# Patient Record
Sex: MALE | Race: WHITE | NOT HISPANIC OR LATINO | Employment: OTHER | URBAN - METROPOLITAN AREA
[De-identification: names, ages, dates, MRNs, and addresses within clinical notes are randomized per-mention and may not be internally consistent; named-entity substitution may affect disease eponyms.]

---

## 2017-01-02 ENCOUNTER — APPOINTMENT (OUTPATIENT)
Dept: CARDIOLOGY | Facility: HOSPITAL | Age: 67
End: 2017-01-02
Attending: INTERNAL MEDICINE

## 2017-01-02 PROCEDURE — 93306 TTE W/DOPPLER COMPLETE: CPT | Performed by: INTERNAL MEDICINE

## 2017-01-02 PROCEDURE — C8929 TTE W OR WO FOL WCON,DOPPLER: HCPCS

## 2017-01-04 ENCOUNTER — APPOINTMENT (OUTPATIENT)
Dept: CARDIOLOGY | Facility: HOSPITAL | Age: 67
End: 2017-01-04

## 2017-01-06 ENCOUNTER — APPOINTMENT (OUTPATIENT)
Dept: CARDIOLOGY | Facility: HOSPITAL | Age: 67
End: 2017-01-06

## 2017-01-09 ENCOUNTER — APPOINTMENT (OUTPATIENT)
Dept: CARDIOLOGY | Facility: HOSPITAL | Age: 67
End: 2017-01-09

## 2017-01-09 RX ORDER — CARVEDILOL 3.12 MG/1
TABLET ORAL
Qty: 60 TABLET | Refills: 0 | OUTPATIENT
Start: 2017-01-09

## 2017-01-09 RX ORDER — CARVEDILOL 6.25 MG/1
12.5 TABLET ORAL 2 TIMES DAILY WITH MEALS
Qty: 60 TABLET | Refills: 6 | Status: CANCELLED
Start: 2017-01-09

## 2017-01-10 ENCOUNTER — OFFICE VISIT (OUTPATIENT)
Dept: CARDIOLOGY | Facility: CLINIC | Age: 67
End: 2017-01-10

## 2017-01-10 VITALS
HEIGHT: 68 IN | SYSTOLIC BLOOD PRESSURE: 138 MMHG | BODY MASS INDEX: 26.98 KG/M2 | DIASTOLIC BLOOD PRESSURE: 78 MMHG | WEIGHT: 178 LBS | HEART RATE: 96 BPM

## 2017-01-10 DIAGNOSIS — I25.5 CARDIOMYOPATHY, ISCHEMIC: Primary | ICD-10-CM

## 2017-01-10 PROCEDURE — 93000 ELECTROCARDIOGRAM COMPLETE: CPT | Performed by: NURSE PRACTITIONER

## 2017-01-10 PROCEDURE — 99214 OFFICE O/P EST MOD 30 MIN: CPT | Performed by: NURSE PRACTITIONER

## 2017-01-10 NOTE — PROGRESS NOTES
Date of Office Visit: 01/10/2017  Encounter Provider: ALEX Soler  Place of Service: [facility] CARDIOLOGY  Patient Name: Mello Arias Jr.  :1950    Chief Complaint   Patient presents with   • Shortness of Breath     Follow-up   :       HPI: Mello Arias Jr. is a 66 y.o. male who has a history of coronary artery disease, myocardial infarction (), and status post angioplasty of the left anterior descending artery, ventricular tachycardia (ICD) and paroxysmal atrial fibrillation.  He also has a history of left ventricular dysfunction.  He is a patient that is followed by Dr. Calvin and I'm seeing him for the first time today.    In 2016, the patient came in for preoperative assessment.  He was seen by ALEX Chinchilla and and reported he had been having midsternal chest pain.  At that time, an echocardiogram and stress tests were ordered.  In 2016, his echo showed his left ventricular cavity severely dilated.  His EF was 19%.  HEENT mildly reduced right ventricular systolic function, moderate MR and mild to moderate TR.  He had a estimated RVSP of greater than 55 mmHg.  His Myoview stress test indicated a large sized infarct located in the anterior wall, inferior wall, septal wall and apex with mild kavita-infarct ischemia.  Both tests were felt to be unchanged from his prior studies in 2014.  At that time, we felt he was high risk to undergo a moderate risk surgery.    About a week after this testing performed, he presented to the hospital with precordial chest pain.  He had elevated proBNP consistent with acute on chronic systolic congestive heart failure.  He was diuresed and discharged 2 days later.  He followed up with her Nikunj few weeks after discharge and was complaining of palpitation and continued orthopnea.  His persistent tightness in the precordium and was easily fatigued.  He went on to have a cardiac  catheterization.  This showed severe two-vessel coronary disease with complete occlusion of the proximal LAD and high-grade stenosis of the distal right coronary artery.  He underwent a CABG ×4 and mitral valve prosthesis with a tissue prosthesis and a tricuspid valve repair.  On November 7, 2016.  He enrolled in cardiac rehabilitation.  He was doing well before Christmas and then on December 30 presented to the hospital with chest tightness, elevated blood pressure as well as shortness of breath.  He was found to be in acute systolic heart failure.  His diuresed with IV diuretics and switched to oral.  An echocardiogram revealed his EF still to be around 20% with stable valve function.    Today, he comes in for follow-up.  He is feeling much improved than when he was in the hospital.  He is found that with his current medications he is able to take breaths easier.  Cardiac rehabilitation was also easier.  He is scheduled to start that again tomorrow.  He denies palpitations or tachycardia.  His edema is improved.  He denies lightheadedness, chest pain, fatigue, orthopnea or PND.  He monitors his blood pressure at home and typically runs 130/60.    Past Medical History   Diagnosis Date   • Allergic rhinitis    • Anemia    • Asthma    • Bone fracture    • CAD (coronary artery disease)    • Cardiomyopathy, ischemic    • Carpal tunnel syndrome    • Fatigue    • GERD (gastroesophageal reflux disease)    • Health care maintenance    • Herniated disc, cervical    • Hx of total hip arthroplasty 3/18/2016   • Hyperglycemia    • Hyperlipidemia    • Hypertension    • Lumbosacral radiculopathy at L4    • Myocardial infarction      OF INFERIOR WALL, GREATER THAN 8 WEEKS   • Myocardial ischemia      POST MYOCARDIAL INFARCTION, POST PTCA WITH STENT PLACEMENT   • Open wound      OF LEFT INDEX FINGER WITHOUT DAMAGE TO NAIL   • Osteoarthritis    • PAF (paroxysmal atrial fibrillation)    • Pneumonia    • Shortness of breath    •  Ventricular tachycardia      POST AICD       Past Surgical History   Procedure Laterality Date   • Appendectomy     • Foot surgery Left    • Implantable cardioverter defibrillator lead replacement/pocket revision     • Coronary angioplasty       WITH STENT PLACEMENT TO THE LAD   • Tonsillectomy     • Cardiac defibrillator placement     • Carpal tunnel release     • Lasik     • Colonoscopy     • Inguinal hernia repair Left    • Total hip arthroplasty Bilateral    • Cardiac catheterization N/A 10/21/2016     Procedure: Coronary angiography;  Surgeon: Naun Calvin MD;  Location: [facility] CATH INVASIVE LOCATION;  Service:    • Cardiac catheterization N/A 10/21/2016     Procedure: Left heart cath;  Surgeon: Naun Calvin MD;  Location: [facility] CATH INVASIVE LOCATION;  Service:    • Cardiac catheterization N/A 10/21/2016     Procedure: Left ventriculography;  Surgeon: Naun Calvin MD;  [facility];  Service:    • Cardiac catheterization N/A 10/21/2016     Procedure: Right heart cath;  Surgeon: Naun Calvin MD;  Location: [facility] CATH INVASIVE LOCATION;  Service:    • Eye surgery     • Hernia repair     • Coronary artery bypass graft with mitral valve repair/replacement N/A 11/7/2016     Procedure: JAYE STERNOTOMY CORONARY ARTERY BYPASS GRAFT TIMES 4 USING LEFT INTERNAL MAMMARY ARTERY AND LEFT GREATER SAPHENOUS VEIN GRAFT PER ENDOSCOPIC VEIN HARVESTING, MITRAL VALVE REPLACEMENT AND TRICUSPID VALVE REPAIR;  Surgeon: Slade Huratdo MD;  Location: [facility] OR;  Service:            Review of Systems   Constitution: Positive for malaise/fatigue. Negative for fever.   HENT: Negative for ear pain, hearing loss, nosebleeds and sore throat.    Eyes: Negative for double vision, pain, vision loss in left eye, vision loss in right eye and visual disturbance.   Cardiovascular: Positive for dyspnea on exertion. Negative for claudication, leg swelling and orthopnea.   Respiratory:  "Positive for shortness of breath and wheezing. Negative for cough and snoring.    Endocrine: Negative for cold intolerance, heat intolerance and polyuria.   Skin: Negative for color change, itching and rash.   Musculoskeletal: Negative for joint pain, joint swelling and muscle cramps.   Gastrointestinal: Negative for abdominal pain, diarrhea, melena, nausea and vomiting.   Genitourinary: Negative for bladder incontinence and hematuria.   Neurological: Negative for excessive daytime sleepiness, dizziness, light-headedness, paresthesias and seizures.   Psychiatric/Behavioral: Negative for depression. The patient is not nervous/anxious.    All other systems reviewed and are negative.    All other systems reviewed and are negative    Allergies   Allergen Reactions   • Shellfish-Derived Products Swelling and Other (See Comments)     THROAT SWELLS       All aspects of family and social history reviewed.          Objective:     Vitals:    01/10/17 0951   BP: 138/78   BP Location: Left arm   Pulse: 96   Weight: 178 lb (80.7 kg)   Height: 68\" (172.7 cm)     Body mass index is 27.06 kg/(m^2).    PHYSICAL EXAM:  Physical Exam   Constitutional: He is oriented to person, place, and time. He appears well-developed and well-nourished.   HENT:   Head: Normocephalic.   Eyes: Pupils are equal, round, and reactive to light.   Neck: Normal range of motion. Carotid bruit is not present. No thyromegaly present.   Cardiovascular: Normal rate, regular rhythm, S1 normal, S2 normal, normal heart sounds and intact distal pulses.  Exam reveals no friction rub.    No murmur heard.  Pulmonary/Chest: Effort normal and breath sounds normal.   Abdominal: Soft. Bowel sounds are normal.   Musculoskeletal: He exhibits no edema.   Neurological: He is alert and oriented to person, place, and time.   Skin: Skin is warm, dry and intact. No erythema.   Psychiatric: He has a normal mood and affect.   Vitals reviewed.        ECG 12 Lead  Date/Time: 1/10/2017 " 1:33 PM  Performed by: AMANDA ENRIQUEZ  Authorized by: AMANDA ENRIQUEZ   Comparison: compared with previous ECG from 1/1/2017  Similar to previous ECG  Rhythm: sinus rhythm  Rate: normal  BPM: 96  T depression: II, III, aVF, V4, V5 and V6  QRS axis: normal  Other findings: LVH  Clinical impression: abnormal ECG                Assessment:       Diagnosis Plan   1. Cardiomyopathy, ischemic  Basic Metabolic Panel        Orders Placed This Encounter   Procedures   • Basic Metabolic Panel     Standing Status:   Future     Standing Expiration Date:   1/10/2018   • ECG 12 Lead     This order was created via procedure documentation       Current Outpatient Prescriptions   Medication Sig Dispense Refill   • acetaminophen (TYLENOL) 325 MG tablet Take 2 tablets by mouth Every 4 (Four) Hours As Needed for mild pain (1-3).  0   • albuterol (PROAIR HFA) 108 (90 BASE) MCG/ACT inhaler Inhale 2 puffs Every 6 (Six) Hours As Needed.     • aspirin 81 MG tablet Take 81 mg by mouth Daily.     • atorvastatin (LIPITOR) 40 MG tablet Take 1 tablet by mouth Every Night. 30 tablet 1   • carvedilol (COREG) 6.25 MG tablet Take 6.25 mg by mouth 2 (Two) Times a Day With Meals.     • furosemide (LASIX) 40 MG tablet Take 1 tablet by mouth Daily. 30 tablet 3   • montelukast (SINGULAIR) 10 MG tablet Take 10 mg by mouth Every Night.     • spironolactone (ALDACTONE) 25 MG tablet Take 1 tablet by mouth Daily. 30 tablet 3   • SYMBICORT 160-4.5 MCG/ACT inhaler Inhale 2 puffs 2 (Two) Times a Day.     • zolpidem (AMBIEN) 5 MG tablet Take 5 mg by mouth At Night As Needed.     • sacubitril-valsartan (ENTRESTO) 24-26 MG tablet Take 1 tablet by mouth 2 (Two) Times a Day. 60 tablet 3     No current facility-administered medications for this visit.             Plan:       #1 ischemic cardiomyopathy-patient with a long standing history of cardiomyopathy.  He is now status post CABG and mitral valve replacement tricuspid valve repair.  His EF still remains low  around 20%.  He has had a few admissions for systolic heart failure and needed diuresis.  Currently he's on guidelines directed medical therapy of carvedilol 6.25 mg twice day, enalapril 2.5 mg daily, spironolactone 25 mg daily and furosemide 40 mg daily.  Due to the patient's recent hospitalizations, in [place] Heart Association class II symptoms, I am going to discontinue his enalapril and began him on Entresto 24/26 mg twice daily.  He will stop his enalapril for 36 hours to allow for washout.  He'll monitor his blood pressures at home and report any side effects.  At this time I will leave his furosemide as it currently is.  If he develops hypotension, we can discuss decreasing his furosemide dosing.  He will have a BMP drawn in one to 2 weeks to follow-up the medication change.      Follow up in office in 3 weeks with Dr. Calvin.    Greater than 25 minute face-to-face time with more than 50% of that time used for education regarding Entresto and heart failure.    As always, it has been a pleasure to participate in this patient's care.      Sincerely,      ALEX Soler    Heart Failure  Assessment  • NYHA class II - There is slight limitation of physical activity. The patient is comfortable at rest, but physical activity results in fatigue, palpitations or shortness of breath.  • Beta blocker prescribed  • Diuretics prescribed  • Angiotensin receptor blocker (ARB) prescribed  • The most recent ejection fraction is 10%  • Left ventricular function is severely reduced by qualitative assessment  • The left ventricle was last assessed on 1/2/2017    Plan  • The patient has received heart failure education on the following topics: dietary sodium restriction, medication instructions, minimizing or avoiding NSAID use, symptom management, physical activity, weight monitoring and minimizing alcohol intake  • The heart failure care plan was discussed with the patient today including: up-titrating HF  medications    Subjective/Objective  • The patient reports dyspnea    • The patient has an ICD implant

## 2017-01-11 ENCOUNTER — APPOINTMENT (OUTPATIENT)
Dept: CARDIOLOGY | Facility: HOSPITAL | Age: 67
End: 2017-01-11

## 2017-01-12 NOTE — TELEPHONE ENCOUNTER
I riverageorge Huitron and asked that they send this refill request to Dr Calvin's office [phone]

## 2017-01-13 ENCOUNTER — APPOINTMENT (OUTPATIENT)
Dept: CARDIOLOGY | Facility: HOSPITAL | Age: 67
End: 2017-01-13

## 2017-01-16 ENCOUNTER — DOCUMENTATION (OUTPATIENT)
Dept: CARDIOLOGY | Facility: CLINIC | Age: 67
End: 2017-01-16

## 2017-01-16 ENCOUNTER — APPOINTMENT (OUTPATIENT)
Dept: CARDIOLOGY | Facility: HOSPITAL | Age: 67
End: 2017-01-16

## 2017-01-16 NOTE — PROGRESS NOTES
1/16/17  Received Prior Authorization from [facility] for pt's Entresto 24/26mg - approved through 12/31/2017 under his Medicare Pard D.  Sent to lit/jaky

## 2017-01-18 ENCOUNTER — APPOINTMENT (OUTPATIENT)
Dept: CARDIOLOGY | Facility: HOSPITAL | Age: 67
End: 2017-01-18

## 2017-01-19 ENCOUNTER — LAB (OUTPATIENT)
Dept: LAB | Facility: HOSPITAL | Age: 67
End: 2017-01-19

## 2017-01-19 ENCOUNTER — TELEPHONE (OUTPATIENT)
Dept: CARDIOLOGY | Facility: CLINIC | Age: 67
End: 2017-01-19

## 2017-01-19 DIAGNOSIS — I25.5 CARDIOMYOPATHY, ISCHEMIC: ICD-10-CM

## 2017-01-19 LAB
ANION GAP SERPL CALCULATED.3IONS-SCNC: 14.4 MMOL/L
BUN BLD-MCNC: 22 MG/DL (ref 8–23)
BUN/CREAT SERPL: 20.4 (ref 7–25)
CALCIUM SPEC-SCNC: 9.3 MG/DL (ref 8.6–10.5)
CHLORIDE SERPL-SCNC: 100 MMOL/L (ref 98–107)
CO2 SERPL-SCNC: 26.6 MMOL/L (ref 22–29)
CREAT BLD-MCNC: 1.08 MG/DL (ref 0.76–1.27)
GFR SERPL CREATININE-BSD FRML MDRD: 68 ML/MIN/1.73
GLUCOSE BLD-MCNC: 157 MG/DL (ref 65–99)
POTASSIUM BLD-SCNC: 4.3 MMOL/L (ref 3.5–5.2)
SODIUM BLD-SCNC: 141 MMOL/L (ref 136–145)

## 2017-01-19 PROCEDURE — 80048 BASIC METABOLIC PNL TOTAL CA: CPT

## 2017-01-19 PROCEDURE — 36415 COLL VENOUS BLD VENIPUNCTURE: CPT

## 2017-01-19 RX ORDER — ATORVASTATIN CALCIUM 40 MG/1
TABLET, FILM COATED ORAL
Qty: 30 TABLET | Refills: 0 | OUTPATIENT
Start: 2017-01-19

## 2017-01-20 ENCOUNTER — APPOINTMENT (OUTPATIENT)
Dept: CARDIOLOGY | Facility: HOSPITAL | Age: 67
End: 2017-01-20

## 2017-01-23 ENCOUNTER — APPOINTMENT (OUTPATIENT)
Dept: CARDIOLOGY | Facility: HOSPITAL | Age: 67
End: 2017-01-23

## 2017-01-25 ENCOUNTER — APPOINTMENT (OUTPATIENT)
Dept: CARDIOLOGY | Facility: HOSPITAL | Age: 67
End: 2017-01-25

## 2017-01-27 ENCOUNTER — TELEPHONE (OUTPATIENT)
Dept: CARDIOLOGY | Facility: HOSPITAL | Age: 67
End: 2017-01-27

## 2017-01-27 ENCOUNTER — APPOINTMENT (OUTPATIENT)
Dept: CARDIOLOGY | Facility: HOSPITAL | Age: 67
End: 2017-01-27

## 2017-01-27 RX ORDER — ATORVASTATIN CALCIUM 40 MG/1
40 TABLET, FILM COATED ORAL NIGHTLY
Qty: 90 TABLET | Refills: 1 | Status: SHIPPED | OUTPATIENT
Start: 2017-01-27 | End: 2017-08-09 | Stop reason: ALTCHOICE

## 2017-01-27 NOTE — TELEPHONE ENCOUNTER
Just FYI, Mr. Arias came to one rehab session, and is not going to return.  He states he is exercising at home.  Thanks,  Claudette

## 2017-01-30 ENCOUNTER — APPOINTMENT (OUTPATIENT)
Dept: CARDIOLOGY | Facility: HOSPITAL | Age: 67
End: 2017-01-30

## 2017-01-31 RX ORDER — FUROSEMIDE 20 MG/1
TABLET ORAL
Qty: 30 TABLET | Refills: 0 | OUTPATIENT
Start: 2017-01-31

## 2017-02-01 ENCOUNTER — APPOINTMENT (OUTPATIENT)
Dept: CARDIOLOGY | Facility: HOSPITAL | Age: 67
End: 2017-02-01

## 2017-02-02 ENCOUNTER — OFFICE VISIT (OUTPATIENT)
Dept: CARDIOLOGY | Facility: CLINIC | Age: 67
End: 2017-02-02

## 2017-02-02 VITALS
BODY MASS INDEX: 27.28 KG/M2 | HEIGHT: 68 IN | HEART RATE: 97 BPM | WEIGHT: 180 LBS | DIASTOLIC BLOOD PRESSURE: 72 MMHG | SYSTOLIC BLOOD PRESSURE: 104 MMHG

## 2017-02-02 DIAGNOSIS — I36.1 NON-RHEUMATIC TRICUSPID VALVE INSUFFICIENCY: ICD-10-CM

## 2017-02-02 DIAGNOSIS — I50.23 ACUTE ON CHRONIC SYSTOLIC CONGESTIVE HEART FAILURE (HCC): ICD-10-CM

## 2017-02-02 DIAGNOSIS — I25.5 CARDIOMYOPATHY, ISCHEMIC: Primary | ICD-10-CM

## 2017-02-02 DIAGNOSIS — I34.0 NON-RHEUMATIC MITRAL REGURGITATION: ICD-10-CM

## 2017-02-02 DIAGNOSIS — I48.0 PAROXYSMAL ATRIAL FIBRILLATION (HCC): ICD-10-CM

## 2017-02-02 DIAGNOSIS — I47.29 IDIOPATHIC VENTRICULAR TACHYCARDIA (HCC): ICD-10-CM

## 2017-02-02 PROCEDURE — 99214 OFFICE O/P EST MOD 30 MIN: CPT | Performed by: INTERNAL MEDICINE

## 2017-02-02 PROCEDURE — 93000 ELECTROCARDIOGRAM COMPLETE: CPT | Performed by: INTERNAL MEDICINE

## 2017-02-02 RX ORDER — CARVEDILOL 3.12 MG/1
3.12 TABLET ORAL 2 TIMES DAILY WITH MEALS
COMMUNITY
End: 2017-03-16 | Stop reason: DRUGHIGH

## 2017-02-02 NOTE — PROGRESS NOTES
Date of Office Visit: 2017  Encounter Provider: Naun Calvin MD  Place of Service: [facility] CARDIOLOGY  Patient Name: Mello Arias Jr.  :1950      Chief Complaint   Patient presents with   • Atrial Fibrillation   • Coronary Artery Disease     History of Present Illness   66-year-old white male with a long-standing history of ischemic heart disease.  In  he had his first angioplasty of the left anterior descending coronary artery.  A cause of ventricular tachycardia as well as decreased left ventricular function a prophylactic ICD was placed.  In  the patient became more symptomatic with shortness of breath and angina pectoris.  He was noted to have an ejection fraction of 19% severe left ventricular dysfunction, reduced right ventricular function, moderate mitral regurgitation and tricuspid regurgitation.  He was also noted to have pulmonary hypertension with a right ventricular systolic pressure of 55.  He underwent a follow-up catheterization which showed severe two-vessel disease with complete occlusion of the left anterior descending and high-grade stenosis in the right coronary artery.  The patient underwent bypass surgery in November as well as a mitral valve replacement with a tissue stasis.  In addition he had tricuspid valve repair.  The patient was rehospitalized with congestive heart failure he eventually was placed on additional medication including Entresto.  He now returns for follow-up.  He states he feels much better than he has an a long time.  He does not complain of any palpitations, peripheral edema or chest pain.  He is short of breath with exertion but this improves on a daily basis.  He continues to exercise regularly. his fatigue issue is also improving.    Past Medical History   Diagnosis Date   • Allergic rhinitis    • Anemia    • Asthma    • Bone fracture    • CAD (coronary artery disease)    • Cardiomyopathy, ischemic     • Carpal tunnel syndrome    • Fatigue    • GERD (gastroesophageal reflux disease)    • Health care maintenance    • Herniated disc, cervical    • Hx of total hip arthroplasty 3/18/2016   • Hyperglycemia    • Hyperlipidemia    • Hypertension    • Lumbosacral radiculopathy at L4    • Myocardial infarction      OF INFERIOR WALL, GREATER THAN 8 WEEKS   • Myocardial ischemia      POST MYOCARDIAL INFARCTION, POST PTCA WITH STENT PLACEMENT   • Open wound      OF LEFT INDEX FINGER WITHOUT DAMAGE TO NAIL   • Osteoarthritis    • PAF (paroxysmal atrial fibrillation)    • Pneumonia    • Shortness of breath    • Ventricular tachycardia      POST AICD         Past Surgical History   Procedure Laterality Date   • Appendectomy     • Foot surgery Left    • Implantable cardioverter defibrillator lead replacement/pocket revision     • Coronary angioplasty       WITH STENT PLACEMENT TO THE LAD   • Tonsillectomy     • Cardiac defibrillator placement     • Carpal tunnel release     • Lasik     • Colonoscopy     • Inguinal hernia repair Left    • Total hip arthroplasty Bilateral    • Cardiac catheterization N/A 10/21/2016     Procedure: Coronary angiography;  Surgeon: Naun Calvin MD;  Location:  [facility] CATH INVASIVE LOCATION;  Service:    • Cardiac catheterization N/A 10/21/2016     Procedure: Left heart cath;  Surgeon: Naun Calvin MD;  Location:  [facility] CATH INVASIVE LOCATION;  Service:    • Cardiac catheterization N/A 10/21/2016     Procedure: Left ventriculography;  Surgeon: Naun Calvin MD;  Location:  [facility] CATH INVASIVE LOCATION;  Service:    • Cardiac catheterization N/A 10/21/2016     Procedure: Right heart cath;  Surgeon: Naun Calvin MD;  [facility];  Service:    • Eye surgery     • Hernia repair     • Coronary artery bypass graft with mitral valve repair/replacement N/A 11/7/2016     Procedure: JAYE STERNOTOMY CORONARY ARTERY BYPASS GRAFT TIMES 4 USING LEFT INTERNAL  MAMMARY ARTERY AND LEFT GREATER SAPHENOUS VEIN GRAFT PER ENDOSCOPIC VEIN HARVESTING, MITRAL VALVE REPLACEMENT AND TRICUSPID VALVE REPAIR;  Surgeon: Slade Hurtado MD;  Location: [facility];  Service:            Current Outpatient Prescriptions:   •  acetaminophen (TYLENOL) 325 MG tablet, Take 2 tablets by mouth Every 4 (Four) Hours As Needed for mild pain (1-3)., Disp: , Rfl: 0  •  albuterol (PROAIR HFA) 108 (90 BASE) MCG/ACT inhaler, Inhale 2 puffs Every 6 (Six) Hours As Needed., Disp: , Rfl:   •  aspirin 81 MG tablet, Take 81 mg by mouth Daily., Disp: , Rfl:   •  atorvastatin (LIPITOR) 40 MG tablet, Take 1 tablet by mouth Every Night., Disp: 90 tablet, Rfl: 1  •  carvedilol (COREG) 3.125 MG tablet, Take 3.125 mg by mouth 2 (Two) Times a Day With Meals., Disp: , Rfl:   •  furosemide (LASIX) 40 MG tablet, Take 1 tablet by mouth Daily., Disp: 30 tablet, Rfl: 3  •  montelukast (SINGULAIR) 10 MG tablet, Take 10 mg by mouth Every Night., Disp: , Rfl:   •  sacubitril-valsartan (ENTRESTO) 24-26 MG tablet, Take 1 tablet by mouth 2 (Two) Times a Day., Disp: 60 tablet, Rfl: 3  •  spironolactone (ALDACTONE) 25 MG tablet, Take 1 tablet by mouth Daily., Disp: 30 tablet, Rfl: 3  •  SYMBICORT 160-4.5 MCG/ACT inhaler, Inhale 2 puffs 2 (Two) Times a Day., Disp: , Rfl:   •  zolpidem (AMBIEN) 5 MG tablet, Take 5 mg by mouth At Night As Needed., Disp: , Rfl:       Social History     Social History   • Marital status: Single     Spouse name: N/A   • Number of children: N/A   • Years of education: N/A     Occupational History   • Not on file.     Social History Main Topics   • Smoking status: Never Smoker   • Smokeless tobacco: Never Used   • Alcohol use 6.0 oz/week     10 Shots of liquor per week      Comment: social drinker   • Drug use: No   • Sexual activity: Defer     Other Topics Concern   • Not on file     Social History Narrative         Review of Systems   Constitution: Positive for weakness.   Cardiovascular: Positive  "for dyspnea on exertion.       Procedures      ECG 12 Lead  Date/Time: 2/2/2017 3:50 PM  Performed by: JANENE ZHAO  Authorized by: JANENE ZHAO   Comparison: compared with previous ECG from 1/10/2017  Similar to previous ECG  Rhythm: sinus rhythm  Ectopy: unifocal PVCs  Rate: normal  Conduction: conduction normal  QRS axis: normal  Comments: Diffuse nonspecific ST-T wave changes               Objective:      Visit Vitals   • /72   • Pulse 97   • Ht 68\" (172.7 cm)   • Wt 180 lb (81.6 kg)   • BMI 27.37 kg/m2           Physical Exam   Constitutional: He is oriented to person, place, and time. He appears well-developed and well-nourished.   HENT:   Head: Normocephalic.   Eyes: Pupils are equal, round, and reactive to light.   Neck: Normal range of motion. No JVD present. Carotid bruit is not present. No thyromegaly present.   Cardiovascular: Normal rate, regular rhythm, S1 normal, S2 normal, normal heart sounds and intact distal pulses.  Exam reveals no S3, no S4 and no friction rub.    No murmur heard.  Pulmonary/Chest: Effort normal and breath sounds normal. He has no rales.   Abdominal: Soft. Bowel sounds are normal.   Musculoskeletal: He exhibits no edema.   Neurological: He is alert and oriented to person, place, and time.   Skin: Skin is warm, dry and intact. No erythema.   Psychiatric: He has a normal mood and affect.   Vitals reviewed.          Assessment:       Diagnosis Plan   1. Cardiomyopathy, ischemic     2. Acute on chronic systolic congestive heart failure     3. Idiopathic ventricular tachycardia     4. Non-rheumatic mitral regurgitation     5. Non-rheumatic tricuspid valve insufficiency     6. Paroxysmal atrial fibrillation       Coronary Artery Disease  Assessment  • The patient has no angina    Plan  • Lifestyle modifications discussed include regular exercise and regular monitoring of cholesterol and blood pressure    Subjective - Objective  • There is a history of past MI  • " There is a history of previous coronary artery bypass graft November 2016  • There has been a previous stent procedure using RODY  • Current antiplatelet therapy includes aspirin 81 mg    Heart Failure  Assessment  • NYHA class II - There is slight limitation of physical activity. The patient is comfortable at rest, but physical activity results in fatigue, palpitations or shortness of breath.  • Left ventricular function is severely reduced by qualitative assessment    Plan  • The heart failure care plan was discussed with the patient today including: continuing the current program    Subjective/Objective    • Physical exam findings negative for rales, peripheral edema, elevated JVP, S3 gallop and S4 gallop.  • The patient has an ICD implant      Ventricular tachycardia: Status post AICD, stable at this time    Valvular heart disease: Mitral regurgitation and tricuspid regurgitation.  Status post mitral valve replacement with tissue prosthesis, tricuspid valve repair.    Pulmonary hypertension    Paroxysmal atrial fibrillation: Resolved     Plan:    The patient will see Nayeli JOHNSON in 6 weeks.  I will see him back in 3 months.

## 2017-02-03 ENCOUNTER — APPOINTMENT (OUTPATIENT)
Dept: CARDIOLOGY | Facility: HOSPITAL | Age: 67
End: 2017-02-03

## 2017-02-06 ENCOUNTER — APPOINTMENT (OUTPATIENT)
Dept: CARDIOLOGY | Facility: HOSPITAL | Age: 67
End: 2017-02-06

## 2017-02-08 ENCOUNTER — APPOINTMENT (OUTPATIENT)
Dept: CARDIOLOGY | Facility: HOSPITAL | Age: 67
End: 2017-02-08

## 2017-02-09 ENCOUNTER — TELEPHONE (OUTPATIENT)
Dept: CARDIOLOGY | Facility: CLINIC | Age: 67
End: 2017-02-09

## 2017-02-09 NOTE — TELEPHONE ENCOUNTER
2/9/17  Pt left sg in Nayeli's - states he is to have dental work next week - dentist asked if he needs antibiotics prior.  His ph [phone]/jaky WHITE pt

## 2017-02-10 ENCOUNTER — APPOINTMENT (OUTPATIENT)
Dept: CARDIOLOGY | Facility: HOSPITAL | Age: 67
End: 2017-02-10

## 2017-02-13 ENCOUNTER — APPOINTMENT (OUTPATIENT)
Dept: CARDIOLOGY | Facility: HOSPITAL | Age: 67
End: 2017-02-13

## 2017-02-15 ENCOUNTER — APPOINTMENT (OUTPATIENT)
Dept: CARDIOLOGY | Facility: HOSPITAL | Age: 67
End: 2017-02-15

## 2017-02-17 ENCOUNTER — APPOINTMENT (OUTPATIENT)
Dept: CARDIOLOGY | Facility: HOSPITAL | Age: 67
End: 2017-02-17

## 2017-02-20 ENCOUNTER — APPOINTMENT (OUTPATIENT)
Dept: CARDIOLOGY | Facility: HOSPITAL | Age: 67
End: 2017-02-20

## 2017-02-22 ENCOUNTER — APPOINTMENT (OUTPATIENT)
Dept: CARDIOLOGY | Facility: HOSPITAL | Age: 67
End: 2017-02-22

## 2017-02-24 ENCOUNTER — APPOINTMENT (OUTPATIENT)
Dept: CARDIOLOGY | Facility: HOSPITAL | Age: 67
End: 2017-02-24

## 2017-02-27 ENCOUNTER — APPOINTMENT (OUTPATIENT)
Dept: CARDIOLOGY | Facility: HOSPITAL | Age: 67
End: 2017-02-27

## 2017-03-01 ENCOUNTER — APPOINTMENT (OUTPATIENT)
Dept: CARDIOLOGY | Facility: HOSPITAL | Age: 67
End: 2017-03-01

## 2017-03-03 ENCOUNTER — APPOINTMENT (OUTPATIENT)
Dept: CARDIOLOGY | Facility: HOSPITAL | Age: 67
End: 2017-03-03

## 2017-03-06 ENCOUNTER — CLINICAL SUPPORT NO REQUIREMENTS (OUTPATIENT)
Dept: CARDIOLOGY | Facility: CLINIC | Age: 67
End: 2017-03-06

## 2017-03-06 ENCOUNTER — APPOINTMENT (OUTPATIENT)
Dept: CARDIOLOGY | Facility: HOSPITAL | Age: 67
End: 2017-03-06

## 2017-03-06 ENCOUNTER — TELEPHONE (OUTPATIENT)
Dept: CARDIOLOGY | Facility: CLINIC | Age: 67
End: 2017-03-06

## 2017-03-06 DIAGNOSIS — I42.9 CARDIOMYOPATHY (HCC): Primary | ICD-10-CM

## 2017-03-06 PROCEDURE — 93295 DEV INTERROG REMOTE 1/2/MLT: CPT | Performed by: INTERNAL MEDICINE

## 2017-03-06 PROCEDURE — 93296 REM INTERROG EVL PM/IDS: CPT | Performed by: INTERNAL MEDICINE

## 2017-03-06 NOTE — TELEPHONE ENCOUNTER
His icd was checked remotely today.  Since last checked in office on 12/1/16 there has been a drop in RV imp by 200 ohms, a drop in RV amplitude in 10mv and a rise in shock imp by nearly 10 ohms.  He has had 11 nst since his device was checked in hospital on 12/31/16, ones with egms are 4-19 beats nst vt.  Per information stored in his device these changes in rv lead had occurred between 12/1 and 12/31 checks.  12/31 tresholds were good based on testing stored in his device.  I spoke to pt and he states he feels great since he was started on Entresto.  He has an apt with KWAKU Armenta in March and with you in May.  We plan to check his icd when he sees you in May

## 2017-03-08 ENCOUNTER — APPOINTMENT (OUTPATIENT)
Dept: CARDIOLOGY | Facility: HOSPITAL | Age: 67
End: 2017-03-08

## 2017-03-10 ENCOUNTER — APPOINTMENT (OUTPATIENT)
Dept: CARDIOLOGY | Facility: HOSPITAL | Age: 67
End: 2017-03-10

## 2017-03-13 ENCOUNTER — APPOINTMENT (OUTPATIENT)
Dept: CARDIOLOGY | Facility: HOSPITAL | Age: 67
End: 2017-03-13

## 2017-03-15 ENCOUNTER — APPOINTMENT (OUTPATIENT)
Dept: CARDIOLOGY | Facility: HOSPITAL | Age: 67
End: 2017-03-15

## 2017-03-16 ENCOUNTER — OFFICE VISIT (OUTPATIENT)
Dept: CARDIOLOGY | Facility: CLINIC | Age: 67
End: 2017-03-16

## 2017-03-16 VITALS
BODY MASS INDEX: 27.28 KG/M2 | SYSTOLIC BLOOD PRESSURE: 116 MMHG | WEIGHT: 180 LBS | HEART RATE: 89 BPM | HEIGHT: 68 IN | DIASTOLIC BLOOD PRESSURE: 82 MMHG

## 2017-03-16 DIAGNOSIS — I25.5 CARDIOMYOPATHY, ISCHEMIC: Primary | ICD-10-CM

## 2017-03-16 DIAGNOSIS — I47.29 IDIOPATHIC VENTRICULAR TACHYCARDIA (HCC): ICD-10-CM

## 2017-03-16 PROCEDURE — 93000 ELECTROCARDIOGRAM COMPLETE: CPT | Performed by: NURSE PRACTITIONER

## 2017-03-16 PROCEDURE — 99213 OFFICE O/P EST LOW 20 MIN: CPT | Performed by: NURSE PRACTITIONER

## 2017-03-16 RX ORDER — CARVEDILOL 6.25 MG/1
6.25 TABLET ORAL 2 TIMES DAILY
Qty: 180 TABLET | Refills: 3 | Status: SHIPPED | OUTPATIENT
Start: 2017-03-16 | End: 2017-08-16 | Stop reason: SDUPTHER

## 2017-03-16 NOTE — PROGRESS NOTES
Date of Office Visit: 2017  Encounter Provider: ALEX Soler  Place of Service: [facility] CARDIOLOGY  Patient Name: Mello Arias Jr.  :1950    Chief Complaint   Patient presents with   • Cardiomyopathy   :     HPI: Mello Arias Jr. is a 66 y.o. male comes in today for follow up. He has a history of coronary artery disease, myocardial infarction (), and status post angioplasty of the left anterior descending artery, ventricular tachycardia (ICD) and paroxysmal atrial fibrillation. He also has a history of left ventricular dysfunction.      In 2016, the patient came in for preoperative assessment. He was seen by ALEX Chinchilla and and reported he had been having midsternal chest pain. At that time, an echocardiogram and stress tests were ordered. In 2016, his echo showed his left ventricular cavity severely dilated. His EF was 19%. HEENT mildly reduced right ventricular systolic function, moderate MR and mild to moderate TR. He had a estimated RVSP of greater than 55 mmHg. His Myoview stress test indicated a large sized infarct located in the anterior wall, inferior wall, septal wall and apex with mild kavita-infarct ischemia. Both tests were felt to be unchanged from his prior studies in 2014. At that time, we felt he was high risk to undergo a moderate risk surgery.    About a week after this testing performed, he presented to the hospital with precordial chest pain. He had elevated proBNP consistent with acute on chronic systolic congestive heart failure. He was diuresed and discharged 2 days later. He followed up with her Licandro few weeks after discharge and was complaining of palpitation and continued orthopnea. His persistent tightness in the precordium and was easily fatigued. He went on to have a cardiac catheterization. This showed severe two-vessel coronary disease with complete occlusion of the proximal LAD  and high-grade stenosis of the distal right coronary artery. He underwent a CABG ×4 and mitral valve prosthesis with a tissue prosthesis and a tricuspid valve repair. On November 7, 2016. He enrolled in cardiac rehabilitation. He was doing well before Christmas and then on December 30 presented to the hospital with chest tightness, elevated blood pressure as well as shortness of breath. He was found to be in acute systolic heart failure. His diuresed with IV diuretics and switched to oral. An echocardiogram revealed his EF still to be around 20% with stable valve function.    He came in for f/u with me on January 10, 2017 has started him on Entresto.    His ICD was interrogated on March 6, 2017.  He had a drop in his RV RV imp by 200 ohms, a drop in RV amplitude in 10mv and a rise in shock imp by nearly 10 ohms. He has had 11 nst since his device was checked in hospital on 12/31/16, ones with egms are 4-19 beats nst vt. Per information stored in his device these changes in rv lead had occurred between 12/1 and 12/31 checks. 12/31 tresholds were good based on testing stored in his device.     Today he comes in for followup and has been feeling well.  He says this is the best he has felt in sometime.  He reports that the last time he came into clinic he still had trouble walking in.  Since then, he has really seen an improvement.  He is walking daily on his treadmill.  He had no problems walking in the clinic today.  His shortness of breath is greatly improved.  He also contributes this a little to having a sleeping pill and having great sleep.  This has really helped his morale and how he has been feeling.  His ICD interrogation reported some nonsustained VT episodes.  He denies ever feeling palpitations or anything that scared him.  He denies edema or lightheadedness.  He denies chest pain, orthopnea or PND.  His Entresto costs him $26 per month.         Past Medical History   Diagnosis Date   • Allergic rhinitis     • Anemia    • Asthma    • Bone fracture    • CAD (coronary artery disease)    • Cardiomyopathy, ischemic    • Carpal tunnel syndrome    • Fatigue    • GERD (gastroesophageal reflux disease)    • Health care maintenance    • Herniated disc, cervical    • Hx of total hip arthroplasty 3/18/2016   • Hyperglycemia    • Hyperlipidemia    • Hypertension    • Lumbosacral radiculopathy at L4    • Myocardial infarction      OF INFERIOR WALL, GREATER THAN 8 WEEKS   • Myocardial ischemia      POST MYOCARDIAL INFARCTION, POST PTCA WITH STENT PLACEMENT   • Open wound      OF LEFT INDEX FINGER WITHOUT DAMAGE TO NAIL   • Osteoarthritis    • PAF (paroxysmal atrial fibrillation)    • Pneumonia    • Shortness of breath    • Ventricular tachycardia      POST AICD       Past Surgical History   Procedure Laterality Date   • Appendectomy     • Foot surgery Left    • Implantable cardioverter defibrillator lead replacement/pocket revision     • Coronary angioplasty       WITH STENT PLACEMENT TO THE LAD   • Tonsillectomy     • Cardiac defibrillator placement     • Carpal tunnel release     • Lasik     • Colonoscopy     • Inguinal hernia repair Left    • Total hip arthroplasty Bilateral    • Cardiac catheterization N/A 10/21/2016     Procedure: Coronary angiography;  Surgeon: Naun Calvin MD;  Location:  [facility] CATH INVASIVE LOCATION;  Service:    • Cardiac catheterization N/A 10/21/2016     Procedure: Left heart cath;  Surgeon: Naun Calvin MD;  Location:  [facility] CATH INVASIVE LOCATION;  Service:    • Cardiac catheterization N/A 10/21/2016     Procedure: Left ventriculography;  Surgeon: Naun Calvin MD;  Location:  [facility] CATH INVASIVE LOCATION;  Service:    • Cardiac catheterization N/A 10/21/2016     Procedure: Right heart cath;  Surgeon: Naun Calvin MD;  Location: [facility] CATH INVASIVE LOCATION;  Service:    • Eye surgery     • Hernia repair     • Coronary artery bypass graft with mitral valve repair/replacement  "N/A 11/7/2016     Procedure: JAYE STERNOTOMY CORONARY ARTERY BYPASS GRAFT TIMES 4 USING LEFT INTERNAL MAMMARY ARTERY AND LEFT GREATER SAPHENOUS VEIN GRAFT PER ENDOSCOPIC VEIN HARVESTING, MITRAL VALVE REPLACEMENT AND TRICUSPID VALVE REPAIR;  Surgeon: Slade Hurtado MD;  Location: [facility]  Service:            Review of Systems   Constitution: Positive for malaise/fatigue (improved). Negative for fever.   HENT: Negative for ear pain, hearing loss, nosebleeds and sore throat.    Eyes: Negative for double vision, pain, vision loss in left eye, vision loss in right eye and visual disturbance.   Cardiovascular: Positive for dyspnea on exertion (improved). Negative for claudication, leg swelling and orthopnea.   Respiratory: Negative for cough, shortness of breath, snoring and wheezing.    Endocrine: Negative for cold intolerance, heat intolerance and polyuria.   Skin: Negative for color change, itching and rash.   Musculoskeletal: Negative for joint pain, joint swelling and muscle cramps.   Gastrointestinal: Negative for abdominal pain, diarrhea, melena, nausea and vomiting.   Genitourinary: Negative for bladder incontinence and hematuria.   Neurological: Negative for excessive daytime sleepiness, dizziness, light-headedness, paresthesias and seizures.   Psychiatric/Behavioral: Negative for depression. The patient is not nervous/anxious.    All other systems reviewed and are negative.    All other systems reviewed and are negative    Allergies   Allergen Reactions   • Shellfish-Derived Products Swelling and Other (See Comments)     THROAT SWELLS       All aspects of family and social history reviewed.          Objective:     Vitals:    03/16/17 1015   BP: 116/82   BP Location: Left arm   Pulse: 89   Weight: 180 lb (81.6 kg)   Height: 68\" (172.7 cm)     Body mass index is 27.37 kg/(m^2).    PHYSICAL EXAM:  Physical Exam   Constitutional: He is oriented to person, place, and time. He appears well-developed and " well-nourished.   HENT:   Head: Normocephalic.   Eyes: Pupils are equal, round, and reactive to light.   Neck: Normal range of motion. No JVD present. Carotid bruit is not present. No thyromegaly present.   Cardiovascular: Normal rate, regular rhythm, S1 normal, S2 normal, normal heart sounds and intact distal pulses.  Exam reveals no S3, no S4 and no friction rub.    No murmur heard.  Pulmonary/Chest: Effort normal and breath sounds normal. He has no rales.   Abdominal: Soft. Bowel sounds are normal.   Musculoskeletal: He exhibits no edema.   Neurological: He is alert and oriented to person, place, and time.   Skin: Skin is warm, dry and intact. No erythema.   Psychiatric: He has a normal mood and affect.   Vitals reviewed.        ECG 12 Lead  Date/Time: 3/16/2017 10:44 AM  Performed by: AMANDA ENRIQUEZ  Authorized by: AMANDA ENRIQUEZ   Comparison: compared with previous ECG from 2/2/2017  Similar to previous ECG  Rhythm: sinus rhythm  Rate: normal  BPM: 89  Conduction: conduction normal  QRS axis: normal  Clinical impression: abnormal ECG  Comments: Diffuse non-specific ST-T changes  Indication: CMP                Assessment:       Diagnosis Plan   1. Cardiomyopathy, ischemic  Basic Metabolic Panel   2. Idiopathic ventricular tachycardia          Orders Placed This Encounter   Procedures   • Basic Metabolic Panel     Standing Status:   Future     Standing Expiration Date:   3/16/2018       Current Outpatient Prescriptions   Medication Sig Dispense Refill   • acetaminophen (TYLENOL) 325 MG tablet Take 2 tablets by mouth Every 4 (Four) Hours As Needed for mild pain (1-3).  0   • albuterol (PROAIR HFA) 108 (90 BASE) MCG/ACT inhaler Inhale 2 puffs Every 6 (Six) Hours As Needed.     • aspirin 81 MG tablet Take 81 mg by mouth Daily.     • atorvastatin (LIPITOR) 40 MG tablet Take 1 tablet by mouth Every Night. 90 tablet 1   • furosemide (LASIX) 40 MG tablet Take 1 tablet by mouth Daily. 30 tablet 3   • montelukast  (SINGULAIR) 10 MG tablet Take 10 mg by mouth Every Night.     • sacubitril-valsartan (ENTRESTO) 24-26 MG tablet Take 1 tablet by mouth 2 (Two) Times a Day. 60 tablet 3   • spironolactone (ALDACTONE) 25 MG tablet Take 1 tablet by mouth Daily. 30 tablet 3   • SYMBICORT 160-4.5 MCG/ACT inhaler Inhale 2 puffs 2 (Two) Times a Day.     • zolpidem (AMBIEN) 5 MG tablet Take 5 mg by mouth At Night As Needed.     • carvedilol (COREG) 6.25 MG tablet Take 1 tablet by mouth 2 (Two) Times a Day. 180 tablet 3     No current facility-administered medications for this visit.             Plan:     1. Heart Failure  Assessment  • NYHA class II - There is slight limitation of physical activity. The patient is comfortable at rest, but physical activity results in fatigue, palpitations or shortness of breath.  • The patient was newly diagnosed with heart failure within the past 12 months  • Beta blocker prescribed  • Diuretics prescribed  • Angiotensin receptor blocker (ARB) prescribed  • Left ventricular function is severely reduced by qualitative assessment    Plan  • The patient has received heart failure education on the following topics: dietary sodium restriction, medication instructions, symptom management and weight monitoring  • The heart failure care plan was discussed with the patient today including: up-titrating HF medications  •  The patient has been counseled about ICD or CRT-D implantation    Subjective/Objective    • Physical exam findings negative for rales, elevated JVP, S3 gallop and S4 gallop.  • The patient has an ICD implant  • Patient is feeling well on Entresto.  He is on good guideline directed medical therapy.  His ICD interrogation has recorded some increase in NSVT episodes.  His blood pressure is able to tolerate an increase of carvedilol.  I'm going to increase his carvedilol to 6.25 mg twice a day.  Continue all current medications.  He will get a BMP today.      2. Coronary Artery Disease  Assessment  •  The patient has no angina    Plan  • Lifestyle modifications discussed include regular exercise and regular monitoring of cholesterol and blood pressure    Subjective - Objective  • There is a history of past MI  • There is a history of previous coronary artery bypass graft November 2016  • There has been a previous stent procedure using RODY  • Current antiplatelet therapy includes aspirin 81 mg      Hx of VT-increased amount of NSVT episodes on ICD interrogation. Carvedilol increased to 6.25 mg bid.     Follow up in office in May as scheduled    As always, it has been a pleasure to participate in this patient's care.      Sincerely,      ALEX Soler

## 2017-03-17 ENCOUNTER — APPOINTMENT (OUTPATIENT)
Dept: CARDIOLOGY | Facility: HOSPITAL | Age: 67
End: 2017-03-17

## 2017-03-20 ENCOUNTER — LAB (OUTPATIENT)
Dept: LAB | Facility: HOSPITAL | Age: 67
End: 2017-03-20

## 2017-03-20 ENCOUNTER — APPOINTMENT (OUTPATIENT)
Dept: CARDIOLOGY | Facility: HOSPITAL | Age: 67
End: 2017-03-20

## 2017-03-20 DIAGNOSIS — I25.5 CARDIOMYOPATHY, ISCHEMIC: ICD-10-CM

## 2017-03-20 LAB
ANION GAP SERPL CALCULATED.3IONS-SCNC: 14.3 MMOL/L
BUN BLD-MCNC: 21 MG/DL (ref 8–23)
BUN/CREAT SERPL: 19.1 (ref 7–25)
CALCIUM SPEC-SCNC: 9.6 MG/DL (ref 8.6–10.5)
CHLORIDE SERPL-SCNC: 97 MMOL/L (ref 98–107)
CO2 SERPL-SCNC: 25.7 MMOL/L (ref 22–29)
CREAT BLD-MCNC: 1.1 MG/DL (ref 0.76–1.27)
GFR SERPL CREATININE-BSD FRML MDRD: 67 ML/MIN/1.73
GLUCOSE BLD-MCNC: 124 MG/DL (ref 65–99)
POTASSIUM BLD-SCNC: 4.2 MMOL/L (ref 3.5–5.2)
SODIUM BLD-SCNC: 137 MMOL/L (ref 136–145)

## 2017-03-20 PROCEDURE — 80048 BASIC METABOLIC PNL TOTAL CA: CPT

## 2017-03-20 PROCEDURE — 36415 COLL VENOUS BLD VENIPUNCTURE: CPT

## 2017-03-20 RX ORDER — METOPROLOL SUCCINATE 25 MG/1
TABLET, EXTENDED RELEASE ORAL
Qty: 180 TABLET | Refills: 0 | OUTPATIENT
Start: 2017-03-20

## 2017-03-21 ENCOUNTER — TELEPHONE (OUTPATIENT)
Dept: CARDIOLOGY | Facility: CLINIC | Age: 67
End: 2017-03-21

## 2017-03-22 ENCOUNTER — APPOINTMENT (OUTPATIENT)
Dept: CARDIOLOGY | Facility: HOSPITAL | Age: 67
End: 2017-03-22

## 2017-03-24 ENCOUNTER — APPOINTMENT (OUTPATIENT)
Dept: CARDIOLOGY | Facility: HOSPITAL | Age: 67
End: 2017-03-24

## 2017-03-27 ENCOUNTER — APPOINTMENT (OUTPATIENT)
Dept: CARDIOLOGY | Facility: HOSPITAL | Age: 67
End: 2017-03-27

## 2017-04-12 ENCOUNTER — TELEPHONE (OUTPATIENT)
Dept: CARDIOLOGY | Facility: CLINIC | Age: 67
End: 2017-04-12

## 2017-04-12 NOTE — TELEPHONE ENCOUNTER
Patient had concerns that he was having some hip pain and back pain.  He was concerned that it could be coming from the Entresto or carvedilol.  He started atorvastatin in January, I think that this would be the bigger culprit.  I've told him to stop the atorvastatin for 2 weeks to see if this helped him improve.  I would rather him continue the Entresto and carvedilol for now.

## 2017-04-12 NOTE — TELEPHONE ENCOUNTER
4/12/17  Pt left vmsg - states he thinks he is having problems with some of his medications and asked to have Nayeli call him to discuss.  Ph [phone]/jaky

## 2017-05-02 RX ORDER — SPIRONOLACTONE 25 MG/1
25 TABLET ORAL DAILY
Qty: 30 TABLET | Refills: 5 | Status: SHIPPED | OUTPATIENT
Start: 2017-05-02 | End: 2017-08-18 | Stop reason: SDUPTHER

## 2017-05-03 ENCOUNTER — OFFICE VISIT (OUTPATIENT)
Dept: CARDIOLOGY | Facility: CLINIC | Age: 67
End: 2017-05-03

## 2017-05-03 ENCOUNTER — CLINICAL SUPPORT NO REQUIREMENTS (OUTPATIENT)
Dept: CARDIOLOGY | Facility: CLINIC | Age: 67
End: 2017-05-03

## 2017-05-03 VITALS
WEIGHT: 184 LBS | HEIGHT: 68 IN | BODY MASS INDEX: 27.89 KG/M2 | DIASTOLIC BLOOD PRESSURE: 72 MMHG | SYSTOLIC BLOOD PRESSURE: 112 MMHG | HEART RATE: 92 BPM

## 2017-05-03 DIAGNOSIS — M79.10 MYALGIA: ICD-10-CM

## 2017-05-03 DIAGNOSIS — I47.29 IDIOPATHIC VENTRICULAR TACHYCARDIA (HCC): ICD-10-CM

## 2017-05-03 DIAGNOSIS — Z98.890 S/P TVR (TRICUSPID VALVE REPAIR): ICD-10-CM

## 2017-05-03 DIAGNOSIS — I50.22 CHRONIC SYSTOLIC CONGESTIVE HEART FAILURE (HCC): ICD-10-CM

## 2017-05-03 DIAGNOSIS — Z95.2 S/P MVR (MITRAL VALVE REPLACEMENT): ICD-10-CM

## 2017-05-03 DIAGNOSIS — I42.9 CARDIOMYOPATHY (HCC): Primary | ICD-10-CM

## 2017-05-03 DIAGNOSIS — I25.5 CARDIOMYOPATHY, ISCHEMIC: Primary | ICD-10-CM

## 2017-05-03 PROCEDURE — 93283 PRGRMG EVAL IMPLANTABLE DFB: CPT | Performed by: INTERNAL MEDICINE

## 2017-05-03 PROCEDURE — 99214 OFFICE O/P EST MOD 30 MIN: CPT | Performed by: INTERNAL MEDICINE

## 2017-05-03 RX ORDER — NITROGLYCERIN 0.4 MG/1
TABLET SUBLINGUAL
COMMUNITY
Start: 2017-04-18 | End: 2018-06-04

## 2017-05-22 ENCOUNTER — TELEPHONE (OUTPATIENT)
Dept: CARDIOLOGY | Facility: CLINIC | Age: 67
End: 2017-05-22

## 2017-06-01 RX ORDER — SACUBITRIL AND VALSARTAN 24; 26 MG/1; MG/1
TABLET, FILM COATED ORAL
Qty: 60 TABLET | Refills: 2 | Status: SHIPPED | OUTPATIENT
Start: 2017-06-01 | End: 2017-08-16 | Stop reason: SDUPTHER

## 2017-07-03 ENCOUNTER — OFFICE VISIT (OUTPATIENT)
Dept: CARDIAC SURGERY | Facility: CLINIC | Age: 67
End: 2017-07-03

## 2017-07-03 VITALS
WEIGHT: 178 LBS | HEART RATE: 90 BPM | SYSTOLIC BLOOD PRESSURE: 138 MMHG | DIASTOLIC BLOOD PRESSURE: 80 MMHG | RESPIRATION RATE: 16 BRPM | BODY MASS INDEX: 27.06 KG/M2 | OXYGEN SATURATION: 100 % | TEMPERATURE: 97.5 F

## 2017-07-03 DIAGNOSIS — Z98.890 S/P TVR (TRICUSPID VALVE REPAIR): ICD-10-CM

## 2017-07-03 DIAGNOSIS — Z95.1 S/P CABG X 4: ICD-10-CM

## 2017-07-03 DIAGNOSIS — Z95.2 S/P MVR (MITRAL VALVE REPLACEMENT): Primary | ICD-10-CM

## 2017-07-03 PROCEDURE — 99024 POSTOP FOLLOW-UP VISIT: CPT | Performed by: THORACIC SURGERY (CARDIOTHORACIC VASCULAR SURGERY)

## 2017-07-03 NOTE — PROGRESS NOTES
I saw Mr. Arias back in the office today.  He was back in the hospital with heart failure around January 1.  Since he has been on interest though he feels awesome.  He still little bit fatigued but he has no symptoms of heart failure and is doing quite well.  He is very active and is enjoying his lifestyle.

## 2017-07-31 RX ORDER — FUROSEMIDE 40 MG/1
TABLET ORAL
Qty: 30 TABLET | Refills: 5 | Status: SHIPPED | OUTPATIENT
Start: 2017-07-31 | End: 2017-08-09 | Stop reason: ALTCHOICE

## 2017-08-02 ENCOUNTER — CLINICAL SUPPORT NO REQUIREMENTS (OUTPATIENT)
Dept: CARDIOLOGY | Facility: CLINIC | Age: 67
End: 2017-08-02

## 2017-08-02 DIAGNOSIS — I42.9 CARDIOMYOPATHY (HCC): Primary | ICD-10-CM

## 2017-08-02 PROCEDURE — 93296 REM INTERROG EVL PM/IDS: CPT | Performed by: INTERNAL MEDICINE

## 2017-08-02 PROCEDURE — 93295 DEV INTERROG REMOTE 1/2/MLT: CPT | Performed by: INTERNAL MEDICINE

## 2017-08-09 ENCOUNTER — OFFICE VISIT (OUTPATIENT)
Dept: CARDIOLOGY | Facility: CLINIC | Age: 67
End: 2017-08-09

## 2017-08-09 VITALS
SYSTOLIC BLOOD PRESSURE: 130 MMHG | HEIGHT: 68 IN | HEART RATE: 68 BPM | BODY MASS INDEX: 28.95 KG/M2 | WEIGHT: 191 LBS | DIASTOLIC BLOOD PRESSURE: 78 MMHG

## 2017-08-09 DIAGNOSIS — I36.1 NON-RHEUMATIC TRICUSPID VALVE INSUFFICIENCY: ICD-10-CM

## 2017-08-09 DIAGNOSIS — I34.0 NON-RHEUMATIC MITRAL REGURGITATION: ICD-10-CM

## 2017-08-09 DIAGNOSIS — I47.29 IDIOPATHIC VENTRICULAR TACHYCARDIA (HCC): ICD-10-CM

## 2017-08-09 DIAGNOSIS — I25.5 CARDIOMYOPATHY, ISCHEMIC: Primary | ICD-10-CM

## 2017-08-09 DIAGNOSIS — I50.22 CHF (CONGESTIVE HEART FAILURE), NYHA CLASS III, CHRONIC, SYSTOLIC (HCC): ICD-10-CM

## 2017-08-09 PROCEDURE — 93000 ELECTROCARDIOGRAM COMPLETE: CPT | Performed by: INTERNAL MEDICINE

## 2017-08-09 PROCEDURE — 99214 OFFICE O/P EST MOD 30 MIN: CPT | Performed by: INTERNAL MEDICINE

## 2017-08-09 NOTE — PROGRESS NOTES
Date of Office Visit: 2017  Encounter Provider: Naun Calvin MD  Place of Service: [facility] CARDIOLOGY  Patient Name: Mello Arias Jr.  :1950      Chief Complaint   Patient presents with   • Coronary Artery Disease   • Cardiomyopathy     History of Present Illness  The patient is a 66-year-old white male with a history of coronary artery disease.  In  underwent angioplasty of the left anterior descending coronary artery.  He also developed significant ventricular tachycardia and was noted to have significant decreased left ventricular function and an ICD was placed.  He became much more symptomatic in 2016 with recurrent angina pectoris.  His ejection fraction had deteriorated to less than 10% and he was noted to have moderate mitral regurgitation and tricuspid regurgitation.  Pulmonary hypertension was also found on his evaluation.  His catheterization at that time revealed severe two-vessel coronary disease.  He underwent bypass surgery of the LAD and right coronary arteries as well as a mitral valve replacement with a tissue valve prosthesis and tricuspid valve repair.  He had a recurrent hospitalization for heart failure but has since done very well on medical adjustment.  He has discontinued his Lasix thinking that that may have something to do with his occasional dizziness but he believes he may have an inner ear problem he does not complain of palpitations nor have we found any evidence of recurrent ventricular tachycardia.    Past Medical History:   Diagnosis Date   • Allergic rhinitis    • Anemia    • Asthma    • Bone fracture    • CAD (coronary artery disease)    • Cardiomyopathy, ischemic    • Carpal tunnel syndrome    • Fatigue    • GERD (gastroesophageal reflux disease)    • Health care maintenance    • Herniated disc, cervical    • Hx of total hip arthroplasty 3/18/2016   • Hyperglycemia    • Hyperlipidemia    • Hypertension    • Lumbosacral  radiculopathy at L4    • Myocardial infarction     OF INFERIOR WALL, GREATER THAN 8 WEEKS   • Myocardial ischemia     POST MYOCARDIAL INFARCTION, POST PTCA WITH STENT PLACEMENT   • Open wound     OF LEFT INDEX FINGER WITHOUT DAMAGE TO NAIL   • Osteoarthritis    • PAF (paroxysmal atrial fibrillation)    • Pneumonia    • Shortness of breath    • Ventricular tachycardia     POST AICD         Past Surgical History:   Procedure Laterality Date   • APPENDECTOMY     • CARDIAC CATHETERIZATION N/A 10/21/2016    Procedure: Coronary angiography;  Surgeon: Naun Calvin MD;  Location: [facility] CATH INVASIVE LOCATION;  Service:    • CARDIAC CATHETERIZATION N/A 10/21/2016    Procedure: Left heart cath;  Surgeon: Naun Calvin MD;  Location: [facility] CATH INVASIVE LOCATION;  Service:    • CARDIAC CATHETERIZATION N/A 10/21/2016    Procedure: Left ventriculography;  Surgeon: Naun Calvin MD;  Location: [facility] CATH INVASIVE LOCATION;  Service:    • CARDIAC CATHETERIZATION N/A 10/21/2016    Procedure: Right heart cath;  Surgeon: Naun Calvin MD;  Location: [facility] INVASIVE LOCATION;  Service:    • CARDIAC DEFIBRILLATOR PLACEMENT     • CARPAL TUNNEL RELEASE     • COLONOSCOPY     • CORONARY ANGIOPLASTY      WITH STENT PLACEMENT TO THE LAD   • CORONARY ARTERY BYPASS GRAFT WITH MITRAL VALVE REPAIR/REPLACEMENT N/A 11/7/2016    Procedure: JAYE STERNOTOMY CORONARY ARTERY BYPASS GRAFT TIMES 4 USING LEFT INTERNAL MAMMARY ARTERY AND LEFT GREATER SAPHENOUS VEIN GRAFT PER ENDOSCOPIC VEIN HARVESTING, MITRAL VALVE REPLACEMENT AND TRICUSPID VALVE REPAIR;  Surgeon: Slade Hurtado MD;  Location: [facility];  Service:    • EYE SURGERY     • FOOT SURGERY Left    • HERNIA REPAIR     • IMPLANTABLE CARDIOVERTER DEFIBRILLATOR LEAD REPLACEMENT/POCKET REVISION     • INGUINAL HERNIA REPAIR Left    • LASIK     • TONSILLECTOMY     • TOTAL HIP ARTHROPLASTY Bilateral            Current Outpatient Prescriptions:   •  acetaminophen  (TYLENOL) 325 MG tablet, Take 2 tablets by mouth Every 4 (Four) Hours As Needed for mild pain (1-3)., Disp: , Rfl: 0  •  albuterol (PROAIR HFA) 108 (90 BASE) MCG/ACT inhaler, Inhale 2 puffs Every 6 (Six) Hours As Needed., Disp: , Rfl:   •  aspirin 81 MG tablet, Take 81 mg by mouth Daily., Disp: , Rfl:   •  carvedilol (COREG) 6.25 MG tablet, Take 1 tablet by mouth 2 (Two) Times a Day., Disp: 180 tablet, Rfl: 3  •  ENTRESTO 24-26 MG tablet, TAKE ONE TABLET BY MOUTH TWICE A DAY, Disp: 60 tablet, Rfl: 2  •  montelukast (SINGULAIR) 10 MG tablet, Take 10 mg by mouth Every Night., Disp: , Rfl:   •  nitroglycerin (NITROSTAT) 0.4 MG SL tablet, , Disp: , Rfl:   •  spironolactone (ALDACTONE) 25 MG tablet, Take 1 tablet by mouth Daily., Disp: 30 tablet, Rfl: 5  •  SYMBICORT 160-4.5 MCG/ACT inhaler, Inhale 2 puffs 2 (Two) Times a Day., Disp: , Rfl:   •  zolpidem (AMBIEN) 5 MG tablet, Take 5 mg by mouth At Night As Needed., Disp: , Rfl:     Current Facility-Administered Medications:   •  erythromycin (ROMYCIN) ophthalmic ointment, , Left Eye, Once, Daniel El MD      Social History     Social History   • Marital status: Single     Spouse name: N/A   • Number of children: N/A   • Years of education: N/A     Occupational History   • Not on file.     Social History Main Topics   • Smoking status: Never Smoker   • Smokeless tobacco: Never Used   • Alcohol use 6.0 oz/week     10 Shots of liquor per week      Comment: social drinker   • Drug use: No   • Sexual activity: Defer     Other Topics Concern   • Not on file     Social History Narrative         Review of Systems   Constitution: Positive for malaise/fatigue.   HENT: Negative.    Eyes: Negative.    Cardiovascular: Negative.    Respiratory: Negative.    Endocrine: Negative.    Skin: Negative.    Musculoskeletal: Negative.    Gastrointestinal: Negative.    Neurological: Negative.    Psychiatric/Behavioral: Negative.        Procedures      ECG 12 Lead  Date/Time: 8/9/2017  "11:06 AM  Performed by: JANENE ZHAO  Authorized by: JANENE ZHAO   Previous ECG: no previous ECG available  Rhythm: sinus rhythm  Rate: normal  QRS axis: normal  Other findings: LVH               Objective:    /78  Pulse 68  Ht 68\" (172.7 cm)  Wt 191 lb (86.6 kg)  BMI 29.04 kg/m2        Physical Exam   Constitutional: He is oriented to person, place, and time. He appears well-developed and well-nourished.   HENT:   Head: Normocephalic.   Eyes: Pupils are equal, round, and reactive to light.   Neck: Normal range of motion. Carotid bruit is not present. No thyromegaly present.   Cardiovascular: Normal rate, regular rhythm, S1 normal, S2 normal, normal heart sounds and intact distal pulses.  Exam reveals no friction rub.    No murmur heard.  Pulmonary/Chest: Effort normal and breath sounds normal.   Abdominal: Soft. Bowel sounds are normal.   Musculoskeletal: He exhibits no edema.   Neurological: He is alert and oriented to person, place, and time.   Skin: Skin is warm, dry and intact. No erythema.   Psychiatric: He has a normal mood and affect.   Vitals reviewed.          Assessment:       Diagnosis Plan   1. Cardiomyopathy, ischemic     2. Idiopathic ventricular tachycardia     3. CHF (congestive heart failure), NYHA class III, chronic, systolic     4. Non-rheumatic mitral regurgitation     5. Non-rheumatic tricuspid valve insufficiency       Coronary Artery Disease  Assessment  • The patient has no angina    Plan  • Lifestyle modifications discussed include adhering to a heart healthy diet, maintenance of a healthy weight, regular exercise and regular monitoring of cholesterol and blood pressure    Subjective - Objective  • There is a history of previous coronary artery bypass graft  • Current antiplatelet therapy includes aspirin 81 mg    Heart Failure  Assessment  • NYHA class II - There is slight limitation of physical activity. The patient is comfortable at rest, but physical activity " results in fatigue, palpitations or shortness of breath.  • ACE inhibitor prescribed  • Beta blocker prescribed  • Diuretics prescribed  • Left ventricular function is severely reduced by qualitative assessment    Plan  • The heart failure care plan was discussed with the patient today including: continuing the current program and lifestyle modifications    Subjective/Objective    • The patient has an ICD implant      Ventricular tachycardia: Status post ICD, stable  Valvular heart disease: Status post mitral valve replacement, tricuspid valve repair     Plan:     Change in medications at this time.  We did discuss his physical activity and what to be aware of now that he stopped his Lasix.  I will see him back in 6 months unless his symptoms change.

## 2017-08-16 RX ORDER — CARVEDILOL 6.25 MG/1
6.25 TABLET ORAL 2 TIMES DAILY
Qty: 180 TABLET | Refills: 3 | Status: SHIPPED | OUTPATIENT
Start: 2017-08-16 | End: 2018-06-08 | Stop reason: HOSPADM

## 2017-08-18 RX ORDER — SPIRONOLACTONE 25 MG/1
25 TABLET ORAL DAILY
Qty: 90 TABLET | Refills: 1 | Status: SHIPPED | OUTPATIENT
Start: 2017-08-18 | End: 2018-03-30 | Stop reason: SDUPTHER

## 2017-11-08 ENCOUNTER — CLINICAL SUPPORT NO REQUIREMENTS (OUTPATIENT)
Dept: CARDIOLOGY | Facility: CLINIC | Age: 67
End: 2017-11-08

## 2017-11-08 ENCOUNTER — TELEPHONE (OUTPATIENT)
Dept: CARDIOLOGY | Facility: CLINIC | Age: 67
End: 2017-11-08

## 2017-11-08 DIAGNOSIS — I25.5 ISCHEMIC CARDIOMYOPATHY: Primary | ICD-10-CM

## 2017-11-08 PROCEDURE — 93283 PRGRMG EVAL IMPLANTABLE DFB: CPT | Performed by: INTERNAL MEDICINE

## 2017-11-08 NOTE — TELEPHONE ENCOUNTER
Patient in the office for an ICD check. He had an episode of VT on 10/14 at 2:25am with an average Vrate of 181. Patient received ATP x1 which successfully converted him back to sinus. He is scheduled to see Nayeli Armenta in February.

## 2017-11-25 RX ORDER — FUROSEMIDE 40 MG/1
TABLET ORAL
Qty: 90 TABLET | Refills: 2 | Status: ON HOLD | OUTPATIENT
Start: 2017-11-25 | End: 2018-06-04

## 2018-02-09 ENCOUNTER — OFFICE VISIT (OUTPATIENT)
Dept: CARDIOLOGY | Facility: CLINIC | Age: 68
End: 2018-02-09

## 2018-02-09 VITALS
WEIGHT: 200 LBS | BODY MASS INDEX: 30.31 KG/M2 | HEIGHT: 68 IN | HEART RATE: 89 BPM | SYSTOLIC BLOOD PRESSURE: 124 MMHG | DIASTOLIC BLOOD PRESSURE: 80 MMHG

## 2018-02-09 DIAGNOSIS — I25.118 CORONARY ARTERY DISEASE OF NATIVE ARTERY OF NATIVE HEART WITH STABLE ANGINA PECTORIS (HCC): ICD-10-CM

## 2018-02-09 DIAGNOSIS — I36.1 NON-RHEUMATIC TRICUSPID VALVE INSUFFICIENCY: ICD-10-CM

## 2018-02-09 DIAGNOSIS — I34.0 NON-RHEUMATIC MITRAL REGURGITATION: ICD-10-CM

## 2018-02-09 DIAGNOSIS — I25.5 CARDIOMYOPATHY, ISCHEMIC: Primary | ICD-10-CM

## 2018-02-09 PROCEDURE — 93000 ELECTROCARDIOGRAM COMPLETE: CPT | Performed by: NURSE PRACTITIONER

## 2018-02-09 PROCEDURE — 99213 OFFICE O/P EST LOW 20 MIN: CPT | Performed by: NURSE PRACTITIONER

## 2018-02-09 NOTE — PROGRESS NOTES
Date of Office Visit: 2018  Encounter Provider: ALEX Soler  Place of Service: [facility] CARDIOLOGY  Patient Name: Mello Arias Jr.  :1950    Chief Complaint   Patient presents with   • Cardiomyopathy   :     HPI: Mello Arias Jr. is a 67 y.o. male comes in today for 6 month follow-up.    He has a history of coronary artery disease, myocardial infarction (), and status post angioplasty of the left anterior descending artery, ventricular tachycardia (ICD) and paroxysmal atrial fibrillation. He also has a history of left ventricular dysfunction.     In 2016, his EF was noted to be 19%.  He had abnormal stress test but it was thought to be stable.  A week after testing performed, he presented with precordial chest pain.  He underwent a CABG ×4 and mitral valve prosthesis with a tissue prosthesis and tricuspid valve repair.  After surgery, his EF continued to be decreased to around 20%.  He did have an ICD placed for ventricular tachycardia.    Today, he comes in for follow-up and is feeling well.  Denies any palpitations or tachycardia, edema, dizziness, chest pain, orthopnea or PND.  He has mild fatigue.  Mild shortness of breath on exertion but due to asthma.  He's had a bronchial infection a had since .  He has a remote pacemaker check next week.        Past Medical History:   Diagnosis Date   • Allergic rhinitis    • Anemia    • Asthma    • Bone fracture    • CAD (coronary artery disease)    • Cardiomyopathy, ischemic    • Carpal tunnel syndrome    • CHF (congestive heart failure), NYHA class III, chronic, systolic    • Fatigue    • GERD (gastroesophageal reflux disease)    • Health care maintenance    • Herniated disc, cervical    • Hx of total hip arthroplasty 3/18/2016   • Hyperglycemia    • Hyperlipidemia    • Hypertension    • Idiopathic ventricular tachycardia    • Lumbosacral radiculopathy at L4    • Malaise and fatigue    •  Myocardial infarction     OF INFERIOR WALL, GREATER THAN 8 WEEKS   • Myocardial ischemia     POST MYOCARDIAL INFARCTION, POST PTCA WITH STENT PLACEMENT   • Non-rheumatic tricuspid valve insufficiency    • Nonrheumatic mitral valve regurgitation    • Open wound     OF LEFT INDEX FINGER WITHOUT DAMAGE TO NAIL   • Osteoarthritis    • PAF (paroxysmal atrial fibrillation)    • Pneumonia    • Shortness of breath    • Ventricular tachycardia     POST AICD       Past Surgical History:   Procedure Laterality Date   • APPENDECTOMY     • CARDIAC CATHETERIZATION N/A 10/21/2016    Procedure: Coronary angiography;  Surgeon: Naun Calvin MD;  Location: [facility] CATH INVASIVE LOCATION;  Service:    • CARDIAC CATHETERIZATION N/A 10/21/2016    Procedure: Left heart cath;  Surgeon: Naun Calvin MD;  Location: [facility] CATH INVASIVE LOCATION;  Service:    • CARDIAC CATHETERIZATION N/A 10/21/2016    Procedure: Left ventriculography;  Surgeon: Naun Calvin MD;  Location: [facility] CATH INVASIVE LOCATION;  Service:    • CARDIAC CATHETERIZATION N/A 10/21/2016    Procedure: Right heart cath;  Surgeon: Naun Calvin MD;  Location:  INVASIVE LOCATION;  Service:    • CARDIAC DEFIBRILLATOR PLACEMENT     • CARPAL TUNNEL RELEASE     • COLONOSCOPY     • CORONARY ANGIOPLASTY      WITH STENT PLACEMENT TO THE LAD   • CORONARY ARTERY BYPASS GRAFT WITH MITRAL VALVE REPAIR/REPLACEMENT N/A 11/7/2016    Procedure: JAYE STERNOTOMY CORONARY ARTERY BYPASS GRAFT TIMES 4 USING LEFT INTERNAL MAMMARY ARTERY AND LEFT GREATER SAPHENOUS VEIN GRAFT PER ENDOSCOPIC VEIN HARVESTING, MITRAL VALVE REPLACEMENT AND TRICUSPID VALVE REPAIR;  Surgeon: Slade Hurtado MD;  Location: [facility];  Service:    • EYE SURGERY     • FOOT SURGERY Left    • HERNIA REPAIR     • IMPLANTABLE CARDIOVERTER DEFIBRILLATOR LEAD REPLACEMENT/POCKET REVISION     • INGUINAL HERNIA REPAIR Left    • LASIK     • TONSILLECTOMY     • TOTAL HIP ARTHROPLASTY  "Bilateral            Review of Systems   Constitution: Positive for malaise/fatigue.   Cardiovascular: Positive for dyspnea on exertion.   Respiratory: Positive for cough.      All other systems reviewed and are negative    Allergies   Allergen Reactions   • Shellfish-Derived Products Swelling and Other (See Comments)     THROAT SWELLS       All aspects of family and social history reviewed.          Objective:     Vitals:    02/09/18 0906   BP: 124/80   BP Location: Right arm   Pulse: 89   Weight: 90.7 kg (200 lb)   Height: 172.7 cm (68\")     Body mass index is 30.41 kg/(m^2).    PHYSICAL EXAM:  Physical Exam   Constitutional: He is oriented to person, place, and time. He appears well-developed and well-nourished.   HENT:   Head: Normocephalic.   Eyes: Pupils are equal, round, and reactive to light.   Neck: Normal range of motion. Carotid bruit is not present. No thyromegaly present.   Cardiovascular: Normal rate, regular rhythm, S1 normal, S2 normal, normal heart sounds and intact distal pulses.  Exam reveals no friction rub.    No murmur heard.  Pulmonary/Chest: Effort normal and breath sounds normal.   Abdominal: Soft. Bowel sounds are normal.   Musculoskeletal: He exhibits no edema.   Neurological: He is alert and oriented to person, place, and time.   Skin: Skin is warm, dry and intact. No erythema.   Psychiatric: He has a normal mood and affect.   Vitals reviewed.        ECG 12 Lead  Date/Time: 2/9/2018 9:28 AM  Performed by: AMANDA ENRIQUEZ  Authorized by: AMANDA ENRIQUEZ   Comparison: compared with previous ECG from 8/9/2017  Similar to previous ECG  Rhythm: sinus rhythm  Rate: normal  BPM: 89  Conduction: conduction normal  ST Segments: ST segments normal  T Waves: T waves normal  QRS axis: normal  Other: no other findings  Clinical impression: abnormal ECG  Comments: Indication: Cardiomyopathy                Assessment:       Diagnosis Plan   1. Cardiomyopathy, ischemic     2. Coronary artery disease " of native artery of native heart with stable angina pectoris     3. Non-rheumatic mitral regurgitation     4. Non-rheumatic tricuspid valve insufficiency          Orders Placed This Encounter   Procedures   • ECG 12 Lead     This order was created via procedure documentation       Current Outpatient Prescriptions   Medication Sig Dispense Refill   • acetaminophen (TYLENOL) 325 MG tablet Take 2 tablets by mouth Every 4 (Four) Hours As Needed for mild pain (1-3).  0   • albuterol (PROAIR HFA) 108 (90 BASE) MCG/ACT inhaler Inhale 2 puffs Every 6 (Six) Hours As Needed.     • aspirin 81 MG tablet Take 81 mg by mouth Daily.     • carvedilol (COREG) 6.25 MG tablet Take 1 tablet by mouth 2 (Two) Times a Day. 180 tablet 3   • furosemide (LASIX) 40 MG tablet TAKE ONE TABLET BY MOUTH DAILY 90 tablet 2   • montelukast (SINGULAIR) 10 MG tablet Take 10 mg by mouth Every Night.     • nitroglycerin (NITROSTAT) 0.4 MG SL tablet      • sacubitril-valsartan (ENTRESTO) 24-26 MG tablet Take 1 tablet by mouth 2 (Two) Times a Day. 180 tablet 1   • spironolactone (ALDACTONE) 25 MG tablet Take 1 tablet by mouth Daily. 90 tablet 1     Current Facility-Administered Medications   Medication Dose Route Frequency Provider Last Rate Last Dose   • erythromycin (ROMYCIN) ophthalmic ointment   Left Eye Once Daniel El MD                Plan:       1. Heart Failure  Assessment  • NYHA class I - There is no limitation of physical activity. Physical activity does not cause fatigue, palpitations or shortness of breath.  • ACE inhibitor prescribed  • Beta blocker prescribed  • Diuretics prescribed  • Left ventricular function is severely reduced by qualitative assessment    Plan  • The heart failure care plan was discussed with the patient today including: continuing the current program and lifestyle modifications    Subjective/Objective    • The patient has an ICD implant      EF 20%. On GDMT. Doing well    2. Coronary Artery  Disease  Assessment  • The patient has no angina    Plan  • Lifestyle modifications discussed include adhering to a heart healthy diet, maintenance of a healthy weight, regular exercise and regular monitoring of cholesterol and blood pressure    Subjective - Objective  • There is a history of previous coronary artery bypass graft  • Current antiplatelet therapy includes aspirin 81 mg      S/p cabg x 4. No angina    3. Mitral valve repair    4. Hyperlipidemia-pt will schedule appt with pcp for lipid profile    Follow up in office in 6 months    As always, it has been a pleasure to participate in this patient's care.      Sincerely,      ALEX Soler

## 2018-02-13 ENCOUNTER — CLINICAL SUPPORT NO REQUIREMENTS (OUTPATIENT)
Dept: CARDIOLOGY | Facility: CLINIC | Age: 68
End: 2018-02-13

## 2018-02-13 DIAGNOSIS — I25.5 ISCHEMIC CARDIOMYOPATHY: Primary | ICD-10-CM

## 2018-02-13 PROCEDURE — 93295 DEV INTERROG REMOTE 1/2/MLT: CPT | Performed by: INTERNAL MEDICINE

## 2018-02-13 PROCEDURE — 93296 REM INTERROG EVL PM/IDS: CPT | Performed by: INTERNAL MEDICINE

## 2018-03-30 RX ORDER — SPIRONOLACTONE 25 MG/1
TABLET ORAL
Qty: 30 TABLET | Refills: 5 | Status: SHIPPED | OUTPATIENT
Start: 2018-03-30 | End: 2018-06-04

## 2018-04-09 RX ORDER — SACUBITRIL AND VALSARTAN 24; 26 MG/1; MG/1
TABLET, FILM COATED ORAL
Qty: 60 TABLET | Refills: 3 | Status: SHIPPED | OUTPATIENT
Start: 2018-04-09 | End: 2018-06-04 | Stop reason: SDUPTHER

## 2018-05-17 ENCOUNTER — CLINICAL SUPPORT NO REQUIREMENTS (OUTPATIENT)
Dept: CARDIOLOGY | Facility: CLINIC | Age: 68
End: 2018-05-17

## 2018-05-17 DIAGNOSIS — I50.22 CHRONIC SYSTOLIC CONGESTIVE HEART FAILURE (HCC): Primary | ICD-10-CM

## 2018-05-17 PROCEDURE — 93283 PRGRMG EVAL IMPLANTABLE DFB: CPT | Performed by: INTERNAL MEDICINE

## 2018-06-04 ENCOUNTER — APPOINTMENT (OUTPATIENT)
Dept: GENERAL RADIOLOGY | Facility: HOSPITAL | Age: 68
End: 2018-06-04

## 2018-06-04 ENCOUNTER — HOSPITAL ENCOUNTER (INPATIENT)
Facility: HOSPITAL | Age: 68
LOS: 4 days | Discharge: HOME | End: 2018-06-08
Attending: EMERGENCY MEDICINE | Admitting: INTERNAL MEDICINE

## 2018-06-04 DIAGNOSIS — Z74.09 IMPAIRED FUNCTIONAL MOBILITY, BALANCE, GAIT, AND ENDURANCE: ICD-10-CM

## 2018-06-04 DIAGNOSIS — A41.9 SEPSIS, DUE TO UNSPECIFIED ORGANISM: ICD-10-CM

## 2018-06-04 DIAGNOSIS — N17.9 ACUTE KIDNEY INJURY (HCC): Primary | ICD-10-CM

## 2018-06-04 DIAGNOSIS — E87.5 HYPERKALEMIA: ICD-10-CM

## 2018-06-04 DIAGNOSIS — J18.9 PNEUMONIA OF LEFT LOWER LOBE DUE TO INFECTIOUS ORGANISM: ICD-10-CM

## 2018-06-04 PROBLEM — E87.1 HYPONATREMIA: Status: ACTIVE | Noted: 2018-06-04

## 2018-06-04 PROBLEM — J15.9 BACTERIAL PNEUMONIA: Status: ACTIVE | Noted: 2018-06-04

## 2018-06-04 LAB
ALBUMIN SERPL-MCNC: 3.9 G/DL (ref 3.5–5.2)
ALBUMIN/GLOB SERPL: 0.8 G/DL
ALP SERPL-CCNC: 133 U/L (ref 39–117)
ALT SERPL W P-5'-P-CCNC: 46 U/L (ref 1–41)
ANION GAP SERPL CALCULATED.3IONS-SCNC: 18.1 MMOL/L
ANION GAP SERPL CALCULATED.3IONS-SCNC: 22.3 MMOL/L
AST SERPL-CCNC: 35 U/L (ref 1–40)
BACTERIA UR QL AUTO: ABNORMAL /HPF
BILIRUB SERPL-MCNC: 0.4 MG/DL (ref 0.1–1.2)
BILIRUB UR QL STRIP: NEGATIVE
BUN BLD-MCNC: 37 MG/DL (ref 8–23)
BUN BLD-MCNC: 38 MG/DL (ref 8–23)
BUN/CREAT SERPL: 8.6 (ref 7–25)
BUN/CREAT SERPL: 9.2 (ref 7–25)
CALCIUM SPEC-SCNC: 8.3 MG/DL (ref 8.6–10.5)
CALCIUM SPEC-SCNC: 9.6 MG/DL (ref 8.6–10.5)
CHLORIDE SERPL-SCNC: 88 MMOL/L (ref 98–107)
CHLORIDE SERPL-SCNC: 93 MMOL/L (ref 98–107)
CLARITY UR: CLEAR
CO2 SERPL-SCNC: 17.7 MMOL/L (ref 22–29)
CO2 SERPL-SCNC: 18.9 MMOL/L (ref 22–29)
COLOR UR: ABNORMAL
CREAT BLD-MCNC: 4.03 MG/DL (ref 0.76–1.27)
CREAT BLD-MCNC: 4.43 MG/DL (ref 0.76–1.27)
CREAT UR-MCNC: 168 MG/DL
D-LACTATE SERPL-SCNC: 1.7 MMOL/L (ref 0.5–2)
D-LACTATE SERPL-SCNC: 2.4 MMOL/L (ref 0.5–2)
D-LACTATE SERPL-SCNC: 2.4 MMOL/L (ref 0.5–2)
DEPRECATED RDW RBC AUTO: 49.4 FL (ref 37–54)
ERYTHROCYTE [DISTWIDTH] IN BLOOD BY AUTOMATED COUNT: 13.7 % (ref 11.5–14.5)
GFR SERPL CREATININE-BSD FRML MDRD: 13 ML/MIN/1.73
GFR SERPL CREATININE-BSD FRML MDRD: 15 ML/MIN/1.73
GLOBULIN UR ELPH-MCNC: 4.8 GM/DL
GLUCOSE BLD-MCNC: 120 MG/DL (ref 65–99)
GLUCOSE BLD-MCNC: 140 MG/DL (ref 65–99)
GLUCOSE UR STRIP-MCNC: NEGATIVE MG/DL
HCT VFR BLD AUTO: 38.3 % (ref 40.4–52.2)
HGB BLD-MCNC: 12.1 G/DL (ref 13.7–17.6)
HGB UR QL STRIP.AUTO: NEGATIVE
HOLD SPECIMEN: NORMAL
HYALINE CASTS UR QL AUTO: ABNORMAL /LPF
KETONES UR QL STRIP: NEGATIVE
LARGE PLATELETS: ABNORMAL
LEUKOCYTE ESTERASE UR QL STRIP.AUTO: NEGATIVE
LIPASE SERPL-CCNC: 9 U/L (ref 13–60)
LYMPHOCYTES # BLD MANUAL: 1.23 10*3/MM3 (ref 0.9–4.8)
LYMPHOCYTES NFR BLD MANUAL: 4 % (ref 5–12)
LYMPHOCYTES NFR BLD MANUAL: 6 % (ref 19.6–45.3)
MAGNESIUM SERPL-MCNC: 1.5 MG/DL (ref 1.6–2.4)
MCH RBC QN AUTO: 31.2 PG (ref 27–32.7)
MCHC RBC AUTO-ENTMCNC: 31.6 G/DL (ref 32.6–36.4)
MCV RBC AUTO: 98.7 FL (ref 79.8–96.2)
MONOCYTES # BLD AUTO: 0.82 10*3/MM3 (ref 0.2–1.2)
MYELOCYTES NFR BLD MANUAL: 1 % (ref 0–0)
NEUTROPHILS # BLD AUTO: 18.19 10*3/MM3 (ref 1.9–8.1)
NEUTROPHILS NFR BLD MANUAL: 89 % (ref 42.7–76)
NITRITE UR QL STRIP: NEGATIVE
OSMOLALITY SERPL: 284 MOSM/KG (ref 280–301)
PH UR STRIP.AUTO: <=5 [PH] (ref 5–8)
PHOSPHATE SERPL-MCNC: 4.9 MG/DL (ref 2.5–4.5)
PLATELET # BLD AUTO: 306 10*3/MM3 (ref 140–500)
PMV BLD AUTO: 11.3 FL (ref 6–12)
POTASSIUM BLD-SCNC: 5 MMOL/L (ref 3.5–5.2)
POTASSIUM BLD-SCNC: 5.3 MMOL/L (ref 3.5–5.2)
PROCALCITONIN SERPL-MCNC: 37.72 NG/ML (ref 0.1–0.25)
PROT SERPL-MCNC: 8.7 G/DL (ref 6–8.5)
PROT UR QL STRIP: ABNORMAL
PSA SERPL-MCNC: 0.42 NG/ML (ref 0–4)
RBC # BLD AUTO: 3.88 10*6/MM3 (ref 4.6–6)
RBC # UR: ABNORMAL /HPF
RBC MORPH BLD: NORMAL
REF LAB TEST METHOD: ABNORMAL
SCAN SLIDE: NORMAL
SODIUM BLD-SCNC: 128 MMOL/L (ref 136–145)
SODIUM BLD-SCNC: 130 MMOL/L (ref 136–145)
SP GR UR STRIP: 1.02 (ref 1–1.03)
SQUAMOUS #/AREA URNS HPF: ABNORMAL /HPF
TROPONIN T SERPL-MCNC: 0.02 NG/ML (ref 0–0.03)
UROBILINOGEN UR QL STRIP: ABNORMAL
WBC MORPH BLD: NORMAL
WBC NRBC COR # BLD: 20.44 10*3/MM3 (ref 4.5–10.7)
WBC UR QL AUTO: ABNORMAL /HPF
WHOLE BLOOD HOLD SPECIMEN: NORMAL
WHOLE BLOOD HOLD SPECIMEN: NORMAL

## 2018-06-04 PROCEDURE — 25010000002 ONDANSETRON PER 1 MG: Performed by: EMERGENCY MEDICINE

## 2018-06-04 PROCEDURE — 84153 ASSAY OF PSA TOTAL: CPT | Performed by: EMERGENCY MEDICINE

## 2018-06-04 PROCEDURE — 93010 ELECTROCARDIOGRAM REPORT: CPT | Performed by: INTERNAL MEDICINE

## 2018-06-04 PROCEDURE — 82570 ASSAY OF URINE CREATININE: CPT | Performed by: EMERGENCY MEDICINE

## 2018-06-04 PROCEDURE — 99284 EMERGENCY DEPT VISIT MOD MDM: CPT

## 2018-06-04 PROCEDURE — 84145 PROCALCITONIN (PCT): CPT | Performed by: EMERGENCY MEDICINE

## 2018-06-04 PROCEDURE — 87086 URINE CULTURE/COLONY COUNT: CPT | Performed by: EMERGENCY MEDICINE

## 2018-06-04 PROCEDURE — 85007 BL SMEAR W/DIFF WBC COUNT: CPT | Performed by: PHYSICIAN ASSISTANT

## 2018-06-04 PROCEDURE — 83935 ASSAY OF URINE OSMOLALITY: CPT | Performed by: INTERNAL MEDICINE

## 2018-06-04 PROCEDURE — 80053 COMPREHEN METABOLIC PANEL: CPT | Performed by: PHYSICIAN ASSISTANT

## 2018-06-04 PROCEDURE — 83605 ASSAY OF LACTIC ACID: CPT | Performed by: INTERNAL MEDICINE

## 2018-06-04 PROCEDURE — 87899 AGENT NOS ASSAY W/OPTIC: CPT | Performed by: INTERNAL MEDICINE

## 2018-06-04 PROCEDURE — 87147 CULTURE TYPE IMMUNOLOGIC: CPT | Performed by: EMERGENCY MEDICINE

## 2018-06-04 PROCEDURE — 71046 X-RAY EXAM CHEST 2 VIEWS: CPT

## 2018-06-04 PROCEDURE — 83690 ASSAY OF LIPASE: CPT | Performed by: EMERGENCY MEDICINE

## 2018-06-04 PROCEDURE — 87186 SC STD MICRODIL/AGAR DIL: CPT | Performed by: EMERGENCY MEDICINE

## 2018-06-04 PROCEDURE — 25010000002 ENOXAPARIN PER 10 MG: Performed by: INTERNAL MEDICINE

## 2018-06-04 PROCEDURE — 83605 ASSAY OF LACTIC ACID: CPT | Performed by: EMERGENCY MEDICINE

## 2018-06-04 PROCEDURE — 84100 ASSAY OF PHOSPHORUS: CPT | Performed by: EMERGENCY MEDICINE

## 2018-06-04 PROCEDURE — 84300 ASSAY OF URINE SODIUM: CPT | Performed by: EMERGENCY MEDICINE

## 2018-06-04 PROCEDURE — 83930 ASSAY OF BLOOD OSMOLALITY: CPT | Performed by: INTERNAL MEDICINE

## 2018-06-04 PROCEDURE — 93005 ELECTROCARDIOGRAM TRACING: CPT | Performed by: PHYSICIAN ASSISTANT

## 2018-06-04 PROCEDURE — 87040 BLOOD CULTURE FOR BACTERIA: CPT | Performed by: EMERGENCY MEDICINE

## 2018-06-04 PROCEDURE — 85025 COMPLETE CBC W/AUTO DIFF WBC: CPT | Performed by: PHYSICIAN ASSISTANT

## 2018-06-04 PROCEDURE — 36415 COLL VENOUS BLD VENIPUNCTURE: CPT | Performed by: PHYSICIAN ASSISTANT

## 2018-06-04 PROCEDURE — 84484 ASSAY OF TROPONIN QUANT: CPT | Performed by: PHYSICIAN ASSISTANT

## 2018-06-04 PROCEDURE — 81001 URINALYSIS AUTO W/SCOPE: CPT | Performed by: EMERGENCY MEDICINE

## 2018-06-04 PROCEDURE — 83735 ASSAY OF MAGNESIUM: CPT | Performed by: EMERGENCY MEDICINE

## 2018-06-04 PROCEDURE — 87150 DNA/RNA AMPLIFIED PROBE: CPT | Performed by: EMERGENCY MEDICINE

## 2018-06-04 PROCEDURE — 25010000002 CEFTRIAXONE PER 250 MG: Performed by: EMERGENCY MEDICINE

## 2018-06-04 RX ORDER — PANTOPRAZOLE SODIUM 40 MG/1
40 TABLET, DELAYED RELEASE ORAL EVERY MORNING
Status: DISCONTINUED | OUTPATIENT
Start: 2018-06-05 | End: 2018-06-05

## 2018-06-04 RX ORDER — ONDANSETRON 4 MG/1
4 TABLET, ORALLY DISINTEGRATING ORAL EVERY 6 HOURS PRN
Status: DISCONTINUED | OUTPATIENT
Start: 2018-06-04 | End: 2018-06-08 | Stop reason: HOSPADM

## 2018-06-04 RX ORDER — SODIUM CHLORIDE 9 MG/ML
75 INJECTION, SOLUTION INTRAVENOUS CONTINUOUS
Status: ACTIVE | OUTPATIENT
Start: 2018-06-04 | End: 2018-06-05

## 2018-06-04 RX ORDER — SODIUM CHLORIDE 0.9 % (FLUSH) 0.9 %
10 SYRINGE (ML) INJECTION AS NEEDED
Status: DISCONTINUED | OUTPATIENT
Start: 2018-06-04 | End: 2018-06-08 | Stop reason: HOSPADM

## 2018-06-04 RX ORDER — CEFTRIAXONE SODIUM 1 G/50ML
1 INJECTION, SOLUTION INTRAVENOUS ONCE
Status: COMPLETED | OUTPATIENT
Start: 2018-06-04 | End: 2018-06-04

## 2018-06-04 RX ORDER — SPIRONOLACTONE 25 MG/1
25 TABLET ORAL DAILY
COMMUNITY
End: 2018-06-08 | Stop reason: HOSPADM

## 2018-06-04 RX ORDER — CARVEDILOL 6.25 MG/1
6.25 TABLET ORAL 2 TIMES DAILY WITH MEALS
Status: DISCONTINUED | OUTPATIENT
Start: 2018-06-04 | End: 2018-06-06

## 2018-06-04 RX ORDER — ACETAMINOPHEN 325 MG/1
650 TABLET ORAL EVERY 4 HOURS PRN
Status: DISCONTINUED | OUTPATIENT
Start: 2018-06-04 | End: 2018-06-08 | Stop reason: HOSPADM

## 2018-06-04 RX ORDER — ONDANSETRON 2 MG/ML
4 INJECTION INTRAMUSCULAR; INTRAVENOUS ONCE
Status: COMPLETED | OUTPATIENT
Start: 2018-06-04 | End: 2018-06-04

## 2018-06-04 RX ORDER — CEFTRIAXONE SODIUM 1 G/50ML
1 INJECTION, SOLUTION INTRAVENOUS EVERY 24 HOURS
Status: DISCONTINUED | OUTPATIENT
Start: 2018-06-05 | End: 2018-06-06

## 2018-06-04 RX ORDER — ALBUTEROL SULFATE 2.5 MG/3ML
2.5 SOLUTION RESPIRATORY (INHALATION) EVERY 6 HOURS PRN
Status: DISCONTINUED | OUTPATIENT
Start: 2018-06-04 | End: 2018-06-08 | Stop reason: HOSPADM

## 2018-06-04 RX ORDER — ASPIRIN 325 MG
325 TABLET ORAL ONCE
Status: DISCONTINUED | OUTPATIENT
Start: 2018-06-04 | End: 2018-06-04

## 2018-06-04 RX ORDER — MONTELUKAST SODIUM 10 MG/1
10 TABLET ORAL NIGHTLY
Status: DISCONTINUED | OUTPATIENT
Start: 2018-06-04 | End: 2018-06-08 | Stop reason: HOSPADM

## 2018-06-04 RX ORDER — SENNA AND DOCUSATE SODIUM 50; 8.6 MG/1; MG/1
2 TABLET, FILM COATED ORAL NIGHTLY
Status: DISCONTINUED | OUTPATIENT
Start: 2018-06-04 | End: 2018-06-08 | Stop reason: HOSPADM

## 2018-06-04 RX ORDER — NITROGLYCERIN 0.4 MG/1
0.4 TABLET SUBLINGUAL
Status: DISCONTINUED | OUTPATIENT
Start: 2018-06-04 | End: 2018-06-08 | Stop reason: HOSPADM

## 2018-06-04 RX ORDER — NITROGLYCERIN 0.4 MG/1
0.4 TABLET SUBLINGUAL
COMMUNITY

## 2018-06-04 RX ORDER — BISACODYL 5 MG/1
5 TABLET, DELAYED RELEASE ORAL DAILY PRN
Status: DISCONTINUED | OUTPATIENT
Start: 2018-06-04 | End: 2018-06-08 | Stop reason: HOSPADM

## 2018-06-04 RX ORDER — ONDANSETRON 4 MG/1
4 TABLET, FILM COATED ORAL EVERY 6 HOURS PRN
Status: DISCONTINUED | OUTPATIENT
Start: 2018-06-04 | End: 2018-06-08 | Stop reason: HOSPADM

## 2018-06-04 RX ORDER — ALUMINA, MAGNESIA, AND SIMETHICONE 2400; 2400; 240 MG/30ML; MG/30ML; MG/30ML
15 SUSPENSION ORAL EVERY 6 HOURS PRN
Status: DISCONTINUED | OUTPATIENT
Start: 2018-06-04 | End: 2018-06-08 | Stop reason: HOSPADM

## 2018-06-04 RX ORDER — ONDANSETRON 2 MG/ML
4 INJECTION INTRAMUSCULAR; INTRAVENOUS EVERY 6 HOURS PRN
Status: DISCONTINUED | OUTPATIENT
Start: 2018-06-04 | End: 2018-06-08 | Stop reason: HOSPADM

## 2018-06-04 RX ADMIN — CEFTRIAXONE SODIUM 1 G: 1 INJECTION, SOLUTION INTRAVENOUS at 18:35

## 2018-06-04 RX ADMIN — MONTELUKAST 10 MG: 10 TABLET, FILM COATED ORAL at 23:37

## 2018-06-04 RX ADMIN — DOXYCYCLINE 100 MG: 100 INJECTION, POWDER, LYOPHILIZED, FOR SOLUTION INTRAVENOUS at 19:04

## 2018-06-04 RX ADMIN — SODIUM CHLORIDE 125 ML/HR: 9 INJECTION, SOLUTION INTRAVENOUS at 22:13

## 2018-06-04 RX ADMIN — ENOXAPARIN SODIUM 30 MG: 30 INJECTION SUBCUTANEOUS at 23:36

## 2018-06-04 RX ADMIN — SODIUM CHLORIDE 2655 ML: 9 INJECTION, SOLUTION INTRAVENOUS at 18:39

## 2018-06-04 RX ADMIN — DOCUSATE SODIUM -SENNOSIDES 2 TABLET: 50; 8.6 TABLET, COATED ORAL at 23:37

## 2018-06-04 RX ADMIN — ONDANSETRON 4 MG: 2 INJECTION INTRAMUSCULAR; INTRAVENOUS at 19:25

## 2018-06-04 NOTE — ED NOTES
Pt cannot urinate at this time to provide sample. MD Hood notified; in and out catheter not ordered; pt to provide sample when he can. Admit floor to be notified.      Flory Zapien RN  06/04/18 [id number]

## 2018-06-04 NOTE — ED TRIAGE NOTES
Pt reports 3 weeks ago he had vominting for 3 days.  Pt states at some point he twisted his knee and was seen at urgent care.  Pt reports now he doesn't have an appetite.  Pt reports chills also. Pt reports generalized weakness.  Pt reports chest pain for 3 to 4 days.

## 2018-06-04 NOTE — ED PROVIDER NOTES
EMERGENCY DEPARTMENT ENCOUNTER    CHIEF COMPLAINT  Chief Complaint: SOA  History given by: pt and pt's son  History limited by: nothing  Room Number: 34/34  PMD: Kelsey Muñoz MD  Cardiologist: Dr. Calvin    HPI:  Pt is a 67 y.o. male with h/o asthma and GERD who presents to the ED [with his son bedside] with complaint of SOA that began a month and a half ago after being taken off Lasix [by Dr. Calvin] and has gradually worsened. Pt's son also complains of loss of appetite, generalized weakness, nausea, and vomiting [one episode]. Pt has no other complaints at this time.    Duration: Began a month and a half ago  Onset: sudden  Timing: constant  Quality: SOA  Intensity/Severity: moderate  Progression: gradually worsened  Associated Symptoms: loss of appetite, generalized weakness, nausea, and vomiting    PAST MEDICAL HISTORY  Active Ambulatory Problems     Diagnosis Date Noted   • A-fib 10/28/2016   • Acid reflux 10/28/2016   • Blood glucose elevated 10/28/2016   • HLD (hyperlipidemia) 10/28/2016   • Cardiomyopathy, ischemic 10/28/2016   • L-S radiculopathy 10/28/2016   • Idiopathic ventricular tachycardia 10/28/2016   • CHF (congestive heart failure), NYHA class III 10/28/2016   • Coronary artery disease of native artery of native heart with stable angina pectoris 11/03/2016   • Non-rheumatic mitral regurgitation 11/03/2016   • Non-rheumatic tricuspid valve insufficiency 11/03/2016   • CAD in native artery 11/07/2016   • Other specified forms of effusion, except tuberculous 11/15/2016   • S/P CABG x 4 11/15/2016   • S/P MVR (mitral valve replacement) 11/15/2016   • S/P TVR (tricuspid valve repair) 11/15/2016   • CHF (congestive heart failure) 12/31/2016     Resolved Ambulatory Problems     Diagnosis Date Noted   • Hx of total hip arthroplasty 03/18/2016   • History of total hip arthroplasty 04/13/2016   • Right inguinal pain 04/21/2016   • History of bilateral hip arthroplasty 06/27/2016   • Right hip pain  06/27/2016   • Precordial chest pain 10/04/2016   • Ischemic heart disease 10/27/2016     Past Medical History:   Diagnosis Date   • Allergic rhinitis    • Anemia    • Asthma    • Bone fracture    • CAD (coronary artery disease)    • Cardiomyopathy, ischemic    • Carpal tunnel syndrome    • CHF (congestive heart failure), NYHA class III, chronic, systolic    • Fatigue    • GERD (gastroesophageal reflux disease)    • Health care maintenance    • Herniated disc, cervical    • Hx of total hip arthroplasty 3/18/2016   • Hyperglycemia    • Hyperlipidemia    • Hypertension    • Idiopathic ventricular tachycardia    • Lumbosacral radiculopathy at L4    • Malaise and fatigue    • Myocardial infarction    • Myocardial ischemia    • Non-rheumatic tricuspid valve insufficiency    • Nonrheumatic mitral valve regurgitation    • Open wound    • Osteoarthritis    • PAF (paroxysmal atrial fibrillation)    • Pneumonia    • Shortness of breath    • Ventricular tachycardia        PAST SURGICAL HISTORY  Past Surgical History:   Procedure Laterality Date   • APPENDECTOMY     • CARDIAC CATHETERIZATION N/A 10/21/2016    Procedure: Coronary angiography;  Surgeon: Naun Calvin MD;  Location: [facility] CATH INVASIVE LOCATION;  Service:    • CARDIAC CATHETERIZATION N/A 10/21/2016    Procedure: Left heart cath;  Surgeon: Naun Calvin MD;  Location: [facility] CATH INVASIVE LOCATION;  Service:    • CARDIAC CATHETERIZATION N/A 10/21/2016    Procedure: Left ventriculography;  Surgeon: Naun Calvin MD;  Location: [facility] CATH INVASIVE LOCATION;  Service:    • CARDIAC CATHETERIZATION N/A 10/21/2016    Procedure: Right heart cath;  Surgeon: Naun Calvin MD;  Location: [facility] CATH INVASIVE LOCATION;  Service:    • CARDIAC DEFIBRILLATOR PLACEMENT     • CARPAL TUNNEL RELEASE     • COLONOSCOPY     • CORONARY ANGIOPLASTY      WITH STENT PLACEMENT TO THE LAD   • CORONARY ARTERY BYPASS GRAFT WITH MITRAL VALVE REPAIR/REPLACEMENT N/A  11/7/2016    Procedure: JAYE STERNOTOMY CORONARY ARTERY BYPASS GRAFT TIMES 4 USING LEFT INTERNAL MAMMARY ARTERY AND LEFT GREATER SAPHENOUS VEIN GRAFT PER ENDOSCOPIC VEIN HARVESTING, MITRAL VALVE REPLACEMENT AND TRICUSPID VALVE REPAIR;  Surgeon: Slade Hurtado MD;  Location: [facility];  Service:    • EYE SURGERY     • FOOT SURGERY Left    • HERNIA REPAIR     • IMPLANTABLE CARDIOVERTER DEFIBRILLATOR LEAD REPLACEMENT/POCKET REVISION     • INGUINAL HERNIA REPAIR Left    • LASIK     • TONSILLECTOMY     • TOTAL HIP ARTHROPLASTY Bilateral        FAMILY HISTORY  Family History   Problem Relation Age of Onset   • Stroke Mother    • Osteoarthritis Mother        SOCIAL HISTORY  Social History     Social History   • Marital status: Single     Spouse name: N/A   • Number of children: N/A   • Years of education: N/A     Occupational History   • Not on file.     Social History Main Topics   • Smoking status: Never Smoker   • Smokeless tobacco: Never Used   • Alcohol use 6.0 oz/week     10 Shots of liquor per week      Comment: social drinker   • Drug use: No   • Sexual activity: Defer     Other Topics Concern   • Not on file     Social History Narrative   • No narrative on file       ALLERGIES  Shellfish-derived products    REVIEW OF SYSTEMS  Review of Systems   Constitutional: Positive for appetite change (loss of appetite). Negative for activity change and fever.   HENT: Negative for congestion and sore throat.    Eyes: Negative.    Respiratory: Positive for shortness of breath. Negative for cough.    Cardiovascular: Negative for chest pain and leg swelling.   Gastrointestinal: Positive for nausea and vomiting (x 1). Negative for abdominal pain and diarrhea.   Endocrine: Negative.    Genitourinary: Negative for decreased urine volume and dysuria.   Musculoskeletal: Negative for neck pain.   Skin: Negative for rash and wound.   Allergic/Immunologic: Negative.    Neurological: Positive for weakness (generalized). Negative  for numbness and headaches.   Hematological: Negative.    Psychiatric/Behavioral: Negative.    All other systems reviewed and are negative.      PHYSICAL EXAM  ED Triage Vitals   Temp Heart Rate Resp BP SpO2   06/04/18 1458 06/04/18 1526 06/04/18 1526 06/04/18 1526 06/04/18 1526   96.9 °F (36.1 °C) 115 18 91/62 98 %      Temp src Heart Rate Source Patient Position BP Location FiO2 (%)   -- 06/04/18 1526 06/04/18 1526 06/04/18 1526 --    Monitor Sitting Right arm        Physical Exam   Constitutional: He is oriented to person, place, and time. No distress.   HENT:   Head: Normocephalic and atraumatic.   Eyes: EOM are normal. Pupils are equal, round, and reactive to light.   Neck: Normal range of motion. Neck supple.   Cardiovascular: Normal rate, regular rhythm and normal heart sounds.    Pulmonary/Chest: Effort normal and breath sounds normal. No respiratory distress.   Abdominal: Soft. There is no tenderness. There is no rebound and no guarding.   Musculoskeletal: Normal range of motion. He exhibits no edema.   Neurological: He is alert and oriented to person, place, and time. He has normal sensation and normal strength.   Skin: Skin is warm and dry.   Psychiatric: Mood and affect normal.   Nursing note and vitals reviewed.      LAB RESULTS  Lab Results (last 24 hours)     Procedure Component Value Units Date/Time    CBC & Differential [[id number]] Collected:  06/04/18 1624    Specimen:  Blood Updated:  06/04/18 1718    Narrative:       The following orders were created for panel order CBC & Differential.  Procedure                               Abnormality         Status                     ---------                               -----------         ------                     Manual Differential[[id number]]          Abnormal            Final result               Scan Slide[[id number]]                                       Final result               CBC Auto Differential[[id number]]        Abnormal            Final  result                 Please view results for these tests on the individual orders.    Comprehensive Metabolic Panel [[id number]]  (Abnormal) Collected:  06/04/18 1624    Specimen:  Blood Updated:  06/04/18 1721     Glucose 140 (H) mg/dL      BUN 38 (H) mg/dL      Creatinine 4.43 (H) mg/dL      Sodium 128 (L) mmol/L      Potassium 5.3 (H) mmol/L      Chloride 88 (L) mmol/L      CO2 17.7 (L) mmol/L      Calcium 9.6 mg/dL      Total Protein 8.7 (H) g/dL      Albumin 3.90 g/dL      ALT (SGPT) 46 (H) U/L      AST (SGOT) 35 U/L      Alkaline Phosphatase 133 (H) U/L      Total Bilirubin 0.4 mg/dL      eGFR Non African Amer 13 (L) mL/min/1.73      Globulin 4.8 gm/dL      A/G Ratio 0.8 g/dL      BUN/Creatinine Ratio 8.6     Anion Gap 22.3 mmol/L     Troponin [[id number]]  (Normal) Collected:  06/04/18 1624    Specimen:  Blood Updated:  06/04/18 1721     Troponin T 0.019 ng/mL     Narrative:       Troponin T Reference Ranges:  Less than 0.03 ng/mL:    Negative for AMI  0.03 to 0.09 ng/mL:      Indeterminant for AMI  Greater than 0.09 ng/mL: Positive for AMI    CBC Auto Differential [[id number]]  (Abnormal) Collected:  06/04/18 1624    Specimen:  Blood Updated:  06/04/18 1718     WBC 20.44 (H) 10*3/mm3      RBC 3.88 (L) 10*6/mm3      Hemoglobin 12.1 (L) g/dL      Hematocrit 38.3 (L) %      MCV 98.7 (H) fL      MCH 31.2 pg      MCHC 31.6 (L) g/dL      RDW 13.7 %      RDW-SD 49.4 fl      MPV 11.3 fL      Platelets 306 10*3/mm3     Scan Slide [[id number]] Collected:  06/04/18 1624    Specimen:  Blood Updated:  06/04/18 1718     Scan Slide --     Comment: See Manual Differential Results       Manual Differential [[id number]]  (Abnormal) Collected:  06/04/18 1624    Specimen:  Blood Updated:  06/04/18 1718     Neutrophil % 89.0 (H) %      Lymphocyte % 6.0 (L) %      Monocyte % 4.0 (L) %      Myelocyte % 1.0 (H) %      Neutrophils Absolute 18.19 (H) 10*3/mm3      Lymphocytes Absolute 1.23 10*3/mm3      Monocytes Absolute 0.82 10*3/mm3   "    RBC Morphology Normal     WBC Morphology Normal     Large Platelets Slight/1+    Lipase [[id number]]  (Abnormal) Collected:  06/04/18 1624    Specimen:  Blood Updated:  06/04/18 1852     Lipase 9 (L) U/L     Magnesium [[id number]]  (Abnormal) Collected:  06/04/18 1624    Specimen:  Blood Updated:  06/04/18 1852     Magnesium 1.5 (L) mg/dL     Phosphorus [[id number]]  (Abnormal) Collected:  06/04/18 1624    Specimen:  Blood Updated:  06/04/18 1852     Phosphorus 4.9 (H) mg/dL     Procalcitonin [[id number]]  (Abnormal) Collected:  06/04/18 1624    Specimen:  Blood Updated:  06/04/18 1900     Procalcitonin 37.72 (C) ng/mL     Narrative:       As a Marker for Sepsis (Non-Neonates):   1. <0.5 ng/mL represents a low risk of severe sepsis and/or septic shock.  1. >2 ng/mL represents a high risk of severe sepsis and/or septic shock.    As a Marker for Lower Respiratory Tract Infections that require antibiotic therapy:  PCT on Admission     Antibiotic Therapy             6-12 Hrs later  > 0.5                Strongly Recommended            >0.25 - <0.5         Recommended  0.1 - 0.25           Discouraged                   Remeasure/reassess PCT  <0.1                 Strongly Discouraged          Remeasure/reassess PCT      As 28 day mortality risk marker: \"Change in Procalcitonin Result\" (> 80 % or <=80 %) if Day 0 (or Day 1) and Day 4 values are available. Refer to http://www.[facility]-pct-calculator.com/   Change in PCT <=80 %   A decrease of PCT levels below or equal to 80 % defines a positive change in PCT test result representing a higher risk for 28-day all-cause mortality of patients diagnosed with severe sepsis or septic shock.  Change in PCT > 80 %   A decrease of PCT levels of more than 80 % defines a negative change in PCT result representing a lower risk for 28-day all-cause mortality of patients diagnosed with severe sepsis or septic shock.                PSA DIAGNOSTIC [[id number]]  (Normal) Collected:  06/04/18 " 1624    Specimen:  Blood Updated:  06/04/18 1844     PSA 0.416 ng/mL     Blood Culture - Blood, [[id number]] Collected:  06/04/18 1831    Specimen:  Blood from Arm, Right Updated:  06/04/18 1837    Blood Culture - Blood, [[id number]] Collected:  06/04/18 1831    Specimen:  Blood from Wrist, Right Updated:  06/04/18 1837    Lactic Acid, Plasma [[id number]]  (Abnormal) Collected:  06/04/18 1831    Specimen:  Blood Updated:  06/04/18 1900     Lactate 2.4 (C) mmol/L     Lactic Acid, Reflex Timer (This will reflex a repeat order 3-3:15 hours after ordered.) [[id number]] Collected:  06/04/18 1831    Specimen:  Blood Updated:  06/04/18 1900          I ordered the above labs and reviewed the results    RADIOLOGY  XR Chest 2 View   Preliminary Result   New, asymmetric opacity at the lateral left lung base seen   to best advantage on the PA view is suspicious for pneumonia.                       I ordered the above noted radiological studies. Interpreted by radiologist. Reviewed by me in PACS.       PROCEDURES  Critical Care  Performed by: ALLI COSTELLO  Authorized by: ALLI COSTELLO     Critical care provider statement:     Critical care time (minutes):  30    Critical care start time:  6/4/2018 5:55 PM    Critical care end time:  6/4/2018 6:27 PM    Critical care time was exclusive of:  Separately billable procedures and treating other patients    Critical care was necessary to treat or prevent imminent or life-threatening deterioration of the following conditions:  Renal failure    Critical care was time spent personally by me on the following activities:  Blood draw for specimens, development of treatment plan with patient or surrogate, discussions with consultants, evaluation of patient's response to treatment, examination of patient, obtaining history from patient or surrogate, review of old charts, re-evaluation of patient's condition, ordering and review of radiographic studies, ordering and review of laboratory studies  and ordering and performing treatments and interventions    I assumed direction of critical care for this patient from another provider in my specialty: no                EKG           EKG time: 4:05 PM  Rhythm/Rate: NSR, 85  P waves and VA: nml  QRS, axis: IVCD  ST and T waves: ST elevation in V3 and V4; T wave inversion in V4, V5, and V6    Interpreted Contemporaneously by me, independently viewed and is unchanged compared to prior 01/01/17.    PROGRESS AND CONSULTS  1805 Ordered EKG for further evaluation.  Also placed call to A and Nephrology.    1813 Ordered blood work and bladder scan for further evaluation. Also ordered Rocephin and Vibramycin to treat infection.    1818 Ordered UA for further evaluation.    1820 Received a call from Dr. Loco, nephrology, and discussed pt's case. Dr. Loco agreed with plan to consult. Also ordered IVF for hydration.    1824 Discussed with patient the diagnoses of acute kidney injury, pneumonia, and hyperkalemia. Also discussed the plan for admission. Pt understands and agrees with the plan, all questions answered.    1825 Received a call from Dr. Guillen, [facility], and discussed pt's case. Dr. Guillen agreed with plan to admit the pt to tele for further evaluation.        MEDICAL DECISION MAKING  Results were reviewed/discussed with the patient and they were also made aware of online access. Pt also made aware that some labs, such as cultures, will not be resulted during ER visit and follow up with PMD is necessary.     MDM  Number of Diagnoses or Management Options  Acute kidney injury:   Hyperkalemia:   Pneumonia of left lower lobe due to infectious organism:   Sepsis, due to unspecified organism:      Amount and/or Complexity of Data Reviewed  Clinical lab tests: ordered and reviewed (WBC - 20.44, BUN - 38, Creatinine - 4.43, eGFR - 13, and Potassium - 5.3)  Tests in the radiology section of CPT®: ordered and reviewed (CXR shows new, asymmetric opacity at the lateral left lung  base seen to best advantage on the PA view is suspicious for pneumonia.)  Tests in the medicine section of CPT®: ordered and reviewed (See procedure notes for EKG interpretation.)  Decide to obtain previous medical records or to obtain history from someone other than the patient: yes  Obtain history from someone other than the patient: yes (Pt's son)  Review and summarize past medical records: yes (Pt was last admitted from 12/30/16 until 1/3/17 for cardiomyopathy and CHF.)  Discuss the patient with other providers: yes (Dr. Guillen, [facility], and Dr. Loco, nephrology)  Independent visualization of images, tracings, or specimens: yes    Critical Care  Total time providing critical care: 30-74 minutes (30)    Patient Progress  Patient progress: stable         DIAGNOSIS  Final diagnoses:   Acute kidney injury   Pneumonia of left lower lobe due to infectious organism   Hyperkalemia   Sepsis, due to unspecified organism       DISPOSITION  ADMISSION TO [facility]    Discussed treatment plan and reason for admission with pt/family and admitting physician.  Pt/family voiced understanding of the plan for admission for further testing/treatment as needed.       Latest Documented Vital Signs:  As of 7:44 PM  BP- 100/53 HR- 86 Temp- 98.6 °F (37 °C) (Oral) O2 sat- 98%    --  Documentation assistance provided by naomi Agrawal for Dr. Hood.  Information recorded by the scribgeorge was done at my direction and has been verified and validated by me.         Ijeoma Agrawal  06/1950       Trenton Hood MD  06/05/18 5052

## 2018-06-04 NOTE — ED NOTES
Per family- pt stopped taking lasix 1.5 months ago due to recent injury and didn't want to have to go to the restroom alot and has been c/o fatigue, chest pain, and SOA. Pt reports vomiting; denies eating and drinking much in at least 1.5 days. Pt reports that MD Calvin told him to stop lasix but family thinks he just stopped it on his own. Reassurance given; call light in reach. Pts breathing even and unlabored. Pt appears in NAD at this time. Family at bedside.      Flory Zapien, OLIVA  06/04/18 2630

## 2018-06-04 NOTE — PROGRESS NOTES
Clinical Pharmacy Services: Medication History    Mello Arias Jr. is a 67 y.o. male presenting to [facility] for   Chief Complaint   Patient presents with   • Mutiple Complaints       He  has a past medical history of Allergic rhinitis; Anemia; Asthma; Bone fracture; CAD (coronary artery disease); Cardiomyopathy, ischemic; Carpal tunnel syndrome; CHF (congestive heart failure), NYHA class III, chronic, systolic; Fatigue; GERD (gastroesophageal reflux disease); Health care maintenance; Herniated disc, cervical; total hip arthroplasty (3/18/2016); Hyperglycemia; Hyperlipidemia; Hypertension; Idiopathic ventricular tachycardia; Lumbosacral radiculopathy at L4; Malaise and fatigue; Myocardial infarction; Myocardial ischemia; Non-rheumatic tricuspid valve insufficiency; Nonrheumatic mitral valve regurgitation; Open wound; Osteoarthritis; PAF (paroxysmal atrial fibrillation); Pneumonia; Shortness of breath; and Ventricular tachycardia.    Allergies as of 06/04/2018 - Reviewed 06/04/2018   Allergen Reaction Noted   • Shellfish-derived products Swelling and Other (See Comments) 11/19/2015       Medication information was obtained from: Patient  Pharmacy and Phone Number: [facility] [phone]    Prior to Admission Medications     Prescriptions Last Dose Informant Patient Reported? Taking?    acetaminophen (TYLENOL) 325 MG tablet  Self No Yes    Take 2 tablets by mouth Every 4 (Four) Hours As Needed for mild pain (1-3).    albuterol (PROAIR HFA) 108 (90 BASE) MCG/ACT inhaler  Self Yes Yes    Inhale 2 puffs Every 6 (Six) Hours As Needed.    aspirin 81 MG tablet  Self Yes Yes    Take 81 mg by mouth Daily.    carvedilol (COREG) 6.25 MG tablet  Self No Yes    Take 1 tablet by mouth 2 (Two) Times a Day.    meloxicam (MOBIC) 15 MG tablet  Self No Yes    Take 1 tablet by mouth Daily for 20 days.    montelukast (SINGULAIR) 10 MG tablet  Self Yes Yes    Take 10 mg by mouth Every Night.    nitroglycerin (NITROSTAT)  0.4 MG SL tablet  Self Yes Yes    Place 0.4 mg under the tongue Every 5 (Five) Minutes As Needed for Chest Pain. Take no more than 3 doses in 15 minutes.    omeprazole (PRILOSEC) 20 MG capsule  Self Yes Yes    Take 20 mg by mouth Daily.    sacubitril-valsartan (ENTRESTO) 24-26 MG tablet  Self No Yes    Take 1 tablet by mouth Every 12 (Twelve) Hours.    spironolactone (ALDACTONE) 25 MG tablet  Self Yes Yes    Take 25 mg by mouth Daily.    erythromycin (ROMYCIN) ophthalmic ointment   No No    furosemide (LASIX) 40 MG tablet  Self No No    TAKE ONE TABLET BY MOUTH DAILY            Medication notes: Patient not currently taking Lasix    This medication list is complete to the best of my knowledge as of 6/4/2018    Please call if questions.    Taylor Arias, Medication History Technician  6/4/2018 7:37 PM

## 2018-06-05 ENCOUNTER — APPOINTMENT (OUTPATIENT)
Dept: ULTRASOUND IMAGING | Facility: HOSPITAL | Age: 68
End: 2018-06-05

## 2018-06-05 LAB
ANION GAP SERPL CALCULATED.3IONS-SCNC: 14.5 MMOL/L
BASOPHILS # BLD AUTO: 0.01 10*3/MM3 (ref 0–0.2)
BASOPHILS NFR BLD AUTO: 0.1 % (ref 0–1.5)
BUN BLD-MCNC: 41 MG/DL (ref 8–23)
BUN/CREAT SERPL: 10.8 (ref 7–25)
CALCIUM SPEC-SCNC: 8 MG/DL (ref 8.6–10.5)
CHLORIDE SERPL-SCNC: 93 MMOL/L (ref 98–107)
CO2 SERPL-SCNC: 19.5 MMOL/L (ref 22–29)
CREAT BLD-MCNC: 3.81 MG/DL (ref 0.76–1.27)
DEPRECATED RDW RBC AUTO: 48.7 FL (ref 37–54)
EOSINOPHIL # BLD AUTO: 0.05 10*3/MM3 (ref 0–0.7)
EOSINOPHIL NFR BLD AUTO: 0.4 % (ref 0.3–6.2)
ERYTHROCYTE [DISTWIDTH] IN BLOOD BY AUTOMATED COUNT: 13.9 % (ref 11.5–14.5)
GFR SERPL CREATININE-BSD FRML MDRD: 16 ML/MIN/1.73
GLUCOSE BLD-MCNC: 133 MG/DL (ref 65–99)
GLUCOSE BLDC GLUCOMTR-MCNC: 99 MG/DL (ref 70–130)
HCT VFR BLD AUTO: 28.8 % (ref 40.4–52.2)
HGB BLD-MCNC: 9.3 G/DL (ref 13.7–17.6)
IMM GRANULOCYTES # BLD: 0.06 10*3/MM3 (ref 0–0.03)
IMM GRANULOCYTES NFR BLD: 0.4 % (ref 0–0.5)
L PNEUMO1 AG UR QL IA: NEGATIVE
LYMPHOCYTES # BLD AUTO: 1.19 10*3/MM3 (ref 0.9–4.8)
LYMPHOCYTES NFR BLD AUTO: 8.9 % (ref 19.6–45.3)
MCH RBC QN AUTO: 30.9 PG (ref 27–32.7)
MCHC RBC AUTO-ENTMCNC: 32.3 G/DL (ref 32.6–36.4)
MCV RBC AUTO: 95.7 FL (ref 79.8–96.2)
MONOCYTES # BLD AUTO: 0.77 10*3/MM3 (ref 0.2–1.2)
MONOCYTES NFR BLD AUTO: 5.8 % (ref 5–12)
NEUTROPHILS # BLD AUTO: 11.33 10*3/MM3 (ref 1.9–8.1)
NEUTROPHILS NFR BLD AUTO: 84.8 % (ref 42.7–76)
NRBC BLD MANUAL-RTO: 0 /100 WBC (ref 0–0)
OSMOLALITY UR: 340 MOSM/KG
PLATELET # BLD AUTO: 211 10*3/MM3 (ref 140–500)
PMV BLD AUTO: 11 FL (ref 6–12)
POTASSIUM BLD-SCNC: 4.7 MMOL/L (ref 3.5–5.2)
PROCALCITONIN SERPL-MCNC: 20.99 NG/ML (ref 0.1–0.25)
RBC # BLD AUTO: 3.01 10*6/MM3 (ref 4.6–6)
S PNEUM AG SPEC QL LA: NEGATIVE
SODIUM BLD-SCNC: 127 MMOL/L (ref 136–145)
SODIUM UR-SCNC: 47 MMOL/L
WBC NRBC COR # BLD: 13.35 10*3/MM3 (ref 4.5–10.7)

## 2018-06-05 PROCEDURE — 94640 AIRWAY INHALATION TREATMENT: CPT

## 2018-06-05 PROCEDURE — 76775 US EXAM ABDO BACK WALL LIM: CPT

## 2018-06-05 PROCEDURE — 93005 ELECTROCARDIOGRAM TRACING: CPT | Performed by: INTERNAL MEDICINE

## 2018-06-05 PROCEDURE — 85025 COMPLETE CBC W/AUTO DIFF WBC: CPT | Performed by: INTERNAL MEDICINE

## 2018-06-05 PROCEDURE — 84145 PROCALCITONIN (PCT): CPT | Performed by: INTERNAL MEDICINE

## 2018-06-05 PROCEDURE — 82962 GLUCOSE BLOOD TEST: CPT

## 2018-06-05 PROCEDURE — 97162 PT EVAL MOD COMPLEX 30 MIN: CPT

## 2018-06-05 PROCEDURE — 93010 ELECTROCARDIOGRAM REPORT: CPT | Performed by: INTERNAL MEDICINE

## 2018-06-05 PROCEDURE — 94799 UNLISTED PULMONARY SVC/PX: CPT

## 2018-06-05 PROCEDURE — 97110 THERAPEUTIC EXERCISES: CPT

## 2018-06-05 PROCEDURE — 25010000002 ENOXAPARIN PER 10 MG: Performed by: INTERNAL MEDICINE

## 2018-06-05 PROCEDURE — 80048 BASIC METABOLIC PNL TOTAL CA: CPT | Performed by: INTERNAL MEDICINE

## 2018-06-05 PROCEDURE — 25010000002 CEFTRIAXONE PER 250 MG: Performed by: INTERNAL MEDICINE

## 2018-06-05 PROCEDURE — 99221 1ST HOSP IP/OBS SF/LOW 40: CPT | Performed by: INTERNAL MEDICINE

## 2018-06-05 RX ORDER — MAGNESIUM OXIDE 400 MG/1
400 TABLET ORAL DAILY
Status: DISCONTINUED | OUTPATIENT
Start: 2018-06-05 | End: 2018-06-08 | Stop reason: HOSPADM

## 2018-06-05 RX ORDER — FAMOTIDINE 20 MG/1
20 TABLET, FILM COATED ORAL DAILY
Status: DISCONTINUED | OUTPATIENT
Start: 2018-06-05 | End: 2018-06-08 | Stop reason: HOSPADM

## 2018-06-05 RX ADMIN — ALBUTEROL SULFATE 2.5 MG: 2.5 SOLUTION RESPIRATORY (INHALATION) at 22:17

## 2018-06-05 RX ADMIN — PANTOPRAZOLE SODIUM 40 MG: 40 TABLET, DELAYED RELEASE ORAL at 06:41

## 2018-06-05 RX ADMIN — CEFTRIAXONE SODIUM 1 G: 1 INJECTION, SOLUTION INTRAVENOUS at 18:06

## 2018-06-05 RX ADMIN — CARVEDILOL 6.25 MG: 6.25 TABLET, FILM COATED ORAL at 18:05

## 2018-06-05 RX ADMIN — CARVEDILOL 6.25 MG: 6.25 TABLET, FILM COATED ORAL at 09:03

## 2018-06-05 RX ADMIN — ALBUTEROL SULFATE 2.5 MG: 2.5 SOLUTION RESPIRATORY (INHALATION) at 14:12

## 2018-06-05 RX ADMIN — FAMOTIDINE 20 MG: 20 TABLET, FILM COATED ORAL at 12:33

## 2018-06-05 RX ADMIN — MONTELUKAST 10 MG: 10 TABLET, FILM COATED ORAL at 20:19

## 2018-06-05 RX ADMIN — DOCUSATE SODIUM -SENNOSIDES 2 TABLET: 50; 8.6 TABLET, COATED ORAL at 20:19

## 2018-06-05 RX ADMIN — DOXYCYCLINE 100 MG: 100 INJECTION, POWDER, LYOPHILIZED, FOR SOLUTION INTRAVENOUS at 06:41

## 2018-06-05 RX ADMIN — ALBUTEROL SULFATE 2.5 MG: 2.5 SOLUTION RESPIRATORY (INHALATION) at 00:12

## 2018-06-05 RX ADMIN — ENOXAPARIN SODIUM 30 MG: 30 INJECTION SUBCUTANEOUS at 20:19

## 2018-06-05 RX ADMIN — DOXYCYCLINE 100 MG: 100 INJECTION, POWDER, LYOPHILIZED, FOR SOLUTION INTRAVENOUS at 19:07

## 2018-06-05 RX ADMIN — SODIUM CHLORIDE 100 ML/HR: 9 INJECTION, SOLUTION INTRAVENOUS at 06:35

## 2018-06-05 RX ADMIN — Medication 400 MG: at 12:34

## 2018-06-05 NOTE — CONSULTS
Patient Name: Mello Arias Jr.  Age/Sex: 67 y.o. male  : 1950  MRN: [id number]    Date of Admission: 2018  Date of Encounter Visit: 18  Encounter Provider: Naun Calvin MD  Place of Service: [facility] CARDIOLOGY      Referring Provider: Luh Guillen MD  Patient Care Team:  Kelsey Muñoz MD as PCP - General (Family Medicine)  Kelsey Muñoz MD as PCP - Family Medicine  Naun Calvin MD as PCP - Claims Attributed    Subjective:   Consulted for: CHF     Chief Complaint: Shortness of breath    New York Heart Association NYHA Class:  3  History of Present Illness:  Mello Arias Jr. is a 67 y.o. male with a history of coronary artery disease, myocardial infarction in , status post angioplasty of the LAD, ventricular tachycardia (ICD) and paroxysmal atrial fibrillation, left ventricular dysfucntion. In , his EF was noted to be 19%.  He had abnormal stress test but it was thought to be stable.  A week after testing performed, he presented with precordial chest pain.  He underwent a CABG ×4 and mitral valve prosthesis with a tissue prosthesis and tricuspid valve repair.  After surgery, his EF continued to be decreased to around 20%.  He did have an ICD ([employer]) placed for ventricular tachycardia.He presented to the ED last evening with c/o shortness of breath that started about a month and a half ago after lasix was stopped. On arrival his BP was 91/62 and heart rate was 115. EKG showed NSR, rate 85, ST elevation in V3 and V4, T wave inversion in V4, V5 and V6. Labs showed BUN/creat 38/4.43, potassium 5.3, Na 128, alk phos 133, wbc 20.44, procalcitonin 37.72, lactate 2.4. CXR was suspicious for pneumonia. He was admitted for acute kidney injury, pneumonia of left lower lobe and sepsis.     We have been consulted for CHF. He is known to the service and was last seen on 18 by LAEX Soler. At that time he was  feeling well and denied palpitations, tachycardia, edema, dizziness, chest pain, orthopnea or PND. He reported mild shortness of breath with exertion but attributes that to asthma. No medication changes were made. An echo done in 1/2017 showed an EF <10%, severe global hypokinesis, LV function severely decreased, mild TR. Last ICD check was 5/17/18 and showed presenting rhythm AS/VS 95 bpm. Lead trending is stable. AP < 1% and  < 1%. Testing WNL. Pulse amplitude on RV lead decreased. 9 VT-NS episodes; 2 with EGMs that show VT lasting 4 and 7 beats. 1 ATR episode lasting 3 seconds that appears to be 1:1 conduction with an occasional blocked PAC.    He is receiving IV fluids due to sepsis and hypotension. His spironolactone and entresto have been stopped and he is receiving IV ceftriaxone and doxycycline. Wbc has come down to 13.35, creat decreased to 3.81.       Cardiac testing:   Echo 1/2/17  · The left ventricular cavity is severely dilated  · There is severe global hypokinesis.  · Left ventricular function is severely decreased.  · The estimated ejection fraction is <10%  · There is a bioprosthetic aortic valve appears to be well-seated.  · Gradients across the bioprosthetic mitral valve are normal.  · There is no mitral regurgitation..  · Mild tricuspid valve regurgitation is present.  · The calculated right ventricular systolic pressure is 41 mmHg (mildly elevated)..  · There is no evidence of pericardial effusion.    Cath 10/21/16  Assessment:  1.  Ischemic heart disease:  Etiology: Coronary atherosclerosis  Anatomy: Severe two-vessel coronary disease with complete occlusion of the proximal LAD and high-grade stenosis of the distal right coronary artery.  Left ventricular enlargement  Physiology: Severe left ventricular dysfunction, pulmonary hypertension (moderate), mitral regurgitation (moderate)  Functional status: Moderately compromised    Stress test 9/23/16  · Myocardial perfusion imaging indicates a  large-sized infarct located in the anterior wall, inferior wall, septal wall and apex with mild kavita-infarct ischemia.  · Left ventricular ejection fraction is severely reduced (Calculated EF = 29%).  · Compared to the prior study from 1/22/2014 the current study reveals no changes.    Echo 9/23/16  · The left ventricular cavity is severely dilated.  · Left ventricular wall segments contract abnormally. Refer to wall scoring diagram for more information.  · Left ventricular function is severely decreased. Estimated EF = 19%.  · Mildly reduced right ventricular systolic function noted.  · Moderate mitral valve regurgitation is present  · Mild to moderate tricuspid valve regurgitation is present.  · Estimated right ventricular systolic pressure from tricuspid regurgitation is markedly elevated (>55 mmHg). Severe pulmonary hypertension is present.    Past Medical History:  Past Medical History:   Diagnosis Date   • Allergic rhinitis    • Anemia    • Asthma    • Bone fracture    • CAD (coronary artery disease)    • Cardiomyopathy, ischemic    • Carpal tunnel syndrome    • CHF (congestive heart failure), NYHA class III, chronic, systolic    • GERD (gastroesophageal reflux disease)    • Health care maintenance    • Herniated disc, cervical    • Hx of total hip arthroplasty 3/18/2016   • Hyperglycemia    • Hyperlipidemia    • Hypertension    • Idiopathic ventricular tachycardia    • Lumbosacral radiculopathy at L4    • Myocardial infarction     OF INFERIOR WALL, GREATER THAN 8 WEEKS   • Myocardial ischemia     POST MYOCARDIAL INFARCTION, POST PTCA WITH STENT PLACEMENT   • Non-rheumatic tricuspid valve insufficiency    • Nonrheumatic mitral valve regurgitation    • Open wound     OF LEFT INDEX FINGER WITHOUT DAMAGE TO NAIL   • Osteoarthritis    • PAF (paroxysmal atrial fibrillation)    • Ventricular tachycardia     POST AICD       Past Surgical History:   Procedure Laterality Date   • APPENDECTOMY     • CARDIAC  CATHETERIZATION N/A 10/21/2016    Procedure: Coronary angiography;  Surgeon: Naun Calvin MD;  Location: [facility] CATH INVASIVE LOCATION;  Service:    • CARDIAC CATHETERIZATION N/A 10/21/2016    Procedure: Left heart cath;  Surgeon: Naun Calvin MD;  Location: [facility] CATH INVASIVE LOCATION;  Service:    • CARDIAC CATHETERIZATION N/A 10/21/2016    Procedure: Left ventriculography;  Surgeon: Naun Calvin MD;  Location: [facility] CATH INVASIVE LOCATION;  Service:    • CARDIAC CATHETERIZATION N/A 10/21/2016    Procedure: Right heart cath;  Surgeon: Naun Calvin MD;  Location: [facility] CATH INVASIVE LOCATION;  Service:    • CARDIAC DEFIBRILLATOR PLACEMENT     • CARPAL TUNNEL RELEASE     • COLONOSCOPY     • CORONARY ANGIOPLASTY      WITH STENT PLACEMENT TO THE LAD   • CORONARY ARTERY BYPASS GRAFT WITH MITRAL VALVE REPAIR/REPLACEMENT N/A 11/7/2016    Procedure: JAYE STERNOTOMY CORONARY ARTERY BYPASS GRAFT TIMES 4 USING LEFT INTERNAL MAMMARY ARTERY AND LEFT GREATER SAPHENOUS VEIN GRAFT PER ENDOSCOPIC VEIN HARVESTING, MITRAL VALVE REPLACEMENT AND TRICUSPID VALVE REPAIR;  Surgeon: Slade Hurtado MD;  Location: [facility] OR;  Service:    • EYE SURGERY     • FOOT SURGERY Left    • HERNIA REPAIR     • IMPLANTABLE CARDIOVERTER DEFIBRILLATOR LEAD REPLACEMENT/POCKET REVISION     • INGUINAL HERNIA REPAIR Left    • LASIK     • TONSILLECTOMY     • TOTAL HIP ARTHROPLASTY Bilateral        Home Medications:   Facility-Administered Medications Prior to Admission   Medication Dose Route Frequency Provider Last Rate Last Dose   • [DISCONTINUED] erythromycin (ROMYCIN) ophthalmic ointment   Left Eye Once Daniel El MD         Prescriptions Prior to Admission   Medication Sig Dispense Refill Last Dose   • acetaminophen (TYLENOL) 325 MG tablet Take 2 tablets by mouth Every 4 (Four) Hours As Needed for mild pain (1-3).  0 6/3/2018 at Unknown time   • albuterol (PROAIR HFA) 108 (90 BASE) MCG/ACT inhaler Inhale 2  puffs Every 6 (Six) Hours As Needed.   6/4/2018 at Unknown time   • aspirin 81 MG tablet Take 81 mg by mouth Daily.   6/3/2018 at Unknown time   • carvedilol (COREG) 6.25 MG tablet Take 1 tablet by mouth 2 (Two) Times a Day. 180 tablet 3 6/3/2018 at Unknown time   • montelukast (SINGULAIR) 10 MG tablet Take 10 mg by mouth Every Night.   6/3/2018 at Unknown time   • omeprazole (PRILOSEC) 20 MG capsule Take 20 mg by mouth Daily.   6/3/2018 at Unknown time   • sacubitril-valsartan (ENTRESTO) 24-26 MG tablet Take 1 tablet by mouth Every 12 (Twelve) Hours. 28 tablet 0 6/3/2018 at Unknown time   • spironolactone (ALDACTONE) 25 MG tablet Take 25 mg by mouth Daily.   6/3/2018 at Unknown time   • nitroglycerin (NITROSTAT) 0.4 MG SL tablet Place 0.4 mg under the tongue Every 5 (Five) Minutes As Needed for Chest Pain. Take no more than 3 doses in 15 minutes.   Unknown at Unknown time       Allergies:  Allergies   Allergen Reactions   • Shellfish-Derived Products Swelling and Other (See Comments)     THROAT SWELLS       Past Social History:  Social History     Social History   • Marital status: Single     Spouse name: N/A   • Number of children: N/A   • Years of education: N/A     Occupational History   • Not on file.     Social History Main Topics   • Smoking status: Never Smoker   • Smokeless tobacco: Never Used   • Alcohol use 6.0 oz/week     10 Shots of liquor per week      Comment: social drinker   • Drug use: No   • Sexual activity: Defer     Other Topics Concern   • Not on file     Social History Narrative   • No narrative on file        Past Family History:  Family History   Problem Relation Age of Onset   • Stroke Mother    • Osteoarthritis Mother        Review of Systems: All systems reviewed. Pertinent positives identified in HPI. All other systems are negative.     REVIEW OF SYSTEMS:   CONSTITUTIONAL: No weight loss, fever, chills, weakness or fatigue.   HEENT: Eyes: No visual loss, blurred vision, double vision or  yellow sclerae. Ears, Nose, Throat: No hearing loss, sneezing, congestion, runny nose or sore throat.   SKIN: No rash or itching.     RESPIRATORY: No shortness of breath, hemoptysis, cough or sputum.   GASTROINTESTINAL: No anorexia, nausea, vomiting or diarrhea. No abdominal pain, bright red blood per rectum or melena.  GENITOURINARY: No burning on urination, hematuria or increased frequency.  NEUROLOGICAL: No headache, dizziness, syncope, paralysis, ataxia, numbness or tingling in the extremities. No change in bowel or bladder control.   MUSCULOSKELETAL: No muscle, back pain, joint pain or stiffness.   HEMATOLOGIC: No anemia, bleeding or bruising.   LYMPHATICS: No enlarged nodes. No history of splenectomy.   PSYCHIATRIC: No history of depression, anxiety, hallucinations.   ENDOCRINOLOGIC: No reports of sweating, cold or heat intolerance. No polyuria or polydipsia.       Objective:     Objective:  Temp:  [96.9 °F (36.1 °C)-100.2 °F (37.9 °C)] 99.6 °F (37.6 °C)  Heart Rate:  [] 106  Resp:  [16-18] 18  BP: ()/(53-67) 103/64    Intake/Output Summary (Last 24 hours) at 06/05/18 0834  Last data filed at 06/05/18 0641   Gross per 24 hour   Intake             3085 ml   Output              125 ml   Net             2960 ml     Body mass index is 30.17 kg/m².  1    06/04/18  1458 06/04/18  2022 06/05/18  0525   Weight: 88.5 kg (195 lb) 88.5 kg (195 lb) 90 kg (198 lb 6.6 oz)           Physical Exam:   Physical Exam   Constitutional: He is oriented to person, place, and time. He appears well-developed and well-nourished.   HENT:   Head: Normocephalic.   Eyes: Pupils are equal, round, and reactive to light.   Neck: Normal range of motion. No JVD present. Carotid bruit is not present. No thyromegaly present.   Cardiovascular: Normal rate, regular rhythm, S1 normal, S2 normal and intact distal pulses.  Exam reveals gallop and S3. Exam reveals no friction rub.    No murmur heard.  Pulmonary/Chest: Effort normal and  breath sounds normal.   Abdominal: Soft. Bowel sounds are normal.   Musculoskeletal: He exhibits no edema.   Neurological: He is alert and oriented to person, place, and time.   Skin: Skin is warm, dry and intact. No erythema.   Psychiatric: He has a normal mood and affect.   Vitals reviewed.        Lab Review:       Results from last 7 days  Lab Units 06/05/18  0601 06/04/18  2139 06/04/18  1624   SODIUM mmol/L 127* 130* 128*   POTASSIUM mmol/L 4.7 5.0 5.3*   CHLORIDE mmol/L 93* 93* 88*   CO2 mmol/L 19.5* 18.9* 17.7*   BUN mg/dL 41* 37* 38*   CREATININE mg/dL 3.81* 4.03* 4.43*   GLUCOSE mg/dL 133* 120* 140*   CALCIUM mg/dL 8.0* 8.3* 9.6         Results from last 7 days  Lab Units 06/04/18  1624   TROPONIN T ng/mL 0.019       Results from last 7 days  Lab Units 06/05/18  0601   WBC 10*3/mm3 13.35*   HEMOGLOBIN g/dL 9.3*   HEMATOCRIT % 28.8*   PLATELETS 10*3/mm3 211               Results from last 7 days  Lab Units 06/04/18  1624   MAGNESIUM mg/dL 1.5*                       Imaging:    Imaging Results (most recent)     Procedure Component Value Units Date/Time    XR Chest 2 View [[id number]] Collected:  06/04/18 1634     Updated:  06/04/18 2100    Narrative:       PA AND LATERAL CHEST X-RAY      HISTORY: Diffuse body aches for 3 days. Short of breath. Previous  coronary bypass and heart valve replacement.     TECHNIQUE: PA and lateral images of the chest are provided and  correlated with chest CT 12/31/2016.     FINDINGS: There are sternal wires and a cardiac defibrillator as well as  cardiac valve hardware. The cardiomediastinal silhouette is normal.  Vascular volume is normal. Asymmetric opacity is observed at the left  lung base laterally as seen to best advantage on the PA view. No such  opacity was present in this area on the prior chest CT or on a chest  x-ray 12/30/2016. This could represent pneumonia. There is some chronic  parenchymal change in the medial right upper lung zone, no different  than on prior  x-ray or CT. The right lung is otherwise clear.       Impression:       New, asymmetric opacity at the lateral left lung base seen  to best advantage on the PA view is suspicious for pneumonia.     This report was finalized on 6/4/2018 8:57 PM by Dr. Bruce Hudson M.D.             EKG:         Baseline:         I personally viewed and interpreted the patient's EKG/Telemetry data.    Assessment:   Assessment/Plan       Principal Problem:    Sepsis  Active Problems:    Cardiomyopathy, ischemic    Idiopathic ventricular tachycardia    CAD in native artery    CHF (congestive heart failure)    Acute renal failure    Hyponatremia    Bacterial pneumonia    1. Pneumonia: on abx  2. Sepsis secondary to  #1  3. Acute renal failure: Entresto and aldactone discontinued.  Slow hydration.  Renal function slightly better  4. Cardiomyopathy: severe ischemic with LVEF < 20%.  Pt responded favorably to Entresto and aldactone.  Will repeat echo for new baseline.  Will be a challenge going forward.  QRS not wide enough to consider BiV ICD  5. CHF: CXR does not indicate fluid overload  6. Ventricular tachycardia: s/p ICD.  23 beat run this am.  Monomorphic.   Mg low.  Will replace        Thank you for allowing me to participate in the care of Mello Arias Jr.. Feel free to contact me directly with any further questions or concerns.    Naun Calvin MD  [facility]  06/05/18  8:34 AM

## 2018-06-05 NOTE — CONSULTS
"  Referring Provider: Dr Guillen  Reason for Consultation: MARISELA     Subjective     Chief complaint   Chief Complaint   Patient presents with   • Mutiple Complaints       History of present illness:  68 yo WM with h/o ICM s/p ACID, EF < 10% on Echo 1/17), CAD, HTN, GERD who was admitted last night with worsening dyspnea over 2-3 weeks.  Mild cough, non-productive, and had several episodes of emesis.  He was found to have CAP, sepsis, and acute kidney injury.  SCr was 4.4, vs 1.1 in March 2017.  With IVF, Cr down to 3.8 today.  PVR bladder scan was normal.  UA: 30mg/dL protein, no blood, NIT/LE NEG.  He took a couple ibuprofen at home recently and is on entresto and aldactone for CHF, but no loop diuretic.  Denies lower ext edema.  Completed a course of amoxicillin a couple weeks ago.  No diarrhea.  Also reports chills at home and \"felt dehydrated.\"  CXR shows left basilar infiltrate.  Tmax 100.2 with WBC 20K and high procalcitonin level.  Started on doxy & rocephin.  Initial BP was 91/62, improved 103/64 this AM.       Past Medical History:   Diagnosis Date   • Allergic rhinitis    • Anemia    • Asthma    • Bone fracture    • CAD (coronary artery disease)    • Cardiomyopathy, ischemic    • Carpal tunnel syndrome    • CHF (congestive heart failure), NYHA class III, chronic, systolic    • GERD (gastroesophageal reflux disease)    • Health care maintenance    • Herniated disc, cervical    • Hx of total hip arthroplasty 3/18/2016   • Hyperglycemia    • Hyperlipidemia    • Hypertension    • Idiopathic ventricular tachycardia    • Lumbosacral radiculopathy at L4    • Myocardial infarction     OF INFERIOR WALL, GREATER THAN 8 WEEKS   • Myocardial ischemia     POST MYOCARDIAL INFARCTION, POST PTCA WITH STENT PLACEMENT   • Non-rheumatic tricuspid valve insufficiency    • Nonrheumatic mitral valve regurgitation    • Open wound     OF LEFT INDEX FINGER WITHOUT DAMAGE TO NAIL   • Osteoarthritis    • PAF (paroxysmal atrial " fibrillation)    • Ventricular tachycardia     POST AICD     Past Surgical History:   Procedure Laterality Date   • APPENDECTOMY     • CARDIAC CATHETERIZATION N/A 10/21/2016    Procedure: Coronary angiography;  Surgeon: Naun Calvin MD;  Location: [facility] CATH INVASIVE LOCATION;  Service:    • CARDIAC CATHETERIZATION N/A 10/21/2016    Procedure: Left heart cath;  Surgeon: Naun Calvin MD;  Location: [facility] CATH INVASIVE LOCATION;  Service:    • CARDIAC CATHETERIZATION N/A 10/21/2016    Procedure: Left ventriculography;  Surgeon: Naun Calvin MD;  Location: [facility] CATH INVASIVE LOCATION;  Service:    • CARDIAC CATHETERIZATION N/A 10/21/2016    Procedure: Right heart cath;  Surgeon: Naun Calvin MD;  Location: [facility] CATH INVASIVE LOCATION;  Service:    • CARDIAC DEFIBRILLATOR PLACEMENT     • CARPAL TUNNEL RELEASE     • COLONOSCOPY     • CORONARY ANGIOPLASTY      WITH STENT PLACEMENT TO THE LAD   • CORONARY ARTERY BYPASS GRAFT WITH MITRAL VALVE REPAIR/REPLACEMENT N/A 11/7/2016    Procedure: JAYE STERNOTOMY CORONARY ARTERY BYPASS GRAFT TIMES 4 USING LEFT INTERNAL MAMMARY ARTERY AND LEFT GREATER SAPHENOUS VEIN GRAFT PER ENDOSCOPIC VEIN HARVESTING, MITRAL VALVE REPLACEMENT AND TRICUSPID VALVE REPAIR;  Surgeon: Slade Hurtado MD;  Location: [facility] OR;  Service:    • EYE SURGERY     • FOOT SURGERY Left    • HERNIA REPAIR     • IMPLANTABLE CARDIOVERTER DEFIBRILLATOR LEAD REPLACEMENT/POCKET REVISION     • INGUINAL HERNIA REPAIR Left    • LASIK     • TONSILLECTOMY     • TOTAL HIP ARTHROPLASTY Bilateral      Family History   Problem Relation Age of Onset   • Stroke Mother    • Osteoarthritis Mother        Social History   Substance Use Topics   • Smoking status: Never Smoker   • Smokeless tobacco: Never Used   • Alcohol use 6.0 oz/week     10 Shots of liquor per week      Comment: social drinker     Facility-Administered Medications Prior to Admission   Medication Dose Route Frequency  "Provider Last Rate Last Dose   • [DISCONTINUED] erythromycin (ROMYCIN) ophthalmic ointment   Left Eye Once Daniel El MD         Prescriptions Prior to Admission   Medication Sig Dispense Refill Last Dose   • acetaminophen (TYLENOL) 325 MG tablet Take 2 tablets by mouth Every 4 (Four) Hours As Needed for mild pain (1-3).  0 6/3/2018 at Unknown time   • albuterol (PROAIR HFA) 108 (90 BASE) MCG/ACT inhaler Inhale 2 puffs Every 6 (Six) Hours As Needed.   6/4/2018 at Unknown time   • aspirin 81 MG tablet Take 81 mg by mouth Daily.   6/3/2018 at Unknown time   • carvedilol (COREG) 6.25 MG tablet Take 1 tablet by mouth 2 (Two) Times a Day. 180 tablet 3 6/3/2018 at Unknown time   • montelukast (SINGULAIR) 10 MG tablet Take 10 mg by mouth Every Night.   6/3/2018 at Unknown time   • omeprazole (PRILOSEC) 20 MG capsule Take 20 mg by mouth Daily.   6/3/2018 at Unknown time   • sacubitril-valsartan (ENTRESTO) 24-26 MG tablet Take 1 tablet by mouth Every 12 (Twelve) Hours. 28 tablet 0 6/3/2018 at Unknown time   • spironolactone (ALDACTONE) 25 MG tablet Take 25 mg by mouth Daily.   6/3/2018 at Unknown time   • nitroglycerin (NITROSTAT) 0.4 MG SL tablet Place 0.4 mg under the tongue Every 5 (Five) Minutes As Needed for Chest Pain. Take no more than 3 doses in 15 minutes.   Unknown at Unknown time     Allergies:  Shellfish-derived products    Review of Systems  12 pt ROS NEG except as per HPI    Objective     Vital Signs  Temp:  [96.9 °F (36.1 °C)-100.2 °F (37.9 °C)] 99.6 °F (37.6 °C)  Heart Rate:  [] 106  Resp:  [16-18] 18  BP: ()/(53-67) 103/64    Flowsheet Rows      First Filed Value   Admission Height  172.7 cm (68\") Documented at 06/04/2018 1529   Admission Weight  88.5 kg (195 lb) Documented at 06/04/2018 1458           No intake/output data recorded.  I/O last 3 completed shifts:  In: 3085 [I.V.:2985; IV Piggyback:100]  Out: 125 [Urine:125]    Intake/Output Summary (Last 24 hours) at 06/05/18 0749  Last " data filed at 06/05/18 0641   Gross per 24 hour   Intake             3085 ml   Output              125 ml   Net             2960 ml       Physical Exam:  GEN - pleasant WM lying comfortably supine, no acute distress, alert  HEENT OP clear, MMM  Neck supple no JVD  Lungs Dec BS bibasilar no rales   CV RRR no M/G  abd soft NT/ND +BS   no suprapubic fullness  Vasc no pedal edema, 2+ radial pulses  MS no joint warmth or erythema  Neuro oriented x3, speech normal     Results Review:  Results for orders placed or performed during the hospital encounter of 06/04/18   Blood Culture - Blood,   Result Value Ref Range    Blood Culture No growth at less than 24 hours    Blood Culture - Blood,   Result Value Ref Range    Blood Culture No growth at less than 24 hours    S. Pneumo Ag Urine or CSF - Urine, Urine, Clean Catch   Result Value Ref Range    Strep Pneumo Ag Negative Negative   Legionella Antigen, Urine - Urine, Urine, Clean Catch   Result Value Ref Range    LEGIONELLA ANTIGEN, URINE Negative Negative   Comprehensive Metabolic Panel   Result Value Ref Range    Glucose 140 (H) 65 - 99 mg/dL    BUN 38 (H) 8 - 23 mg/dL    Creatinine 4.43 (H) 0.76 - 1.27 mg/dL    Sodium 128 (L) 136 - 145 mmol/L    Potassium 5.3 (H) 3.5 - 5.2 mmol/L    Chloride 88 (L) 98 - 107 mmol/L    CO2 17.7 (L) 22.0 - 29.0 mmol/L    Calcium 9.6 8.6 - 10.5 mg/dL    Total Protein 8.7 (H) 6.0 - 8.5 g/dL    Albumin 3.90 3.50 - 5.20 g/dL    ALT (SGPT) 46 (H) 1 - 41 U/L    AST (SGOT) 35 1 - 40 U/L    Alkaline Phosphatase 133 (H) 39 - 117 U/L    Total Bilirubin 0.4 0.1 - 1.2 mg/dL    eGFR Non African Amer 13 (L) >60 mL/min/1.73    Globulin 4.8 gm/dL    A/G Ratio 0.8 g/dL    BUN/Creatinine Ratio 8.6 7.0 - 25.0    Anion Gap 22.3 mmol/L   Troponin   Result Value Ref Range    Troponin T 0.019 0.000 - 0.030 ng/mL   CBC Auto Differential   Result Value Ref Range    WBC 20.44 (H) 4.50 - 10.70 10*3/mm3    RBC 3.88 (L) 4.60 - 6.00 10*6/mm3    Hemoglobin 12.1 (L) 13.7 -  17.6 g/dL    Hematocrit 38.3 (L) 40.4 - 52.2 %    MCV 98.7 (H) 79.8 - 96.2 fL    MCH 31.2 27.0 - 32.7 pg    MCHC 31.6 (L) 32.6 - 36.4 g/dL    RDW 13.7 11.5 - 14.5 %    RDW-SD 49.4 37.0 - 54.0 fl    MPV 11.3 6.0 - 12.0 fL    Platelets 306 140 - 500 10*3/mm3   Scan Slide   Result Value Ref Range    Scan Slide     Lipase   Result Value Ref Range    Lipase 9 (L) 13 - 60 U/L   Sodium, Urine, Random - Urine, Clean Catch   Result Value Ref Range    Sodium, Urine 47 mmol/L   Creatinine, Urine, Random - Urine, Clean Catch   Result Value Ref Range    Creatinine, Urine 168.0 mg/dL   Magnesium   Result Value Ref Range    Magnesium 1.5 (L) 1.6 - 2.4 mg/dL   Phosphorus   Result Value Ref Range    Phosphorus 4.9 (H) 2.5 - 4.5 mg/dL   Lactic Acid, Plasma   Result Value Ref Range    Lactate 2.4 (C) 0.5 - 2.0 mmol/L   Procalcitonin   Result Value Ref Range    Procalcitonin 37.72 (C) 0.10 - 0.25 ng/mL   PSA DIAGNOSTIC   Result Value Ref Range    PSA 0.416 0.000 - 4.000 ng/mL   Urinalysis With / Culture If Indicated - Urine, Clean Catch   Result Value Ref Range    Color, UA Dark Yellow (A) Yellow, Straw    Appearance, UA Clear Clear    pH, UA <=5.0 5.0 - 8.0    Specific Gravity, UA 1.019 1.005 - 1.030    Glucose, UA Negative Negative    Ketones, UA Negative Negative    Bilirubin, UA Negative Negative    Blood, UA Negative Negative    Protein, UA 30 mg/dL (1+) (A) Negative    Leuk Esterase, UA Negative Negative    Nitrite, UA Negative Negative    Urobilinogen, UA 1.0 E.U./dL 0.2 - 1.0 E.U./dL   Lactic Acid, Reflex Timer (This will reflex a repeat order 3-3:15 hours after ordered.)   Result Value Ref Range    Extra Tube Hold for add-ons.    Osmolality, Serum   Result Value Ref Range    Osmolality 284 280 - 301 mOsm/kg   Osmolality, Urine - Urine, Clean Catch   Result Value Ref Range    Osmolality, Urine 340 mOsm/kg   Lactic Acid, Plasma   Result Value Ref Range    Lactate 2.4 (C) 0.5 - 2.0 mmol/L   Basic Metabolic Panel   Result Value  Ref Range    Glucose 120 (H) 65 - 99 mg/dL    BUN 37 (H) 8 - 23 mg/dL    Creatinine 4.03 (H) 0.76 - 1.27 mg/dL    Sodium 130 (L) 136 - 145 mmol/L    Potassium 5.0 3.5 - 5.2 mmol/L    Chloride 93 (L) 98 - 107 mmol/L    CO2 18.9 (L) 22.0 - 29.0 mmol/L    Calcium 8.3 (L) 8.6 - 10.5 mg/dL    eGFR Non African Amer 15 (L) >60 mL/min/1.73    BUN/Creatinine Ratio 9.2 7.0 - 25.0    Anion Gap 18.1 mmol/L   Lactic Acid, Reflex   Result Value Ref Range    Lactate 1.7 0.5 - 2.0 mmol/L   Urinalysis, Microscopic Only - Urine, Clean Catch   Result Value Ref Range    RBC, UA 0-2 None Seen, 0-2 /HPF    WBC, UA 21-30 (A) None Seen, 0-2 /HPF    Bacteria, UA None Seen None Seen /HPF    Squamous Epithelial Cells, UA 7-12 (A) None Seen, 0-2 /HPF    Hyaline Casts, UA 7-12 None Seen /LPF    Methodology Manual Light Microscopy    Basic Metabolic Panel   Result Value Ref Range    Glucose 133 (H) 65 - 99 mg/dL    BUN 41 (H) 8 - 23 mg/dL    Creatinine 3.81 (H) 0.76 - 1.27 mg/dL    Sodium 127 (L) 136 - 145 mmol/L    Potassium 4.7 3.5 - 5.2 mmol/L    Chloride 93 (L) 98 - 107 mmol/L    CO2 19.5 (L) 22.0 - 29.0 mmol/L    Calcium 8.0 (L) 8.6 - 10.5 mg/dL    eGFR Non African Amer 16 (L) >60 mL/min/1.73    BUN/Creatinine Ratio 10.8 7.0 - 25.0    Anion Gap 14.5 mmol/L   CBC Auto Differential   Result Value Ref Range    WBC 13.35 (H) 4.50 - 10.70 10*3/mm3    RBC 3.01 (L) 4.60 - 6.00 10*6/mm3    Hemoglobin 9.3 (L) 13.7 - 17.6 g/dL    Hematocrit 28.8 (L) 40.4 - 52.2 %    MCV 95.7 79.8 - 96.2 fL    MCH 30.9 27.0 - 32.7 pg    MCHC 32.3 (L) 32.6 - 36.4 g/dL    RDW 13.9 11.5 - 14.5 %    RDW-SD 48.7 37.0 - 54.0 fl    MPV 11.0 6.0 - 12.0 fL    Platelets 211 140 - 500 10*3/mm3    Neutrophil % 84.8 (H) 42.7 - 76.0 %    Lymphocyte % 8.9 (L) 19.6 - 45.3 %    Monocyte % 5.8 5.0 - 12.0 %    Eosinophil % 0.4 0.3 - 6.2 %    Basophil % 0.1 0.0 - 1.5 %    Immature Grans % 0.4 0.0 - 0.5 %    Neutrophils, Absolute 11.33 (H) 1.90 - 8.10 10*3/mm3    Lymphocytes, Absolute  1.19 0.90 - 4.80 10*3/mm3    Monocytes, Absolute 0.77 0.20 - 1.20 10*3/mm3    Eosinophils, Absolute 0.05 0.00 - 0.70 10*3/mm3    Basophils, Absolute 0.01 0.00 - 0.20 10*3/mm3    Immature Grans, Absolute 0.06 (H) 0.00 - 0.03 10*3/mm3    nRBC 0.0 0.0 - 0.0 /100 WBC   Procalcitonin   Result Value Ref Range    Procalcitonin 20.99 (C) 0.10 - 0.25 ng/mL   Light Blue Top   Result Value Ref Range    Extra Tube hold for add-on    Green Top (Gel)   Result Value Ref Range    Extra Tube Hold for add-ons.    Lavender Top   Result Value Ref Range    Extra Tube hold for add-on    Gold Top - SST   Result Value Ref Range    Extra Tube Hold for add-ons.    Manual Differential   Result Value Ref Range    Neutrophil % 89.0 (H) 42.7 - 76.0 %    Lymphocyte % 6.0 (L) 19.6 - 45.3 %    Monocyte % 4.0 (L) 5.0 - 12.0 %    Myelocyte % 1.0 (H) 0.0 - 0.0 %    Neutrophils Absolute 18.19 (H) 1.90 - 8.10 10*3/mm3    Lymphocytes Absolute 1.23 0.90 - 4.80 10*3/mm3    Monocytes Absolute 0.82 0.20 - 1.20 10*3/mm3    RBC Morphology Normal Normal    WBC Morphology Normal Normal    Large Platelets Slight/1+ None Seen     Imaging Results (last 72 hours)     Procedure Component Value Units Date/Time    XR Chest 2 View [[id number]] Collected:  06/04/18 1634     Updated:  06/04/18 2100    Narrative:       PA AND LATERAL CHEST X-RAY      HISTORY: Diffuse body aches for 3 days. Short of breath. Previous  coronary bypass and heart valve replacement.     TECHNIQUE: PA and lateral images of the chest are provided and  correlated with chest CT 12/31/2016.     FINDINGS: There are sternal wires and a cardiac defibrillator as well as  cardiac valve hardware. The cardiomediastinal silhouette is normal.  Vascular volume is normal. Asymmetric opacity is observed at the left  lung base laterally as seen to best advantage on the PA view. No such  opacity was present in this area on the prior chest CT or on a chest  x-ray 12/30/2016. This could represent pneumonia. There is  some chronic  parenchymal change in the medial right upper lung zone, no different  than on prior x-ray or CT. The right lung is otherwise clear.       Impression:       New, asymmetric opacity at the lateral left lung base seen  to best advantage on the PA view is suspicious for pneumonia.     This report was finalized on 6/4/2018 8:57 PM by Dr. Bruce Hudson M.D.                 carvedilol 6.25 mg Oral BID With Meals   ceftriaxone 1 g Intravenous Q24H   doxycycline 100 mg Intravenous Q12H   enoxaparin 30 mg Subcutaneous Q24H   montelukast 10 mg Oral Nightly   pantoprazole 40 mg Oral QAM   sennosides-docusate sodium 2 tablet Oral Nightly       sodium chloride 100 mL/hr Last Rate: 100 mL/hr (06/05/18 0635)       Assessment/Plan   MARISELA - possibly oliguric.  Prerenal azotemia vs ischemic ATN from variety of insults: vol depletion with N/V, ARB, aldactone, NSAID, sepsis/PNA and hypotension.  AIN a consideration from recent amoxicillin or PPI use, but less likely.  PVR normal.  UA fairly bland, 30mg/dL protein, no blood.  Cr has improved 4.4 to 3.8 with IVF.  Creatinine was normal 1.1 in March 2017.       Hyperkalemia - mild, initial K 5.3, now normal, due to MARISELA + entresto/aldactone  Hyponatremia - Na 127, no better with IVF so poss SIADH from PNA.  Alyssa 47, Uosm 340 supportive  Sepsis, PNA - WBC down to 13K.  Left basilar infiltrate.  Doxycycline & rocephin started   ICM, EF < 10% - compensated currently but agree to exercise caution with IVF  Hypotension - due to sepsis, BP improved to low 100s systolic with IVF  GERD - on PPI, switch to pepcid due to MARISELA    Plan   - dec IVF rate 75cc/hr and stop fluids after current bag  - fluid restrict 1500cc/day  - hold entresto & aldactone  - change PPI to pepcid   - renal US   - avoid IV contrast  - renally dose meds for GFR < 10     Thank you Dr Guillen for involving me in pt's care    Principal Problem:    Sepsis  Active Problems:    Cardiomyopathy, ischemic    Idiopathic  ventricular tachycardia    CAD in native artery    CHF (congestive heart failure)    Acute renal failure    Hyponatremia    Bacterial pneumonia        I discussed the patients findings and my recommendations with patient and nursing staff    Daniel Loza MD  06/05/18  7:49 AM

## 2018-06-05 NOTE — PROGRESS NOTES
Name: Mello Arias Jr. ADMIT: 2018   : 1950  PCP: Kelsey Muñoz MD    MRN: [id number] LOS: 1 days   AGE/SEX: 67 y.o. male    ROOM: [facility]   Subjective   Patient reports that he is feeling little better rested well and is able to eat  No fever    Brief hospital course since admission:  Pneumonia  Sepsis  Acute kidney injury  Hyperkalemia improved  Hyponatremia  Cardiomyopathy with ejection fraction less than 10%      I have reviewed past medical history, social hisotory, family history, allergies.  No changes from admission note.      Review of Systems   Constitutional: Negative for chills and fever.   Respiratory: Positive for shortness of breath.    Cardiovascular: Negative for chest pain and leg swelling.          Objective   Vital Signs  Temp:  [97.5 °F (36.4 °C)-100.2 °F (37.9 °C)] 97.5 °F (36.4 °C)  Heart Rate:  [] 90  Resp:  [16-20] 20  BP: ()/(53-67) 101/57  SpO2:  [97 %-100 %] 98 %  on  Flow (L/min):  [2] 2;   Device (Oxygen Therapy): room air  Body mass index is 30.17 kg/m².    Intake/Output Summary (Last 24 hours) at 18 1750  Last data filed at 18 1600   Gross per 24 hour   Intake             3445 ml   Output              825 ml   Net             2620 ml       Physical Exam   Constitutional: He is oriented to person, place, and time. He appears well-developed and well-nourished.   HENT:   Head: Atraumatic.   Eyes: Pupils are equal, round, and reactive to light. No scleral icterus.   Cardiovascular: Normal rate and regular rhythm.    Pulmonary/Chest: No accessory muscle usage. No respiratory distress. He has no decreased breath sounds. He has no wheezes.   Abdominal: Soft. Normal appearance and bowel sounds are normal. He exhibits no ascites. There is no hepatosplenomegaly.   Neurological: He is alert and oriented to person, place, and time.   Skin: No laceration and no petechiae noted. No cyanosis. Nails show no clubbing.   Psychiatric: He has a normal mood and  affect. His behavior is normal.   Vitals reviewed.      Results Review:      Results from last 7 days  Lab Units 06/05/18  0601 06/04/18  1624   WBC 10*3/mm3 13.35* 20.44*   HEMOGLOBIN g/dL 9.3* 12.1*   HEMATOCRIT % 28.8* 38.3*   PLATELETS 10*3/mm3 211 306       Results from last 7 days  Lab Units 06/05/18  0601 06/04/18  2139 06/04/18  1624   SODIUM mmol/L 127* 130* 128*   POTASSIUM mmol/L 4.7 5.0 5.3*   CHLORIDE mmol/L 93* 93* 88*   CO2 mmol/L 19.5* 18.9* 17.7*   BUN mg/dL 41* 37* 38*   CREATININE mg/dL 3.81* 4.03* 4.43*   GLUCOSE mg/dL 133* 120* 140*   CALCIUM mg/dL 8.0* 8.3* 9.6         Hemoglobin A1C:  Lab Results   Component Value Date    HGBA1C 5.40 12/31/2016     Glucose Range:No results found for: POCGLU      carvedilol 6.25 mg Oral BID With Meals   ceftriaxone 1 g Intravenous Q24H   doxycycline 100 mg Intravenous Q12H   enoxaparin 30 mg Subcutaneous Q24H   famotidine 20 mg Oral Daily   magnesium oxide 400 mg Oral Daily   montelukast 10 mg Oral Nightly   sennosides-docusate sodium 2 tablet Oral Nightly       sodium chloride 75 mL/hr Last Rate: 75 mL/hr (06/05/18 0856)   Diet Regular; Cardiac, Daily Fluid Restriction; 1500 mL Fluid  Assessment/Plan       Assessment/Plan      Active Hospital Problems (** Indicates Principal Problem)    Diagnosis Date Noted   • **Sepsis [A41.9] 06/04/2018   • Bacterial pneumonia [J15.9] 06/04/2018     Priority: High   • Acute renal failure [N17.9] 06/04/2018   • Hyponatremia [E87.1] 06/04/2018   • CHF (congestive heart failure) [I50.9] 12/31/2016   • CAD in native artery [I25.10] 11/07/2016   • Cardiomyopathy, ischemic [I25.5] 10/28/2016   • Idiopathic ventricular tachycardia [I47.2] 10/28/2016      Resolved Hospital Problems    Diagnosis Date Noted Date Resolved   No resolved problems to display.     Patient had renal ultrasound and echocardiogram today and the results are not available.  Hyperkalemia has resolved  Renal function is slowly improving  Pneumonia continue  antibiotics  This patient is hemodynamically stable  Cardiomyopathy with ejection fraction less than 10%          Nile De La Cruz MD  [facility]  06/05/18

## 2018-06-05 NOTE — H&P
Name: Mello Arias Jr. ADMIT: 2018   : 1950  PCP: Kelsey Muñoz MD    MRN: [id number] LOS: 1 days   AGE/SEX: 67 y.o. male  ROOM: [facility]/     Chief Complaint   Patient presents with   • Mutiple Complaints        History of Present Illness  66 yo male with h/o severe cardiomyopathy who presented with worsening SOA. He reports he has been SOA for a long time, but it has been worse the last 3 wks & especially the last 3 days. There has been some cough but he has allergy problems & reflux problems. He has been hurting all over during this time & it is worse with coughing, so he's been trying to keep from coughing. He has been shivering some but has had no known fever. No CP. Has HA with coughing. No sore throat.  He has also had some emesis a couple of days ago that he associates with his reflux problems. He has not been eating or drinking the last 2 days & prior to that he was eating very little. He reports his appetite has been poor since the heart surgery. He has also been increasingly more sedentary because of dyspnea on exertion.  Overall he reports that he did not have any improvement in his symptoms after having his heart surgery although he is not having any chest pain.  He was having lightheadedness and saw his cardiologist Dr. Calvin, who told him to cut his Lasix dose in half and that didn't help then to stop it completely for a bit and then try restarting it.  According to the patient's son, the patient stopped it completely from the beginning and has not restarted it.  The patient reports that his breathing doesn't seem any better on or off the diuretic.  He reports that he is not having any problems with edema.  He has also noted that he has had decreased urine output.  On evaluation in the ER, it was found that he had an infiltrate in the left base on his chest x-ray, his white count was markedly elevated as well as his lactic acid, and he was in renal failure.      Past Medical History:    Diagnosis Date   • Allergic rhinitis    • Anemia    • Asthma    • Bone fracture    • CAD (coronary artery disease)    • Cardiomyopathy, ischemic    • Carpal tunnel syndrome    • CHF (congestive heart failure), NYHA class III, chronic, systolic    • GERD (gastroesophageal reflux disease)    • Health care maintenance    • Herniated disc, cervical    • Hx of total hip arthroplasty 3/18/2016   • Hyperglycemia    • Hyperlipidemia    • Hypertension    • Idiopathic ventricular tachycardia    • Lumbosacral radiculopathy at L4    • Myocardial infarction     OF INFERIOR WALL, GREATER THAN 8 WEEKS   • Myocardial ischemia     POST MYOCARDIAL INFARCTION, POST PTCA WITH STENT PLACEMENT   • Non-rheumatic tricuspid valve insufficiency    • Nonrheumatic mitral valve regurgitation    • Open wound     OF LEFT INDEX FINGER WITHOUT DAMAGE TO NAIL   • Osteoarthritis    • PAF (paroxysmal atrial fibrillation)    • Ventricular tachycardia     POST AICD     Past Surgical History:   Procedure Laterality Date   • APPENDECTOMY     • CARDIAC CATHETERIZATION N/A 10/21/2016    Procedure: Coronary angiography;  Surgeon: Naun Calvin MD;  Location:  [facility] CATH INVASIVE LOCATION;  Service:    • CARDIAC CATHETERIZATION N/A 10/21/2016    Procedure: Left heart cath;  Surgeon: Naun Calvin MD;  Location: [facility] CATH [facility];  Service:    • CARDIAC CATHETERIZATION N/A 10/21/2016    Procedure: Left ventriculography;  Surgeon: Naun Calvin MD;  Location:  [facility] CATH INVASIVE LOCATION;  Service:    • CARDIAC CATHETERIZATION N/A 10/21/2016    Procedure: Right heart cath;  Surgeon: Naun Calvin MD;  Location: [facility];  Service:    • CARDIAC DEFIBRILLATOR PLACEMENT     • CARPAL TUNNEL RELEASE     • COLONOSCOPY     • CORONARY ANGIOPLASTY      WITH STENT PLACEMENT TO THE LAD   • CORONARY ARTERY BYPASS GRAFT WITH MITRAL VALVE REPAIR/REPLACEMENT N/A 11/7/2016    Procedure: JAYE STERNOTOMY CORONARY ARTERY  BYPASS GRAFT TIMES 4 USING LEFT INTERNAL MAMMARY ARTERY AND LEFT GREATER SAPHENOUS VEIN GRAFT PER ENDOSCOPIC VEIN HARVESTING, MITRAL VALVE REPLACEMENT AND TRICUSPID VALVE REPAIR;  Surgeon: Slade Hurtado MD;  Location: [facility];  Service:    • EYE SURGERY     • FOOT SURGERY Left    • HERNIA REPAIR     • IMPLANTABLE CARDIOVERTER DEFIBRILLATOR LEAD REPLACEMENT/POCKET REVISION     • INGUINAL HERNIA REPAIR Left    • LASIK     • TONSILLECTOMY     • TOTAL HIP ARTHROPLASTY Bilateral        Allergies:  Shellfish-derived products    Facility-Administered Medications Prior to Admission   Medication Dose Route Frequency Provider Last Rate Last Dose   • [DISCONTINUED] erythromycin (ROMYCIN) ophthalmic ointment   Left Eye Once Daniel El MD         Prescriptions Prior to Admission   Medication Sig Dispense Refill Last Dose   • acetaminophen (TYLENOL) 325 MG tablet Take 2 tablets by mouth Every 4 (Four) Hours As Needed for mild pain (1-3).  0 6/3/2018 at Unknown time   • albuterol (PROAIR HFA) 108 (90 BASE) MCG/ACT inhaler Inhale 2 puffs Every 6 (Six) Hours As Needed.   6/4/2018 at Unknown time   • aspirin 81 MG tablet Take 81 mg by mouth Daily.   6/3/2018 at Unknown time   • carvedilol (COREG) 6.25 MG tablet Take 1 tablet by mouth 2 (Two) Times a Day. 180 tablet 3 6/3/2018 at Unknown time   • montelukast (SINGULAIR) 10 MG tablet Take 10 mg by mouth Every Night.   6/3/2018 at Unknown time   • omeprazole (PRILOSEC) 20 MG capsule Take 20 mg by mouth Daily.   6/3/2018 at Unknown time   • sacubitril-valsartan (ENTRESTO) 24-26 MG tablet Take 1 tablet by mouth Every 12 (Twelve) Hours. 28 tablet 0 6/3/2018 at Unknown time   • spironolactone (ALDACTONE) 25 MG tablet Take 25 mg by mouth Daily.   6/3/2018 at Unknown time   • nitroglycerin (NITROSTAT) 0.4 MG SL tablet Place 0.4 mg under the tongue Every 5 (Five) Minutes As Needed for Chest Pain. Take no more than 3 doses in 15 minutes.   Unknown at Unknown time        Social History   Substance Use Topics   • Smoking status: Never Smoker   • Smokeless tobacco: Never Used   • Alcohol use 6.0 oz/week     10 Shots of liquor per week      Comment: social drinker       Family History   Problem Relation Age of Onset   • Stroke Mother    • Osteoarthritis Mother        Review of Systems   Constitutional: see HPI  HEENT: No vision changes. No rhinorrhea, sore throat.  Not hard of hearing.   Respiratory: see HPI  Cardiovascular: see HPI   Gastrointestinal:  Bowel movements normal until yesterday & hasn't had BM since.  No blood noted in stools. No melena  Endocrine: some hyperglycemia   Genitourinary: no difficulty starting a stream usually but UO is down last few days  Musculoskeletal: has pain in hips.  Skin: No rash or wound.   Neurological:  No paresthesias or focal weakness  Hematological:  Does bruise/bleed easily. No h/o DVTs  Psychiatric/Behavioral: No confusion. The patient is not nervous/anxious.       Objective    Vital Signs  Temp:  [96.9 °F (36.1 °C)-99.4 °F (37.4 °C)] 99.4 °F (37.4 °C)  Heart Rate:  [] 106  Resp:  [16-18] 16  BP: ()/(53-67) 104/53  SpO2:  [97 %-99 %] 99 %  on  Flow (L/min):  [2] 2;   Device (Oxygen Therapy): nasal cannula  Body mass index is 29.65 kg/m².    Physical Exam   WDWN male in NAD  PERRL, EOMI, sclera nonicteric. Oropharynx benign, tongue midline, palate elevates symmetrically. Neck supple without adenopathy or thyromegaly. No JVD.  Lungs mostly clear with a few crackles in the left base.  Breathing nonlabored  Heart RRR, I did not hear a murmur   Back no kyphosis  Abdomen soft, nontender, bowel sounds present throughout.  Extremities no edema.   Skin warm & dry  Neuro no gross motor deficits, alert & oriented. Speech fluent.       Results Review:   I reviewed the patient's new clinical results.    Lab Results (last 24 hours)     Procedure Component Value Units Date/Time    CBC & Differential [[id number]] Collected:  06/04/18 6155     Specimen:  Blood Updated:  06/04/18 1718    Narrative:       The following orders were created for panel order CBC & Differential.  Procedure                               Abnormality         Status                     ---------                               -----------         ------                     Manual Differential[[id number]]          Abnormal            Final result               Scan Slide[[id number]]                                       Final result               CBC Auto Differential[[id number]]        Abnormal            Final result                 Please view results for these tests on the individual orders.    Comprehensive Metabolic Panel [[id number]]  (Abnormal) Collected:  06/04/18 1624    Specimen:  Blood Updated:  06/04/18 1721     Glucose 140 (H) mg/dL      BUN 38 (H) mg/dL      Creatinine 4.43 (H) mg/dL      Sodium 128 (L) mmol/L      Potassium 5.3 (H) mmol/L      Chloride 88 (L) mmol/L      CO2 17.7 (L) mmol/L      Calcium 9.6 mg/dL      Total Protein 8.7 (H) g/dL      Albumin 3.90 g/dL      ALT (SGPT) 46 (H) U/L      AST (SGOT) 35 U/L      Alkaline Phosphatase 133 (H) U/L      Total Bilirubin 0.4 mg/dL      eGFR Non African Amer 13 (L) mL/min/1.73      Globulin 4.8 gm/dL      A/G Ratio 0.8 g/dL      BUN/Creatinine Ratio 8.6     Anion Gap 22.3 mmol/L     Troponin [[id number]]  (Normal) Collected:  06/04/18 1624    Specimen:  Blood Updated:  06/04/18 1721     Troponin T 0.019 ng/mL     Narrative:       Troponin T Reference Ranges:  Less than 0.03 ng/mL:    Negative for AMI  0.03 to 0.09 ng/mL:      Indeterminant for AMI  Greater than 0.09 ng/mL: Positive for AMI    CBC Auto Differential [[id number]]  (Abnormal) Collected:  06/04/18 1624    Specimen:  Blood Updated:  06/04/18 1718     WBC 20.44 (H) 10*3/mm3      RBC 3.88 (L) 10*6/mm3      Hemoglobin 12.1 (L) g/dL      Hematocrit 38.3 (L) %      MCV 98.7 (H) fL      MCH 31.2 pg      MCHC 31.6 (L) g/dL      RDW 13.7 %      RDW-SD 49.4 fl      MPV  "11.3 fL      Platelets 306 10*3/mm3     Scan Slide [[id number]] Collected:  06/04/18 1624    Specimen:  Blood Updated:  06/04/18 1718     Scan Slide --     Comment: See Manual Differential Results       Manual Differential [[id number]]  (Abnormal) Collected:  06/04/18 1624    Specimen:  Blood Updated:  06/04/18 1718     Neutrophil % 89.0 (H) %      Lymphocyte % 6.0 (L) %      Monocyte % 4.0 (L) %      Myelocyte % 1.0 (H) %      Neutrophils Absolute 18.19 (H) 10*3/mm3      Lymphocytes Absolute 1.23 10*3/mm3      Monocytes Absolute 0.82 10*3/mm3      RBC Morphology Normal     WBC Morphology Normal     Large Platelets Slight/1+    Lipase [[id number]]  (Abnormal) Collected:  06/04/18 1624    Specimen:  Blood Updated:  06/04/18 1852     Lipase 9 (L) U/L     Magnesium [[id number]]  (Abnormal) Collected:  06/04/18 1624    Specimen:  Blood Updated:  06/04/18 1852     Magnesium 1.5 (L) mg/dL     Phosphorus [[id number]]  (Abnormal) Collected:  06/04/18 1624    Specimen:  Blood Updated:  06/04/18 1852     Phosphorus 4.9 (H) mg/dL     Procalcitonin [[id number]]  (Abnormal) Collected:  06/04/18 1624    Specimen:  Blood Updated:  06/04/18 1900     Procalcitonin 37.72 (C) ng/mL     Narrative:       As a Marker for Sepsis (Non-Neonates):   1. <0.5 ng/mL represents a low risk of severe sepsis and/or septic shock.  1. >2 ng/mL represents a high risk of severe sepsis and/or septic shock.    As a Marker for Lower Respiratory Tract Infections that require antibiotic therapy:  PCT on Admission     Antibiotic Therapy             6-12 Hrs later  > 0.5                Strongly Recommended            >0.25 - <0.5         Recommended  0.1 - 0.25           Discouraged                   Remeasure/reassess PCT  <0.1                 Strongly Discouraged          Remeasure/reassess PCT      As 28 day mortality risk marker: \"Change in Procalcitonin Result\" (> 80 % or <=80 %) if Day 0 (or Day 1) and Day 4 values are available. Refer to " http://[email or web address]-pct-[email or web address]/   Change in PCT <=80 %   A decrease of PCT levels below or equal to 80 % defines a positive change in PCT test result representing a higher risk for 28-day all-cause mortality of patients diagnosed with severe sepsis or septic shock.  Change in PCT > 80 %   A decrease of PCT levels of more than 80 % defines a negative change in PCT result representing a lower risk for 28-day all-cause mortality of patients diagnosed with severe sepsis or septic shock.                PSA DIAGNOSTIC [[id number]]  (Normal) Collected:  06/04/18 1624    Specimen:  Blood Updated:  06/04/18 1844     PSA 0.416 ng/mL     Blood Culture - Blood, [[id number]] Collected:  06/04/18 1831    Specimen:  Blood from Arm, Right Updated:  06/04/18 1837    Blood Culture - Blood, [[id number]] Collected:  06/04/18 1831    Specimen:  Blood from Wrist, Right Updated:  06/04/18 1837    Lactic Acid, Plasma [[id number]]  (Abnormal) Collected:  06/04/18 1831    Specimen:  Blood Updated:  06/04/18 1900     Lactate 2.4 (C) mmol/L     Lactic Acid, Reflex Timer (This will reflex a repeat order 3-3:15 hours after ordered.) [[id number]] Collected:  06/04/18 1831    Specimen:  Blood Updated:  06/04/18 2215     Extra Tube Hold for add-ons.     Comment: Auto resulted.       Osmolality, Serum [[id number]]  (Normal) Collected:  06/04/18 2139    Specimen:  Blood Updated:  06/04/18 2226     Osmolality 284 mOsm/kg     Lactic Acid, Plasma [[id number]]  (Abnormal) Collected:  06/04/18 2139    Specimen:  Blood Updated:  06/04/18 2210     Lactate 2.4 (C) mmol/L     Basic Metabolic Panel [[id number]]  (Abnormal) Collected:  06/04/18 2139    Specimen:  Blood Updated:  06/04/18 2220     Glucose 120 (H) mg/dL      BUN 37 (H) mg/dL      Creatinine 4.03 (H) mg/dL      Sodium 130 (L) mmol/L      Potassium 5.0 mmol/L      Chloride 93 (L) mmol/L      CO2 18.9 (L) mmol/L      Calcium 8.3 (L) mg/dL      eGFR Non African Amer 15 (L) mL/min/1.73       BUN/Creatinine Ratio 9.2     Anion Gap 18.1 mmol/L     Narrative:       GFR Normal >60  Chronic Kidney Disease <60  Kidney Failure <15    Lactic Acid, Reflex [[id number]]  (Normal) Collected:  06/04/18 2242    Specimen:  Blood Updated:  06/04/18 2300     Lactate 1.7 mmol/L     Sodium, Urine, Random - Urine, Clean Catch [[id number]] Collected:  06/04/18 2322    Specimen:  Urine from Urine, Clean Catch Updated:  06/05/18 0001     Sodium, Urine 47 mmol/L     Narrative:       Reference intervals for random urine have not been established.  Clinical usage is dependent upon physician's interpretation in combination with other laboratory tests.     Creatinine, Urine, Random - Urine, Clean Catch [[id number]] Collected:  06/04/18 2322    Specimen:  Urine from Urine, Clean Catch Updated:  06/04/18 2359     Creatinine, Urine 168.0 mg/dL     Narrative:       Reference intervals for random urine have not been established.  Clinical usage is dependent upon physician's interpretation in combination with other laboratory tests.     Urinalysis With / Culture If Indicated - Urine, Clean Catch [[id number]]  (Abnormal) Collected:  06/04/18 2322    Specimen:  Urine from Urine, Clean Catch Updated:  06/04/18 2350     Color, UA Dark Yellow (A)     Appearance, UA Clear     pH, UA <=5.0     Specific Gravity, UA 1.019     Glucose, UA Negative     Ketones, UA Negative     Bilirubin, UA Negative     Blood, UA Negative     Protein, UA 30 mg/dL (1+) (A)     Leuk Esterase, UA Negative     Nitrite, UA Negative     Urobilinogen, UA 1.0 E.U./dL    Osmolality, Urine - Urine, Clean Catch [[id number]] Collected:  06/04/18 2322    Specimen:  Urine from Urine, Clean Catch Updated:  06/05/18 0056     Osmolality, Urine 340 mOsm/kg     Narrative:       Osmo Normal Reference Ranges:    Random:  mOsm/kg H2O, depending on fluid intake.  Random: >850 mOsm/kg H20, after 12 hour fluid restriction.    24 Hour: 300-900 mOsm/kg H2O.    S. Pneumo Ag Urine or CSF -  Urine, Urine, Clean Catch [[id number]] Collected:  06/04/18 2322    Specimen:  Urine from Urine, Clean Catch Updated:  06/04/18 2329    Legionella Antigen, Urine - Urine, Urine, Clean Catch [[id number]] Collected:  06/04/18 2322    Specimen:  Urine from Urine, Clean Catch Updated:  06/04/18 2329    Urinalysis, Microscopic Only - Urine, Clean Catch [[id number]]  (Abnormal) Collected:  06/04/18 2322    Specimen:  Urine from Urine, Clean Catch Updated:  06/04/18 2350     RBC, UA 0-2 /HPF      WBC, UA 21-30 (A) /HPF      Bacteria, UA None Seen /HPF      Squamous Epithelial Cells, UA 7-12 (A) /HPF      Hyaline Casts, UA 7-12 /LPF      Methodology Manual Light Microscopy    Urine Culture - Urine, [[id number]] Collected:  06/04/18 2322    Specimen:  Urine from Urine, Clean Catch Updated:  06/04/18 2350            Results from last 7 days  Lab Units 06/04/18  1624   WBC 10*3/mm3 20.44*   HEMOGLOBIN g/dL 12.1*   PLATELETS 10*3/mm3 306       Results from last 7 days  Lab Units 06/04/18  2139 06/04/18  1624   SODIUM mmol/L 130* 128*   POTASSIUM mmol/L 5.0 5.3*   CHLORIDE mmol/L 93* 88*   CO2 mmol/L 18.9* 17.7*   BUN mg/dL 37* 38*   CREATININE mg/dL 4.03* 4.43*   GLUCOSE mg/dL 120* 140*   ALBUMIN g/dL  --  3.90   BILIRUBIN mg/dL  --  0.4   ALK PHOS U/L  --  133*   AST (SGOT) U/L  --  35   ALT (SGPT) U/L  --  46*   Estimated Creatinine Clearance: 18.7 mL/min (A) (by C-G formula based on SCr of 4.03 mg/dL (H)).    Results from last 7 days  Lab Units 06/04/18  1624   TROPONIN T ng/mL 0.019         Invalid input(s): LDLCALC    Results from last 7 days  Lab Units 06/04/18  2322   NITRITE UA  Negative   WBC UA /HPF 21-30*   BACTERIA UA /HPF None Seen   SQUAM EPITHEL UA /HPF 7-12*       XR Chest 2 View   Final Result   New, asymmetric opacity at the lateral left lung base seen   to best advantage on the PA view is suspicious for pneumonia.       This report was finalized on 6/4/2018 8:57 PM by Dr. Bruce Hudson M.D.             Assessment/Plan   Assessment:      Active Hospital Problems (** Indicates Principal Problem)    Diagnosis Date Noted   • **Sepsis [A41.9] 06/04/2018   • Acute renal failure [N17.9] 06/04/2018   • Hyponatremia [E87.1] 06/04/2018   • Bacterial pneumonia [J15.9] 06/04/2018   • CHF (congestive heart failure) [I50.9] 12/31/2016   • CAD in native artery [I25.10] 11/07/2016   • Cardiomyopathy, ischemic [I25.5] 10/28/2016   • Idiopathic ventricular tachycardia [I47.2] 10/28/2016      Resolved Hospital Problems    Diagnosis Date Noted Date Resolved   No resolved problems to display.       Plan:     He has been started on IV ceftriaxone & doxycycline to cover for a CAP. With how elevated his white count is, the hypotension, tachycardia, acidosis and renal failure, I feel that he meets criteria for sepsis. His procalcitonin is also markedly elevated.  I am going to check a urinary antigen for strep pneumoniae and Legionella.  Cardiology and nephrology have been consulted.  His spironolactone and Entresto have been discontinued.  He is receiving IV fluids at 125cc/hr. His last echo was done in January 2017 and at that time his EF was less than 10%.  His labs were repeated tonight and his lactic acid is down into the normal range now.  His creatinine has gone from 4.43 down to 4.03.  He is concerned about throwing him into fluid overload so I'm going to back down the IV fluids just a little.  Further management will proceed from there.    Pt was seen on 6/4/2018        Luh Guillen MD  [facility]  06/05/18  1:23 AM

## 2018-06-05 NOTE — NURSING NOTE
Pt admitted to rm 668 from home accompanied by son and brother. Alert and oriented x4. Amb from stretcher to bed, tolerated well. Data base obtained and recorded. Adm orders per Dr. Guillen discussed with pt and son. Dr. Guillen examined pt and explained plan of treatment. Orientation to room as well as safety and pneumonia education provided. Pt voiced understanding.

## 2018-06-05 NOTE — THERAPY EVALUATION
Acute Care - Physical Therapy Initial Evaluation  [facility]     Patient Name: Mello Arias Jr.  : 1950  MRN: [id number]  Today's Date: 2018      Date of Referral to PT: 18  Referring Physician: Wilmer      Admit Date: 2018    Visit Dx:     ICD-10-CM ICD-9-CM   1. Acute kidney injury N17.9 584.9   2. Pneumonia of left lower lobe due to infectious organism J18.1 486   3. Hyperkalemia E87.5 276.7   4. Sepsis, due to unspecified organism A41.9 038.9     995.91   5. Impaired functional mobility, balance, gait, and endurance Z74.09 V49.89     Patient Active Problem List   Diagnosis   • A-fib   • Acid reflux   • Blood glucose elevated   • HLD (hyperlipidemia)   • Cardiomyopathy, ischemic   • L-S radiculopathy   • Idiopathic ventricular tachycardia   • CHF (congestive heart failure), NYHA class III   • Coronary artery disease of native artery of native heart with stable angina pectoris   • Non-rheumatic mitral regurgitation   • Non-rheumatic tricuspid valve insufficiency   • CAD in native artery   • Other specified forms of effusion, except tuberculous   • S/P CABG x 4   • S/P MVR (mitral valve replacement)   • S/P TVR (tricuspid valve repair)   • CHF (congestive heart failure)   • Acute renal failure   • Sepsis   • Hyponatremia   • Bacterial pneumonia     Past Medical History:   Diagnosis Date   • Allergic rhinitis    • Anemia    • Asthma    • Bone fracture    • CAD (coronary artery disease)    • Cardiomyopathy, ischemic    • Carpal tunnel syndrome    • CHF (congestive heart failure), NYHA class III, chronic, systolic    • GERD (gastroesophageal reflux disease)    • Health care maintenance    • Herniated disc, cervical    • Hx of total hip arthroplasty 3/18/2016   • Hyperglycemia    • Hyperlipidemia    • Hypertension    • Idiopathic ventricular tachycardia    • Lumbosacral radiculopathy at L4    • Myocardial infarction     OF INFERIOR WALL, GREATER THAN 8 WEEKS   • Myocardial ischemia     POST  MYOCARDIAL INFARCTION, POST PTCA WITH STENT PLACEMENT   • Non-rheumatic tricuspid valve insufficiency    • Nonrheumatic mitral valve regurgitation    • Open wound     OF LEFT INDEX FINGER WITHOUT DAMAGE TO NAIL   • Osteoarthritis    • PAF (paroxysmal atrial fibrillation)    • Ventricular tachycardia     POST AICD     Past Surgical History:   Procedure Laterality Date   • APPENDECTOMY     • CARDIAC CATHETERIZATION N/A 10/21/2016    Procedure: Coronary angiography;  Surgeon: Naun Calvin MD;  Location: [facility] CATH INVASIVE LOCATION;  Service:    • CARDIAC CATHETERIZATION N/A 10/21/2016    Procedure: Left heart cath;  Surgeon: Naun Calvin MD;  Location: [facility] CATH INVASIVE LOCATION;  Service:    • CARDIAC CATHETERIZATION N/A 10/21/2016    Procedure: Left ventriculography;  Surgeon: Naun Calvin MD;  Location: [facility] CATH INVASIVE LOCATION;  Service:    • CARDIAC CATHETERIZATION N/A 10/21/2016    Procedure: Right heart cath;  Surgeon: Naun Calvin MD;  Location: [facility] CATH INVASIVE LOCATION;  Service:    • CARDIAC DEFIBRILLATOR PLACEMENT     • CARPAL TUNNEL RELEASE     • COLONOSCOPY     • CORONARY ANGIOPLASTY      WITH STENT PLACEMENT TO THE LAD   • CORONARY ARTERY BYPASS GRAFT WITH MITRAL VALVE REPAIR/REPLACEMENT N/A 11/7/2016    Procedure: JAYE STERNOTOMY CORONARY ARTERY BYPASS GRAFT TIMES 4 USING LEFT INTERNAL MAMMARY ARTERY AND LEFT GREATER SAPHENOUS VEIN GRAFT PER ENDOSCOPIC VEIN HARVESTING, MITRAL VALVE REPLACEMENT AND TRICUSPID VALVE REPAIR;  Surgeon: Slade Hurtado MD;  Location: [facility] OR;  Service:    • EYE SURGERY     • FOOT SURGERY Left    • HERNIA REPAIR     • IMPLANTABLE CARDIOVERTER DEFIBRILLATOR LEAD REPLACEMENT/POCKET REVISION     • INGUINAL HERNIA REPAIR Left    • LASIK     • TONSILLECTOMY     • TOTAL HIP ARTHROPLASTY Bilateral         PT ASSESSMENT (last 12 hours)      Physical Therapy Evaluation     Row Name 06/05/18 1000          PT Evaluation Time/Intention     Subjective Information complains of;weakness  -MA     Document Type evaluation  -MA     Mode of Treatment physical therapy  -MA     Patient Effort good  -MA     Symptoms Noted During/After Treatment fatigue  -MA     Row Name 06/05/18 1000          General Information    Patient Profile Reviewed? yes  -MA     Referring Physician Wilmer  -MA     Patient Observations alert;cooperative;agree to therapy  -MA     General Observations of Patient Supine in bed with HOB elevated, no exit alarm, no acute distress noted at rest  -MA     Prior Level of Function independent:;all household mobility  -MA     Equipment Currently Used at Home cane, straight;walker, rolling   doesn't typically use  -MA     Pertinent History of Current Functional Problem Admission from home for SOA and generalized weakness  -MA     Existing Precautions/Restrictions fall  -MA     Limitations/Impairments safety/cognitive  -MA     Risks Reviewed patient:  -MA     Benefits Reviewed patient:  -MA     Barriers to Rehab none identified  -MA     Row Name 06/05/18 1000          Home Main Entrance    Number of Stairs, Main Entrance three  -MA     Row Name 06/05/18 1000          Cognitive Assessment/Intervention- PT/OT    Orientation Status (Cognition) oriented x 4  -MA     Follows Commands (Cognition) WFL  -MA     Safety Deficit (Cognitive) mild deficit;judgment;safety precautions follow-through/compliance  -MA     Personal Safety Interventions fall prevention program maintained;gait belt;nonskid shoes/slippers when out of bed  -MA     Row Name 06/05/18 1000          Safety Issues, Functional Mobility    Impairments Affecting Function (Mobility) endurance/activity tolerance;strength;balance  -MA     Row Name 06/05/18 1000          Bed Mobility Assessment/Treatment    Bed Mobility Assessment/Treatment supine-sit;sit-supine  -MA     Supine-Sit [place] (Bed Mobility) contact guard;verbal cues  -MA     Sit-Supine [place] (Bed Mobility) not tested  -MA      Row Name 06/05/18 1000          Transfer Assessment/Treatment    Transfer Assessment/Treatment sit-stand transfer;stand-sit transfer;toilet transfer  -MA     Comment (Transfers) Initially used a SPC but patient was unsteady requiring touching walls for balance- added RW to gait training- stability much improved.  -MA     Sit-Stand [place] (Transfers) contact guard;verbal cues  -MA     Stand-Sit [place] (Transfers) contact guard;verbal cues  -MA     Row Name 06/05/18 1000          Sit-Stand Transfer    Assistive Device (Sit-Stand Transfers) cane, straight;walker, front-wheeled  -MA     Row Name 06/05/18 1000          Stand-Sit Transfer    Assistive Device (Stand-Sit Transfers) walker, front-wheeled  -MA     [facility] Name 06/05/18 1000          Gait/Stairs Assessment/Training    Gait/Stairs Assessment/Training gait/ambulation independence  -MA     [place] Level (Gait) contact guard;verbal cues  -MA     Assistive Device (Gait) walker, front-wheeled  -MA     Distance in Feet (Gait) 75  -MA     Pattern (Gait) step-to  -MA     Deviations/Abnormal Patterns (Gait) base of support, narrow;rachel decreased  -MA     Comment (Gait/Stairs) Unsteady without an AD- distance limited 2' to fatigue  -MA     [facility] Name 06/05/18 1000          General ROM    GENERAL ROM COMMENTS B LE WFL  -MA     [facility] Name 06/05/18 1000          General Assessment (Manual Muscle Testing)    Comment, General Manual Muscle Testing (MMT) Assessment B LEs grossly 4+/5  -MA     [facility] Name 06/05/18 1000          Motor Assessment/Intervention    Additional Documentation Balance (Group);Therapeutic Exercise Interventions (Group)  -MA     Row Name 06/05/18 1000          Balance    Balance static sitting balance;static standing balance  -MA     Row Name 06/05/18 1000          Static Sitting Balance    Level of [place] (Unsupported Sitting, Static Balance) supervision  -MA     Sitting Position (Unsupported Sitting, Static Balance) sitting on edge of bed   -MA     Level of [place] (Supported Sitting, Static Balance) supervision  -MA     Sitting Position (Supported Sitting, Static Balance) sitting on edge of bed  -MA     Row Name 06/05/18 1000          Static Standing Balance    Level of [place] (Supported Standing, Static Balance) contact guard assist  -MA     Assistive Device Utilized (Supported Standing, Static Balance) rolling walker  -MA     Level of [place] (Unsupported Standing, Static Balance) minimal assist, 75% patient effort  -MA     Row Name 06/05/18 1000          Sensory Assessment/Intervention    Sensory General Assessment no sensation deficits identified  -MA     Row Name 06/05/18 1000          Vision Assessment/Intervention    Visual Impairment/Limitations WFL  -MA     Row Name 06/05/18 1000          Pain Assessment    Additional Documentation --   No pain to report  -MA     Row Name 06/05/18 1000          Plan of Care Review    Plan of Care Reviewed With patient  -MA     Row Name 06/05/18 1000          Physical Therapy Clinical Impression    Date of Referral to PT 06/05/18  -MA     PT Diagnosis (PT Clinical Impression) impaired functional mobility and endurance  -MA     Criteria for Skilled Interventions Met (PT Clinical Impression) yes;treatment indicated  -MA     Pathology/Pathophysiology Noted (Describe Specifically for Each System) musculoskeletal  -MA     Impairments Found (describe specific impairments) aerobic capacity/endurance;ergonomics and body mechanics;gait, locomotion, and balance  -MA     Rehab Potential (PT Clinical Summary) good, to achieve stated therapy goals  -MA     Care Plan Review (PT) patient/other agree to care plan  -MA     Row Name 06/05/18 1000          Vital Signs    Pre SpO2 (%) 92  -MA     O2 Delivery Pre Treatment supplemental O2  -MA     O2 Delivery Post Treatment room air   in BR  -MA     Row Name 06/05/18 1000          Physical Therapy Goals    Bed Mobility Goal Selection (PT) bed mobility, PT goal 1   -MA     Transfer Goal Selection (PT) transfer, PT goal 1  -MA     Gait Training Goal Selection (PT) gait training, PT goal 1  -MA     Stairs Goal Selection (PT) stairs, PT goal 1  -MA     Additional Documentation Stairs Goal Selection (PT) (Row)  -MA     Row Name 06/05/18 1000          Bed Mobility Goal 1 (PT)    Activity/Assistive Device (Bed Mobility Goal 1, PT) bed mobility activities, all  -MA     [place] Level/Cues Needed (Bed Mobility Goal 1, PT) independent  -MA     Time Frame (Bed Mobility Goal 1, PT) 1 week  -MA     Progress/Outcomes (Bed Mobility Goal 1, PT) goal ongoing  -MA     Row Name 06/05/18 1000          Transfer Goal 1 (PT)    Activity/Assistive Device (Transfer Goal 1, PT) transfers, all  -MA     [place] Level/Cues Needed (Transfer Goal 1, PT) supervision required  -MA     Time Frame (Transfer Goal 1, PT) 1 week  -MA     Progress/Outcome (Transfer Goal 1, PT) goal ongoing  -MA     Row Name 06/05/18 1000          Gait Training Goal 1 (PT)    Activity/Assistive Device (Gait Training Goal 1, PT) gait (walking locomotion)  -MA     [place] Level (Gait Training Goal 1, PT) supervision required  -MA     Distance (Gait Goal 1, PT) 200  -MA     Time Frame (Gait Training Goal 1, PT) 1 week  -MA     Progress/Outcome (Gait Training Goal 1, PT) goal ongoing  -MA     Row Name 06/05/18 1000          Stairs Goal 1 (PT)    Activity/Assistive Device (Stairs Goal 1, PT) stairs, all skills  -MA     [place] Level/Cues Needed (Stairs Goal 1, PT) supervision required  -MA     Number of Stairs (Stairs Goal 1, PT) 3  -MA     Time Frame (Stairs Goal 1, PT) 1 week  -MA     Row Name 06/05/18 1000          Positioning and Restraints    Pre-Treatment Position in bed  -MA     Post Treatment Position bathroom  -MA     Bathroom call light within reach;encouraged to call for assist  -MA     Row Name 06/05/18 1000          Living Environment    Home Accessibility stairs to enter home  -MA       User Key  (r) =  Recorded By, (t) = Taken By, (c) = Cosigned By    Initials Name Provider Type    RANJANA Huggins PT Physical Therapist          Physical Therapy Education     Title: PT OT SLP Therapies (Active)     Topic: Physical Therapy (Active)     Point: Mobility training (Active)    Learning Progress Summary     Learner Status Readiness Method Response Comment Documented by    Patient Active Acceptance E NR  MA 06/05/18 1029          Point: Home exercise program (Active)    Learning Progress Summary     Learner Status Readiness Method Response Comment Documented by    Patient Active Acceptance E NR  MA 06/05/18 1029          Point: Body mechanics (Active)    Learning Progress Summary     Learner Status Readiness Method Response Comment Documented by    Patient Active Acceptance E NR  MA 06/05/18 1029          Point: Precautions (Active)    Learning Progress Summary     Learner Status Readiness Method Response Comment Documented by    Patient Active Acceptance E NR  MA 06/05/18 1029                      User Key     Initials Effective Dates Name Provider Type [facility] 04/03/18 -  Dawn Huggins PT Physical Therapist PT                PT Recommendation and Plan  Anticipated Discharge Disposition (PT): home, home with home health (pending patient progress made)  Planned Therapy Interventions (PT Eval): balance training, bed mobility training, gait training, home exercise program, patient/family education, postural re-education, strengthening, transfer training, stair training  Therapy Frequency (PT Clinical Impression): daily  Outcome Summary/Treatment Plan (PT)  Anticipated Discharge Disposition (PT): home, home with home health (pending patient progress made)  Plan of Care Reviewed With: patient  Outcome Summary: Patient is a pleasant 67 y.o. male who presents with impaired functional mobility and endurance following admission from home for SOA and generalized weakness. Patient is independent with all ADLs  at baseline and does not use an AD typically has had to use a SPC recently due to weakness. All mobility required assist x 1. Ambulated 75' CGA with RW today- attempted to gait train without an AD but patient was slightly unsteady. May benefit from skilled PT services acutely to address deficits as able and improve level of independence.          Outcome Measures     Row Name 06/05/18 1000             How much help from another person do you currently need...    Turning from your back to your side while in flat bed without using bedrails? 4  -MA      Moving from lying on back to sitting on the side of a flat bed without bedrails? 4  -MA      Moving to and from a bed to a chair (including a wheelchair)? 3  -MA      Standing up from a chair using your arms (e.g., wheelchair, bedside chair)? 3  -MA      Climbing 3-5 steps with a railing? 2  -MA      To walk in hospital room? 3  -MA      AM-PAC 6 Clicks Score 19  -MA         Functional Assessment    Outcome Measure Options AM-PAC 6 Clicks Basic Mobility (PT)  -MA        User Key  (r) = Recorded By, (t) = Taken By, (c) = Cosigned By    Initials Name Provider Type    RANJANA Huggins PT Physical Therapist           Time Calculation:         PT Charges     Row Name 06/05/18 1010             Time Calculation    Start Time 0958  -MA      Stop Time 1011  -MA      Time Calculation (min) 13 min  -MA      PT Received On 06/05/18  -MA      PT - Next Appointment 06/06/18  -MA      PT Goal Re-Cert Due Date 06/12/18  -MA        User Key  (r) = Recorded By, (t) = Taken By, (c) = Cosigned By    Initials Name Provider Type    RANJANA Huggins PT Physical Therapist          Therapy Charges for Today     Code Description Service Date Service Provider Modifiers Qty    98902483997 HC PT EVAL MOD COMPLEXITY 2 6/5/2018 Dawn Huggins, PT GP 1    09352277354 HC PT THER PROC EA 15 MIN 6/5/2018 Dawn Huggins PT GP 1          PT G-Codes  Outcome Measure Options: AM-PAC 6  Clicks Basic Mobility (PT)      Dawn Huggins, PT  6/5/2018

## 2018-06-05 NOTE — PLAN OF CARE
Problem: Patient Care Overview  Goal: Plan of Care Review  Outcome: Ongoing (interventions implemented as appropriate)   06/05/18 0457   Coping/Psychosocial   Plan of Care Reviewed With patient   Plan of Care Review   Progress no change   OTHER   Outcome Summary pt slept some after getting situated, took po meds well without problem. Up to toilet x1 to void, voided lg amount of urine, which was very dark and foul pt encourage to drink plenty of water. Ate 1/2 sandwich and some chips. requires min assist of 1 to br r/t weakness and chronic knee/hip issues. Pneumonia booklet provided.      Goal: Individualization and Mutuality  Outcome: Ongoing (interventions implemented as appropriate)    Goal: Discharge Needs Assessment  Outcome: Ongoing (interventions implemented as appropriate)    Goal: Interprofessional Rounds/Family Conf  Outcome: Ongoing (interventions implemented as appropriate)      Problem: Fall Risk (Adult)  Goal: Identify Related Risk Factors and Signs and Symptoms  Outcome: Ongoing (interventions implemented as appropriate)    Goal: Absence of Fall  Outcome: Ongoing (interventions implemented as appropriate)      Problem: Pneumonia (Adult)  Goal: Signs and Symptoms of Listed Potential Problems Will be Absent, Minimized or Managed (Pneumonia)  Outcome: Ongoing (interventions implemented as appropriate)

## 2018-06-05 NOTE — PROGRESS NOTES
Discharge Planning Assessment  [facility]     Patient Name: Mello Arias Jr.  MRN: [id number]  Today's Date: 6/5/2018    Admit Date: 6/4/2018          Discharge Needs Assessment     Row Name 06/05/18 0943       Living Environment    Lives With alone    Current Living Arrangements home/apartment/condo    Primary Care Provided by self    Provides Primary Care For no one    Family Caregiver if Needed child(prema), adult    Able to Return to Prior Arrangements yes    Living Arrangement Comments Pt lives alone in a single story house.       Resource/Environmental Concerns    Resource/Environmental Concerns none       Transition Planning    Patient/Family Anticipates Transition to home    Patient/Family Anticipated Services at Transition none    Transportation Anticipated car, drives self       Discharge Needs Assessment    Concerns to be Addressed denies needs/concerns at this time    Equipment Currently Used at Home cane, quad;walker, standard    Equipment Needed After Discharge cane, quad;walker, standard    Offered/Gave Vendor List no            Discharge Plan     Row Name 06/05/18 0944       Plan    Plan Plan home.   [facility]    Plan Comments FACE SHEET VERIFIED/ IM LETTER SIGNED.   Spoke to pt at bedside.  Pt's PCP is Dr. Kelsey Muñoz.  Pt is independent with ADL's.   Pt states he has a cane and standard walker for home use.  Pt gets his prescriptions at [facility] [facility]  Pt denies any issues affording medications.  Pt is not current with .   Pt has been in [facility] for skilled care.   Referrals not made at this time.   Pt plan is home.  [facility]        Destination     No service coordination in this encounter.      Durable Medical Equipment     No service coordination in this encounter.      Dialysis/Infusion     No service coordination in this encounter.      Home Medical Care     No service coordination in this encounter.      Social Care     No  service coordination in this encounter.                Demographic Summary     Row Name 06/05/18 0942       General Information    Admission Type inpatient    Arrived From emergency department    Required Notices Provided Important Message from Medicare    Referral Source admission list    Reason for Consult discharge planning    Preferred Language English     Used During This Interaction no            Functional Status     Row Name 06/05/18 0943       Functional Status    Usual Activity Tolerance good    Current Activity Tolerance good       Functional Status, IADL    Medications independent    Meal Preparation independent    Housekeeping independent    Laundry independent    Shopping independent       Mental Status    General Appearance WDL WDL       Mental Status Summary    Recent Changes in Mental Status/Cognitive Functioning no changes            Psychosocial    No documentation.           Abuse/Neglect    No documentation.           Legal    No documentation.           Substance Abuse    No documentation.           Patient Forms    No documentation.         Yesenia Valerio, RN

## 2018-06-05 NOTE — PROGRESS NOTES
Continued Stay Note  [facility]     Patient Name: Mello Arias Jr.  MRN: [id number]  Today's Date: 6/5/2018    Admit Date: 6/4/2018          Discharge Plan     Row Name 06/05/18 0944       Plan    Plan Plan home.   [facility]    Plan Comments FACE SHEET VERIFIED/ IM LETTER SIGNED.   Spoke to pt at bedside.  Pt's PCP is Dr. Kelsey Muñoz.  Pt is independent with ADL's.   Pt states he has a cane and standard walker for home use.  Pt gets his prescriptions at [facility] ).  Pt denies any issues affording medications.  Pt is not current with .   Pt has been in [facility] and [facility] for skilled care.   Referrals not made at this time.   Pt plan is home.  [facility]              Discharge Codes    No documentation.           Yesenia Valerio RN

## 2018-06-05 NOTE — PLAN OF CARE
Problem: Patient Care Overview  Goal: Plan of Care Review   06/05/18 1026   Coping/Psychosocial   Plan of Care Reviewed With patient   OTHER   Outcome Summary Patient is a pleasant 67 y.o. male who presents with impaired functional mobility and endurance following admission from home for SOA and generalized weakness. Patient is independent with all ADLs at baseline and does not use an AD typically though has had to use a SPC recently due to weakness. All mobility required assist x 1. Ambulated 75' CGA with RW today- attempted to gait train without an AD but patient was slightly unsteady. May benefit from skilled PT services acutely to address deficits as able and improve level of independence.

## 2018-06-06 ENCOUNTER — APPOINTMENT (OUTPATIENT)
Dept: CARDIOLOGY | Facility: HOSPITAL | Age: 68
End: 2018-06-06
Attending: INTERNAL MEDICINE

## 2018-06-06 LAB
ANION GAP SERPL CALCULATED.3IONS-SCNC: 13.9 MMOL/L
AORTIC DIMENSIONLESS INDEX: 0.5 (DI)
BACTERIA BLD CULT: ABNORMAL
BACTERIA SPEC AEROBE CULT: NORMAL
BASOPHILS # BLD AUTO: 0.02 10*3/MM3 (ref 0–0.2)
BASOPHILS NFR BLD AUTO: 0.1 % (ref 0–1.5)
BH CV ECHO MEAS - AO MAX PG (FULL): 7.6 MMHG
BH CV ECHO MEAS - AO MAX PG: 10.4 MMHG
BH CV ECHO MEAS - AO MEAN PG (FULL): 4 MMHG
BH CV ECHO MEAS - AO MEAN PG: 5 MMHG
BH CV ECHO MEAS - AO ROOT AREA (BSA CORRECTED): 1.6
BH CV ECHO MEAS - AO ROOT AREA: 8.6 CM^2
BH CV ECHO MEAS - AO ROOT DIAM: 3.3 CM
BH CV ECHO MEAS - AO V2 MAX: 161 CM/SEC
BH CV ECHO MEAS - AO V2 MEAN: 107 CM/SEC
BH CV ECHO MEAS - AO V2 VTI: 30.4 CM
BH CV ECHO MEAS - AVA(I,A): 2 CM^2
BH CV ECHO MEAS - AVA(I,D): 2 CM^2
BH CV ECHO MEAS - AVA(V,A): 2.1 CM^2
BH CV ECHO MEAS - AVA(V,D): 2.1 CM^2
BH CV ECHO MEAS - BSA(HAYCOCK): 2.1 M^2
BH CV ECHO MEAS - BSA: 2 M^2
BH CV ECHO MEAS - BZI_BMI: 30 KILOGRAMS/M^2
BH CV ECHO MEAS - BZI_METRIC_HEIGHT: 172.7 CM
BH CV ECHO MEAS - BZI_METRIC_WEIGHT: 89.4 KG
BH CV ECHO MEAS - CONTRAST EF (2CH): 19.6 ML/M^2
BH CV ECHO MEAS - CONTRAST EF 4CH: 16.4 ML/M^2
BH CV ECHO MEAS - EDV(CUBED): 328.5 ML
BH CV ECHO MEAS - EDV(MOD-SP2): 275 ML
BH CV ECHO MEAS - EDV(MOD-SP4): 201 ML
BH CV ECHO MEAS - EDV(TEICH): 247.3 ML
BH CV ECHO MEAS - EF(CUBED): 20.2 %
BH CV ECHO MEAS - EF(MOD-BP): 19 %
BH CV ECHO MEAS - EF(MOD-SP2): 19.6 %
BH CV ECHO MEAS - EF(MOD-SP4): 16.4 %
BH CV ECHO MEAS - EF(TEICH): 15.7 %
BH CV ECHO MEAS - ESV(CUBED): 262.1 ML
BH CV ECHO MEAS - ESV(MOD-SP2): 221 ML
BH CV ECHO MEAS - ESV(MOD-SP4): 168 ML
BH CV ECHO MEAS - ESV(TEICH): 208.5 ML
BH CV ECHO MEAS - FS: 7.2 %
BH CV ECHO MEAS - IVS/LVPW: 0.89
BH CV ECHO MEAS - IVSD: 0.8 CM
BH CV ECHO MEAS - LAT PEAK E' VEL: 6 CM/SEC
BH CV ECHO MEAS - LV DIASTOLIC VOL/BSA (35-75): 99 ML/M^2
BH CV ECHO MEAS - LV MASS(C)D: 256.5 GRAMS
BH CV ECHO MEAS - LV MASS(C)DI: 126.3 GRAMS/M^2
BH CV ECHO MEAS - LV MAX PG: 2.7 MMHG
BH CV ECHO MEAS - LV MEAN PG: 1 MMHG
BH CV ECHO MEAS - LV SYSTOLIC VOL/BSA (12-30): 82.7 ML/M^2
BH CV ECHO MEAS - LV V1 MAX: 82.8 CM/SEC
BH CV ECHO MEAS - LV V1 MEAN: 54.8 CM/SEC
BH CV ECHO MEAS - LV V1 VTI: 14.9 CM
BH CV ECHO MEAS - LVIDD: 6.9 CM
BH CV ECHO MEAS - LVIDS: 6.4 CM
BH CV ECHO MEAS - LVLD AP2: 9.6 CM
BH CV ECHO MEAS - LVLD AP4: 8.9 CM
BH CV ECHO MEAS - LVLS AP2: 9.5 CM
BH CV ECHO MEAS - LVLS AP4: 8.9 CM
BH CV ECHO MEAS - LVOT AREA (M): 4.2 CM^2
BH CV ECHO MEAS - LVOT AREA: 4.2 CM^2
BH CV ECHO MEAS - LVOT DIAM: 2.3 CM
BH CV ECHO MEAS - LVPWD: 0.9 CM
BH CV ECHO MEAS - MED PEAK E' VEL: 7 CM/SEC
BH CV ECHO MEAS - MV A DUR: 0.16 SEC
BH CV ECHO MEAS - MV A MAX VEL: 148 CM/SEC
BH CV ECHO MEAS - MV DEC SLOPE: 895.5 CM/SEC^2
BH CV ECHO MEAS - MV DEC TIME: 0.21 SEC
BH CV ECHO MEAS - MV E MAX VEL: 186 CM/SEC
BH CV ECHO MEAS - MV E/A: 1.3
BH CV ECHO MEAS - MV MAX PG: 18.3 MMHG
BH CV ECHO MEAS - MV MEAN PG: 8 MMHG
BH CV ECHO MEAS - MV P1/2T MAX VEL: 213 CM/SEC
BH CV ECHO MEAS - MV P1/2T: 69.7 MSEC
BH CV ECHO MEAS - MV V2 MAX: 214 CM/SEC
BH CV ECHO MEAS - MV V2 MEAN: 132 CM/SEC
BH CV ECHO MEAS - MV V2 VTI: 52.2 CM
BH CV ECHO MEAS - MVA P1/2T LCG: 1 CM^2
BH CV ECHO MEAS - MVA(P1/2T): 3.2 CM^2
BH CV ECHO MEAS - MVA(VTI): 1.2 CM^2
BH CV ECHO MEAS - PA ACC TIME: 0.08 SEC
BH CV ECHO MEAS - PA MAX PG (FULL): 1.9 MMHG
BH CV ECHO MEAS - PA MAX PG: 3.7 MMHG
BH CV ECHO MEAS - PA PR(ACCEL): 42.6 MMHG
BH CV ECHO MEAS - PA V2 MAX: 95.6 CM/SEC
BH CV ECHO MEAS - PI END-D VEL: 142 CM/SEC
BH CV ECHO MEAS - PULM A REVS DUR: 0.11 SEC
BH CV ECHO MEAS - PULM A REVS VEL: 35.2 CM/SEC
BH CV ECHO MEAS - PULM DIAS VEL: 69.9 CM/SEC
BH CV ECHO MEAS - PULM S/D: 0.63
BH CV ECHO MEAS - PULM SYS VEL: 43.7 CM/SEC
BH CV ECHO MEAS - PVA(V,A): 4 CM^2
BH CV ECHO MEAS - PVA(V,D): 4 CM^2
BH CV ECHO MEAS - QP/QS: 0.95
BH CV ECHO MEAS - RAP SYSTOLE: 8 MMHG
BH CV ECHO MEAS - RV MAX PG: 1.8 MMHG
BH CV ECHO MEAS - RV MEAN PG: 1 MMHG
BH CV ECHO MEAS - RV V1 MAX: 66.5 CM/SEC
BH CV ECHO MEAS - RV V1 MEAN: 38.2 CM/SEC
BH CV ECHO MEAS - RV V1 VTI: 10.3 CM
BH CV ECHO MEAS - RVOT AREA: 5.7 CM^2
BH CV ECHO MEAS - RVOT DIAM: 2.7 CM
BH CV ECHO MEAS - RVSP: 54 MMHG
BH CV ECHO MEAS - SI(AO): 128 ML/M^2
BH CV ECHO MEAS - SI(CUBED): 32.7 ML/M^2
BH CV ECHO MEAS - SI(LVOT): 30.5 ML/M^2
BH CV ECHO MEAS - SI(MOD-SP2): 26.6 ML/M^2
BH CV ECHO MEAS - SI(MOD-SP4): 16.2 ML/M^2
BH CV ECHO MEAS - SI(TEICH): 19.1 ML/M^2
BH CV ECHO MEAS - SV(AO): 260 ML
BH CV ECHO MEAS - SV(CUBED): 66.4 ML
BH CV ECHO MEAS - SV(LVOT): 61.9 ML
BH CV ECHO MEAS - SV(MOD-SP2): 54 ML
BH CV ECHO MEAS - SV(MOD-SP4): 33 ML
BH CV ECHO MEAS - SV(RVOT): 59 ML
BH CV ECHO MEAS - SV(TEICH): 38.7 ML
BH CV ECHO MEAS - TAPSE (>1.6): 1.3 CM2
BH CV ECHO MEAS - TR MAX VEL: 339 CM/SEC
BH CV ECHO MEASUREMENTS AVERAGE E/E' RATIO: 28.62
BH CV VAS BP LEFT ARM: NORMAL MMHG
BH CV XLRA - RV BASE: 4.4 CM
BH CV XLRA - TDI S': 10 CM/SEC
BUN BLD-MCNC: 39 MG/DL (ref 8–23)
BUN/CREAT SERPL: 21.3 (ref 7–25)
CALCIUM SPEC-SCNC: 9 MG/DL (ref 8.6–10.5)
CHLORIDE SERPL-SCNC: 96 MMOL/L (ref 98–107)
CO2 SERPL-SCNC: 21.1 MMOL/L (ref 22–29)
CREAT BLD-MCNC: 1.83 MG/DL (ref 0.76–1.27)
DEPRECATED RDW RBC AUTO: 49.3 FL (ref 37–54)
EOSINOPHIL # BLD AUTO: 0.12 10*3/MM3 (ref 0–0.7)
EOSINOPHIL NFR BLD AUTO: 0.9 % (ref 0.3–6.2)
ERYTHROCYTE [DISTWIDTH] IN BLOOD BY AUTOMATED COUNT: 14 % (ref 11.5–14.5)
GFR SERPL CREATININE-BSD FRML MDRD: 37 ML/MIN/1.73
GLUCOSE BLD-MCNC: 105 MG/DL (ref 65–99)
HCT VFR BLD AUTO: 28.2 % (ref 40.4–52.2)
HGB BLD-MCNC: 9 G/DL (ref 13.7–17.6)
IMM GRANULOCYTES # BLD: 0.05 10*3/MM3 (ref 0–0.03)
IMM GRANULOCYTES NFR BLD: 0.4 % (ref 0–0.5)
LEFT ATRIUM VOLUME INDEX: 40 ML/M2
LEFT ATRIUM VOLUME: 73 CM3
LYMPHOCYTES # BLD AUTO: 1.27 10*3/MM3 (ref 0.9–4.8)
LYMPHOCYTES NFR BLD AUTO: 9.4 % (ref 19.6–45.3)
MAXIMAL PREDICTED HEART RATE: 151 BPM
MCH RBC QN AUTO: 30.6 PG (ref 27–32.7)
MCHC RBC AUTO-ENTMCNC: 31.9 G/DL (ref 32.6–36.4)
MCV RBC AUTO: 95.9 FL (ref 79.8–96.2)
MONOCYTES # BLD AUTO: 1.14 10*3/MM3 (ref 0.2–1.2)
MONOCYTES NFR BLD AUTO: 8.4 % (ref 5–12)
NEUTROPHILS # BLD AUTO: 11.02 10*3/MM3 (ref 1.9–8.1)
NEUTROPHILS NFR BLD AUTO: 81.2 % (ref 42.7–76)
NRBC BLD MANUAL-RTO: 0 /100 WBC (ref 0–0)
PHOSPHATE SERPL-MCNC: 3.1 MG/DL (ref 2.5–4.5)
PLATELET # BLD AUTO: 241 10*3/MM3 (ref 140–500)
PMV BLD AUTO: 11.3 FL (ref 6–12)
POTASSIUM BLD-SCNC: 5.5 MMOL/L (ref 3.5–5.2)
RBC # BLD AUTO: 2.94 10*6/MM3 (ref 4.6–6)
SODIUM BLD-SCNC: 131 MMOL/L (ref 136–145)
STRESS TARGET HR: 128 BPM
URATE SERPL-MCNC: 8.5 MG/DL (ref 3.4–7)
WBC NRBC COR # BLD: 13.57 10*3/MM3 (ref 4.5–10.7)

## 2018-06-06 PROCEDURE — 84100 ASSAY OF PHOSPHORUS: CPT | Performed by: INTERNAL MEDICINE

## 2018-06-06 PROCEDURE — 25010000002 PERFLUTREN (DEFINITY) 8.476 MG IN SODIUM CHLORIDE 0.9 % 10 ML INJECTION: Performed by: INTERNAL MEDICINE

## 2018-06-06 PROCEDURE — 25010000002 FUROSEMIDE PER 20 MG: Performed by: INTERNAL MEDICINE

## 2018-06-06 PROCEDURE — 25010000002 ENOXAPARIN PER 10 MG: Performed by: INTERNAL MEDICINE

## 2018-06-06 PROCEDURE — 93306 TTE W/DOPPLER COMPLETE: CPT | Performed by: INTERNAL MEDICINE

## 2018-06-06 PROCEDURE — 97110 THERAPEUTIC EXERCISES: CPT

## 2018-06-06 PROCEDURE — 84550 ASSAY OF BLOOD/URIC ACID: CPT | Performed by: INTERNAL MEDICINE

## 2018-06-06 PROCEDURE — 25010000003 CEFTRIAXONE PER 250 MG: Performed by: INTERNAL MEDICINE

## 2018-06-06 PROCEDURE — 94799 UNLISTED PULMONARY SVC/PX: CPT

## 2018-06-06 PROCEDURE — 25010000002 CEFTRIAXONE PER 250 MG: Performed by: INTERNAL MEDICINE

## 2018-06-06 PROCEDURE — 99233 SBSQ HOSP IP/OBS HIGH 50: CPT | Performed by: INTERNAL MEDICINE

## 2018-06-06 PROCEDURE — 93306 TTE W/DOPPLER COMPLETE: CPT

## 2018-06-06 PROCEDURE — 85025 COMPLETE CBC W/AUTO DIFF WBC: CPT | Performed by: INTERNAL MEDICINE

## 2018-06-06 PROCEDURE — 80048 BASIC METABOLIC PNL TOTAL CA: CPT | Performed by: INTERNAL MEDICINE

## 2018-06-06 RX ORDER — CEFTRIAXONE SODIUM 2 G/50ML
2 INJECTION, SOLUTION INTRAVENOUS EVERY 24 HOURS
Status: COMPLETED | OUTPATIENT
Start: 2018-06-06 | End: 2018-06-06

## 2018-06-06 RX ORDER — DOXYCYCLINE 100 MG/1
100 CAPSULE ORAL EVERY 12 HOURS
Status: DISCONTINUED | OUTPATIENT
Start: 2018-06-06 | End: 2018-06-06

## 2018-06-06 RX ORDER — FUROSEMIDE 10 MG/ML
40 INJECTION INTRAMUSCULAR; INTRAVENOUS ONCE
Status: COMPLETED | OUTPATIENT
Start: 2018-06-06 | End: 2018-06-06

## 2018-06-06 RX ORDER — CEFTRIAXONE SODIUM 1 G/50ML
1 INJECTION, SOLUTION INTRAVENOUS EVERY 24 HOURS
Status: COMPLETED | OUTPATIENT
Start: 2018-06-06 | End: 2018-06-06

## 2018-06-06 RX ORDER — CEFTRIAXONE SODIUM 2 G/50ML
2 INJECTION, SOLUTION INTRAVENOUS EVERY 24 HOURS
Status: DISCONTINUED | OUTPATIENT
Start: 2018-06-06 | End: 2018-06-06

## 2018-06-06 RX ADMIN — DOXYCYCLINE 100 MG: 100 INJECTION, POWDER, LYOPHILIZED, FOR SOLUTION INTRAVENOUS at 06:55

## 2018-06-06 RX ADMIN — Medication 10 ML: at 21:16

## 2018-06-06 RX ADMIN — FUROSEMIDE 40 MG: 10 INJECTION, SOLUTION INTRAMUSCULAR; INTRAVENOUS at 12:43

## 2018-06-06 RX ADMIN — ALBUTEROL SULFATE 2.5 MG: 2.5 SOLUTION RESPIRATORY (INHALATION) at 18:58

## 2018-06-06 RX ADMIN — FAMOTIDINE 20 MG: 20 TABLET, FILM COATED ORAL at 09:38

## 2018-06-06 RX ADMIN — Medication 10 ML: at 12:43

## 2018-06-06 RX ADMIN — ACETAMINOPHEN 650 MG: 325 TABLET, FILM COATED ORAL at 00:54

## 2018-06-06 RX ADMIN — Medication 10 ML: at 18:35

## 2018-06-06 RX ADMIN — CARVEDILOL 6.25 MG: 6.25 TABLET, FILM COATED ORAL at 09:38

## 2018-06-06 RX ADMIN — DOCUSATE SODIUM -SENNOSIDES 2 TABLET: 50; 8.6 TABLET, COATED ORAL at 21:15

## 2018-06-06 RX ADMIN — Medication 400 MG: at 09:38

## 2018-06-06 RX ADMIN — ALBUTEROL SULFATE 2.5 MG: 2.5 SOLUTION RESPIRATORY (INHALATION) at 03:49

## 2018-06-06 RX ADMIN — CEFTRIAXONE SODIUM 2 G: 2 INJECTION, SOLUTION INTRAVENOUS at 18:35

## 2018-06-06 RX ADMIN — ALBUTEROL SULFATE 2.5 MG: 2.5 SOLUTION RESPIRATORY (INHALATION) at 11:18

## 2018-06-06 RX ADMIN — CEFTRIAXONE SODIUM 1 G: 1 INJECTION, SOLUTION INTRAVENOUS at 03:11

## 2018-06-06 RX ADMIN — MONTELUKAST 10 MG: 10 TABLET, FILM COATED ORAL at 21:15

## 2018-06-06 RX ADMIN — PERFLUTREN 2 ML: 6.52 INJECTION, SUSPENSION INTRAVENOUS at 09:05

## 2018-06-06 RX ADMIN — ENOXAPARIN SODIUM 30 MG: 30 INJECTION SUBCUTANEOUS at 22:43

## 2018-06-06 RX ADMIN — ALBUTEROL SULFATE 2.5 MG: 2.5 SOLUTION RESPIRATORY (INHALATION) at 07:36

## 2018-06-06 NOTE — PROGRESS NOTES
Hospital Follow Up    LOS:  LOS: 2 days   Patient Name: Mello Arias Jr.  Age/Sex: 67 y.o. male  : 1950  MRN: [id number]    Date of Hospital Visit: 18  Length of Stay: 2  Encounter Provider: Naun Calvin MD  Place of Service: [facility] CARDIOLOGY    Subjective:     Follow Up for: cardiomyopathy    Interval History: feeling better.  Echo pending    Objective:     Objective:  Temp:  [97.5 °F (36.4 °C)-99.5 °F (37.5 °C)] 97.6 °F (36.4 °C)  Heart Rate:  [] 84  Resp:  [16-20] 16  BP: ()/(57-71) 124/71  Body mass index is 29.93 kg/m².    Intake/Output Summary (Last 24 hours) at 18 0810  Last data filed at 18 0718   Gross per 24 hour   Intake              440 ml   Output             2150 ml   Net            -1710 ml     1    18  0525 18  0600   Weight: 88.5 kg (195 lb) 90 kg (198 lb 6.6 oz) 89.3 kg (196 lb 13.9 oz)     Weight change: 0.849 kg (1 lb 13.9 oz)    Physical Exam:   General Appearance: Alert, cooperative, in no acute distress. AAOx4.   HEENT: Normocephalic.  Neck: Supple. No JVD. No Carotid bruit. No thyromegaly  Lungs: CTAB. Normal respiratory effort and rate.  Heart:: Regular rate and rhythm, normal S1 and S2, no murmurs, gallops or rubs.  Abdomen: Soft, nontender, non-distended. positive bowel sounds  Extremities: Warm, no cyanosis, or clubbing. No edema.     Lab Review:     Results from last 7 days  Lab Units 18  0618  0618  2139   SODIUM mmol/L 131* 127* 130*   POTASSIUM mmol/L 5.5* 4.7 5.0   CHLORIDE mmol/L 96* 93* 93*   CO2 mmol/L 21.1* 19.5* 18.9*   BUN mg/dL 39* 41* 37*   CREATININE mg/dL 1.83* 3.81* 4.03*   GLUCOSE mg/dL 105* 133* 120*   CALCIUM mg/dL 9.0 8.0* 8.3*         Results from last 7 days  Lab Units 18  1624   TROPONIN T ng/mL 0.019       Results from last 7 days  Lab Units 18  0623 18  0601   WBC 10*3/mm3 13.57* 13.35*   HEMOGLOBIN g/dL 9.0*  9.3*   HEMATOCRIT % 28.2* 28.8*   PLATELETS 10*3/mm3 241 211           Results from last 7 days  Lab Units 06/04/18  1624   MAGNESIUM mg/dL 1.5*                     I reviewed the patient's new clinical results.          I personally viewed and interpreted the patient's EKG/Telemetry data.  Current Medications:   Scheduled Meds:  carvedilol 6.25 mg Oral BID With Meals   ceftriaxone 2 g Intravenous Q24H   doxycycline 100 mg Intravenous Q12H   enoxaparin 30 mg Subcutaneous Q24H   famotidine 20 mg Oral Daily   magnesium oxide 400 mg Oral Daily   montelukast 10 mg Oral Nightly   sennosides-docusate sodium 2 tablet Oral Nightly     Continuous Infusions:     Allergies:  Allergies   Allergen Reactions   • Shellfish-Derived Products Swelling and Other (See Comments)     THROAT SWELLS       Assessment & Plan     Principal Problem:    Sepsis  Active Problems:    Cardiomyopathy, ischemic    Idiopathic ventricular tachycardia    CAD in native artery    CHF (congestive heart failure)    Acute renal failure    Hyponatremia    Bacterial pneumonia    1. Pneumonia: on abx  2. Sepsis secondary to  #1  3. Acute renal failure: Entresto and aldactone discontinued.  Slow hydration.  Renal function  better  4. Cardiomyopathy: severe ischemic with LVEF < 20%.  Pt responded favorably to Entresto and aldactone.  Will repeat echo for new baseline.  Will be a challenge going forward.  QRS not wide enough to consider BiV ICD.  Echo pending  5. CHF: CXR does not indicate fluid overload  6. Ventricular tachycardia: s/p ICD.    Monomorphic.  on Mg.           Plan:       Naun Calvin MD  06/06/18

## 2018-06-06 NOTE — THERAPY TREATMENT NOTE
Acute Care - Physical Therapy Treatment Note  [facility]     Patient Name: Mello Arias Jr.  : 1950  MRN: [id number]  Today's Date: 2018     Date of Referral to PT: 18  Referring Physician: Wilmer Fischer Date: 2018    Visit Dx:    ICD-10-CM ICD-9-CM   1. Acute kidney injury N17.9 584.9   2. Pneumonia of left lower lobe due to infectious organism J18.1 486   3. Hyperkalemia E87.5 276.7   4. Sepsis, due to unspecified organism A41.9 038.9     995.91   5. Impaired functional mobility, balance, gait, and endurance Z74.09 V49.89     Patient Active Problem List   Diagnosis   • A-fib   • Acid reflux   • Blood glucose elevated   • HLD (hyperlipidemia)   • Cardiomyopathy, ischemic   • L-S radiculopathy   • Idiopathic ventricular tachycardia   • CHF (congestive heart failure), NYHA class III   • Coronary artery disease of native artery of native heart with stable angina pectoris   • Non-rheumatic mitral regurgitation   • Non-rheumatic tricuspid valve insufficiency   • CAD in native artery   • Other specified forms of effusion, except tuberculous   • S/P CABG x 4   • S/P MVR (mitral valve replacement)   • S/P TVR (tricuspid valve repair)   • CHF (congestive heart failure)   • Acute renal failure   • Sepsis   • Hyponatremia   • Bacterial pneumonia       Therapy Treatment          Rehabilitation Treatment Summary     Row Name 18 1503             Treatment Time/Intention    Discipline physical therapist  -EF      Document Type therapy note (daily note)  -EF      Subjective Information --   feeling better  -EF      Mode of Treatment physical therapy  -EF      Patient/Family Observations supine in bed  -EF      Patient Effort good  -EF      Existing Precautions/Restrictions fall  -EF      Recorded by [EF] Luh Brandon, PT 18 1507      Row Name 18 1503             Bed Mobility Assessment/Treatment    Supine-Sit [place] (Bed Mobility) supervision  -EF      Sit-Supine [place]  (Bed Mobility) supervision  -EF      Recorded by [EF] Luh Brandon, PT 06/06/18 1507      Row Name 06/06/18 1503             Transfer Assessment/Treatment    Sit-Stand [place] (Transfers) contact guard;verbal cues  -EF      Stand-Sit [place] (Transfers) contact guard;verbal cues  -EF      Recorded by [EF] Luh Brandon, PT 06/06/18 1507      Row Name 06/06/18 1503             Sit-Stand Transfer    Assistive Device (Sit-Stand Transfers) walker, front-wheeled  -EF      Recorded by [EF] Luh Brandon, PT 06/06/18 1507      Row Name 06/06/18 1503             Stand-Sit Transfer    Assistive Device (Stand-Sit Transfers) walker, front-wheeled  -EF      Recorded by [EF] Luh Brandon, PT 06/06/18 1507      Row Name 06/06/18 1503             Gait/Stairs Assessment/Training    Gait/Stairs Assessment/Training gait/ambulation independence  -EF      [place] Level (Gait) contact guard  -EF      Assistive Device (Gait) walker, front-wheeled  -EF      Distance in Feet (Gait) 80  -EF      Deviations/Abnormal Patterns (Gait) rachel decreased  -EF      Recorded by [EF] Luh Brandon, PT 06/06/18 1507      Row Name 06/06/18 1503             Motor Skills Assessment/Interventions    Additional Documentation Therapeutic Exercise Interventions (Group);Therapeutic Exercise (Group)  -EF      Recorded by [EF] Luh Brandon, PT 06/06/18 1507      Row Name 06/06/18 1503             Therapeutic Exercise    Comment (Therapeutic Exercise) sitting LAQ, APs x 10 reps  -EF      Recorded by [EF] Luh Brnadon, PT 06/06/18 1507      Row Name 06/06/18 1503             Positioning and Restraints    Pre-Treatment Position in bed  -EF      Post Treatment Position bed  -EF      In Bed notified nsg;supine;call light within reach;encouraged to call for assist;exit alarm on   buttons on bed not working/alarm would not set-RN informed  -EF      Recorded by [EF] Luh Brandon, PT 06/06/18 1507      Row Name  06/06/18 1503             Pain Assessment    Additional Documentation Pain Scale: Numbers Pre/Post-Treatment (Group)  -EF      Recorded by [EF] Luh Brandon, PT 06/06/18 1507      Row Name 06/06/18 1503             Pain Scale: Numbers Pre/Post-Treatment    Pain Scale: Numbers, Pretreatment 0/10 - no pain  -EF      Pain Scale: Numbers, Post-Treatment 0/10 - no pain  -EF      Recorded by [EF] Luh Brandon, PT 06/06/18 1507        User Key  (r) = Recorded By, (t) = Taken By, (c) = Cosigned By    Initials Name Effective Dates Discipline    EF Luh Brandon, PT 03/07/18 -  PT                     Physical Therapy Education     Title: PT OT SLP Therapies (Done)     Topic: Physical Therapy (Done)     Point: Mobility training (Done)    Learning Progress Summary     Learner Status Readiness Method Response Comment Documented by    Patient Done Acceptance E VU,NR   06/06/18 1507     Active Acceptance E NR  MA 06/05/18 1029          Point: Home exercise program (Done)    Learning Progress Summary     Learner Status Readiness Method Response Comment Documented by    Patient Done Acceptance E VU,NR   06/06/18 1507     Active Acceptance E NR  MA 06/05/18 1029          Point: Body mechanics (Done)    Learning Progress Summary     Learner Status Readiness Method Response Comment Documented by    Patient Done Acceptance E VU,NR   06/06/18 1507     Active Acceptance E NR  MA 06/05/18 1029          Point: Precautions (Done)    Learning Progress Summary     Learner Status Readiness Method Response Comment Documented by    Patient Done Acceptance E VU,NR   06/06/18 1507     Active Acceptance E NR  MA 06/05/18 1029                      User Key     Initials Effective Dates Name Provider Type Discipline     03/07/18 -  Luh Brandon, PT Physical Therapist PT    MA 04/03/18 -  Dawn Huggins, PT Physical Therapist PT                    PT Recommendation and Plan     Plan of Care Reviewed With:  patient  Progress: improving  Outcome Summary: Pt able to ambulate with rolling walker without excessive fatigue.           Outcome Measures     Row Name 06/06/18 1500 06/05/18 1000          How much help from another person do you currently need...    Turning from your back to your side while in flat bed without using bedrails? 4  -EF 4  -MA     Moving from lying on back to sitting on the side of a flat bed without bedrails? 4  -EF 4  -MA     Moving to and from a bed to a chair (including a wheelchair)? 3  -EF 3  -MA     Standing up from a chair using your arms (e.g., wheelchair, bedside chair)? 3  -EF 3  -MA     Climbing 3-5 steps with a railing? 3  -EF 2  -MA     To walk in hospital room? 3  -EF 3  -MA     AM-PAC 6 Clicks Score 20  -EF 19  -MA        Functional Assessment    Outcome Measure Options AM-PAC 6 Clicks Basic Mobility (PT)  -EF AM-PAC 6 Clicks Basic Mobility (PT)  -MA       User Key  (r) = Recorded By, (t) = Taken By, (c) = Cosigned By    Initials Name Provider Type    EF Luh Brandon, PT Physical Therapist    RANJANA Huggins, PT Physical Therapist           Time Calculation:         PT Charges     Row Name 06/06/18 1509 06/06/18 0956          Time Calculation    Start Time 1451  -EF  --     Stop Time 1502  -EF  --     Time Calculation (min) 11 min  -EF  --     PT Received On 06/06/18  -EF  --     PT - Next Appointment 06/07/18  -EF 06/06/18  -       User Key  (r) = Recorded By, (t) = Taken By, (c) = Cosigned By    Initials Name Provider Type    EF Luh Brandon, PT Physical Therapist    CW Joe Garza, PTA Physical Therapy Assistant          Therapy Charges for Today     Code Description Service Date Service Provider Modifiers Qty    22844047314 HC PT THER PROC EA 15 MIN 6/6/2018 Luh Brandon, PT GP 1          PT G-Codes  Outcome Measure Options: AM-PAC 6 Clicks Basic Mobility (PT)    Luh Brandon PT  6/6/2018

## 2018-06-06 NOTE — NURSING NOTE
Spoke with cardiologist. He said ok for pt to go off unit for echo. Called Andre in echo to let her know. She said Tech was already on their way to the unit.

## 2018-06-06 NOTE — PROGRESS NOTES
Name: Mello Arias Jr. ADMIT: 2018   : 1950  PCP: Kelsey Muñoz MD    MRN: [id number] LOS: 2 days   AGE/SEX: 67 y.o. male    ROOM: [facility]   Subjective   Patient reports that he is feeling little better rested well and is able to eat  No fever    Brief hospital course since admission:  Pneumonia  Sepsis  Acute kidney injury  Hyperkalemia improved  Hyponatremia  Cardiomyopathy with ejection fraction less than 10%      I have reviewed past medical history, social hisotory, family history, allergies.  No changes from admission note.      Review of Systems   Constitutional: Negative for chills and fever.   Respiratory: Positive for shortness of breath.    Cardiovascular: Negative for chest pain and leg swelling.          Objective   Vital Signs  Temp:  [97.6 °F (36.4 °C)-99.5 °F (37.5 °C)] 98.2 °F (36.8 °C)  Heart Rate:  [] 83  Resp:  [16-20] 16  BP: ()/(65-71) 117/71  SpO2:  [93 %-99 %] 94 %  on  Flow (L/min):  [2] 2;   Device (Oxygen Therapy): room air  Body mass index is 29.93 kg/m².    Intake/Output Summary (Last 24 hours) at 18 1659  Last data filed at 18 1643   Gross per 24 hour   Intake              560 ml   Output             2750 ml   Net            -2190 ml       Physical Exam   Constitutional: He is oriented to person, place, and time. He appears well-developed and well-nourished.   HENT:   Head: Atraumatic.   Eyes: Pupils are equal, round, and reactive to light. No scleral icterus.   Cardiovascular: Normal rate and regular rhythm.    Pulmonary/Chest: No accessory muscle usage. No respiratory distress. He has no decreased breath sounds. He has no wheezes.   Abdominal: Soft. Normal appearance and bowel sounds are normal. He exhibits no ascites. There is no hepatosplenomegaly.   Neurological: He is alert and oriented to person, place, and time.   Skin: No laceration and no petechiae noted. No cyanosis. Nails show no clubbing.   Psychiatric: He has a normal mood and  affect. His behavior is normal.   Vitals reviewed.      Results Review:      Results from last 7 days  Lab Units 06/06/18  0623 06/05/18  0601 06/04/18  1624   WBC 10*3/mm3 13.57* 13.35* 20.44*   HEMOGLOBIN g/dL 9.0* 9.3* 12.1*   HEMATOCRIT % 28.2* 28.8* 38.3*   PLATELETS 10*3/mm3 241 211 306       Results from last 7 days  Lab Units 06/06/18  0623 06/05/18  0601 06/04/18  2139   SODIUM mmol/L 131* 127* 130*   POTASSIUM mmol/L 5.5* 4.7 5.0   CHLORIDE mmol/L 96* 93* 93*   CO2 mmol/L 21.1* 19.5* 18.9*   BUN mg/dL 39* 41* 37*   CREATININE mg/dL 1.83* 3.81* 4.03*   GLUCOSE mg/dL 105* 133* 120*   CALCIUM mg/dL 9.0 8.0* 8.3*         Hemoglobin A1C:  Lab Results   Component Value Date    HGBA1C 5.40 12/31/2016     Glucose Range:  Glucose   Date/Time Value Ref Range Status   06/05/2018 2207 99 70 - 130 mg/dL Final         ceftriaxone 2 g Intravenous Q24H   doxycycline 100 mg Oral Q12H   enoxaparin 30 mg Subcutaneous Q24H   famotidine 20 mg Oral Daily   magnesium oxide 400 mg Oral Daily   montelukast 10 mg Oral Nightly   sennosides-docusate sodium 2 tablet Oral Nightly      Diet Regular; Cardiac, Daily Fluid Restriction; 1500 mL Fluid  Assessment/Plan       Assessment/Plan      Active Hospital Problems (** Indicates Principal Problem)    Diagnosis Date Noted   • **Sepsis [A41.9] 06/04/2018   • Bacterial pneumonia [J15.9] 06/04/2018     Priority: High   • Acute renal failure [N17.9] 06/04/2018   • Hyponatremia [E87.1] 06/04/2018   • CHF (congestive heart failure) [I50.9] 12/31/2016   • CAD in native artery [I25.10] 11/07/2016   • Cardiomyopathy, ischemic [I25.5] 10/28/2016   • Idiopathic ventricular tachycardia [I47.2] 10/28/2016      Resolved Hospital Problems    Diagnosis Date Noted Date Resolved   No resolved problems to display.     Patient had renal ultrasound and echocardiogram  Hyperkalemia   Renal function is slowly improving  Pneumonia, Blood culture is positive, DC Doxy and increase Rocephin to 2 grm Q 24 hr   This  patient is hemodynamically stable  Cardiomyopathy with ejection fraction less than 19% ( new echo)   Not resuming combination of entresto and aldactone          Nile De La Cruz MD  [facility]  06/06/18

## 2018-06-06 NOTE — PLAN OF CARE
Problem: Patient Care Overview  Goal: Plan of Care Review  Outcome: Ongoing (interventions implemented as appropriate)   06/06/18 4921   Coping/Psychosocial   Plan of Care Reviewed With patient   Plan of Care Review   Progress improving   OTHER   Outcome Summary Pt able to ambulate with rolling walker without excessive fatigue.

## 2018-06-06 NOTE — NURSING NOTE
Transport here to take pt to have echo. In report, Lyndsey RN (last night nurse) said this needed to be done at bedside d/t recent heart activity. Checked order and it was ordered for bedside. Called cardio and talked with Andre. Explained to her order was for bedside echo; reason explained: pt needs to stay on monitor d/t recent activity.

## 2018-06-06 NOTE — PROGRESS NOTES
"   LOS: 2 days    Patient Care Team:  Kelsey Muñoz MD as PCP - General (Family Medicine)  Kelsey Muñoz MD as PCP - Family Medicine  Naun Calvin MD as PCP - Claims Attributed  Yesenia Saucedo RN as Care Coordinator (Population Health)    Chief Complaint:    Chief Complaint   Patient presents with   • Mutiple Complaints     Follow up MARISELA  Subjective     Interval History:   Feels better. Eating better. Walked in swain.  NO bm yet. Urinating in response to lasix IV given this am.     Review of Systems:   Not soa.  Usual weight 195 .      Objective     Vital Signs  Temp:  [97.6 °F (36.4 °C)-99.5 °F (37.5 °C)] 98.2 °F (36.8 °C)  Heart Rate:  [] 83  Resp:  [16-20] 16  BP: ()/(65-71) 117/71    Flowsheet Rows      First Filed Value   Admission Height  172.7 cm (68\") Documented at 06/04/2018 1529   Admission Weight  88.5 kg (195 lb) Documented at 06/04/2018 1458          I/O this shift:  In: 360 [P.O.:360]  Out: 1700 [Urine:1700]  I/O last 3 completed shifts:  In: 3645 [P.O.:360; I.V.:2985; IV Piggyback:300]  Out: 1575 [Urine:1575]    Intake/Output Summary (Last 24 hours) at 06/06/18 1618  Last data filed at 06/06/18 1407   Gross per 24 hour   Intake              560 ml   Output             2450 ml   Net            -1890 ml       Physical Exam:  Lying flat in bed. Comfortable.  Pale, Heart RRR no s3 or rub. Lungs clear to auscultation.  Abd + bs soft, nontender.  Ext no edema. Skin pale, no rash.       Results Review:      Results from last 7 days  Lab Units 06/06/18  0623 06/05/18  0601 06/04/18  2139 06/04/18  1624   SODIUM mmol/L 131* 127* 130* 128*   POTASSIUM mmol/L 5.5* 4.7 5.0 5.3*   CHLORIDE mmol/L 96* 93* 93* 88*   CO2 mmol/L 21.1* 19.5* 18.9* 17.7*   BUN mg/dL 39* 41* 37* 38*   CREATININE mg/dL 1.83* 3.81* 4.03* 4.43*   CALCIUM mg/dL 9.0 8.0* 8.3* 9.6   BILIRUBIN mg/dL  --   --   --  0.4   ALK PHOS U/L  --   --   --  133*   ALT (SGPT) U/L  --   --   --  46*   AST (SGOT) U/L  --   --   --  35 "   GLUCOSE mg/dL 105* 133* 120* 140*       Estimated Creatinine Clearance: 41.4 mL/min (A) (by C-G formula based on SCr of 1.83 mg/dL (H)).      Results from last 7 days  Lab Units 06/06/18  0623 06/04/18  1624   MAGNESIUM mg/dL  --  1.5*   PHOSPHORUS mg/dL 3.1 4.9*         Results from last 7 days  Lab Units 06/06/18  0623   URIC ACID mg/dL 8.5*         Results from last 7 days  Lab Units 06/06/18  0623 06/05/18  0601 06/04/18  1624   WBC 10*3/mm3 13.57* 13.35* 20.44*   HEMOGLOBIN g/dL 9.0* 9.3* 12.1*   PLATELETS 10*3/mm3 241 211 306               Imaging Results (last 24 hours)     Procedure Component Value Units Date/Time    US Renal Bilateral [[id number]] Collected:  06/05/18 1920     Updated:  06/05/18 1926    Narrative:       US RENAL BILATERAL-     INDICATIONS: Acute kidney injury     TECHNIQUE: ULTRASOUND OF THE KIDNEYS AND URINARY BLADDER.     COMPARISON: None available     FINDINGS:     The right kidney measures 11.1 centimeters, the left kidney measures  10.4 centimeters.     A hypoechoic focus, possibly a tiny cyst, but too small to characterize,  6 mm in the right kidney.. No hydronephrosis. Echogenic 5 mm focus in  the left kidney suggesting nonobstructive stone.     No ureteral jets were observed during the exam.     The urinary bladder otherwise appears unremarkable.       Impression:       No hydronephrosis. Evidence of nonobstructive nephrolithiasis in the  left kidney. Possible cyst in the right kidney.     This report was finalized on 6/5/2018 7:23 PM by Dr. Godwin Hendrickson M.D.             ceftriaxone 2 g Intravenous Q24H   doxycycline 100 mg Oral Q12H   enoxaparin 30 mg Subcutaneous Q24H   famotidine 20 mg Oral Daily   magnesium oxide 400 mg Oral Daily   montelukast 10 mg Oral Nightly   sennosides-docusate sodium 2 tablet Oral Nightly          Medication Review:   Current Facility-Administered Medications   Medication Dose Route Frequency Provider Last Rate Last Dose   • acetaminophen  (TYLENOL) tablet 650 mg  650 mg Oral Q4H PRN Luh Guillen MD   650 mg at 06/06/18 0054   • albuterol (PROVENTIL) nebulizer solution 0.083% 2.5 mg/3mL  2.5 mg Nebulization Q6H PRN Luh Guillen MD   2.5 mg at 06/06/18 1118   • aluminum-magnesium hydroxide-simethicone (MAALOX MAX) 400-400-40 MG/5ML suspension 15 mL  15 mL Oral Q6H PRN Luh Guillen MD       • bisacodyl (DULCOLAX) EC tablet 5 mg  5 mg Oral Daily PRN Luh Guillen MD       • cefTRIAXone (ROCEPHIN) IVPB 2 g  2 g Intravenous Q24H Michael Barron MD       • doxycycline (MONODOX) capsule 100 mg  100 mg Oral Q12H Luh Guillen MD       • enoxaparin (LOVENOX) syringe 30 mg  30 mg Subcutaneous Q24H Luh Guillen MD   30 mg at 06/05/18 2019   • famotidine (PEPCID) tablet 20 mg  20 mg Oral Daily Daniel Loza MD   20 mg at 06/06/18 0938   • magnesium oxide (MAG-OX) tablet 400 mg  400 mg Oral Daily Naun Calvin MD   400 mg at 06/06/18 0938   • montelukast (SINGULAIR) tablet 10 mg  10 mg Oral Nightly Luh Guillen MD   10 mg at 06/05/18 2019   • nitroglycerin (NITROSTAT) SL tablet 0.4 mg  0.4 mg Sublingual Q5 Min PRN Luh Guillen MD       • ondansetron (ZOFRAN) tablet 4 mg  4 mg Oral Q6H PRN Luh Guillen MD        Or   • ondansetron ODT (ZOFRAN-ODT) disintegrating tablet 4 mg  4 mg Oral Q6H PRMONICO Guillen MD        Or   • ondansetron (ZOFRAN) injection 4 mg  4 mg Intravenous Q6H PRN Luh Guillen MD       • pneumococcal polysaccharide 23-valent (PNEUMOVAX-23) vaccine 0.5 mL  0.5 mL Intramuscular During Hospitalization Luh Guillen MD       • sennosides-docusate sodium (SENOKOT-S) 8.6-50 MG tablet 2 tablet  2 tablet Oral Nightly Luh Guillen MD   2 tablet at 06/05/18 2019   • sodium chloride 0.9 % flush 10 mL  10 mL Intravenous PRN AYDEE Smith   10 mL at 06/06/18 1243       Assessment/Plan   1. [place], multifactorial including NV, volume  depletion, sepsis syndrome, med effect.  Significant improvement in waste products. Non-oliguric. K still a little elevated.  Lasix this am given to facilitate K excretion.  Good response. Renal us negative.   2. Ischemic cardiomyopathy, pulm HTN, MV stenosis.   Discussed not resuming combination of entresto and aldactone, but may be able to try one of these, once renal function normal and K normal.  3. Hyponatremia.  Improving.  4. Left basilar infiltrate. WBC much improved. Group B strep bacteremia. Rocephin/Doxycycline started.   5. Anemia    Principal Problem:    Sepsis  Active Problems:    Cardiomyopathy, ischemic    Idiopathic ventricular tachycardia    CAD in native artery    CHF (congestive heart failure)    Acute renal failure    Hyponatremia    Bacterial pneumonia              Elizabeth Gayle MD  06/06/18  4:18 PM

## 2018-06-06 NOTE — SIGNIFICANT NOTE
06/06/18 0956   Rehab Treatment   Discipline physical therapy assistant   Reason Treatment Not Performed unavailable for treatment  (Pt off the floor in AM)   Recommendation   PT - Next Appointment 06/06/18

## 2018-06-06 NOTE — PLAN OF CARE
Problem: Patient Care Overview  Goal: Plan of Care Review  Outcome: Ongoing (interventions implemented as appropriate)   06/06/18 0323   Coping/Psychosocial   Plan of Care Reviewed With patient   Plan of Care Review   Progress no change   OTHER   Outcome Summary patient alert follows commands, cardiac monitor continuously, echo ordered but not completed. patient went to the br and became soa, 2l nasal cannula placed on patient and patient started shivers eith anxiety, resp therapy gave prn breathing treatm,ent and patient calm down. bld cultures came back positive, md ordered increase in antibx that were given. no other acute distress noted will continue to monitor     Goal: Individualization and Mutuality  Outcome: Ongoing (interventions implemented as appropriate)    Goal: Discharge Needs Assessment  Outcome: Ongoing (interventions implemented as appropriate)    Goal: Interprofessional Rounds/Family Conf  Outcome: Ongoing (interventions implemented as appropriate)      Problem: Fall Risk (Adult)  Goal: Identify Related Risk Factors and Signs and Symptoms  Outcome: Ongoing (interventions implemented as appropriate)    Goal: Absence of Fall  Outcome: Ongoing (interventions implemented as appropriate)      Problem: Pneumonia (Adult)  Goal: Signs and Symptoms of Listed Potential Problems Will be Absent, Minimized or Managed (Pneumonia)  Outcome: Ongoing (interventions implemented as appropriate)      Problem: Cardiac: Heart Failure (Adult)  Goal: Signs and Symptoms of Listed Potential Problems Will be Absent, Minimized or Managed (Cardiac: Heart Failure)  Outcome: Ongoing (interventions implemented as appropriate)      Problem: Skin Injury Risk (Adult)  Goal: Identify Related Risk Factors and Signs and Symptoms  Outcome: Ongoing (interventions implemented as appropriate)    Goal: Skin Health and Integrity  Outcome: Ongoing (interventions implemented as appropriate)

## 2018-06-06 NOTE — PLAN OF CARE
Problem: Patient Care Overview  Goal: Plan of Care Review  Outcome: Ongoing (interventions implemented as appropriate)   06/06/18 9695   Coping/Psychosocial   Plan of Care Reviewed With patient   Plan of Care Review   Progress improving   OTHER   Outcome Summary PT ALERT AND ORIENTED. UP TO THE TOILET WITH STAND BY ASSIST. PT DENIES PAIN. NO ACUTE DISTRESS NOTED, WILL CONTINUE TO MONITOR.     Goal: Individualization and Mutuality  Outcome: Ongoing (interventions implemented as appropriate)    Goal: Discharge Needs Assessment  Outcome: Ongoing (interventions implemented as appropriate)    Goal: Interprofessional Rounds/Family Conf  Outcome: Ongoing (interventions implemented as appropriate)      Problem: Fall Risk (Adult)  Goal: Identify Related Risk Factors and Signs and Symptoms  Outcome: Ongoing (interventions implemented as appropriate)    Goal: Absence of Fall  Outcome: Ongoing (interventions implemented as appropriate)      Problem: Pneumonia (Adult)  Goal: Signs and Symptoms of Listed Potential Problems Will be Absent, Minimized or Managed (Pneumonia)  Outcome: Ongoing (interventions implemented as appropriate)      Problem: Cardiac: Heart Failure (Adult)  Goal: Signs and Symptoms of Listed Potential Problems Will be Absent, Minimized or Managed (Cardiac: Heart Failure)  Outcome: Ongoing (interventions implemented as appropriate)      Problem: Skin Injury Risk (Adult)  Goal: Identify Related Risk Factors and Signs and Symptoms  Outcome: Ongoing (interventions implemented as appropriate)    Goal: Skin Health and Integrity  Outcome: Ongoing (interventions implemented as appropriate)

## 2018-06-07 LAB
ALBUMIN SERPL-MCNC: 2.8 G/DL (ref 3.5–5.2)
ALBUMIN/GLOB SERPL: 0.6 G/DL
ALP SERPL-CCNC: 158 U/L (ref 39–117)
ALT SERPL W P-5'-P-CCNC: 54 U/L (ref 1–41)
ANION GAP SERPL CALCULATED.3IONS-SCNC: 13 MMOL/L
AST SERPL-CCNC: 46 U/L (ref 1–40)
BACTERIA SPEC AEROBE CULT: ABNORMAL
BACTERIA SPEC AEROBE CULT: ABNORMAL
BASOPHILS # BLD AUTO: 0.03 10*3/MM3 (ref 0–0.2)
BASOPHILS NFR BLD AUTO: 0.3 % (ref 0–1.5)
BILIRUB SERPL-MCNC: 0.2 MG/DL (ref 0.1–1.2)
BUN BLD-MCNC: 31 MG/DL (ref 8–23)
BUN/CREAT SERPL: 26.7 (ref 7–25)
CALCIUM SPEC-SCNC: 9.1 MG/DL (ref 8.6–10.5)
CHLORIDE SERPL-SCNC: 95 MMOL/L (ref 98–107)
CO2 SERPL-SCNC: 23 MMOL/L (ref 22–29)
CREAT BLD-MCNC: 1.16 MG/DL (ref 0.76–1.27)
DEPRECATED RDW RBC AUTO: 48.5 FL (ref 37–54)
EOSINOPHIL # BLD AUTO: 0.15 10*3/MM3 (ref 0–0.7)
EOSINOPHIL NFR BLD AUTO: 1.4 % (ref 0.3–6.2)
ERYTHROCYTE [DISTWIDTH] IN BLOOD BY AUTOMATED COUNT: 13.5 % (ref 11.5–14.5)
GFR SERPL CREATININE-BSD FRML MDRD: 62 ML/MIN/1.73
GLOBULIN UR ELPH-MCNC: 4.5 GM/DL
GLUCOSE BLD-MCNC: 119 MG/DL (ref 65–99)
GRAM STN SPEC: ABNORMAL
HCT VFR BLD AUTO: 30.9 % (ref 40.4–52.2)
HGB BLD-MCNC: 9.8 G/DL (ref 13.7–17.6)
IMM GRANULOCYTES # BLD: 0.05 10*3/MM3 (ref 0–0.03)
IMM GRANULOCYTES NFR BLD: 0.5 % (ref 0–0.5)
ISOLATED FROM: ABNORMAL
LYMPHOCYTES # BLD AUTO: 1 10*3/MM3 (ref 0.9–4.8)
LYMPHOCYTES NFR BLD AUTO: 9.6 % (ref 19.6–45.3)
MAGNESIUM SERPL-MCNC: 1.6 MG/DL (ref 1.6–2.4)
MCH RBC QN AUTO: 30.6 PG (ref 27–32.7)
MCHC RBC AUTO-ENTMCNC: 31.7 G/DL (ref 32.6–36.4)
MCV RBC AUTO: 96.6 FL (ref 79.8–96.2)
MONOCYTES # BLD AUTO: 0.98 10*3/MM3 (ref 0.2–1.2)
MONOCYTES NFR BLD AUTO: 9.4 % (ref 5–12)
NEUTROPHILS # BLD AUTO: 8.17 10*3/MM3 (ref 1.9–8.1)
NEUTROPHILS NFR BLD AUTO: 78.8 % (ref 42.7–76)
PLATELET # BLD AUTO: 275 10*3/MM3 (ref 140–500)
PMV BLD AUTO: 11.4 FL (ref 6–12)
POTASSIUM BLD-SCNC: 4.4 MMOL/L (ref 3.5–5.2)
PROT SERPL-MCNC: 7.3 G/DL (ref 6–8.5)
RBC # BLD AUTO: 3.2 10*6/MM3 (ref 4.6–6)
SODIUM BLD-SCNC: 131 MMOL/L (ref 136–145)
STREP GROUPING: ABNORMAL
WBC NRBC COR # BLD: 10.38 10*3/MM3 (ref 4.5–10.7)

## 2018-06-07 PROCEDURE — 85025 COMPLETE CBC W/AUTO DIFF WBC: CPT | Performed by: INTERNAL MEDICINE

## 2018-06-07 PROCEDURE — 94799 UNLISTED PULMONARY SVC/PX: CPT

## 2018-06-07 PROCEDURE — 97110 THERAPEUTIC EXERCISES: CPT

## 2018-06-07 PROCEDURE — 83735 ASSAY OF MAGNESIUM: CPT | Performed by: INTERNAL MEDICINE

## 2018-06-07 PROCEDURE — 80053 COMPREHEN METABOLIC PANEL: CPT | Performed by: INTERNAL MEDICINE

## 2018-06-07 PROCEDURE — 25010000002 ENOXAPARIN PER 10 MG: Performed by: INTERNAL MEDICINE

## 2018-06-07 PROCEDURE — 99232 SBSQ HOSP IP/OBS MODERATE 35: CPT | Performed by: INTERNAL MEDICINE

## 2018-06-07 RX ADMIN — ALBUTEROL SULFATE 2.5 MG: 2.5 SOLUTION RESPIRATORY (INHALATION) at 00:58

## 2018-06-07 RX ADMIN — ACETAMINOPHEN 650 MG: 325 TABLET, FILM COATED ORAL at 12:55

## 2018-06-07 RX ADMIN — ENOXAPARIN SODIUM 40 MG: 100 INJECTION SUBCUTANEOUS at 22:38

## 2018-06-07 RX ADMIN — Medication 400 MG: at 09:25

## 2018-06-07 RX ADMIN — SACUBITRIL AND VALSARTAN 1 TABLET: 24; 26 TABLET, FILM COATED ORAL at 22:38

## 2018-06-07 RX ADMIN — MONTELUKAST 10 MG: 10 TABLET, FILM COATED ORAL at 21:51

## 2018-06-07 RX ADMIN — SACUBITRIL AND VALSARTAN 1 TABLET: 24; 26 TABLET, FILM COATED ORAL at 16:36

## 2018-06-07 RX ADMIN — FAMOTIDINE 20 MG: 20 TABLET, FILM COATED ORAL at 09:25

## 2018-06-07 RX ADMIN — DOCUSATE SODIUM -SENNOSIDES 2 TABLET: 50; 8.6 TABLET, COATED ORAL at 21:51

## 2018-06-07 RX ADMIN — ALBUTEROL SULFATE 2.5 MG: 2.5 SOLUTION RESPIRATORY (INHALATION) at 09:03

## 2018-06-07 NOTE — PROGRESS NOTES
Name: Mello Arias Jr. ADMIT: 2018   : 1950  PCP: Kelsey Muñoz MD    MRN: [id number] LOS: 3 days   AGE/SEX: 67 y.o. male    ROOM: [facility]   Subjective   Patient reports that he is feeling little better rested well and is able to eat  No fever    Brief hospital course since admission:  Pneumonia  Sepsis  Acute kidney injury  Hyperkalemia improved  Hyponatremia  Cardiomyopathy with ejection fraction less than 10%      I have reviewed past medical history, social hisotory, family history, allergies.  No changes from admission note.      Review of Systems   Constitutional: Negative for chills and fever.   Respiratory: Positive for shortness of breath.    Cardiovascular: Negative for chest pain and leg swelling.          Objective   Vital Signs  Temp:  [97.9 °F (36.6 °C)-98.6 °F (37 °C)] 98.6 °F (37 °C)  Heart Rate:  [84-97] 84  Resp:  [16-20] 16  BP: ()/(56-75) 128/75  SpO2:  [90 %-99 %] 97 %  on  Flow (L/min):  [1-2] 1;   Device (Oxygen Therapy): room air  Body mass index is 29.8 kg/m².    Intake/Output Summary (Last 24 hours) at 18 1815  Last data filed at 18 1807   Gross per 24 hour   Intake              480 ml   Output             1000 ml   Net             -520 ml       Physical Exam   Constitutional: He is oriented to person, place, and time. He appears well-developed and well-nourished.   HENT:   Head: Atraumatic.   Eyes: Pupils are equal, round, and reactive to light. No scleral icterus.   Cardiovascular: Normal rate and regular rhythm.    Pulmonary/Chest: No accessory muscle usage. No respiratory distress. He has no decreased breath sounds. He has no wheezes.   Abdominal: Soft. Normal appearance and bowel sounds are normal. He exhibits no ascites. There is no hepatosplenomegaly.   Neurological: He is alert and oriented to person, place, and time.   Skin: No laceration and no petechiae noted. No cyanosis. Nails show no clubbing.   Psychiatric: He has a normal mood and  affect. His behavior is normal.   Vitals reviewed.      Results Review:      Results from last 7 days  Lab Units 06/07/18  0559 06/06/18  0623 06/05/18  0601   WBC 10*3/mm3 10.38 13.57* 13.35*   HEMOGLOBIN g/dL 9.8* 9.0* 9.3*   HEMATOCRIT % 30.9* 28.2* 28.8*   PLATELETS 10*3/mm3 275 241 211       Results from last 7 days  Lab Units 06/07/18  0559 06/06/18  0623 06/05/18  0601   SODIUM mmol/L 131* 131* 127*   POTASSIUM mmol/L 4.4 5.5* 4.7   CHLORIDE mmol/L 95* 96* 93*   CO2 mmol/L 23.0 21.1* 19.5*   BUN mg/dL 31* 39* 41*   CREATININE mg/dL 1.16 1.83* 3.81*   GLUCOSE mg/dL 119* 105* 133*   CALCIUM mg/dL 9.1 9.0 8.0*         Hemoglobin A1C:  Lab Results   Component Value Date    HGBA1C 5.40 12/31/2016     Glucose Range:  Glucose   Date/Time Value Ref Range Status   06/05/2018 2207 99 70 - 130 mg/dL Final         enoxaparin 40 mg Subcutaneous Q24H   famotidine 20 mg Oral Daily   magnesium oxide 400 mg Oral Daily   montelukast 10 mg Oral Nightly   sacubitril-valsartan 1 tablet Oral Q12H   sennosides-docusate sodium 2 tablet Oral Nightly      Diet Regular; Cardiac, Daily Fluid Restriction; 1500 mL Fluid  Assessment/Plan       Assessment/Plan      Active Hospital Problems (** Indicates Principal Problem)    Diagnosis Date Noted   • **Sepsis [A41.9] 06/04/2018   • Bacterial pneumonia [J15.9] 06/04/2018     Priority: High   • Acute renal failure [N17.9] 06/04/2018   • Hyponatremia [E87.1] 06/04/2018   • CHF (congestive heart failure) [I50.9] 12/31/2016   • CAD in native artery [I25.10] 11/07/2016   • Cardiomyopathy, ischemic [I25.5] 10/28/2016   • Idiopathic ventricular tachycardia [I47.2] 10/28/2016      Resolved Hospital Problems    Diagnosis Date Noted Date Resolved   No resolved problems to display.     Patient had renal ultrasound and echocardiogram  Hyperkalemia   Renal function is slowly improving  Pneumonia, Blood culture is positive, DC Doxy and increase Rocephin to 2 grm Q 24 hr   This patient is hemodynamically  stable  Cardiomyopathy with ejection fraction less than 19% ( new echo)  He was restarted on entresto and Lasix but not aldactone    Repeat CXR in AM      Nile De La Cruz MD  [facility]  06/07/18

## 2018-06-07 NOTE — PLAN OF CARE
Problem: Cardiac: Heart Failure (Adult)  Goal: Signs and Symptoms of Listed Potential Problems Will be Absent, Minimized or Managed (Cardiac: Heart Failure)  Outcome: Ongoing (interventions implemented as appropriate)      Problem: Skin Injury Risk (Adult)  Goal: Skin Health and Integrity  Outcome: Ongoing (interventions implemented as appropriate)

## 2018-06-07 NOTE — PLAN OF CARE
Problem: Patient Care Overview  Goal: Plan of Care Review  Outcome: Ongoing (interventions implemented as appropriate)   06/07/18 0600   Coping/Psychosocial   Plan of Care Reviewed With patient   Plan of Care Review   Progress improving   OTHER   Outcome Summary Patient resting in bed. Alert and oriented, famil;y at bedside. No complaint of pain at this time. Nursing will continue to monitor.     Goal: Discharge Needs Assessment  Outcome: Ongoing (interventions implemented as appropriate)      Problem: Fall Risk (Adult)  Goal: Identify Related Risk Factors and Signs and Symptoms  Outcome: Outcome(s) achieved Date Met: 06/07/18    Goal: Absence of Fall  Outcome: Ongoing (interventions implemented as appropriate)      Problem: Pneumonia (Adult)  Goal: Signs and Symptoms of Listed Potential Problems Will be Absent, Minimized or Managed (Pneumonia)  Outcome: Ongoing (interventions implemented as appropriate)      Problem: Cardiac: Heart Failure (Adult)  Goal: Signs and Symptoms of Listed Potential Problems Will be Absent, Minimized or Managed (Cardiac: Heart Failure)  Outcome: Ongoing (interventions implemented as appropriate)      Problem: Skin Injury Risk (Adult)  Goal: Identify Related Risk Factors and Signs and Symptoms  Outcome: Outcome(s) achieved Date Met: 06/07/18    Goal: Skin Health and Integrity  Outcome: Ongoing (interventions implemented as appropriate)

## 2018-06-07 NOTE — PROGRESS NOTES
Continued Stay Note  [facility]     Patient Name: Mello Arias Jr.  MRN: [id number]  Today's Date: 6/7/2018    Admit Date: 6/4/2018          Discharge Plan     Row Name 06/07/18 1014       Plan    Plan Plan home.  OLIVA San    Plan Comments Spoke to pt at bedside.  Pt denies any discharge needs.   Pt states he has family support that will assist him if needed.   Plan home.  OLIVA San              Discharge Codes    No documentation.           Yesenia Valerio RN

## 2018-06-07 NOTE — PROGRESS NOTES
Hospital Follow Up    LOS:  LOS: 3 days   Patient Name: Mello Arias Jr.  Age/Sex: 67 y.o. male  : 1950  MRN: [id number]    Date of Hospital Visit: 18  Length of Stay: 3  Encounter Provider: Naun Calvin MD  Place of Service: [facility]    Subjective:     Follow Up for:  cardiomyopathy    Interval History:  Feeling better.  Echo reviewed    Objective:     Objective:  Temp:  [97.9 °F (36.6 °C)-98.6 °F (37 °C)] 97.9 °F (36.6 °C)  Heart Rate:  [83-97] 91  Resp:  [16-20] 16  BP: ()/(56-75) 130/75  Body mass index is 29.8 kg/m².    Intake/Output Summary (Last 24 hours) at 18 1037  Last data filed at 18 0959   Gross per 24 hour   Intake              720 ml   Output             2500 ml   Net            -1780 ml     1    18  0600 18  0500 18  0709   Weight: 89.3 kg (196 lb 13.9 oz) 89.5 kg (197 lb 5 oz) 88.9 kg (196 lb)     Weight change: 0.2 kg (7.1 oz)    Physical Exam:   General Appearance: Alert, cooperative, in no acute distress. AAOx4.   HEENT: Normocephalic.  Neck: Supple. No JVD. No Carotid bruit. No thyromegaly  Lungs: CTAB. Normal respiratory effort and rate.  Heart:: Regular rate and rhythm, normal S1 and S2, no murmurs, gallops or rubs.  Abdomen: Soft, nontender, non-distended. positive bowel sounds  Extremities: Warm, no cyanosis, or clubbing. No edema.     Lab Review:     Results from last 7 days  Lab Units 18  0559 18  0623 18  0601   SODIUM mmol/L 131* 131* 127*   POTASSIUM mmol/L 4.4 5.5* 4.7   CHLORIDE mmol/L 95* 96* 93*   CO2 mmol/L 23.0 21.1* 19.5*   BUN mg/dL 31* 39* 41*   CREATININE mg/dL 1.16 1.83* 3.81*   GLUCOSE mg/dL 119* 105* 133*   CALCIUM mg/dL 9.1 9.0 8.0*         Results from last 7 days  Lab Units 18  1624   TROPONIN T ng/mL 0.019       Results from last 7 days  Lab Units 18  0559 18  0623   WBC 10*3/mm3 10.38 13.57*   HEMOGLOBIN g/dL 9.8* 9.0*    HEMATOCRIT % 30.9* 28.2*   PLATELETS 10*3/mm3 275 241           Results from last 7 days  Lab Units 06/07/18  0559 06/04/18  1624   MAGNESIUM mg/dL 1.6 1.5*                     I reviewed the patient's new clinical results.          I personally viewed and interpreted the patient's EKG/Telemetry data.  Current Medications:   Scheduled Meds:  enoxaparin 30 mg Subcutaneous Q24H   famotidine 20 mg Oral Daily   magnesium oxide 400 mg Oral Daily   montelukast 10 mg Oral Nightly   sennosides-docusate sodium 2 tablet Oral Nightly     Continuous Infusions:     Allergies:  Allergies   Allergen Reactions   • Shellfish-Derived Products Swelling and Other (See Comments)     THROAT SWELLS       Assessment & Plan     Principal Problem:    Sepsis  Active Problems:    Cardiomyopathy, ischemic    Idiopathic ventricular tachycardia    CAD in native artery    CHF (congestive heart failure)    Acute renal failure    Hyponatremia    Bacterial pneumonia    1. Pneumonia: on abx  2. Sepsis secondary to  #1  3. Acute renal failure: Entresto and aldactone discontinued.  Slow hydration.  Renal function slightly better  4. Cardiomyopathy: severe ischemic with LVEF < 20%.  Pt responded favorably to Entresto and aldactone.   Will be a challenge going forward.  QRS not wide enough to consider BiV ICD  5. CHF: CXR does not indicate fluid overload  6. Ventricular tachycardia: s/p ICD.  23 beat run this am.  Monomorphic.   Mg low.  Will replace       Will restart entresto.  Agree with lasix as diuretic    Plan:       Naun Calvin MD  06/07/18

## 2018-06-07 NOTE — THERAPY TREATMENT NOTE
Acute Care - Physical Therapy Treatment Note  [facility]     Patient Name: Mello Arias Jr.  : 1950  MRN: [id number]  Today's Date: 2018     Date of Referral to PT: 18  Referring Physician: Wilmer Fischer Date: 2018    Visit Dx:    ICD-10-CM ICD-9-CM   1. Acute kidney injury N17.9 584.9   2. Pneumonia of left lower lobe due to infectious organism J18.1 486   3. Hyperkalemia E87.5 276.7   4. Sepsis, due to unspecified organism A41.9 038.9     995.91   5. Impaired functional mobility, balance, gait, and endurance Z74.09 V49.89     Patient Active Problem List   Diagnosis   • A-fib   • Acid reflux   • Blood glucose elevated   • HLD (hyperlipidemia)   • Cardiomyopathy, ischemic   • L-S radiculopathy   • Idiopathic ventricular tachycardia   • CHF (congestive heart failure), NYHA class III   • Coronary artery disease of native artery of native heart with stable angina pectoris   • Non-rheumatic mitral regurgitation   • Non-rheumatic tricuspid valve insufficiency   • CAD in native artery   • Other specified forms of effusion, except tuberculous   • S/P CABG x 4   • S/P MVR (mitral valve replacement)   • S/P TVR (tricuspid valve repair)   • CHF (congestive heart failure)   • Acute renal failure   • Sepsis   • Hyponatremia   • Bacterial pneumonia       Therapy Treatment          Rehabilitation Treatment Summary     Row Name 18 1000             Treatment Time/Intention    Discipline physical therapy assistant  -CW      Document Type therapy note (daily note)  -CW      Subjective Information no complaints  -CW      Mode of Treatment physical therapy  -CW      Therapy Frequency (PT Clinical Impression) daily  -CW      Patient Effort good  -CW      Existing Precautions/Restrictions fall  -CW      Recorded by [CW] Joe Garza, PTA 18 1101      Row Name 18 1000             Vital Signs    O2 Delivery Pre Treatment supplemental O2  -CW      O2 Delivery Intra Treatment room air  -CW       O2 Delivery Post Treatment supplemental O2  -CW      Recorded by [CW] Joe Garza, PTA 06/07/18 1101      Row Name 06/07/18 1000             Cognitive Assessment/Intervention- PT/OT    Orientation Status (Cognition) oriented x 4  -CW      Follows Commands (Cognition) WFL  -CW      Safety Deficit (Cognitive) mild deficit  -CW      Personal Safety Interventions fall prevention program maintained;gait belt;muscle strengthening facilitated;nonskid shoes/slippers when out of bed  -CW      Recorded by [CW] Joe Garza, PTA 06/07/18 1101      Row Name 06/07/18 1000             Bed Mobility Assessment/Treatment    Bed Mobility Assessment/Treatment supine-sit;sit-supine  -CW      Supine-Sit Ward (Bed Mobility) supervision  -CW      Sit-Supine Ward (Bed Mobility) supervision  -CW      Recorded by [CW] Joe Garza, PTA 06/07/18 1101      Row Name 06/07/18 1000             Transfer Assessment/Treatment    Transfer Assessment/Treatment sit-stand transfer;stand-sit transfer  -CW      Sit-Stand Ward (Transfers) supervision  -CW      Stand-Sit Ward (Transfers) supervision  -CW      Recorded by [CW] Joe Garza, PTA 06/07/18 1101      Row Name 06/07/18 1000             Gait/Stairs Assessment/Training    Ward Level (Gait) stand by assist;contact guard  -CW      Distance in Feet (Gait) 150  -CW      Pattern (Gait) step-through  -CW      Recorded by [CW] Joe Garza, PTA 06/07/18 1101      Row Name 06/07/18 1000             Therapeutic Exercise    Lower Extremity (Therapeutic Exercise) gluteal sets;LAQ (long arc quad), bilateral;marching while seated  -CW      Lower Extremity Range of Motion (Therapeutic Exercise) ankle dorsiflexion/plantar flexion, bilateral  -CW      Exercise Type (Therapeutic Exercise) AROM (active range of motion)  -CW      Position (Therapeutic Exercise) seated  -CW      Sets/Reps (Therapeutic Exercise) 10  -CW      Recorded by [CW]  Joe Garza, [facility] 06/07/18 1101      Row Name 06/07/18 1000             Positioning and Restraints    Pre-Treatment Position in bed  -CW      Post Treatment Position bed  -CW      In Bed notified nsg;fowfavio;call light within reach;encouraged to call for assist  -CW      Recorded by [CW] Joe Garza, [facility] 06/07/18 1101      Row Name 06/07/18 1000             Outcome Summary/Treatment Plan (PT)    Anticipated Discharge Disposition (PT) home;home with home health  -CW      Recorded by [CW] Joe Garza, [facility] 06/07/18 1101        User Key  (r) = Recorded By, (t) = Taken By, (c) = Cosigned By    Initials Name Effective Dates Discipline    CW Joe Garza, [facility] 03/07/18 -  PT                     Physical Therapy Education     Title: PT OT SLP Therapies (Done)     Topic: Physical Therapy (Done)     Point: Mobility training (Done)    Learning Progress Summary     Learner Status Readiness Method Response Comment Documented by    Patient Done Acceptance E,TB DU,VU   06/07/18 1101     Done Acceptance E VU,NR  EF 06/06/18 1507     Active Acceptance E NR  MA 06/05/18 1029          Point: Home exercise program (Done)    Learning Progress Summary     Learner Status Readiness Method Response Comment Documented by    Patient Done Acceptance E,TB DU,VU   06/07/18 1101     Done Acceptance E VU,NR  EF 06/06/18 1507     Active Acceptance E NR  MA 06/05/18 1029          Point: Body mechanics (Done)    Learning Progress Summary     Learner Status Readiness Method Response Comment Documented by    Patient Done Acceptance E,TB DU,VU   06/07/18 1101     Done Acceptance E VU,NR  EF 06/06/18 1507     Active Acceptance E NR  MA 06/05/18 1029          Point: Precautions (Done)    Learning Progress Summary     Learner Status Readiness Method Response Comment Documented by    Patient Done Acceptance E,TB DU,VU   06/07/18 1101     Done Acceptance E VU,NR  EF 06/06/18 1507     Active Acceptance E NR  MA 06/05/18 1029                       User Key     Initials Effective Dates Name Provider Type Discipline    EF 03/07/18 -  Luh Brandon, PT Physical Therapist PT    MA 04/03/18 -  Dawn Huggins, PT Physical Therapist PT    CW 03/07/18 -  Joe Garza, NEVILLE Physical Therapy Assistant PT                    PT Recommendation and Plan  Anticipated Discharge Disposition (PT): home, home with home health  Therapy Frequency (PT Clinical Impression): daily  Outcome Summary/Treatment Plan (PT)  Anticipated Discharge Disposition (PT): home, home with home health  Plan of Care Reviewed With: patient  Progress: improving  Outcome Summary: Pty increasing with amb safety and I with no AD but does use a STC or RWX when he feel as though he needs the AD          Outcome Measures     Row Name 06/07/18 1100 06/06/18 1500 06/05/18 1000       How much help from another person do you currently need...    Turning from your back to your side while in flat bed without using bedrails? 4  -CW 4  -EF 4  -MA    Moving from lying on back to sitting on the side of a flat bed without bedrails? 4  -CW 4  -EF 4  -MA    Moving to and from a bed to a chair (including a wheelchair)? 3  -CW 3  -EF 3  -MA    Standing up from a chair using your arms (e.g., wheelchair, bedside chair)? 3  -CW 3  -EF 3  -MA    Climbing 3-5 steps with a railing? 3  -CW 3  -EF 2  -MA    To walk in hospital room? 3  -CW 3  -EF 3  -MA    AM-PAC 6 Clicks Score 20  -CW 20  -EF 19  -MA       Functional Assessment    Outcome Measure Options AM-PAC 6 Clicks Basic Mobility (PT)  -CW AM-PAC 6 Clicks Basic Mobility (PT)  -EF AM-PAC 6 Clicks Basic Mobility (PT)  -MA      User Key  (r) = Recorded By, (t) = Taken By, (c) = Cosigned By    Initials Name Provider Type    EF Luh Brandon, PT Physical Therapist    RANJANA Huggins, PT Physical Therapist    FELIX Garza, NEVILLE Physical Therapy Assistant           Time Calculation:         PT Charges     Row Name 06/07/18 1102              Time Calculation    Start Time 1045  -CW      Stop Time 1102  -CW      Time Calculation (min) 17 min  -CW      PT Received On 06/07/18  -CW      PT - Next Appointment 06/08/18  -CW        User Key  (r) = Recorded By, (t) = Taken By, (c) = Cosigned By    Initials Name Provider Type    CW Joe Garza PTA Physical Therapy Assistant          Therapy Charges for Today     Code Description Service Date Service Provider Modifiers Qty    93061153008 HC PT THER PROC EA 15 MIN 6/7/2018 Joe Garza PTA GP 1          PT G-Codes  Outcome Measure Options: AM-PAC 6 Clicks Basic Mobility (PT)    Joe Garza PTA  6/7/2018

## 2018-06-07 NOTE — PLAN OF CARE
Problem: Patient Care Overview  Goal: Plan of Care Review  Outcome: Ongoing (interventions implemented as appropriate)   06/07/18 1101   Coping/Psychosocial   Plan of Care Reviewed With patient   Plan of Care Review   Progress improving   OTHER   Outcome Summary Pt increasing with amb safety and I with no AD but does use a STC or RWX when he feel as though he needs the AD

## 2018-06-07 NOTE — PROGRESS NOTES
"   LOS: 3 days    Patient Care Team:  Kelsey Muñoz MD as PCP - General (Family Medicine)  Kelsey Muñoz MD as PCP - Family Medicine  Naun Calvin MD as PCP - Claims Attributed  Yesenia Saucedo, OLIVA as Care Coordinator (Population Health)    Chief Complaint:    Chief Complaint   Patient presents with   • Mutiple Complaints     Follow UP MARISELA  Subjective     Interval History:   UOP 2.6L s/p IV lasix x1 yest.  Feels well.  Denies soa or n/v    Objective     Vital Signs  Temp:  [98.2 °F (36.8 °C)-98.6 °F (37 °C)] 98.6 °F (37 °C)  Heart Rate:  [83-97] 87  Resp:  [16-20] 18  BP: ()/(56-75) 130/75    Flowsheet Rows      First Filed Value   Admission Height  172.7 cm (68\") Documented at 06/04/2018 1529   Admission Weight  88.5 kg (195 lb) Documented at 06/04/2018 1458          No intake/output data recorded.  I/O last 3 completed shifts:  In: 800 [P.O.:600; IV Piggyback:200]  Out: 3350 [Urine:3350]    Intake/Output Summary (Last 24 hours) at 06/07/18 0719  Last data filed at 06/06/18 1833   Gross per 24 hour   Intake              600 ml   Output             1900 ml   Net            -1300 ml       Physical Exam:  gen nad, alert  Neck supple no jvd  Lungs CTA bilat no rales  CV RRR no m/g  abd soft NT/ND +BS  vasc no pedal edema 2+ radial pulses     Results Review:      Results from last 7 days  Lab Units 06/07/18  0559 06/06/18  0623 06/05/18  0601  06/04/18  1624   SODIUM mmol/L 131* 131* 127*  < > 128*   POTASSIUM mmol/L 4.4 5.5* 4.7  < > 5.3*   CHLORIDE mmol/L 95* 96* 93*  < > 88*   CO2 mmol/L 23.0 21.1* 19.5*  < > 17.7*   BUN mg/dL 31* 39* 41*  < > 38*   CREATININE mg/dL 1.16 1.83* 3.81*  < > 4.43*   CALCIUM mg/dL 9.1 9.0 8.0*  < > 9.6   BILIRUBIN mg/dL 0.2  --   --   --  0.4   ALK PHOS U/L 158*  --   --   --  133*   ALT (SGPT) U/L 54*  --   --   --  46*   AST (SGOT) U/L 46*  --   --   --  35   GLUCOSE mg/dL 119* 105* 133*  < > 140*   < > = values in this interval not displayed.    Estimated Creatinine " Clearance: 65.1 mL/min (by C-G formula based on SCr of 1.16 mg/dL).      Results from last 7 days  Lab Units 06/07/18  0559 06/06/18  0623 06/04/18  1624   MAGNESIUM mg/dL 1.6  --  1.5*   PHOSPHORUS mg/dL  --  3.1 4.9*         Results from last 7 days  Lab Units 06/06/18  0623   URIC ACID mg/dL 8.5*         Results from last 7 days  Lab Units 06/07/18  0559 06/06/18  0623 06/05/18  0601 06/04/18  1624   WBC 10*3/mm3 10.38 13.57* 13.35* 20.44*   HEMOGLOBIN g/dL 9.8* 9.0* 9.3* 12.1*   PLATELETS 10*3/mm3 275 241 211 306               Imaging Results (last 24 hours)     ** No results found for the last 24 hours. **          enoxaparin 30 mg Subcutaneous Q24H   famotidine 20 mg Oral Daily   magnesium oxide 400 mg Oral Daily   montelukast 10 mg Oral Nightly   sennosides-docusate sodium 2 tablet Oral Nightly          Medication Review:   Current Facility-Administered Medications   Medication Dose Route Frequency Provider Last Rate Last Dose   • acetaminophen (TYLENOL) tablet 650 mg  650 mg Oral Q4H PRN Luh Guillen MD   650 mg at 06/06/18 0054   • albuterol (PROVENTIL) nebulizer solution 0.083% 2.5 mg/3mL  2.5 mg Nebulization Q6H PRN Luh Guillen MD   2.5 mg at 06/07/18 0058   • aluminum-magnesium hydroxide-simethicone (MAALOX MAX) 400-400-40 MG/5ML suspension 15 mL  15 mL Oral Q6H PRN Luh Guillen MD       • bisacodyl (DULCOLAX) EC tablet 5 mg  5 mg Oral Daily PRN Luh Guillen MD       • enoxaparin (LOVENOX) syringe 30 mg  30 mg Subcutaneous Q24H Luh Guillen MD   30 mg at 06/06/18 0588   • famotidine (PEPCID) tablet 20 mg  20 mg Oral Daily Daniel Loza MD   20 mg at 06/06/18 0938   • magnesium oxide (MAG-OX) tablet 400 mg  400 mg Oral Daily Naun Calvin MD   400 mg at 06/06/18 0938   • montelukast (SINGULAIR) tablet 10 mg  10 mg Oral Nightly Luh Guillen MD   10 mg at 06/06/18 2116   • nitroglycerin (NITROSTAT) SL tablet 0.4 mg  0.4 mg Sublingual Q5 Min  PRN Luh Guillen MD       • ondansetron (ZOFRAN) tablet 4 mg  4 mg Oral Q6H PRN Luh Guillen MD        Or   • ondansetron ODT (ZOFRAN-ODT) disintegrating tablet 4 mg  4 mg Oral Q6H PRN Luh Guillen MD        Or   • ondansetron (ZOFRAN) injection 4 mg  4 mg Intravenous Q6H PRN Luh Guillen MD       • pneumococcal polysaccharide 23-valent (PNEUMOVAX-23) vaccine 0.5 mL  0.5 mL Intramuscular During Hospitalization Luh Guillen MD       • sennosides-docusate sodium (SENOKOT-S) 8.6-50 MG tablet 2 tablet  2 tablet Oral Nightly Luh Guillen MD   2 tablet at 06/06/18 2115   • sodium chloride 0.9 % flush 10 mL  10 mL Intravenous PRN AYDEE Smith   10 mL at 06/06/18 2116       Assessment/Plan   1. MARISELA, multifactorial including NV, volume depletion, sepsis, meds.  Resolved, SCr down to 1.1 (peak 4.4, was 1.1 in March 2017).  US NEG  2. Ischemic cardiomyopathy, pulm HTN, MV stenosis.   Echo: EF 19%.  RVSP 45 - 55.  Entresto & aldactone on hold  -- I would not resume both in light of hyperkalemia  3. Hyponatremia.  Improved  4. PNA, Group B strep bacteremia. Rocephin/Doxycycline started.   5. Hyperkalemia - resolved    Plan  - I would not oppose resumption of entresto tomorrow given resolution of MARISELA and normalization of K  - could also subs lasix 20mg PO daily for aldactone upon discharge  - will see what Dr Calvin thinks about this regimen    Principal Problem:    Sepsis  Active Problems:    Cardiomyopathy, ischemic    Idiopathic ventricular tachycardia    CAD in native artery    CHF (congestive heart failure)    Acute renal failure    Hyponatremia    Bacterial pneumonia              Daniel Loza MD  06/07/18  7:19 AM

## 2018-06-08 VITALS
BODY MASS INDEX: 32.48 KG/M2 | TEMPERATURE: 98.7 F | WEIGHT: 214.29 LBS | HEART RATE: 86 BPM | DIASTOLIC BLOOD PRESSURE: 68 MMHG | RESPIRATION RATE: 18 BRPM | OXYGEN SATURATION: 90 % | SYSTOLIC BLOOD PRESSURE: 110 MMHG | HEIGHT: 68 IN

## 2018-06-08 PROBLEM — B95.1 BACTEREMIA DUE TO GROUP B STREPTOCOCCUS: Status: ACTIVE | Noted: 2018-06-08

## 2018-06-08 PROBLEM — R78.81 BACTEREMIA DUE TO GROUP B STREPTOCOCCUS: Status: ACTIVE | Noted: 2018-06-08

## 2018-06-08 PROBLEM — B95.5 BACTEREMIA DUE TO STREPTOCOCCUS: Status: ACTIVE | Noted: 2018-06-08

## 2018-06-08 PROBLEM — R78.81 BACTEREMIA DUE TO STREPTOCOCCUS: Status: ACTIVE | Noted: 2018-06-08

## 2018-06-08 LAB
ANION GAP SERPL CALCULATED.3IONS-SCNC: 13.1 MMOL/L
BASOPHILS # BLD AUTO: 0.04 10*3/MM3 (ref 0–0.2)
BASOPHILS NFR BLD AUTO: 0.4 % (ref 0–1.5)
BUN BLD-MCNC: 20 MG/DL (ref 8–23)
BUN/CREAT SERPL: 23.3 (ref 7–25)
CALCIUM SPEC-SCNC: 9.6 MG/DL (ref 8.6–10.5)
CHLORIDE SERPL-SCNC: 99 MMOL/L (ref 98–107)
CO2 SERPL-SCNC: 23.9 MMOL/L (ref 22–29)
CREAT BLD-MCNC: 0.86 MG/DL (ref 0.76–1.27)
DEPRECATED RDW RBC AUTO: 47 FL (ref 37–54)
EOSINOPHIL # BLD AUTO: 0.14 10*3/MM3 (ref 0–0.7)
EOSINOPHIL NFR BLD AUTO: 1.5 % (ref 0.3–6.2)
ERYTHROCYTE [DISTWIDTH] IN BLOOD BY AUTOMATED COUNT: 13.4 % (ref 11.5–14.5)
GFR SERPL CREATININE-BSD FRML MDRD: 88 ML/MIN/1.73
GLUCOSE BLD-MCNC: 127 MG/DL (ref 65–99)
HCT VFR BLD AUTO: 32 % (ref 40.4–52.2)
HGB BLD-MCNC: 10.2 G/DL (ref 13.7–17.6)
IMM GRANULOCYTES # BLD: 0.06 10*3/MM3 (ref 0–0.03)
IMM GRANULOCYTES NFR BLD: 0.7 % (ref 0–0.5)
LYMPHOCYTES # BLD AUTO: 1.02 10*3/MM3 (ref 0.9–4.8)
LYMPHOCYTES NFR BLD AUTO: 11.1 % (ref 19.6–45.3)
MCH RBC QN AUTO: 30.4 PG (ref 27–32.7)
MCHC RBC AUTO-ENTMCNC: 31.9 G/DL (ref 32.6–36.4)
MCV RBC AUTO: 95.5 FL (ref 79.8–96.2)
MONOCYTES # BLD AUTO: 1.21 10*3/MM3 (ref 0.2–1.2)
MONOCYTES NFR BLD AUTO: 13.2 % (ref 5–12)
NEUTROPHILS # BLD AUTO: 6.68 10*3/MM3 (ref 1.9–8.1)
NEUTROPHILS NFR BLD AUTO: 73.1 % (ref 42.7–76)
PLATELET # BLD AUTO: 284 10*3/MM3 (ref 140–500)
PMV BLD AUTO: 11.2 FL (ref 6–12)
POTASSIUM BLD-SCNC: 4.3 MMOL/L (ref 3.5–5.2)
RBC # BLD AUTO: 3.35 10*6/MM3 (ref 4.6–6)
SODIUM BLD-SCNC: 136 MMOL/L (ref 136–145)
WBC NRBC COR # BLD: 9.15 10*3/MM3 (ref 4.5–10.7)

## 2018-06-08 PROCEDURE — G0009 ADMIN PNEUMOCOCCAL VACCINE: HCPCS | Performed by: INTERNAL MEDICINE

## 2018-06-08 PROCEDURE — 99232 SBSQ HOSP IP/OBS MODERATE 35: CPT | Performed by: INTERNAL MEDICINE

## 2018-06-08 PROCEDURE — 85025 COMPLETE CBC W/AUTO DIFF WBC: CPT | Performed by: INTERNAL MEDICINE

## 2018-06-08 PROCEDURE — 90732 PPSV23 VACC 2 YRS+ SUBQ/IM: CPT | Performed by: INTERNAL MEDICINE

## 2018-06-08 PROCEDURE — 97110 THERAPEUTIC EXERCISES: CPT

## 2018-06-08 PROCEDURE — 80048 BASIC METABOLIC PNL TOTAL CA: CPT | Performed by: INTERNAL MEDICINE

## 2018-06-08 PROCEDURE — 25010000002 PNEUMOCOCCAL VAC POLYVALENT PER 0.5 ML: Performed by: INTERNAL MEDICINE

## 2018-06-08 RX ORDER — AMOXICILLIN 500 MG/1
1000 CAPSULE ORAL 3 TIMES DAILY
Qty: 21 CAPSULE | Refills: 0 | Status: SHIPPED | OUTPATIENT
Start: 2018-06-08 | End: 2018-06-28 | Stop reason: HOSPADM

## 2018-06-08 RX ORDER — FUROSEMIDE 20 MG/1
20 TABLET ORAL DAILY
Status: DISCONTINUED | OUTPATIENT
Start: 2018-06-08 | End: 2018-06-08 | Stop reason: HOSPADM

## 2018-06-08 RX ORDER — ACETAMINOPHEN 325 MG/1
650 TABLET ORAL EVERY 6 HOURS PRN
Refills: 0
Start: 2018-06-08

## 2018-06-08 RX ORDER — FUROSEMIDE 20 MG/1
20 TABLET ORAL DAILY
Qty: 30 TABLET | Refills: 0 | Status: SHIPPED | OUTPATIENT
Start: 2018-06-09 | End: 2018-07-05 | Stop reason: SDUPTHER

## 2018-06-08 RX ADMIN — FUROSEMIDE 20 MG: 20 TABLET ORAL at 10:33

## 2018-06-08 RX ADMIN — PNEUMOCOCCAL VACCINE POLYVALENT 0.5 ML
25; 25; 25; 25; 25; 25; 25; 25; 25; 25; 25; 25; 25; 25; 25; 25; 25; 25; 25; 25; 25; 25; 25 INJECTION, SOLUTION INTRAMUSCULAR; SUBCUTANEOUS at 13:05

## 2018-06-08 RX ADMIN — FAMOTIDINE 20 MG: 20 TABLET, FILM COATED ORAL at 07:28

## 2018-06-08 RX ADMIN — SACUBITRIL AND VALSARTAN 1 TABLET: 24; 26 TABLET, FILM COATED ORAL at 07:28

## 2018-06-08 RX ADMIN — Medication 400 MG: at 07:28

## 2018-06-08 NOTE — PROGRESS NOTES
Continued Stay Note  [facility]     Patient Name: Mello Arias Jr.  MRN: [id number]  Today's Date: 6/8/2018    Admit Date: 6/4/2018          Discharge Plan     Row Name 06/08/18 1318       Plan    Plan Plan home.  OLIVA San    Plan Comments Spoke with pt at bedside.  Pt denies any needs.  Plan home.  OLIVA San              Discharge Codes    No documentation.       Expected Discharge Date and Time     Expected Discharge Date Expected Discharge Time    Jun 8, 2018             Yesenia Valerio RN

## 2018-06-08 NOTE — PROGRESS NOTES
Case Management Discharge Note    Final Note: Discharged home.   OLIVA San    Destination     No service coordination in this encounter.      Durable Medical Equipment     No service coordination in this encounter.      Dialysis/Infusion     No service coordination in this encounter.      Home Medical Care     No service coordination in this encounter.      Social Care     No service coordination in this encounter.        Other: Other (Private Auto)    Final Discharge Disposition Code: 01 - home or self-care

## 2018-06-08 NOTE — PROGRESS NOTES
"   LOS: 4 days    Patient Care Team:  Kelsey Muñoz MD as PCP - General (Family Medicine)  Kelsey Muñoz MD as PCP - Family Medicine  Naun Calvin MD as PCP - Claims Attributed  Yesenia Saucedo RN as Care Coordinator (Population Health)    Chief Complaint:    Chief Complaint   Patient presents with   • Mutiple Complaints     Follow UP MARISELA  Subjective     Interval History:   Trouble sleeping but o/w feels well.  No soa or n/v    Objective     Vital Signs  Temp:  [97.9 °F (36.6 °C)-98.6 °F (37 °C)] 98.4 °F (36.9 °C)  Heart Rate:  [84-95] 94  Resp:  [16-18] 18  BP: (107-143)/(71-78) 107/71    Flowsheet Rows      First Filed Value   Admission Height  172.7 cm (68\") Documented at 06/04/2018 1529   Admission Weight  88.5 kg (195 lb) Documented at 06/04/2018 1458          No intake/output data recorded.  I/O last 3 completed shifts:  In: 600 [P.O.:600]  Out: 600 [Urine:600]    Intake/Output Summary (Last 24 hours) at 06/08/18 0724  Last data filed at 06/07/18 1807   Gross per 24 hour   Intake              600 ml   Output              600 ml   Net                0 ml       Physical Exam:  gen nad alert  Neck supple no jvd  Lungs CTA bilat no rales   CV RRR no m/g  abd soft NT/ND  vasc no pedal edema 2+ radial pulses     Results Review:      Results from last 7 days  Lab Units 06/08/18  0600 06/07/18  0559 06/06/18  0623  06/04/18  1624   SODIUM mmol/L 136 131* 131*  < > 128*   POTASSIUM mmol/L 4.3 4.4 5.5*  < > 5.3*   CHLORIDE mmol/L 99 95* 96*  < > 88*   CO2 mmol/L 23.9 23.0 21.1*  < > 17.7*   BUN mg/dL 20 31* 39*  < > 38*   CREATININE mg/dL 0.86 1.16 1.83*  < > 4.43*   CALCIUM mg/dL 9.6 9.1 9.0  < > 9.6   BILIRUBIN mg/dL  --  0.2  --   --  0.4   ALK PHOS U/L  --  158*  --   --  133*   ALT (SGPT) U/L  --  54*  --   --  46*   AST (SGOT) U/L  --  46*  --   --  35   GLUCOSE mg/dL 127* 119* 105*  < > 140*   < > = values in this interval not displayed.    Estimated Creatinine Clearance: 91.6 mL/min (by C-G formula " based on SCr of 0.86 mg/dL).      Results from last 7 days  Lab Units 06/07/18  0559 06/06/18  0623 06/04/18  1624   MAGNESIUM mg/dL 1.6  --  1.5*   PHOSPHORUS mg/dL  --  3.1 4.9*         Results from last 7 days  Lab Units 06/06/18  0623   URIC ACID mg/dL 8.5*         Results from last 7 days  Lab Units 06/08/18  0600 06/07/18  0559 06/06/18  0623 06/05/18  0601 06/04/18  1624   WBC 10*3/mm3 9.15 10.38 13.57* 13.35* 20.44*   HEMOGLOBIN g/dL 10.2* 9.8* 9.0* 9.3* 12.1*   PLATELETS 10*3/mm3 284 275 241 211 306               Imaging Results (last 24 hours)     ** No results found for the last 24 hours. **          enoxaparin 40 mg Subcutaneous Q24H   famotidine 20 mg Oral Daily   furosemide 20 mg Oral Daily   magnesium oxide 400 mg Oral Daily   montelukast 10 mg Oral Nightly   sacubitril-valsartan 1 tablet Oral Q12H   sennosides-docusate sodium 2 tablet Oral Nightly          Medication Review:   Current Facility-Administered Medications   Medication Dose Route Frequency Provider Last Rate Last Dose   • acetaminophen (TYLENOL) tablet 650 mg  650 mg Oral Q4H PRN Luh Guillen MD   650 mg at 06/07/18 1255   • albuterol (PROVENTIL) nebulizer solution 0.083% 2.5 mg/3mL  2.5 mg Nebulization Q6H PRN Luh Guillen MD   2.5 mg at 06/07/18 0903   • aluminum-magnesium hydroxide-simethicone (MAALOX MAX) 400-400-40 MG/5ML suspension 15 mL  15 mL Oral Q6H PRN Luh Guillen MD       • bisacodyl (DULCOLAX) EC tablet 5 mg  5 mg Oral Daily PRN Luh Guillen MD       • enoxaparin (LOVENOX) syringe 40 mg  40 mg Subcutaneous Q24H Luh Guillen MD   40 mg at 06/07/18 9728   • famotidine (PEPCID) tablet 20 mg  20 mg Oral Daily Daniel Loza MD   20 mg at 06/07/18 0925   • furosemide (LASIX) tablet 20 mg  20 mg Oral Daily Daniel Loza MD       • magnesium oxide (MAG-OX) tablet 400 mg  400 mg Oral Daily Naun Calvin MD   400 mg at 06/07/18 0925   • montelukast (SINGULAIR) tablet  10 mg  10 mg Oral Nightly Luh Guillen MD   10 mg at 06/07/18 2151   • nitroglycerin (NITROSTAT) SL tablet 0.4 mg  0.4 mg Sublingual Q5 Min PRN Luh Guillen MD       • ondansetron (ZOFRAN) tablet 4 mg  4 mg Oral Q6H PRN Luh Guillen MD        Or   • ondansetron ODT (ZOFRAN-ODT) disintegrating tablet 4 mg  4 mg Oral Q6H PRN Luh Guillen MD        Or   • ondansetron (ZOFRAN) injection 4 mg  4 mg Intravenous Q6H PRN Luh Guillen MD       • pneumococcal polysaccharide 23-valent (PNEUMOVAX-23) vaccine 0.5 mL  0.5 mL Intramuscular During Hospitalization Luh Guillen MD       • sacubitril-valsartan (ENTRESTO) 24-26 MG tablet 1 tablet  1 tablet Oral Q12H Naun Calvin MD   1 tablet at 06/07/18 2238   • sennosides-docusate sodium (SENOKOT-S) 8.6-50 MG tablet 2 tablet  2 tablet Oral Nightly Luh Guillen MD   2 tablet at 06/07/18 2151   • sodium chloride 0.9 % flush 10 mL  10 mL Intravenous PRN AYDEE Smith   10 mL at 06/06/18 2116       Assessment/Plan   1. MARISELA, multifactorial including NV, volume depletion, sepsis, meds.  Resolved, SCr down to 0.86 (peak 4.4, was 1.1 in March 2017).  US NEG  2. Ischemic cardiomyopathy, pulm HTN, MV stenosis.   Echo: EF 19%.  RVSP 45 - 55.  Entresto restarted.  Will subs lasix 20mg daily for aldactone (due to hyperkalemia).  Low dose of lasix as poorly juan before and normal GFR  3. Hyponatremia. Resolved  4. PNA, Group B strep bacteremia.  Off doxy, on rocephin   5. Hyperkalemia - resolved    Plan  - lasix 20 mg daily  - entresto restarted  - will keep off aldactone  - no objection to discharge from nephrology standpoint, can follow-up with me in 2-3 weeks    Principal Problem:    Sepsis  Active Problems:    Cardiomyopathy, ischemic    Idiopathic ventricular tachycardia    CAD in native artery    CHF (congestive heart failure)    Acute renal failure    Hyponatremia    Bacterial pneumonia              Daniel Loza,  MD  06/08/18  7:24 AM

## 2018-06-08 NOTE — DISCHARGE SUMMARY
Date of Admission: 6/4/2018  Date of Discharge:  6/8/2018  Primary Care Physician: Kelsey Muñoz MD     Discharge Diagnosis:  Active Hospital Problems (** Indicates Principal Problem)    Diagnosis Date Noted   • **Sepsis [A41.9] 06/04/2018   • Bacteremia due to group B Streptococcus [R78.81] 06/08/2018   • Acute renal failure [N17.9] 06/04/2018   • Hyponatremia [E87.1] 06/04/2018   • Bacterial pneumonia [J15.9] 06/04/2018   • CHF (congestive heart failure) [I50.9] 12/31/2016   • CAD in native artery [I25.10] 11/07/2016   • Cardiomyopathy, ischemic [I25.5] 10/28/2016   • Idiopathic ventricular tachycardia [I47.2] 10/28/2016      Resolved Hospital Problems    Diagnosis Date Noted Date Resolved   No resolved problems to display.       Presenting Problem/History of Present Illness:  Hyperkalemia [E87.5]  Acute kidney injury [N17.9]  Pneumonia of left lower lobe due to infectious organism [J18.1]  Sepsis, due to unspecified organism [A41.9]     Hospital Course:  The patient is a 67 y.o. man who presented with shortness of breath and cough.  He had acute kidney injury and hyperkalemia at admission.  Pneumonia was present.  Blood culture was positive for group B strep.  Entresto and Aldactone were stopped; the former has been restarted.  He is tolerating it.  Lasix was started in place of Aldactone.  Kidney function has returned to normal. He feels good and is ready for discharge home.   He had a 23 beat run of V. tach at admission.  Magnesium level was low and was corrected.  He does have an ICD.  Discharge has been okayed by cardiology.    Stable condition; fair prognosis    Exam Today: Feels good.  Anxious to go home.  Vital signs noted.  No distress.  Heart is regular, no murmur.  Lungs are clear with decreased breath sounds.  Extremities without edema.  Abdomen nondistended    Procedures Performed:  Xr Chest 2 View    Result Date: 6/4/2018  New, asymmetric opacity at the lateral left lung base seen to best  advantage on the PA view is suspicious for pneumonia.  This report was finalized on 6/4/2018 8:57 PM by Dr. Bruce Hudson M.D.       Renal Bilateral    Result Date: 6/5/2018  No hydronephrosis. Evidence of nonobstructive nephrolithiasis in the left kidney. Possible cyst in the right kidney.  This report was finalized on 6/5/2018 7:23 PM by Dr. Godwin Hendrickson M.D.      Echocardiogram 6/6/18 ejection fraction 19%.  Indeterminant LV diastolic function.  At least mild prosthetic valve stenosis.  Calculated RV systolic pressure 54       Labs:  Lab Results   Component Value Date    WBC 9.15 06/08/2018    HGB 10.2 (L) 06/08/2018    HCT 32.0 (L) 06/08/2018     06/08/2018     Lab Results   Component Value Date     06/08/2018    K 4.3 06/08/2018    CL 99 06/08/2018    CO2 23.9 06/08/2018    BUN 20 06/08/2018    CREATININE 0.86 06/08/2018    GLUCOSE 127 (H) 06/08/2018     Lab Results   Component Value Date    AST 46 (H) 06/07/2018    ALT 54 (H) 06/07/2018    ALKPHOS 158 (H) 06/07/2018         Results from last 7 days  Lab Units 06/04/18  2322 06/04/18  1831   BLOODCX   --  Streptococcus agalactiae (Group B)*  Streptococcus agalactiae (Group B)*   GRAM STAIN RESULT   --  Aerobic Bottle Gram positive cocci in chains  Aerobic Bottle Gram positive cocci in chains  Anaerobic Bottle Gram positive cocci in chains   URINECX  50,000 CFU/mL Mixed Verna Isolated  --    BCIDPCR   --  Streptococcus agalactiae (Group B). Identification by BCID PCR.*       Consults:   Dr. Peña Calvin     Discharge Disposition:  Home or Self Care    Discharge Medications:   Mello Arias Jr.   Home Medication Instructions JONES:[id number]    Printed on:06/08/18 1411   Medication Information                      acetaminophen (TYLENOL) 325 MG tablet  Take 2 tablets by mouth Every 6 (Six) Hours As Needed for Mild Pain .             albuterol (PROAIR HFA) 108 (90 BASE) MCG/ACT inhaler  Inhale 2 puffs Every 6 (Six)  Hours As Needed.             amoxicillin (AMOXIL) 500 MG capsule  Take 2 capsules by mouth 3 (Three) Times a Day.             aspirin 81 MG tablet  Take 81 mg by mouth Daily.             furosemide (LASIX) 20 MG tablet  Take 1 tablet by mouth Daily.             montelukast (SINGULAIR) 10 MG tablet  Take 10 mg by mouth Every Night.             nitroglycerin (NITROSTAT) 0.4 MG SL tablet  Place 0.4 mg under the tongue Every 5 (Five) Minutes As Needed for Chest Pain. Take no more than 3 doses in 15 minutes.             omeprazole (PRILOSEC) 20 MG capsule  Take 20 mg by mouth Daily.             sacubitril-valsartan (ENTRESTO) 24-26 MG tablet  Take 1 tablet by mouth Every 12 (Twelve) Hours.                 Discharge Diet:   Diet Instructions     Diet: Cardiac       Discharge Diet:  Cardiac          Activity at Discharge:   Activity Instructions     Activity as Tolerated             Follow-up Appointments:  Future Appointments  Date Time Provider Department Center   7/5/2018 11:00 AM Slade Hurtado MD [facility] None   7/12/2018 9:30 AM MD BUZZ Angela CD [facility] None   8/16/2018 12:00 AM ULISES SCHMIDG [facility] None   10/4/2018 2:30 PM MD BUZZ Angela CD [facility] None     Additional Instructions for the Follow-ups that You Need to Schedule     Discharge Follow-up with Specialty: Cardiology; 1 Month    As directed      Specialty:  Cardiology    Follow Up:  1 Month    Follow Up Details:  Our office will call with an appointment in 3-4 weeks with Dr. Calvin. Please call [phone] with questions.           Follow-up Information     Kelsey Muñoz MD Follow up in 1 week(s).    Specialty:  Family Medicine  Why:  pneumonia and MARISELA  Contact information:  [address]  [facility] [id number] [phone]             Naun Calvin MD Follow up in 3 week(s).    Specialty:  Cardiology  Why:  ICM  Contact information:  [address]  [facility] [id number] [phone]                  Additional Instructions for the Follow-ups that You Need to Schedule     Discharge Follow-up with Specialty: Cardiology; 1 Month    As directed      Specialty:  Cardiology    Follow Up:  1 Month    Follow Up Details:  Our office will call with an appointment in 3-4 weeks with Dr. Calvin. Please call [phone] with questions.               Test Results Pending at Discharge:None       Mildred Jimenez MD  06/08/18  2:11 PM    Time Spent on Discharge Activities: 35 minutes

## 2018-06-08 NOTE — PLAN OF CARE
Problem: Patient Care Overview  Goal: Plan of Care Review  Outcome: Ongoing (interventions implemented as appropriate)   06/08/18 0625   Coping/Psychosocial   Plan of Care Reviewed With patient   Plan of Care Review   Progress improving   OTHER   Outcome Summary Patient has been calm this shift. No anxiety issues noted at this time. Has not voiced a ny complaint of pain. Nursing will continue to monitor.      Goal: Individualization and Mutuality  Outcome: Ongoing (interventions implemented as appropriate)    Goal: Discharge Needs Assessment  Outcome: Ongoing (interventions implemented as appropriate)      Problem: Fall Risk (Adult)  Goal: Absence of Fall  Outcome: Ongoing (interventions implemented as appropriate)      Problem: Pneumonia (Adult)  Goal: Signs and Symptoms of Listed Potential Problems Will be Absent, Minimized or Managed (Pneumonia)  Outcome: Ongoing (interventions implemented as appropriate)      Problem: Cardiac: Heart Failure (Adult)  Goal: Signs and Symptoms of Listed Potential Problems Will be Absent, Minimized or Managed (Cardiac: Heart Failure)  Outcome: Ongoing (interventions implemented as appropriate)      Problem: Skin Injury Risk (Adult)  Goal: Skin Health and Integrity  Outcome: Outcome(s) achieved Date Met: 06/08/18

## 2018-06-08 NOTE — PROGRESS NOTES
Hospital Follow Up    LOS:  LOS: 4 days   Patient Name: Mlelo Arias Jr.  Age/Sex: 67 y.o. male  : 1950  MRN: [id number]    Date of Hospital Visit: 18  Length of Stay: 4  Encounter Provider: Naun Calvin MD  Place of Service: [facility] CARDIOLOGY    Subjective:     Follow Up for: chf, cardiomyopathy    Interval History: renal function normalized.  Back on Entresto    Objective:     Objective:  Temp:  [98 °F (36.7 °C)-98.7 °F (37.1 °C)] 98.7 °F (37.1 °C)  Heart Rate:  [78-95] 90  Resp:  [16-18] 18  BP: (107-143)/(68-78) 120/68  Body mass index is 32.58 kg/m².    Intake/Output Summary (Last 24 hours) at 18 1018  Last data filed at 18 0838   Gross per 24 hour   Intake              480 ml   Output                0 ml   Net              480 ml     1    18  0500 18  0709 18  0700   Weight: 89.5 kg (197 lb 5 oz) 88.9 kg (196 lb) 97.2 kg (214 lb 4.6 oz)     Weight change: -0.595 kg (-1 lb 5 oz)    Physical Exam:   General Appearance: Alert, cooperative, in no acute distress. AAOx4.   HEENT: Normocephalic.  Neck: Supple. No JVD. No Carotid bruit. No thyromegaly  Lungs: CTAB. Normal respiratory effort and rate.  Heart:: Regular rate and rhythm, normal S1 and S2, no murmurs, gallops or rubs.  Abdomen: Soft, nontender, non-distended. positive bowel sounds  Extremities: Warm, no cyanosis, or clubbing. No edema.     Lab Review:     Results from last 7 days  Lab Units 18  0600 18  0559 18  0623   SODIUM mmol/L 136 131* 131*   POTASSIUM mmol/L 4.3 4.4 5.5*   CHLORIDE mmol/L 99 95* 96*   CO2 mmol/L 23.9 23.0 21.1*   BUN mg/dL 20 31* 39*   CREATININE mg/dL 0.86 1.16 1.83*   GLUCOSE mg/dL 127* 119* 105*   CALCIUM mg/dL 9.6 9.1 9.0         Results from last 7 days  Lab Units 18  1624   TROPONIN T ng/mL 0.019       Results from last 7 days  Lab Units 18  0600 18  0559   WBC 10*3/mm3 9.15 10.38   HEMOGLOBIN g/dL  10.2* 9.8*   HEMATOCRIT % 32.0* 30.9*   PLATELETS 10*3/mm3 284 275           Results from last 7 days  Lab Units 06/07/18  0559 06/04/18  1624   MAGNESIUM mg/dL 1.6 1.5*                     I reviewed the patient's new clinical results.          I personally viewed and interpreted the patient's EKG/Telemetry data.  Current Medications:   Scheduled Meds:  enoxaparin 40 mg Subcutaneous Q24H   famotidine 20 mg Oral Daily   furosemide 20 mg Oral Daily   magnesium oxide 400 mg Oral Daily   montelukast 10 mg Oral Nightly   sacubitril-valsartan 1 tablet Oral Q12H   sennosides-docusate sodium 2 tablet Oral Nightly     Continuous Infusions:     Allergies:  Allergies   Allergen Reactions   • Shellfish-Derived Products Swelling and Other (See Comments)     THROAT SWELLS       Assessment & Plan     Principal Problem:    Sepsis  Active Problems:    Cardiomyopathy, ischemic    Idiopathic ventricular tachycardia    CAD in native artery    CHF (congestive heart failure)    Acute renal failure    Hyponatremia    Bacterial pneumonia      1. Pneumonia: on abx  2. Sepsis secondary to  #1  3. Acute renal failure: Entresto and aldactone discontinued.  Slow hydration.  Renal function slightly better  4. Cardiomyopathy: severe ischemic with LVEF < 20%.  Pt responded favorably to Entresto and aldactone.  Will repeat echo for new baseline.  Will be a challenge going forward.  QRS not wide enough to consider BiV ICD  5. CHF: CXR does not indicate fluid overload  6. Ventricular tachycardia: s/p ICD.  23 beat run this am.  Monomorphic.   Mg low.  Will replace         Plan: pt being discharged.  Will see in 3-4 weeks.  Reviewed meds and activities.      Naun Calvin MD  06/08/18

## 2018-06-08 NOTE — PLAN OF CARE
Problem: Patient Care Overview  Goal: Plan of Care Review  Outcome: Ongoing (interventions implemented as appropriate)   06/07/18 1922   Coping/Psychosocial   Plan of Care Reviewed With patient   Plan of Care Review   Progress improving   OTHER   Outcome Summary pt up walking around today. no c/o cp or dizziness. restarted entresto today. pt c/o being agitated, anxious chest tightness without cp, claustrophobia, and seeing black pepper when eyes closed he reported this an hour after the episode and stated that it also happened to him at 0330 this am but did not let the night nurse know. Dr. Jeong was notified per pt request. Dr. Jeong will pass info to Dr. Calvin tmrw. vss. Will continue to monitor.       Problem: Fall Risk (Adult)  Goal: Absence of Fall  Outcome: Ongoing (interventions implemented as appropriate)

## 2018-06-08 NOTE — PLAN OF CARE
Problem: Patient Care Overview  Goal: Plan of Care Review  Outcome: Ongoing (interventions implemented as appropriate)   06/08/18 1103   Coping/Psychosocial   Plan of Care Reviewed With patient   Plan of Care Review   Progress improving   OTHER   Outcome Summary Pt increased with amb safety and I with no AD

## 2018-06-08 NOTE — THERAPY TREATMENT NOTE
Acute Care - Physical Therapy Treatment Note  [facility]     Patient Name: Mello Arias Jr.  : 1950  MRN: [id number]  Today's Date: 2018     Date of Referral to PT: 18  Referring Physician: Wilmer Fischer Date: 2018    Visit Dx:    ICD-10-CM ICD-9-CM   1. Acute kidney injury N17.9 584.9   2. Pneumonia of left lower lobe due to infectious organism J18.1 486   3. Hyperkalemia E87.5 276.7   4. Sepsis, due to unspecified organism A41.9 038.9     995.91   5. Impaired functional mobility, balance, gait, and endurance Z74.09 V49.89     Patient Active Problem List   Diagnosis   • A-fib   • Acid reflux   • Blood glucose elevated   • HLD (hyperlipidemia)   • Cardiomyopathy, ischemic   • L-S radiculopathy   • Idiopathic ventricular tachycardia   • CHF (congestive heart failure), NYHA class III   • Coronary artery disease of native artery of native heart with stable angina pectoris   • Non-rheumatic mitral regurgitation   • Non-rheumatic tricuspid valve insufficiency   • CAD in native artery   • Other specified forms of effusion, except tuberculous   • S/P CABG x 4   • S/P MVR (mitral valve replacement)   • S/P TVR (tricuspid valve repair)   • CHF (congestive heart failure)   • Acute renal failure   • Sepsis   • Hyponatremia   • Bacterial pneumonia       Therapy Treatment          Rehabilitation Treatment Summary     Row Name 18 1100             Treatment Time/Intention    Discipline physical therapy assistant  -CW      Document Type therapy note (daily note)  -CW      Subjective Information no complaints  -CW      Mode of Treatment physical therapy  -CW      Therapy Frequency (PT Clinical Impression) daily  -CW      Patient Effort good  -CW      Existing Precautions/Restrictions fall  -CW      Recorded by [CW] Joe Garza, NEVILLE 18 1103      Row Name 18 1100             Vital Signs    O2 Delivery Pre Treatment room air  -CW      Recorded by [CW] Joe Garza PTA 18  1103      Row Name 06/08/18 1100             Cognitive Assessment/Intervention- PT/OT    Orientation Status (Cognition) oriented x 4  -CW      Follows Commands (Cognition) WFL  -CW      Personal Safety Interventions fall prevention program maintained;gait belt;muscle strengthening facilitated;nonskid shoes/slippers when out of bed  -CW      Recorded by [CW] Joe Garza, PTA 06/08/18 1103      Row Name 06/08/18 1100             Bed Mobility Assessment/Treatment    Bed Mobility Assessment/Treatment supine-sit;sit-supine  -CW      Supine-Sit Tyrrell (Bed Mobility) conditional independence  -CW      Sit-Supine Tyrrell (Bed Mobility) conditional independence  -CW      Recorded by [CW] Joe Garza, PTA 06/08/18 1103      Row Name 06/08/18 1100             Transfer Assessment/Treatment    Transfer Assessment/Treatment sit-stand transfer;stand-sit transfer  -CW      Sit-Stand Tyrrell (Transfers) conditional independence  -CW      Stand-Sit Tyrrell (Transfers) conditional independence  -CW      Recorded by [CW] Joe Garza, PTA 06/08/18 1103      Row Name 06/08/18 1100             Gait/Stairs Assessment/Training    Tyrrell Level (Gait) conditional independence  -CW      Distance in Feet (Gait) 150  -CW      Pattern (Gait) step-through  -CW      Recorded by [CW] Joe Garza, PTA 06/08/18 1103      Row Name 06/08/18 1100             Positioning and Restraints    Pre-Treatment Position in bed  -CW      Post Treatment Position chair  -CW      In Chair notified nsg;sitting;call light within reach;encouraged to call for assist  -CW      Recorded by [CW] Joe Garza, PTA 06/08/18 1103      Row Name 06/08/18 1100             Outcome Summary/Treatment Plan (PT)    Anticipated Discharge Disposition (PT) home;home with home health  -CW      Recorded by [CW] Joe Garza, PTA 06/08/18 1103        User Key  (r) = Recorded By, (t) = Taken By, (c) = Cosigned By    Initials  Name Effective Dates Discipline     Joe Garza, NEVILLE 03/07/18 -  PT                     Physical Therapy Education     Title: PT OT SLP Therapies (Done)     Topic: Physical Therapy (Done)     Point: Mobility training (Done)    Learning Progress Summary     Learner Status Readiness Method Response Comment Documented by    Patient Done Acceptance E,TB VU,DU   06/08/18 1103     Done Acceptance E,TB DU,VU   06/07/18 1101     Done Acceptance E VU,NR   06/06/18 1507     Active Acceptance E NR  MA 06/05/18 1029          Point: Home exercise program (Done)    Learning Progress Summary     Learner Status Readiness Method Response Comment Documented by    Patient Done Acceptance E,TB VU,DU   06/08/18 1103     Done Acceptance E,TB DU,VU   06/07/18 1101     Done Acceptance E VU,NR   06/06/18 1507     Active Acceptance E NR  MA 06/05/18 1029          Point: Body mechanics (Done)    Learning Progress Summary     Learner Status Readiness Method Response Comment Documented by    Patient Done Acceptance E,TB VU,DU   06/08/18 1103     Done Acceptance E,TB DU,VU   06/07/18 1101     Done Acceptance E VU,NR   06/06/18 1507     Active Acceptance E NR  MA 06/05/18 1029          Point: Precautions (Done)    Learning Progress Summary     Learner Status Readiness Method Response Comment Documented by    Patient Done Acceptance E,TB VU,DU   06/08/18 1103     Done Acceptance E,TB DU,VU   06/07/18 1101     Done Acceptance E VU,NR   06/06/18 1507     Active Acceptance E NR  MA 06/05/18 1029                      User Key     Initials Effective Dates Name Provider Type Discipline     03/07/18 -  Luh Brandon, PT Physical Therapist PT    MA 04/03/18 -  Dawn Huggins, PT Physical Therapist PT     03/07/18 -  Joe Garza, NEVILLE Physical Therapy Assistant PT                    PT Recommendation and Plan  Anticipated Discharge Disposition (PT): home, home with home health  Therapy Frequency (PT  Clinical Impression): daily  Outcome Summary/Treatment Plan (PT)  Anticipated Discharge Disposition (PT): home, home with home health  Plan of Care Reviewed With: patient  Progress: improving  Outcome Summary: Pt increased with amb safety and I with no AD          Outcome Measures     Row Name 06/08/18 1100 06/07/18 1100 06/06/18 1500       How much help from another person do you currently need...    Turning from your back to your side while in flat bed without using bedrails? 4  -CW 4  -CW 4  -EF    Moving from lying on back to sitting on the side of a flat bed without bedrails? 4  -CW 4  -CW 4  -EF    Moving to and from a bed to a chair (including a wheelchair)? 4  -CW 3  -CW 3  -EF    Standing up from a chair using your arms (e.g., wheelchair, bedside chair)? 4  -CW 3  -CW 3  -EF    Climbing 3-5 steps with a railing? 3  -CW 3  -CW 3  -EF    To walk in hospital room? 4  -CW 3  -CW 3  -EF    AM-PAC 6 Clicks Score 23  -CW 20  -CW 20  -EF       Functional Assessment    Outcome Measure Options AM-PAC 6 Clicks Basic Mobility (PT)  -CW AM-PAC 6 Clicks Basic Mobility (PT)  -CW AM-PAC 6 Clicks Basic Mobility (PT)  -EF      User Key  (r) = Recorded By, (t) = Taken By, (c) = Cosigned By    Initials Name Provider Type    EF Luh Brandon, PT Physical Therapist    CW Joe Garza PTA Physical Therapy Assistant           Time Calculation:         PT Charges     Row Name 06/08/18 1104             Time Calculation    Start Time 1047  -CW      Stop Time 1104  -CW      Time Calculation (min) 17 min  -CW      PT Received On 06/08/18  -CW      PT - Next Appointment 06/09/18  -CW        User Key  (r) = Recorded By, (t) = Taken By, (c) = Cosigned By    Initials Name Provider Type    FELIX Garza PTA Physical Therapy Assistant          Therapy Charges for Today     Code Description Service Date Service Provider Modifiers Qty    71923444239 HC PT THER PROC EA 15 MIN 6/7/2018 Joe Garza PTA GP 1     [id number]  PT THER PROC EA 15 MIN 6/8/2018 Joe Garza, PTA GP 1          PT G-Codes  Outcome Measure Options: AM-PAC 6 Clicks Basic Mobility (PT)    Joe Garza, NEVILLE  6/8/2018

## 2018-06-11 ENCOUNTER — EPISODE CHANGES (OUTPATIENT)
Dept: CASE MANAGEMENT | Facility: OTHER | Age: 68
End: 2018-06-11

## 2018-06-26 ENCOUNTER — HOSPITAL ENCOUNTER (OUTPATIENT)
Facility: HOSPITAL | Age: 68
Setting detail: OBSERVATION
LOS: 1 days | Discharge: HOME HEALTH | End: 2018-06-28
Attending: EMERGENCY MEDICINE | Admitting: EMERGENCY MEDICINE

## 2018-06-26 ENCOUNTER — APPOINTMENT (OUTPATIENT)
Dept: GENERAL RADIOLOGY | Facility: HOSPITAL | Age: 68
End: 2018-06-26

## 2018-06-26 DIAGNOSIS — R53.1 WEAKNESS: Primary | ICD-10-CM

## 2018-06-26 DIAGNOSIS — E83.42 HYPOMAGNESEMIA: ICD-10-CM

## 2018-06-26 LAB
ALBUMIN SERPL-MCNC: 3.8 G/DL (ref 3.5–5.2)
ALBUMIN/GLOB SERPL: 0.8 G/DL
ALP SERPL-CCNC: 110 U/L (ref 39–117)
ALT SERPL W P-5'-P-CCNC: 16 U/L (ref 1–41)
ANION GAP SERPL CALCULATED.3IONS-SCNC: 13.5 MMOL/L
AST SERPL-CCNC: 16 U/L (ref 1–40)
BASOPHILS # BLD AUTO: 0.04 10*3/MM3 (ref 0–0.2)
BASOPHILS NFR BLD AUTO: 0.5 % (ref 0–1.5)
BILIRUB SERPL-MCNC: 0.2 MG/DL (ref 0.1–1.2)
BILIRUB UR QL STRIP: NEGATIVE
BUN BLD-MCNC: 18 MG/DL (ref 8–23)
BUN/CREAT SERPL: 16.4 (ref 7–25)
CALCIUM SPEC-SCNC: 9.6 MG/DL (ref 8.6–10.5)
CHLORIDE SERPL-SCNC: 100 MMOL/L (ref 98–107)
CLARITY UR: CLEAR
CO2 SERPL-SCNC: 25.5 MMOL/L (ref 22–29)
COLOR UR: YELLOW
CREAT BLD-MCNC: 1.1 MG/DL (ref 0.76–1.27)
DEPRECATED RDW RBC AUTO: 45.2 FL (ref 37–54)
EOSINOPHIL # BLD AUTO: 0.51 10*3/MM3 (ref 0–0.7)
EOSINOPHIL NFR BLD AUTO: 6.5 % (ref 0.3–6.2)
ERYTHROCYTE [DISTWIDTH] IN BLOOD BY AUTOMATED COUNT: 13.1 % (ref 11.5–14.5)
GFR SERPL CREATININE-BSD FRML MDRD: 66 ML/MIN/1.73
GLOBULIN UR ELPH-MCNC: 4.5 GM/DL
GLUCOSE BLD-MCNC: 116 MG/DL (ref 65–99)
GLUCOSE UR STRIP-MCNC: NEGATIVE MG/DL
HCT VFR BLD AUTO: 36.8 % (ref 40.4–52.2)
HGB BLD-MCNC: 11.8 G/DL (ref 13.7–17.6)
HGB UR QL STRIP.AUTO: NEGATIVE
IMM GRANULOCYTES # BLD: 0 10*3/MM3 (ref 0–0.03)
IMM GRANULOCYTES NFR BLD: 0 % (ref 0–0.5)
KETONES UR QL STRIP: NEGATIVE
LEUKOCYTE ESTERASE UR QL STRIP.AUTO: NEGATIVE
LYMPHOCYTES # BLD AUTO: 1.48 10*3/MM3 (ref 0.9–4.8)
LYMPHOCYTES NFR BLD AUTO: 18.9 % (ref 19.6–45.3)
MAGNESIUM SERPL-MCNC: 1.5 MG/DL (ref 1.6–2.4)
MCH RBC QN AUTO: 30.4 PG (ref 27–32.7)
MCHC RBC AUTO-ENTMCNC: 32.1 G/DL (ref 32.6–36.4)
MCV RBC AUTO: 94.8 FL (ref 79.8–96.2)
MONOCYTES # BLD AUTO: 0.91 10*3/MM3 (ref 0.2–1.2)
MONOCYTES NFR BLD AUTO: 11.6 % (ref 5–12)
NEUTROPHILS # BLD AUTO: 4.91 10*3/MM3 (ref 1.9–8.1)
NEUTROPHILS NFR BLD AUTO: 62.5 % (ref 42.7–76)
NITRITE UR QL STRIP: NEGATIVE
NT-PROBNP SERPL-MCNC: 1531 PG/ML (ref 0–900)
PH UR STRIP.AUTO: <=5 [PH] (ref 5–8)
PLATELET # BLD AUTO: 278 10*3/MM3 (ref 140–500)
PMV BLD AUTO: 11 FL (ref 6–12)
POTASSIUM BLD-SCNC: 3.8 MMOL/L (ref 3.5–5.2)
PROT SERPL-MCNC: 8.3 G/DL (ref 6–8.5)
PROT UR QL STRIP: NEGATIVE
RBC # BLD AUTO: 3.88 10*6/MM3 (ref 4.6–6)
SODIUM BLD-SCNC: 139 MMOL/L (ref 136–145)
SP GR UR STRIP: 1.01 (ref 1–1.03)
TROPONIN T SERPL-MCNC: <0.01 NG/ML (ref 0–0.03)
UROBILINOGEN UR QL STRIP: NORMAL
WBC NRBC COR # BLD: 7.85 10*3/MM3 (ref 4.5–10.7)

## 2018-06-26 PROCEDURE — 93010 ELECTROCARDIOGRAM REPORT: CPT | Performed by: INTERNAL MEDICINE

## 2018-06-26 PROCEDURE — 96365 THER/PROPH/DIAG IV INF INIT: CPT

## 2018-06-26 PROCEDURE — 81003 URINALYSIS AUTO W/O SCOPE: CPT | Performed by: NURSE PRACTITIONER

## 2018-06-26 PROCEDURE — 80053 COMPREHEN METABOLIC PANEL: CPT | Performed by: NURSE PRACTITIONER

## 2018-06-26 PROCEDURE — 83735 ASSAY OF MAGNESIUM: CPT | Performed by: EMERGENCY MEDICINE

## 2018-06-26 PROCEDURE — 85025 COMPLETE CBC W/AUTO DIFF WBC: CPT | Performed by: NURSE PRACTITIONER

## 2018-06-26 PROCEDURE — 84484 ASSAY OF TROPONIN QUANT: CPT | Performed by: NURSE PRACTITIONER

## 2018-06-26 PROCEDURE — 93005 ELECTROCARDIOGRAM TRACING: CPT

## 2018-06-26 PROCEDURE — 71046 X-RAY EXAM CHEST 2 VIEWS: CPT

## 2018-06-26 PROCEDURE — 25010000002 MAGNESIUM SULFATE 2 GM/50ML SOLUTION: Performed by: EMERGENCY MEDICINE

## 2018-06-26 PROCEDURE — 83880 ASSAY OF NATRIURETIC PEPTIDE: CPT | Performed by: NURSE PRACTITIONER

## 2018-06-26 PROCEDURE — 93005 ELECTROCARDIOGRAM TRACING: CPT | Performed by: EMERGENCY MEDICINE

## 2018-06-26 PROCEDURE — 99284 EMERGENCY DEPT VISIT MOD MDM: CPT

## 2018-06-26 RX ORDER — SODIUM CHLORIDE 0.9 % (FLUSH) 0.9 %
10 SYRINGE (ML) INJECTION AS NEEDED
Status: DISCONTINUED | OUTPATIENT
Start: 2018-06-26 | End: 2018-06-28 | Stop reason: HOSPADM

## 2018-06-26 RX ORDER — MAGNESIUM SULFATE HEPTAHYDRATE 40 MG/ML
2 INJECTION, SOLUTION INTRAVENOUS ONCE
Status: COMPLETED | OUTPATIENT
Start: 2018-06-26 | End: 2018-06-26

## 2018-06-26 RX ADMIN — MAGNESIUM SULFATE HEPTAHYDRATE 2 G: 40 INJECTION, SOLUTION INTRAVENOUS at 22:51

## 2018-06-27 PROBLEM — F32.A DEPRESSION: Status: ACTIVE | Noted: 2018-06-27

## 2018-06-27 LAB
ANION GAP SERPL CALCULATED.3IONS-SCNC: 15.5 MMOL/L
BUN BLD-MCNC: 19 MG/DL (ref 8–23)
BUN/CREAT SERPL: 15.8 (ref 7–25)
CALCIUM SPEC-SCNC: 9.1 MG/DL (ref 8.6–10.5)
CHLORIDE SERPL-SCNC: 98 MMOL/L (ref 98–107)
CO2 SERPL-SCNC: 26.5 MMOL/L (ref 22–29)
CREAT BLD-MCNC: 1.2 MG/DL (ref 0.76–1.27)
GFR SERPL CREATININE-BSD FRML MDRD: 60 ML/MIN/1.73
GLUCOSE BLD-MCNC: 160 MG/DL (ref 65–99)
MAGNESIUM SERPL-MCNC: 2.1 MG/DL (ref 1.6–2.4)
POTASSIUM BLD-SCNC: 3.7 MMOL/L (ref 3.5–5.2)
SODIUM BLD-SCNC: 140 MMOL/L (ref 136–145)
TROPONIN T SERPL-MCNC: <0.01 NG/ML (ref 0–0.03)

## 2018-06-27 PROCEDURE — G8980 MOBILITY D/C STATUS: HCPCS | Performed by: PHYSICAL THERAPIST

## 2018-06-27 PROCEDURE — 25010000002 ENOXAPARIN PER 10 MG: Performed by: INTERNAL MEDICINE

## 2018-06-27 PROCEDURE — G8979 MOBILITY GOAL STATUS: HCPCS | Performed by: PHYSICAL THERAPIST

## 2018-06-27 PROCEDURE — 96372 THER/PROPH/DIAG INJ SC/IM: CPT

## 2018-06-27 PROCEDURE — 94640 AIRWAY INHALATION TREATMENT: CPT

## 2018-06-27 PROCEDURE — 83735 ASSAY OF MAGNESIUM: CPT | Performed by: INTERNAL MEDICINE

## 2018-06-27 PROCEDURE — G0378 HOSPITAL OBSERVATION PER HR: HCPCS

## 2018-06-27 PROCEDURE — G8978 MOBILITY CURRENT STATUS: HCPCS | Performed by: PHYSICAL THERAPIST

## 2018-06-27 PROCEDURE — 84484 ASSAY OF TROPONIN QUANT: CPT | Performed by: INTERNAL MEDICINE

## 2018-06-27 PROCEDURE — 97110 THERAPEUTIC EXERCISES: CPT | Performed by: PHYSICAL THERAPIST

## 2018-06-27 PROCEDURE — 97161 PT EVAL LOW COMPLEX 20 MIN: CPT | Performed by: PHYSICAL THERAPIST

## 2018-06-27 PROCEDURE — 80048 BASIC METABOLIC PNL TOTAL CA: CPT | Performed by: INTERNAL MEDICINE

## 2018-06-27 PROCEDURE — 94799 UNLISTED PULMONARY SVC/PX: CPT

## 2018-06-27 PROCEDURE — 99221 1ST HOSP IP/OBS SF/LOW 40: CPT | Performed by: INTERNAL MEDICINE

## 2018-06-27 RX ORDER — ASPIRIN 81 MG/1
81 TABLET, CHEWABLE ORAL DAILY
Status: DISCONTINUED | OUTPATIENT
Start: 2018-06-27 | End: 2018-06-28 | Stop reason: HOSPADM

## 2018-06-27 RX ORDER — ALBUTEROL SULFATE 2.5 MG/3ML
2.5 SOLUTION RESPIRATORY (INHALATION) EVERY 6 HOURS PRN
Status: DISCONTINUED | OUTPATIENT
Start: 2018-06-27 | End: 2018-06-28 | Stop reason: HOSPADM

## 2018-06-27 RX ORDER — FUROSEMIDE 20 MG/1
20 TABLET ORAL DAILY
Status: DISCONTINUED | OUTPATIENT
Start: 2018-06-27 | End: 2018-06-28 | Stop reason: HOSPADM

## 2018-06-27 RX ORDER — PANTOPRAZOLE SODIUM 40 MG/1
40 TABLET, DELAYED RELEASE ORAL EVERY MORNING
Status: DISCONTINUED | OUTPATIENT
Start: 2018-06-27 | End: 2018-06-28 | Stop reason: HOSPADM

## 2018-06-27 RX ORDER — BUPROPION HYDROCHLORIDE 150 MG/1
150 TABLET ORAL DAILY
Status: DISCONTINUED | OUTPATIENT
Start: 2018-06-27 | End: 2018-06-28 | Stop reason: HOSPADM

## 2018-06-27 RX ORDER — NITROGLYCERIN 0.4 MG/1
0.4 TABLET SUBLINGUAL
Status: DISCONTINUED | OUTPATIENT
Start: 2018-06-27 | End: 2018-06-28 | Stop reason: HOSPADM

## 2018-06-27 RX ORDER — SODIUM CHLORIDE 0.9 % (FLUSH) 0.9 %
1-10 SYRINGE (ML) INJECTION AS NEEDED
Status: DISCONTINUED | OUTPATIENT
Start: 2018-06-27 | End: 2018-06-28 | Stop reason: HOSPADM

## 2018-06-27 RX ORDER — SODIUM CHLORIDE 9 MG/ML
50 INJECTION, SOLUTION INTRAVENOUS CONTINUOUS
Status: DISCONTINUED | OUTPATIENT
Start: 2018-06-27 | End: 2018-06-27

## 2018-06-27 RX ORDER — CARVEDILOL 3.12 MG/1
3.12 TABLET ORAL EVERY 12 HOURS SCHEDULED
Status: DISCONTINUED | OUTPATIENT
Start: 2018-06-27 | End: 2018-06-28 | Stop reason: HOSPADM

## 2018-06-27 RX ORDER — MONTELUKAST SODIUM 10 MG/1
10 TABLET ORAL NIGHTLY
Status: DISCONTINUED | OUTPATIENT
Start: 2018-06-27 | End: 2018-06-28 | Stop reason: HOSPADM

## 2018-06-27 RX ORDER — ACETAMINOPHEN 325 MG/1
650 TABLET ORAL EVERY 6 HOURS PRN
Status: DISCONTINUED | OUTPATIENT
Start: 2018-06-27 | End: 2018-06-28 | Stop reason: HOSPADM

## 2018-06-27 RX ORDER — MAGNESIUM OXIDE 400 MG/1
400 TABLET ORAL DAILY
Status: DISCONTINUED | OUTPATIENT
Start: 2018-06-27 | End: 2018-06-28 | Stop reason: HOSPADM

## 2018-06-27 RX ORDER — ZOLPIDEM TARTRATE 5 MG/1
5 TABLET ORAL NIGHTLY PRN
Status: DISCONTINUED | OUTPATIENT
Start: 2018-06-27 | End: 2018-06-28 | Stop reason: HOSPADM

## 2018-06-27 RX ADMIN — SODIUM CHLORIDE 50 ML/HR: 9 INJECTION, SOLUTION INTRAVENOUS at 01:54

## 2018-06-27 RX ADMIN — ZOLPIDEM TARTRATE 5 MG: 5 TABLET ORAL at 22:47

## 2018-06-27 RX ADMIN — CARVEDILOL 3.12 MG: 3.12 TABLET, FILM COATED ORAL at 10:14

## 2018-06-27 RX ADMIN — SACUBITRIL AND VALSARTAN 1 TABLET: 24; 26 TABLET, FILM COATED ORAL at 02:16

## 2018-06-27 RX ADMIN — SACUBITRIL AND VALSARTAN 1 TABLET: 49; 51 TABLET, FILM COATED ORAL at 20:40

## 2018-06-27 RX ADMIN — ALBUTEROL SULFATE 2.5 MG: 2.5 SOLUTION RESPIRATORY (INHALATION) at 23:23

## 2018-06-27 RX ADMIN — ENOXAPARIN SODIUM 40 MG: 40 INJECTION, SOLUTION INTRAVENOUS; SUBCUTANEOUS at 09:40

## 2018-06-27 RX ADMIN — SACUBITRIL AND VALSARTAN 1 TABLET: 49; 51 TABLET, FILM COATED ORAL at 11:27

## 2018-06-27 RX ADMIN — MONTELUKAST 10 MG: 10 TABLET, FILM COATED ORAL at 20:41

## 2018-06-27 RX ADMIN — FUROSEMIDE 20 MG: 20 TABLET ORAL at 09:40

## 2018-06-27 RX ADMIN — BUPROPION HYDROCHLORIDE 150 MG: 150 TABLET, EXTENDED RELEASE ORAL at 12:19

## 2018-06-27 RX ADMIN — PANTOPRAZOLE SODIUM 40 MG: 40 TABLET, DELAYED RELEASE ORAL at 06:55

## 2018-06-27 RX ADMIN — ZOLPIDEM TARTRATE 5 MG: 5 TABLET ORAL at 01:54

## 2018-06-27 RX ADMIN — Medication 400 MG: at 10:14

## 2018-06-27 RX ADMIN — CARVEDILOL 3.12 MG: 3.12 TABLET, FILM COATED ORAL at 20:41

## 2018-06-27 RX ADMIN — ASPIRIN 81 MG: 81 TABLET, CHEWABLE ORAL at 09:40

## 2018-06-27 RX ADMIN — MONTELUKAST 10 MG: 10 TABLET, FILM COATED ORAL at 02:16

## 2018-06-28 VITALS
WEIGHT: 186 LBS | OXYGEN SATURATION: 97 % | DIASTOLIC BLOOD PRESSURE: 65 MMHG | BODY MASS INDEX: 28.19 KG/M2 | RESPIRATION RATE: 18 BRPM | HEIGHT: 68 IN | TEMPERATURE: 97.9 F | SYSTOLIC BLOOD PRESSURE: 109 MMHG | HEART RATE: 79 BPM

## 2018-06-28 LAB
ANION GAP SERPL CALCULATED.3IONS-SCNC: 10.4 MMOL/L
BUN BLD-MCNC: 18 MG/DL (ref 8–23)
BUN/CREAT SERPL: 16.5 (ref 7–25)
CALCIUM SPEC-SCNC: 9 MG/DL (ref 8.6–10.5)
CHLORIDE SERPL-SCNC: 100 MMOL/L (ref 98–107)
CO2 SERPL-SCNC: 26.6 MMOL/L (ref 22–29)
CREAT BLD-MCNC: 1.09 MG/DL (ref 0.76–1.27)
GFR SERPL CREATININE-BSD FRML MDRD: 67 ML/MIN/1.73
GLUCOSE BLD-MCNC: 105 MG/DL (ref 65–99)
POTASSIUM BLD-SCNC: 3.8 MMOL/L (ref 3.5–5.2)
SODIUM BLD-SCNC: 137 MMOL/L (ref 136–145)

## 2018-06-28 PROCEDURE — 99226 PR SBSQ OBSERVATION CARE/DAY 35 MINUTES: CPT | Performed by: INTERNAL MEDICINE

## 2018-06-28 PROCEDURE — G0378 HOSPITAL OBSERVATION PER HR: HCPCS

## 2018-06-28 PROCEDURE — 80048 BASIC METABOLIC PNL TOTAL CA: CPT | Performed by: INTERNAL MEDICINE

## 2018-06-28 PROCEDURE — 25010000002 ENOXAPARIN PER 10 MG: Performed by: INTERNAL MEDICINE

## 2018-06-28 PROCEDURE — 96372 THER/PROPH/DIAG INJ SC/IM: CPT

## 2018-06-28 RX ORDER — MAGNESIUM OXIDE 400 MG/1
400 TABLET ORAL DAILY
Qty: 30 TABLET | Refills: 0 | Status: SHIPPED | OUTPATIENT
Start: 2018-06-29 | End: 2018-09-14

## 2018-06-28 RX ORDER — BUPROPION HYDROCHLORIDE 150 MG/1
150 TABLET ORAL DAILY
Qty: 30 TABLET | Refills: 0 | Status: SHIPPED | OUTPATIENT
Start: 2018-06-29 | End: 2018-09-14

## 2018-06-28 RX ORDER — CARVEDILOL 3.12 MG/1
3.12 TABLET ORAL EVERY 12 HOURS SCHEDULED
Qty: 60 TABLET | Refills: 0 | Status: ON HOLD | OUTPATIENT
Start: 2018-06-28 | End: 2018-07-18

## 2018-06-28 RX ADMIN — CARVEDILOL 3.12 MG: 3.12 TABLET, FILM COATED ORAL at 08:18

## 2018-06-28 RX ADMIN — FUROSEMIDE 20 MG: 20 TABLET ORAL at 08:18

## 2018-06-28 RX ADMIN — PANTOPRAZOLE SODIUM 40 MG: 40 TABLET, DELAYED RELEASE ORAL at 06:50

## 2018-06-28 RX ADMIN — ENOXAPARIN SODIUM 40 MG: 40 INJECTION, SOLUTION INTRAVENOUS; SUBCUTANEOUS at 08:18

## 2018-06-28 RX ADMIN — ASPIRIN 81 MG: 81 TABLET, CHEWABLE ORAL at 08:18

## 2018-06-28 RX ADMIN — Medication 400 MG: at 08:18

## 2018-06-28 RX ADMIN — BUPROPION HYDROCHLORIDE 150 MG: 150 TABLET, EXTENDED RELEASE ORAL at 08:18

## 2018-06-28 RX ADMIN — SACUBITRIL AND VALSARTAN 1 TABLET: 49; 51 TABLET, FILM COATED ORAL at 08:18

## 2018-07-02 ENCOUNTER — TELEPHONE (OUTPATIENT)
Dept: CARDIOLOGY | Facility: CLINIC | Age: 68
End: 2018-07-02

## 2018-07-02 NOTE — TELEPHONE ENCOUNTER
7/2 Pt daughter in law,Trisha Arias ([phone]) called in reference to pt being referred to [facility] for CHF by Dr HUEY Rico or Dylan Arias ([phone]), pt son has been informed regarding referral and would like him to be seen at  before f/u with [facility] 7/12.    Please advise    Thank you    Jonathon ZAPIEN

## 2018-07-05 RX ORDER — FUROSEMIDE 20 MG/1
20 TABLET ORAL DAILY
Qty: 30 TABLET | Refills: 5 | Status: SHIPPED | OUTPATIENT
Start: 2018-07-05 | End: 2018-07-18 | Stop reason: HOSPADM

## 2018-07-05 NOTE — TELEPHONE ENCOUNTER
I'm not understanding what you need me to do with this. I do not schedule these appointments, if a referral is needed, please send to [facility]. I can't put referrals in. It looks like you rescheduled his 7/12 appointment to August. [email or web address]

## 2018-07-09 ENCOUNTER — APPOINTMENT (OUTPATIENT)
Dept: GENERAL RADIOLOGY | Facility: HOSPITAL | Age: 68
End: 2018-07-09

## 2018-07-09 ENCOUNTER — TELEPHONE (OUTPATIENT)
Dept: CARDIOLOGY | Facility: CLINIC | Age: 68
End: 2018-07-09

## 2018-07-09 ENCOUNTER — HOSPITAL ENCOUNTER (INPATIENT)
Facility: HOSPITAL | Age: 68
LOS: 9 days | Discharge: HOME | End: 2018-07-18
Attending: EMERGENCY MEDICINE | Admitting: INTERNAL MEDICINE

## 2018-07-09 DIAGNOSIS — A41.9 SEPSIS, DUE TO UNSPECIFIED ORGANISM: Primary | ICD-10-CM

## 2018-07-09 DIAGNOSIS — R53.1 GENERALIZED WEAKNESS: ICD-10-CM

## 2018-07-09 DIAGNOSIS — N28.9 RENAL INSUFFICIENCY: ICD-10-CM

## 2018-07-09 DIAGNOSIS — J18.9 PNEUMONIA OF LEFT LOWER LOBE DUE TO INFECTIOUS ORGANISM: ICD-10-CM

## 2018-07-09 DIAGNOSIS — E83.42 HYPOMAGNESEMIA: ICD-10-CM

## 2018-07-09 LAB
ALBUMIN SERPL-MCNC: 4 G/DL (ref 3.5–5.2)
ALBUMIN/GLOB SERPL: 1 G/DL
ALP SERPL-CCNC: 100 U/L (ref 39–117)
ALT SERPL W P-5'-P-CCNC: 11 U/L (ref 1–41)
ANION GAP SERPL CALCULATED.3IONS-SCNC: 15.1 MMOL/L
AST SERPL-CCNC: 14 U/L (ref 1–40)
BASOPHILS # BLD AUTO: 0.02 10*3/MM3 (ref 0–0.2)
BASOPHILS NFR BLD AUTO: 0.2 % (ref 0–1.5)
BILIRUB SERPL-MCNC: 0.5 MG/DL (ref 0.1–1.2)
BILIRUB UR QL STRIP: NEGATIVE
BUN BLD-MCNC: 24 MG/DL (ref 8–23)
BUN/CREAT SERPL: 15.9 (ref 7–25)
CALCIUM SPEC-SCNC: 9 MG/DL (ref 8.6–10.5)
CHLORIDE SERPL-SCNC: 93 MMOL/L (ref 98–107)
CLARITY UR: ABNORMAL
CO2 SERPL-SCNC: 24.9 MMOL/L (ref 22–29)
COLOR UR: YELLOW
CREAT BLD-MCNC: 1.51 MG/DL (ref 0.76–1.27)
D-LACTATE SERPL-SCNC: 2.1 MMOL/L (ref 0.5–2)
DEPRECATED RDW RBC AUTO: 46.9 FL (ref 37–54)
EOSINOPHIL # BLD AUTO: 0.18 10*3/MM3 (ref 0–0.7)
EOSINOPHIL NFR BLD AUTO: 1.4 % (ref 0.3–6.2)
ERYTHROCYTE [DISTWIDTH] IN BLOOD BY AUTOMATED COUNT: 13.6 % (ref 11.5–14.5)
GFR SERPL CREATININE-BSD FRML MDRD: 46 ML/MIN/1.73
GLOBULIN UR ELPH-MCNC: 4.1 GM/DL
GLUCOSE BLD-MCNC: 119 MG/DL (ref 65–99)
GLUCOSE UR STRIP-MCNC: NEGATIVE MG/DL
HCT VFR BLD AUTO: 34.6 % (ref 40.4–52.2)
HGB BLD-MCNC: 11.3 G/DL (ref 13.7–17.6)
HGB UR QL STRIP.AUTO: NEGATIVE
IMM GRANULOCYTES # BLD: 0.04 10*3/MM3 (ref 0–0.03)
IMM GRANULOCYTES NFR BLD: 0.3 % (ref 0–0.5)
KETONES UR QL STRIP: NEGATIVE
LEUKOCYTE ESTERASE UR QL STRIP.AUTO: NEGATIVE
LYMPHOCYTES # BLD AUTO: 0.32 10*3/MM3 (ref 0.9–4.8)
LYMPHOCYTES NFR BLD AUTO: 2.4 % (ref 19.6–45.3)
MAGNESIUM SERPL-MCNC: 1.2 MG/DL (ref 1.6–2.4)
MCH RBC QN AUTO: 30.8 PG (ref 27–32.7)
MCHC RBC AUTO-ENTMCNC: 32.7 G/DL (ref 32.6–36.4)
MCV RBC AUTO: 94.3 FL (ref 79.8–96.2)
MONOCYTES # BLD AUTO: 0.11 10*3/MM3 (ref 0.2–1.2)
MONOCYTES NFR BLD AUTO: 0.8 % (ref 5–12)
NEUTROPHILS # BLD AUTO: 12.66 10*3/MM3 (ref 1.9–8.1)
NEUTROPHILS NFR BLD AUTO: 95.2 % (ref 42.7–76)
NITRITE UR QL STRIP: NEGATIVE
NT-PROBNP SERPL-MCNC: 1268 PG/ML (ref 5–900)
PH UR STRIP.AUTO: <=5 [PH] (ref 5–8)
PLATELET # BLD AUTO: 175 10*3/MM3 (ref 140–500)
PMV BLD AUTO: 10.9 FL (ref 6–12)
POTASSIUM BLD-SCNC: 3.7 MMOL/L (ref 3.5–5.2)
PROCALCITONIN SERPL-MCNC: 1.5 NG/ML (ref 0.1–0.25)
PROT SERPL-MCNC: 8.1 G/DL (ref 6–8.5)
PROT UR QL STRIP: NEGATIVE
RBC # BLD AUTO: 3.67 10*6/MM3 (ref 4.6–6)
SODIUM BLD-SCNC: 133 MMOL/L (ref 136–145)
SP GR UR STRIP: 1.02 (ref 1–1.03)
TROPONIN T SERPL-MCNC: 0.04 NG/ML (ref 0–0.03)
UROBILINOGEN UR QL STRIP: ABNORMAL
WBC NRBC COR # BLD: 13.29 10*3/MM3 (ref 4.5–10.7)

## 2018-07-09 PROCEDURE — 83605 ASSAY OF LACTIC ACID: CPT | Performed by: PHYSICIAN ASSISTANT

## 2018-07-09 PROCEDURE — 84484 ASSAY OF TROPONIN QUANT: CPT | Performed by: PHYSICIAN ASSISTANT

## 2018-07-09 PROCEDURE — 85025 COMPLETE CBC W/AUTO DIFF WBC: CPT | Performed by: PHYSICIAN ASSISTANT

## 2018-07-09 PROCEDURE — 81003 URINALYSIS AUTO W/O SCOPE: CPT | Performed by: PHYSICIAN ASSISTANT

## 2018-07-09 PROCEDURE — 83735 ASSAY OF MAGNESIUM: CPT | Performed by: PHYSICIAN ASSISTANT

## 2018-07-09 PROCEDURE — 93005 ELECTROCARDIOGRAM TRACING: CPT | Performed by: PHYSICIAN ASSISTANT

## 2018-07-09 PROCEDURE — 93010 ELECTROCARDIOGRAM REPORT: CPT | Performed by: INTERNAL MEDICINE

## 2018-07-09 PROCEDURE — 87150 DNA/RNA AMPLIFIED PROBE: CPT | Performed by: PHYSICIAN ASSISTANT

## 2018-07-09 PROCEDURE — 80053 COMPREHEN METABOLIC PANEL: CPT | Performed by: PHYSICIAN ASSISTANT

## 2018-07-09 PROCEDURE — 25010000002 MAGNESIUM SULFATE 2 GM/50ML SOLUTION: Performed by: EMERGENCY MEDICINE

## 2018-07-09 PROCEDURE — 71045 X-RAY EXAM CHEST 1 VIEW: CPT

## 2018-07-09 PROCEDURE — 83880 ASSAY OF NATRIURETIC PEPTIDE: CPT | Performed by: PHYSICIAN ASSISTANT

## 2018-07-09 PROCEDURE — 25010000002 VANCOMYCIN 10 G RECONSTITUTED SOLUTION: Performed by: EMERGENCY MEDICINE

## 2018-07-09 PROCEDURE — 87186 SC STD MICRODIL/AGAR DIL: CPT | Performed by: PHYSICIAN ASSISTANT

## 2018-07-09 PROCEDURE — 84145 PROCALCITONIN (PCT): CPT | Performed by: PHYSICIAN ASSISTANT

## 2018-07-09 PROCEDURE — 25010000002 CEFEPIME PER 500 MG: Performed by: EMERGENCY MEDICINE

## 2018-07-09 PROCEDURE — 87147 CULTURE TYPE IMMUNOLOGIC: CPT | Performed by: PHYSICIAN ASSISTANT

## 2018-07-09 PROCEDURE — 99285 EMERGENCY DEPT VISIT HI MDM: CPT

## 2018-07-09 PROCEDURE — 36415 COLL VENOUS BLD VENIPUNCTURE: CPT

## 2018-07-09 PROCEDURE — 87040 BLOOD CULTURE FOR BACTERIA: CPT | Performed by: PHYSICIAN ASSISTANT

## 2018-07-09 RX ORDER — SODIUM CHLORIDE 0.9 % (FLUSH) 0.9 %
10 SYRINGE (ML) INJECTION AS NEEDED
Status: DISCONTINUED | OUTPATIENT
Start: 2018-07-09 | End: 2018-07-18 | Stop reason: HOSPADM

## 2018-07-09 RX ORDER — MAGNESIUM SULFATE HEPTAHYDRATE 40 MG/ML
2 INJECTION, SOLUTION INTRAVENOUS ONCE
Status: COMPLETED | OUTPATIENT
Start: 2018-07-09 | End: 2018-07-09

## 2018-07-09 RX ORDER — SODIUM CHLORIDE 9 MG/ML
100 INJECTION, SOLUTION INTRAVENOUS CONTINUOUS
Status: DISCONTINUED | OUTPATIENT
Start: 2018-07-09 | End: 2018-07-10

## 2018-07-09 RX ADMIN — MAGNESIUM SULFATE HEPTAHYDRATE 2 G: 40 INJECTION, SOLUTION INTRAVENOUS at 22:38

## 2018-07-09 RX ADMIN — VANCOMYCIN HYDROCHLORIDE 1750 MG: 10 INJECTION, POWDER, LYOPHILIZED, FOR SOLUTION INTRAVENOUS at 22:27

## 2018-07-09 RX ADMIN — SODIUM CHLORIDE 500 ML: 9 INJECTION, SOLUTION INTRAVENOUS at 21:51

## 2018-07-09 RX ADMIN — CEFEPIME HYDROCHLORIDE 2 G: 2 INJECTION, POWDER, FOR SOLUTION INTRAVENOUS at 22:36

## 2018-07-09 RX ADMIN — SODIUM CHLORIDE 100 ML/HR: 9 INJECTION, SOLUTION INTRAVENOUS at 22:21

## 2018-07-09 NOTE — TELEPHONE ENCOUNTER
I spoke with the son. He stated that his dad was having chills and leg weakness as well as cramps. He stated that he looked similar to the way he looked at the beginning of June when he was septic. I advised him to take his father to the ER for further evaluation, as he was very sick at the beginning of June. He indicated that he understood.

## 2018-07-09 NOTE — ED NOTES
"Pt reports shakes, chills, bilateral leg pain, weakness that started today.     Pt hx CHF. Per family \"this are the symptoms that happened last time when he became septic. He is a littlle confused too\".      Jazz Patel RN  07/09/18 1940    "

## 2018-07-10 ENCOUNTER — APPOINTMENT (OUTPATIENT)
Dept: CARDIOLOGY | Facility: HOSPITAL | Age: 68
End: 2018-07-10
Attending: INTERNAL MEDICINE

## 2018-07-10 ENCOUNTER — APPOINTMENT (OUTPATIENT)
Dept: NUCLEAR MEDICINE | Facility: HOSPITAL | Age: 68
End: 2018-07-10

## 2018-07-10 ENCOUNTER — APPOINTMENT (OUTPATIENT)
Dept: GENERAL RADIOLOGY | Facility: HOSPITAL | Age: 68
End: 2018-07-10
Attending: INTERNAL MEDICINE

## 2018-07-10 PROBLEM — E83.42 HYPOMAGNESEMIA: Status: ACTIVE | Noted: 2018-07-10

## 2018-07-10 PROBLEM — J18.9 PNEUMONIA: Status: ACTIVE | Noted: 2018-07-10

## 2018-07-10 PROBLEM — R78.81 POSITIVE BLOOD CULTURE: Status: ACTIVE | Noted: 2018-07-10

## 2018-07-10 PROBLEM — R06.02 SHORTNESS OF BREATH: Status: ACTIVE | Noted: 2018-07-10

## 2018-07-10 LAB
ANION GAP SERPL CALCULATED.3IONS-SCNC: 15.2 MMOL/L
B PERT DNA SPEC QL NAA+PROBE: NOT DETECTED
BACTERIA BLD CULT: ABNORMAL
BASOPHILS # BLD AUTO: 0.03 10*3/MM3 (ref 0–0.2)
BASOPHILS NFR BLD AUTO: 0.1 % (ref 0–1.5)
BH CV LOWER VASCULAR LEFT COMMON FEMORAL AUGMENT: NORMAL
BH CV LOWER VASCULAR LEFT COMMON FEMORAL COMPETENT: NORMAL
BH CV LOWER VASCULAR LEFT COMMON FEMORAL COMPRESS: NORMAL
BH CV LOWER VASCULAR LEFT COMMON FEMORAL PHASIC: NORMAL
BH CV LOWER VASCULAR LEFT COMMON FEMORAL SPONT: NORMAL
BH CV LOWER VASCULAR LEFT DISTAL FEMORAL COMPRESS: NORMAL
BH CV LOWER VASCULAR LEFT GASTRONEMIUS COMPRESS: NORMAL
BH CV LOWER VASCULAR LEFT GREATER SAPH AK COMPRESS: NORMAL
BH CV LOWER VASCULAR LEFT GREATER SAPH BK COMPRESS: NORMAL
BH CV LOWER VASCULAR LEFT MID FEMORAL AUGMENT: NORMAL
BH CV LOWER VASCULAR LEFT MID FEMORAL COMPETENT: NORMAL
BH CV LOWER VASCULAR LEFT MID FEMORAL COMPRESS: NORMAL
BH CV LOWER VASCULAR LEFT MID FEMORAL PHASIC: NORMAL
BH CV LOWER VASCULAR LEFT MID FEMORAL SPONT: NORMAL
BH CV LOWER VASCULAR LEFT PERONEAL COMPRESS: NORMAL
BH CV LOWER VASCULAR LEFT POPLITEAL AUGMENT: NORMAL
BH CV LOWER VASCULAR LEFT POPLITEAL COMPETENT: NORMAL
BH CV LOWER VASCULAR LEFT POPLITEAL COMPRESS: NORMAL
BH CV LOWER VASCULAR LEFT POPLITEAL PHASIC: NORMAL
BH CV LOWER VASCULAR LEFT POPLITEAL SPONT: NORMAL
BH CV LOWER VASCULAR LEFT POSTERIOR TIBIAL COMPRESS: NORMAL
BH CV LOWER VASCULAR LEFT PROXIMAL FEMORAL COMPRESS: NORMAL
BH CV LOWER VASCULAR LEFT SAPHENOFEMORAL JUNCTION AUGMENT: NORMAL
BH CV LOWER VASCULAR LEFT SAPHENOFEMORAL JUNCTION COMPETENT: NORMAL
BH CV LOWER VASCULAR LEFT SAPHENOFEMORAL JUNCTION COMPRESS: NORMAL
BH CV LOWER VASCULAR LEFT SAPHENOFEMORAL JUNCTION PHASIC: NORMAL
BH CV LOWER VASCULAR LEFT SAPHENOFEMORAL JUNCTION SPONT: NORMAL
BH CV LOWER VASCULAR RIGHT COMMON FEMORAL AUGMENT: NORMAL
BH CV LOWER VASCULAR RIGHT COMMON FEMORAL COMPETENT: NORMAL
BH CV LOWER VASCULAR RIGHT COMMON FEMORAL COMPRESS: NORMAL
BH CV LOWER VASCULAR RIGHT COMMON FEMORAL PHASIC: NORMAL
BH CV LOWER VASCULAR RIGHT COMMON FEMORAL SPONT: NORMAL
BH CV LOWER VASCULAR RIGHT DISTAL FEMORAL COMPRESS: NORMAL
BH CV LOWER VASCULAR RIGHT GASTRONEMIUS COMPRESS: NORMAL
BH CV LOWER VASCULAR RIGHT GREATER SAPH AK COMPRESS: NORMAL
BH CV LOWER VASCULAR RIGHT GREATER SAPH BK COMPRESS: NORMAL
BH CV LOWER VASCULAR RIGHT MID FEMORAL AUGMENT: NORMAL
BH CV LOWER VASCULAR RIGHT MID FEMORAL COMPETENT: NORMAL
BH CV LOWER VASCULAR RIGHT MID FEMORAL COMPRESS: NORMAL
BH CV LOWER VASCULAR RIGHT MID FEMORAL PHASIC: NORMAL
BH CV LOWER VASCULAR RIGHT MID FEMORAL SPONT: NORMAL
BH CV LOWER VASCULAR RIGHT PERONEAL COMPRESS: NORMAL
BH CV LOWER VASCULAR RIGHT POPLITEAL AUGMENT: NORMAL
BH CV LOWER VASCULAR RIGHT POPLITEAL COMPETENT: NORMAL
BH CV LOWER VASCULAR RIGHT POPLITEAL COMPRESS: NORMAL
BH CV LOWER VASCULAR RIGHT POPLITEAL PHASIC: NORMAL
BH CV LOWER VASCULAR RIGHT POPLITEAL SPONT: NORMAL
BH CV LOWER VASCULAR RIGHT POSTERIOR TIBIAL COMPRESS: NORMAL
BH CV LOWER VASCULAR RIGHT PROXIMAL FEMORAL COMPRESS: NORMAL
BH CV LOWER VASCULAR RIGHT SAPHENOFEMORAL JUNCTION AUGMENT: NORMAL
BH CV LOWER VASCULAR RIGHT SAPHENOFEMORAL JUNCTION COMPETENT: NORMAL
BH CV LOWER VASCULAR RIGHT SAPHENOFEMORAL JUNCTION COMPRESS: NORMAL
BH CV LOWER VASCULAR RIGHT SAPHENOFEMORAL JUNCTION PHASIC: NORMAL
BH CV LOWER VASCULAR RIGHT SAPHENOFEMORAL JUNCTION SPONT: NORMAL
BUN BLD-MCNC: 24 MG/DL (ref 8–23)
BUN/CREAT SERPL: 15.6 (ref 7–25)
C PNEUM DNA NPH QL NAA+NON-PROBE: NOT DETECTED
CALCIUM SPEC-SCNC: 8 MG/DL (ref 8.6–10.5)
CHLORIDE SERPL-SCNC: 96 MMOL/L (ref 98–107)
CO2 SERPL-SCNC: 22.8 MMOL/L (ref 22–29)
CREAT BLD-MCNC: 1.54 MG/DL (ref 0.76–1.27)
D-LACTATE SERPL-SCNC: 1.5 MMOL/L (ref 0.5–2)
DEPRECATED RDW RBC AUTO: 47.7 FL (ref 37–54)
EOSINOPHIL # BLD AUTO: 0.01 10*3/MM3 (ref 0–0.7)
EOSINOPHIL NFR BLD AUTO: 0 % (ref 0.3–6.2)
ERYTHROCYTE [DISTWIDTH] IN BLOOD BY AUTOMATED COUNT: 13.8 % (ref 11.5–14.5)
FLUAV H1 2009 PAND RNA NPH QL NAA+PROBE: NOT DETECTED
FLUAV H1 HA GENE NPH QL NAA+PROBE: NOT DETECTED
FLUAV H3 RNA NPH QL NAA+PROBE: NOT DETECTED
FLUAV SUBTYP SPEC NAA+PROBE: NOT DETECTED
FLUBV RNA ISLT QL NAA+PROBE: NOT DETECTED
GFR SERPL CREATININE-BSD FRML MDRD: 45 ML/MIN/1.73
GLUCOSE BLD-MCNC: 138 MG/DL (ref 65–99)
GLUCOSE BLDC GLUCOMTR-MCNC: 159 MG/DL (ref 70–130)
GLUCOSE BLDC GLUCOMTR-MCNC: 89 MG/DL (ref 70–130)
HADV DNA SPEC NAA+PROBE: NOT DETECTED
HCOV 229E RNA SPEC QL NAA+PROBE: NOT DETECTED
HCOV HKU1 RNA SPEC QL NAA+PROBE: NOT DETECTED
HCOV NL63 RNA SPEC QL NAA+PROBE: NOT DETECTED
HCOV OC43 RNA SPEC QL NAA+PROBE: NOT DETECTED
HCT VFR BLD AUTO: 30 % (ref 40.4–52.2)
HGB BLD-MCNC: 9.7 G/DL (ref 13.7–17.6)
HMPV RNA NPH QL NAA+NON-PROBE: NOT DETECTED
HOLD SPECIMEN: NORMAL
HPIV1 RNA SPEC QL NAA+PROBE: NOT DETECTED
HPIV2 RNA SPEC QL NAA+PROBE: NOT DETECTED
HPIV3 RNA NPH QL NAA+PROBE: NOT DETECTED
HPIV4 P GENE NPH QL NAA+PROBE: NOT DETECTED
IMM GRANULOCYTES # BLD: 0.08 10*3/MM3 (ref 0–0.03)
IMM GRANULOCYTES NFR BLD: 0.4 % (ref 0–0.5)
L PNEUMO1 AG UR QL IA: NEGATIVE
LYMPHOCYTES # BLD AUTO: 0.33 10*3/MM3 (ref 0.9–4.8)
LYMPHOCYTES NFR BLD AUTO: 1.5 % (ref 19.6–45.3)
M PNEUMO IGG SER IA-ACNC: NOT DETECTED
MAGNESIUM SERPL-MCNC: 1.5 MG/DL (ref 1.6–2.4)
MCH RBC QN AUTO: 30.8 PG (ref 27–32.7)
MCHC RBC AUTO-ENTMCNC: 32.3 G/DL (ref 32.6–36.4)
MCV RBC AUTO: 95.2 FL (ref 79.8–96.2)
MONOCYTES # BLD AUTO: 0.82 10*3/MM3 (ref 0.2–1.2)
MONOCYTES NFR BLD AUTO: 3.8 % (ref 5–12)
NEUTROPHILS # BLD AUTO: 20.5 10*3/MM3 (ref 1.9–8.1)
NEUTROPHILS NFR BLD AUTO: 94.6 % (ref 42.7–76)
NT-PROBNP SERPL-MCNC: 7163 PG/ML (ref 5–900)
PLATELET # BLD AUTO: 149 10*3/MM3 (ref 140–500)
PMV BLD AUTO: 11.2 FL (ref 6–12)
POTASSIUM BLD-SCNC: 3.7 MMOL/L (ref 3.5–5.2)
PROCALCITONIN SERPL-MCNC: 14.44 NG/ML (ref 0.1–0.25)
RBC # BLD AUTO: 3.15 10*6/MM3 (ref 4.6–6)
RHINOVIRUS RNA SPEC NAA+PROBE: NOT DETECTED
RSV RNA NPH QL NAA+NON-PROBE: NOT DETECTED
S PNEUM AG SPEC QL LA: NEGATIVE
SODIUM BLD-SCNC: 134 MMOL/L (ref 136–145)
TROPONIN T SERPL-MCNC: 0.02 NG/ML (ref 0–0.03)
TROPONIN T SERPL-MCNC: 0.03 NG/ML (ref 0–0.03)
WBC NRBC COR # BLD: 21.69 10*3/MM3 (ref 4.5–10.7)

## 2018-07-10 PROCEDURE — 87899 AGENT NOS ASSAY W/OPTIC: CPT | Performed by: INTERNAL MEDICINE

## 2018-07-10 PROCEDURE — 82962 GLUCOSE BLOOD TEST: CPT

## 2018-07-10 PROCEDURE — 99223 1ST HOSP IP/OBS HIGH 75: CPT | Performed by: INTERNAL MEDICINE

## 2018-07-10 PROCEDURE — 87081 CULTURE SCREEN ONLY: CPT | Performed by: INTERNAL MEDICINE

## 2018-07-10 PROCEDURE — 93970 EXTREMITY STUDY: CPT

## 2018-07-10 PROCEDURE — 0 TECHNETIUM ALBUMIN AGGREGATED: Performed by: INTERNAL MEDICINE

## 2018-07-10 PROCEDURE — 0 XENON XE 133: Performed by: INTERNAL MEDICINE

## 2018-07-10 PROCEDURE — 25010000003 CEFTRIAXONE PER 250 MG: Performed by: INTERNAL MEDICINE

## 2018-07-10 PROCEDURE — 78582 LUNG VENTILAT&PERFUS IMAGING: CPT

## 2018-07-10 PROCEDURE — 94640 AIRWAY INHALATION TREATMENT: CPT

## 2018-07-10 PROCEDURE — 25010000002 ENOXAPARIN PER 10 MG: Performed by: INTERNAL MEDICINE

## 2018-07-10 PROCEDURE — 87040 BLOOD CULTURE FOR BACTERIA: CPT | Performed by: INTERNAL MEDICINE

## 2018-07-10 PROCEDURE — 87633 RESP VIRUS 12-25 TARGETS: CPT | Performed by: INTERNAL MEDICINE

## 2018-07-10 PROCEDURE — 80048 BASIC METABOLIC PNL TOTAL CA: CPT | Performed by: INTERNAL MEDICINE

## 2018-07-10 PROCEDURE — A9540 TC99M MAA: HCPCS | Performed by: INTERNAL MEDICINE

## 2018-07-10 PROCEDURE — 84484 ASSAY OF TROPONIN QUANT: CPT | Performed by: INTERNAL MEDICINE

## 2018-07-10 PROCEDURE — 94799 UNLISTED PULMONARY SVC/PX: CPT

## 2018-07-10 PROCEDURE — 83880 ASSAY OF NATRIURETIC PEPTIDE: CPT | Performed by: INTERNAL MEDICINE

## 2018-07-10 PROCEDURE — 87581 M.PNEUMON DNA AMP PROBE: CPT | Performed by: INTERNAL MEDICINE

## 2018-07-10 PROCEDURE — 25010000002 FUROSEMIDE PER 20 MG

## 2018-07-10 PROCEDURE — 25010000002 FUROSEMIDE PER 20 MG: Performed by: INTERNAL MEDICINE

## 2018-07-10 PROCEDURE — 71045 X-RAY EXAM CHEST 1 VIEW: CPT

## 2018-07-10 PROCEDURE — 85025 COMPLETE CBC W/AUTO DIFF WBC: CPT | Performed by: INTERNAL MEDICINE

## 2018-07-10 PROCEDURE — 83605 ASSAY OF LACTIC ACID: CPT | Performed by: PHYSICIAN ASSISTANT

## 2018-07-10 PROCEDURE — 25010000002 CEFEPIME PER 500 MG: Performed by: INTERNAL MEDICINE

## 2018-07-10 PROCEDURE — 84145 PROCALCITONIN (PCT): CPT | Performed by: INTERNAL MEDICINE

## 2018-07-10 PROCEDURE — 87486 CHLMYD PNEUM DNA AMP PROBE: CPT | Performed by: INTERNAL MEDICINE

## 2018-07-10 PROCEDURE — 83735 ASSAY OF MAGNESIUM: CPT | Performed by: INTERNAL MEDICINE

## 2018-07-10 PROCEDURE — 87798 DETECT AGENT NOS DNA AMP: CPT | Performed by: INTERNAL MEDICINE

## 2018-07-10 PROCEDURE — 25010000002 VANCOMYCIN 10 G RECONSTITUTED SOLUTION: Performed by: INTERNAL MEDICINE

## 2018-07-10 PROCEDURE — 99221 1ST HOSP IP/OBS SF/LOW 40: CPT | Performed by: INTERNAL MEDICINE

## 2018-07-10 PROCEDURE — A9558 XE133 XENON 10MCI: HCPCS | Performed by: INTERNAL MEDICINE

## 2018-07-10 RX ORDER — ALBUTEROL SULFATE 2.5 MG/3ML
2.5 SOLUTION RESPIRATORY (INHALATION) EVERY 6 HOURS PRN
Status: DISCONTINUED | OUTPATIENT
Start: 2018-07-10 | End: 2018-07-12

## 2018-07-10 RX ORDER — SODIUM CHLORIDE 0.9 % (FLUSH) 0.9 %
1-10 SYRINGE (ML) INJECTION AS NEEDED
Status: DISCONTINUED | OUTPATIENT
Start: 2018-07-10 | End: 2018-07-18 | Stop reason: HOSPADM

## 2018-07-10 RX ORDER — CEFTRIAXONE SODIUM 2 G/50ML
2 INJECTION, SOLUTION INTRAVENOUS EVERY 24 HOURS
Status: DISCONTINUED | OUTPATIENT
Start: 2018-07-10 | End: 2018-07-18 | Stop reason: HOSPADM

## 2018-07-10 RX ORDER — NITROGLYCERIN 0.4 MG/1
0.4 TABLET SUBLINGUAL
Status: DISCONTINUED | OUTPATIENT
Start: 2018-07-10 | End: 2018-07-18 | Stop reason: HOSPADM

## 2018-07-10 RX ORDER — IPRATROPIUM BROMIDE AND ALBUTEROL SULFATE 2.5; .5 MG/3ML; MG/3ML
3 SOLUTION RESPIRATORY (INHALATION)
Status: DISCONTINUED | OUTPATIENT
Start: 2018-07-10 | End: 2018-07-12

## 2018-07-10 RX ORDER — ONDANSETRON 2 MG/ML
4 INJECTION INTRAMUSCULAR; INTRAVENOUS EVERY 6 HOURS PRN
Status: DISCONTINUED | OUTPATIENT
Start: 2018-07-10 | End: 2018-07-18 | Stop reason: HOSPADM

## 2018-07-10 RX ORDER — PANTOPRAZOLE SODIUM 40 MG/1
40 TABLET, DELAYED RELEASE ORAL EVERY MORNING
Status: DISCONTINUED | OUTPATIENT
Start: 2018-07-10 | End: 2018-07-18 | Stop reason: HOSPADM

## 2018-07-10 RX ORDER — FUROSEMIDE 10 MG/ML
40 INJECTION INTRAMUSCULAR; INTRAVENOUS ONCE
Status: COMPLETED | OUTPATIENT
Start: 2018-07-10 | End: 2018-07-10

## 2018-07-10 RX ORDER — FUROSEMIDE 10 MG/ML
INJECTION INTRAMUSCULAR; INTRAVENOUS
Status: DISPENSED
Start: 2018-07-10 | End: 2018-07-11

## 2018-07-10 RX ORDER — ZOLPIDEM TARTRATE 5 MG/1
5 TABLET ORAL NIGHTLY PRN
Status: DISCONTINUED | OUTPATIENT
Start: 2018-07-10 | End: 2018-07-18 | Stop reason: HOSPADM

## 2018-07-10 RX ORDER — ACETAMINOPHEN 325 MG/1
650 TABLET ORAL EVERY 6 HOURS PRN
Status: DISCONTINUED | OUTPATIENT
Start: 2018-07-10 | End: 2018-07-18 | Stop reason: HOSPADM

## 2018-07-10 RX ORDER — ASPIRIN 81 MG/1
81 TABLET, CHEWABLE ORAL DAILY
Status: DISCONTINUED | OUTPATIENT
Start: 2018-07-10 | End: 2018-07-18 | Stop reason: HOSPADM

## 2018-07-10 RX ORDER — IPRATROPIUM BROMIDE AND ALBUTEROL SULFATE 2.5; .5 MG/3ML; MG/3ML
3 SOLUTION RESPIRATORY (INHALATION) EVERY 4 HOURS PRN
Status: DISCONTINUED | OUTPATIENT
Start: 2018-07-10 | End: 2018-07-12

## 2018-07-10 RX ORDER — MAGNESIUM OXIDE 400 MG/1
400 TABLET ORAL DAILY
Status: DISCONTINUED | OUTPATIENT
Start: 2018-07-10 | End: 2018-07-18 | Stop reason: HOSPADM

## 2018-07-10 RX ORDER — BUPROPION HYDROCHLORIDE 150 MG/1
150 TABLET ORAL DAILY
Status: DISCONTINUED | OUTPATIENT
Start: 2018-07-10 | End: 2018-07-18 | Stop reason: HOSPADM

## 2018-07-10 RX ORDER — CARVEDILOL 3.12 MG/1
3.12 TABLET ORAL EVERY 12 HOURS SCHEDULED
Status: DISCONTINUED | OUTPATIENT
Start: 2018-07-10 | End: 2018-07-17

## 2018-07-10 RX ORDER — FUROSEMIDE 10 MG/ML
20 INJECTION INTRAMUSCULAR; INTRAVENOUS EVERY 12 HOURS
Status: DISCONTINUED | OUTPATIENT
Start: 2018-07-10 | End: 2018-07-11

## 2018-07-10 RX ADMIN — ALBUTEROL SULFATE 2.5 MG: 2.5 SOLUTION RESPIRATORY (INHALATION) at 09:55

## 2018-07-10 RX ADMIN — XENON XE-133 10.9 MILLICURIE: 10 GAS RESPIRATORY (INHALATION) at 08:15

## 2018-07-10 RX ADMIN — ASPIRIN 81 MG: 81 TABLET, CHEWABLE ORAL at 11:22

## 2018-07-10 RX ADMIN — Medication 400 MG: at 17:58

## 2018-07-10 RX ADMIN — ENOXAPARIN SODIUM 40 MG: 40 INJECTION SUBCUTANEOUS at 08:53

## 2018-07-10 RX ADMIN — BUPROPION HYDROCHLORIDE 150 MG: 150 TABLET, EXTENDED RELEASE ORAL at 11:22

## 2018-07-10 RX ADMIN — PANTOPRAZOLE SODIUM 40 MG: 40 TABLET, DELAYED RELEASE ORAL at 06:34

## 2018-07-10 RX ADMIN — CARVEDILOL 3.12 MG: 3.12 TABLET, FILM COATED ORAL at 08:53

## 2018-07-10 RX ADMIN — VANCOMYCIN HYDROCHLORIDE 1750 MG: 10 INJECTION, POWDER, LYOPHILIZED, FOR SOLUTION INTRAVENOUS at 12:25

## 2018-07-10 RX ADMIN — ALBUTEROL SULFATE 2.5 MG: 2.5 SOLUTION RESPIRATORY (INHALATION) at 05:46

## 2018-07-10 RX ADMIN — CEFTRIAXONE SODIUM 2 G: 2 INJECTION, SOLUTION INTRAVENOUS at 17:58

## 2018-07-10 RX ADMIN — SODIUM CHLORIDE 250 ML: 9 INJECTION, SOLUTION INTRAVENOUS at 00:12

## 2018-07-10 RX ADMIN — IPRATROPIUM BROMIDE AND ALBUTEROL SULFATE 3 ML: .5; 3 SOLUTION RESPIRATORY (INHALATION) at 19:41

## 2018-07-10 RX ADMIN — FUROSEMIDE 40 MG: 10 INJECTION, SOLUTION INTRAMUSCULAR; INTRAVENOUS at 16:09

## 2018-07-10 RX ADMIN — Medication 1 DOSE: at 08:15

## 2018-07-10 RX ADMIN — CEFEPIME HYDROCHLORIDE 2 G: 2 INJECTION, POWDER, FOR SOLUTION INTRAVENOUS at 06:35

## 2018-07-10 RX ADMIN — SODIUM CHLORIDE 100 ML/HR: 9 INJECTION, SOLUTION INTRAVENOUS at 06:36

## 2018-07-10 RX ADMIN — ACETAMINOPHEN 650 MG: 325 TABLET, FILM COATED ORAL at 09:17

## 2018-07-10 RX ADMIN — ZOLPIDEM TARTRATE 5 MG: 5 TABLET ORAL at 21:47

## 2018-07-10 RX ADMIN — ALBUTEROL SULFATE 2.5 MG: 2.5 SOLUTION RESPIRATORY (INHALATION) at 16:05

## 2018-07-10 NOTE — ED NOTES
"Pt reports weakness of his legs. Pt's son reports \"he has very low heart function. He is supposed to go to [facility] to possibly have a VAD placed. The heart issue has made the kidneys messed up.\" +chills and nausea. Denies chest pain, SOA. Denies urinary symptoms. Reassurance given; call light in reach. Pts breathing even and unlabored. Pt appears in NAD at this time. Family at bedside. AYDEE Carlton at bedside for eval.       Flory Zapien RN  07/09/18 2028    "

## 2018-07-10 NOTE — CONSULTS
Patient Name: Mello Arias Jr.  Age/Sex: 67 y.o. male  : 1950  MRN: [id number]    Date of Admission: 2018  Date of Encounter Visit: 07/10/18  Encounter Provider: Naun Calvin MD  Place of Service: [facility] CARDIOLOGY      Referring Provider: Jamin Olvera*  Patient Care Team:  Kelsey Muñoz MD as PCP - General (Family Medicine)  Kelsey Muñoz MD as PCP - Family Medicine  Christy Mcwilliams MD as PCP - Claims Attributed  Yesenia Saucedo RN as Care Coordinator (Population Health)    Subjective:   Consulted for: CHF    Chief Complaint: chills    New York Heart Association NYHA Class: 3  History of Present Illness:  Mello Arias Jr. is a 67 y.o. male with a history of history of coronary artery disease, myocardial infarction in , status post angioplasty of the LAD, ventricular tachycardia (ICD) and paroxysmal atrial fibrillation, left ventricular dysfucntion. In , his EF was noted to be 19%.  He had abnormal stress test but it was thought to be stable.  A week after testing performed, he presented with precordial chest pain.  He underwent a CABG ×4 and mitral valve prosthesis with a tissue prosthesis and tricuspid valve repair.  After surgery, his EF continued to be decreased to around 20%.  He did have an ICD ([employer]) placed for ventricular tachycardia. He was hospitalized from -18 with c/o chest pain and palpitations. His Entresto dose was increased and low dose coreg was added. Sending him to advanced HF clinic at  was discussed and apparently he has an upcoming appointment this Thursday.    He presented to the ED last night with c/o shaking and chills, back pain, weakness and frequent urination. On arrival his HR was 118, bp 102/71, T 99.0. Labs showed a bun/creat of 24/1.5 (1.09), Na 133, proBNP 1268, trop 0.039 (<0.010), mag 1.2, procal 1.5, lactate 2.1 and WBC 13.3. CXR revealed a mild LLL infiltrate. He was  placed on IV antibiotics and admitted. We have been asked to see for CHF management. He is currently soa at rest, but did just come back from getting x-rays. There are IVF running at 100cc/hr. SBP was in the 70-80 range last night and Entresto is on hold. He remains tachycardic with rates up to 110.       Cardiac testing:   Echo 6/2018  · Left ventricular systolic function is severely decreased. Calculated EF = 19%. Estimated EF was in agreement with the calculated EF. Global left ventricular wall motion appears abnormal. The left ventricular cavity is severely dilated. Left ventricular diastolic function was indeterminate. Elevated left atrial pressure.  · Extensive wall motion abnormalities are present; see wall scoring diagram.  · There is a bioprosthetic mitral valve present. There is at least mild prosthetic valve stenosis (mean gradient 8 mm Hg).  · Mild to moderate tricuspid valve regurgitation is present. Estimated right ventricular systolic pressure from tricuspid regurgitation is moderately elevated (45-55 mmHg). Calculated right ventricular systolic pressure from tricuspid regurgitation is 54 mmHg.  PPM interrogation 5/17/18    Cardiac Catheterization 10/2016  Hemodynamics:  1.  Right atrium: A wave 9, V wave 8, mean 7  2.  Right ventricle: 54/3   3.  Pulmonary artery: 54/20, mean 32  4.  PCW: A wave 16, V wave 32, mean 16  5.  Aorta: 101/53, mean 76  6.  Left ventricle: 101/10  7.  Cardiac output:( Freda) 5.09 L/m  8.  Cardiac index: (Freda) 2.64 L/m/m²     Cineangiography:  1.  Left main: Normal.  2.  LAD: The left anterior descending coronary artery is completely occluded in the proximal segment.  The occlusion is at the site of her previously placed stent.  There is some filling of the distal LAD via collateral circulation.  3.  LCx: Proximal segment of the left circumflex is narrowed 20%.  Segment is also mildly narrowed 20%.  The artery terminates in the AV groove as a small vessel.  There is a large  obtuse marginal branch arising from the mid segment that appears to be normal.  Collateralization to the LAD is seen via the left circumflex.  4.  RCA: The proximal segment of the right coronary artery is narrowed 30%.  The mid segment of the artery is also mildly irregular with a narrowing of 20%.  The distal segment of the artery tapers to a 75% stenosis at the crux.  The posterior descending and large posterior lateral branch arises from this crux both of which are involved in the stenosis.  Individually the PDA is large and normal.  The posterior lateral segment artery and its branches are large and normal.     Left ventriculography: The overall size of the left ventricle is dilated.  The global contractility of the ventricle is abnormal.  There appears to be normal anterobasilar contraction.  The anterior wall is akinetic.  The apex is dyskinetic and inferoapical segment is akinetic.  The inferior and inferobasilar segments are severely hypokinetic.  The overall ejection fraction appears to be approximately 20%.  Moderate mitral regurgitation is also noted.  Past Medical History:  Past Medical History:   Diagnosis Date   • Allergic rhinitis    • Anemia    • Asthma    • Bone fracture    • CAD (coronary artery disease)    • Cardiomyopathy, ischemic    • Carpal tunnel syndrome    • CHF (congestive heart failure), NYHA class III, chronic, systolic (CMS/HCC)    • GERD (gastroesophageal reflux disease)    • Health care maintenance    • Herniated disc, cervical    • Hx of total hip arthroplasty 3/18/2016   • Hyperglycemia    • Hyperlipidemia    • Hypertension    • Idiopathic ventricular tachycardia (CMS/HCC)    • Lumbosacral radiculopathy at L4    • Myocardial infarction     OF INFERIOR WALL, GREATER THAN 8 WEEKS   • Myocardial ischemia     POST MYOCARDIAL INFARCTION, POST PTCA WITH STENT PLACEMENT   • Non-rheumatic tricuspid valve insufficiency    • Nonrheumatic mitral valve regurgitation    • Open wound     OF LEFT  INDEX FINGER WITHOUT DAMAGE TO NAIL   • Osteoarthritis    • PAF (paroxysmal atrial fibrillation) (CMS/HCC)    • Ventricular tachycardia (CMS/HCC)     POST AICD       Past Surgical History:   Procedure Laterality Date   • APPENDECTOMY     • CARDIAC CATHETERIZATION N/A 10/21/2016    Procedure: Coronary angiography;  Surgeon: Naun Calvin MD;  Location: [facility] CATH INVASIVE LOCATION;  Service:    • CARDIAC CATHETERIZATION N/A 10/21/2016    Procedure: Left heart cath;  Surgeon: Naun Calvin MD;  Location: [facility] CATH INVASIVE LOCATION;  Service:    • CARDIAC CATHETERIZATION N/A 10/21/2016    Procedure: Left ventriculography;  Surgeon: Naun Calvin MD;  Location: [facility] CATH INVASIVE LOCATION;  Service:    • CARDIAC CATHETERIZATION N/A 10/21/2016    Procedure: Right heart cath;  Surgeon: Naun Calvin MD;  Location: [facility] CATH INVASIVE LOCATION;  Service:    • CARDIAC DEFIBRILLATOR PLACEMENT     • CARPAL TUNNEL RELEASE     • COLONOSCOPY     • CORONARY ANGIOPLASTY      WITH STENT PLACEMENT TO THE LAD   • CORONARY ARTERY BYPASS GRAFT WITH MITRAL VALVE REPAIR/REPLACEMENT N/A 11/7/2016    Procedure: JAYE STERNOTOMY CORONARY ARTERY BYPASS GRAFT TIMES 4 USING LEFT INTERNAL MAMMARY ARTERY AND LEFT GREATER SAPHENOUS VEIN GRAFT PER ENDOSCOPIC VEIN HARVESTING, MITRAL VALVE REPLACEMENT AND TRICUSPID VALVE REPAIR;  Surgeon: Slade Hurtado MD;  Location: [facility];  Service:    • EYE SURGERY     • FOOT SURGERY Left    • HERNIA REPAIR     • IMPLANTABLE CARDIOVERTER DEFIBRILLATOR LEAD REPLACEMENT/POCKET REVISION     • INGUINAL HERNIA REPAIR Left    • LASIK     • TONSILLECTOMY     • TOTAL HIP ARTHROPLASTY Bilateral        Home Medications:   Prescriptions Prior to Admission   Medication Sig Dispense Refill Last Dose   • acetaminophen (TYLENOL) 325 MG tablet Take 2 tablets by mouth Every 6 (Six) Hours As Needed for Mild Pain .  0    • albuterol (PROAIR HFA) 108 (90 BASE) MCG/ACT inhaler Inhale 2 puffs  Every 6 (Six) Hours As Needed.   6/4/2018 at Unknown time   • aspirin 81 MG tablet Take 81 mg by mouth Daily.   6/3/2018 at Unknown time   • buPROPion XL (WELLBUTRIN XL) 150 MG 24 hr tablet Take 1 tablet by mouth Daily. 30 tablet 0    • carvedilol (COREG) 3.125 MG tablet Take 1 tablet by mouth Every 12 (Twelve) Hours. 60 tablet 0    • furosemide (LASIX) 20 MG tablet Take 1 tablet by mouth Daily. 30 tablet 5    • magnesium oxide (MAG-OX) 400 MG tablet Take 1 tablet by mouth Daily. 30 tablet 0    • montelukast (SINGULAIR) 10 MG tablet Take 10 mg by mouth Every Night.   6/3/2018 at Unknown time   • nitroglycerin (NITROSTAT) 0.4 MG SL tablet Place 0.4 mg under the tongue Every 5 (Five) Minutes As Needed for Chest Pain. Take no more than 3 doses in 15 minutes.   Unknown at Unknown time   • omeprazole (PRILOSEC) 20 MG capsule Take 20 mg by mouth Daily.   6/3/2018 at Unknown time   • sacubitril-valsartan (ENTRESTO) 49-51 MG tablet Take 1 tablet by mouth Every 12 (Twelve) Hours. 60 tablet 0        Allergies:  Allergies   Allergen Reactions   • Shellfish-Derived Products Swelling and Other (See Comments)     THROAT SWELLS       Past Social History:  Social History     Social History   • Marital status: Single     Spouse name: N/A   • Number of children: N/A   • Years of education: N/A     Occupational History   • Not on file.     Social History Main Topics   • Smoking status: Never Smoker   • Smokeless tobacco: Never Used   • Alcohol use 6.0 oz/week     10 Shots of liquor per week      Comment: social drinker   • Drug use: No   • Sexual activity: Defer     Other Topics Concern   • Not on file     Social History Narrative   • No narrative on file        Past Family History:  Family History   Problem Relation Age of Onset   • Stroke Mother    • Osteoarthritis Mother        Review of Systems: All systems reviewed. Pertinent positives identified in HPI. All other systems are negative.     REVIEW OF SYSTEMS:   CONSTITUTIONAL: No  weight loss, fever, chills, weakness or fatigue.   HEENT: Eyes: No visual loss, blurred vision, double vision or yellow sclerae. Ears, Nose, Throat: No hearing loss, sneezing, congestion, runny nose or sore throat.   SKIN: No rash or itching.     RESPIRATORY: No shortness of breath, hemoptysis, cough or sputum.   GASTROINTESTINAL: No anorexia, nausea, vomiting or diarrhea. No abdominal pain, bright red blood per rectum or melena.  GENITOURINARY: No burning on urination, hematuria or increased frequency.  NEUROLOGICAL: No headache, dizziness, syncope, paralysis, ataxia, numbness or tingling in the extremities. No change in bowel or bladder control.   MUSCULOSKELETAL: No muscle, back pain, joint pain or stiffness.   HEMATOLOGIC: No anemia, bleeding or bruising.   LYMPHATICS: No enlarged nodes. No history of splenectomy.   PSYCHIATRIC: No history of depression, anxiety, hallucinations.   ENDOCRINOLOGIC: No reports of sweating, cold or heat intolerance. No polyuria or polydipsia.       Objective:     Objective:  Temp:  [99 °F (37.2 °C)-99.6 °F (37.6 °C)] 99.4 °F (37.4 °C)  Heart Rate:  [104-132] 104  Resp:  [16-18] 16  BP: ()/(49-71) 99/60    Intake/Output Summary (Last 24 hours) at 07/10/18 0918  Last data filed at 07/10/18 0636   Gross per 24 hour   Intake             1800 ml   Output                0 ml   Net             1800 ml     Body mass index is 28.59 kg/m².  1    07/09/18  1940 07/10/18  0520   Weight: 86.2 kg (190 lb) 85.3 kg (188 lb)           Physical Exam:   Physical Exam   Constitutional: He is oriented to person, place, and time. He appears well-developed and well-nourished. He appears ill.   HENT:   Head: Normocephalic.   Eyes: Pupils are equal, round, and reactive to light.   Neck: Normal range of motion. No JVD present. Carotid bruit is not present. No thyromegaly present.   Cardiovascular: Normal rate, regular rhythm, S1 normal, S2 normal, normal heart sounds and intact distal pulses.  Exam  reveals no gallop and no friction rub.    No murmur heard.  Pulmonary/Chest: Effort normal. He has decreased breath sounds in the right lower field.   Abdominal: Soft. Bowel sounds are normal.   Musculoskeletal: He exhibits no edema.   Neurological: He is alert and oriented to person, place, and time.   Skin: Skin is warm, dry and intact. No erythema.   Psychiatric: He has a normal mood and affect.   Vitals reviewed.        Lab Review:       Results from last 7 days  Lab Units 07/10/18  0252 07/09/18  2101   SODIUM mmol/L 134* 133*   POTASSIUM mmol/L 3.7 3.7   CHLORIDE mmol/L 96* 93*   CO2 mmol/L 22.8 24.9   BUN mg/dL 24* 24*   CREATININE mg/dL 1.54* 1.51*   GLUCOSE mg/dL 138* 119*   CALCIUM mg/dL 8.0* 9.0         Results from last 7 days  Lab Units 07/09/18  2101   TROPONIN T ng/mL 0.039*       Results from last 7 days  Lab Units 07/10/18  0252   WBC 10*3/mm3 21.69*   HEMOGLOBIN g/dL 9.7*   HEMATOCRIT % 30.0*   PLATELETS 10*3/mm3 149               Results from last 7 days  Lab Units 07/10/18  0252   MAGNESIUM mg/dL 1.5*           Results from last 7 days  Lab Units 07/09/18  2101   PROBNP pg/mL 1,268.0*               Imaging:    Imaging Results (most recent)     Procedure Component Value Units Date/Time    NM Lung Ventilation Perfusion [[id number]] Updated:  07/10/18 0802    XR Chest 1 View [[id number]] Collected:  07/09/18 2207     Updated:  07/09/18 2208    Narrative:       PORTABLE CHEST     HISTORY: Weakness.     FINDINGS: A single portable view of the chest demonstrates a cardiac  pacer with leads terminating in the right atrium and right ventricle.  The heart is within normal limits in size. There is mild elevation in  the right hemidiaphragm. There is mild atelectasis/infiltrate present in  the retrocardiac region, new versus 06/26/2018. There is no evidence of  congestive failure, consolidation or of gross effusion.             EKG:         Baseline:     I personally viewed and interpreted the patient's  EKG/Telemetry data.    Assessment:   Assessment/Plan       Principal Problem:    Sepsis (CMS/HCC)  Active Problems:    A-fib (CMS/[facility])    CHF (congestive heart failure), NYHA class III (CMS/HCC)    Coronary artery disease of native artery of native heart with stable angina pectoris (CMS/HCC)    Acute renal failure (CMS/HCC)    Hyponatremia    Pneumonia    Hypomagnesemia        Plan:     Very concerning that pt is back with another sepsis episode.  His cardiac condition is deteriorating.  He has apt. With [facility] Clinic at  Thursday.  I will reach out to the physicians about considering a transfer to  for eval.  Prognosis guarded.    Thank you for allowing me to participate in the care of Mello Arias Jr.. Feel free to contact me directly with any further questions or concerns.    Naun Calvin MD  [facility]  07/10/18  9:18 AM

## 2018-07-10 NOTE — H&P
[facility] Admission H&P    Patient Care Team:  Kelsey Muñoz MD as PCP - General (Family Medicine)  Kelsey Muñoz MD as PCP - Family Medicine  Christy Mcwilliams MD as PCP - [facility]  Yesenia Saucedo RN as Care Coordinator (Population Health)    Chief complaint: Generalized weakness, chills, leg pain    History of Present Illness    This is a 67-year-old male with a complicated past medical history including ischemic cardiomyopathy with systolic congestive heart failure, CAD, and valvular disease who presented to the emergency room with complaints of generalized weakness and shaking chills.  He has had recent admissions for his heart failure as well as acute renal failure.  His symptoms started suddenly today after he returned home from an appointment with his ophthalmologist.  He became so weak that he could barely stand.  He also reports burning/painful sensation in the back of his and calves earlier today but this is now resolved.  He denies any true fever.  He is not a very good historian and his son at bedside provides most history.  In the emergency room he was found to be febrile with a leukocytosis, elevated lactic acid and pro-calcitonin.  Chest x-ray was concerning for a left base infiltrate.  The patient denies any shortness of breath.  He has a chronic cough.  No chest pain but he does experience occasional palpitations.  No nausea or vomiting.  No changes in bladder or bowel habits.  He denies any lower extremity swelling.    Past Medical History:   Diagnosis Date   • Allergic rhinitis    • Anemia    • Asthma    • Bone fracture    • CAD (coronary artery disease)    • Cardiomyopathy, ischemic    • Carpal tunnel syndrome    • CHF (congestive heart failure), NYHA class III, chronic, systolic (CMS/[facility])    • GERD (gastroesophageal reflux disease)    • Health care maintenance    • Herniated disc, cervical    • Hx of total hip arthroplasty 3/18/2016   • Hyperglycemia    • Hyperlipidemia    • Hypertension     • Idiopathic ventricular tachycardia (CMS/HCC)    • Lumbosacral radiculopathy at L4    • Myocardial infarction     OF INFERIOR WALL, GREATER THAN 8 WEEKS   • Myocardial ischemia     POST MYOCARDIAL INFARCTION, POST PTCA WITH STENT PLACEMENT   • Non-rheumatic tricuspid valve insufficiency    • Nonrheumatic mitral valve regurgitation    • Open wound     OF LEFT INDEX FINGER WITHOUT DAMAGE TO NAIL   • Osteoarthritis    • PAF (paroxysmal atrial fibrillation) (CMS/HCC)    • Ventricular tachycardia (CMS/HCC)     POST AICD     Past Surgical History:   Procedure Laterality Date   • APPENDECTOMY     • CARDIAC CATHETERIZATION N/A 10/21/2016    Procedure: Coronary angiography;  Surgeon: Naun Calvin MD;  Location: [facility] CATH INVASIVE LOCATION;  Service:    • CARDIAC CATHETERIZATION N/A 10/21/2016    Procedure: Left heart cath;  Surgeon: Naun Calvin MD;  Location: [facility] CATH INVASIVE LOCATION;  Service:    • CARDIAC CATHETERIZATION N/A 10/21/2016    Procedure: Left ventriculography;  Surgeon: Naun Calvin MD;  Location: [facility] CATH INVASIVE LOCATION;  Service:    • CARDIAC CATHETERIZATION N/A 10/21/2016    Procedure: Right heart cath;  Surgeon: Naun Calvin MD;  Location: [facility] CATH INVASIVE LOCATION;  Service:    • CARDIAC DEFIBRILLATOR PLACEMENT     • CARPAL TUNNEL RELEASE     • COLONOSCOPY     • CORONARY ANGIOPLASTY      WITH STENT PLACEMENT TO THE LAD   • CORONARY ARTERY BYPASS GRAFT WITH MITRAL VALVE REPAIR/REPLACEMENT N/A 11/7/2016    Procedure: JAYE STERNOTOMY CORONARY ARTERY BYPASS GRAFT TIMES 4 USING LEFT INTERNAL MAMMARY ARTERY AND LEFT GREATER SAPHENOUS VEIN GRAFT PER ENDOSCOPIC VEIN HARVESTING, MITRAL VALVE REPLACEMENT AND TRICUSPID VALVE REPAIR;  Surgeon: Slade Hurtado MD;  Location: [facility] OR;  Service:    • EYE SURGERY     • FOOT SURGERY Left    • HERNIA REPAIR     • IMPLANTABLE CARDIOVERTER DEFIBRILLATOR LEAD REPLACEMENT/POCKET REVISION     • INGUINAL HERNIA REPAIR Left     • LASIK     • TONSILLECTOMY     • TOTAL HIP ARTHROPLASTY Bilateral      Family History   Problem Relation Age of Onset   • Stroke Mother    • Osteoarthritis Mother      Social History   Substance Use Topics   • Smoking status: Never Smoker   • Smokeless tobacco: Never Used   • Alcohol use 6.0 oz/week     10 Shots of liquor per week      Comment: social drinker     Prescriptions Prior to Admission   Medication Sig Dispense Refill Last Dose   • acetaminophen (TYLENOL) 325 MG tablet Take 2 tablets by mouth Every 6 (Six) Hours As Needed for Mild Pain .  0    • albuterol (PROAIR HFA) 108 (90 BASE) MCG/ACT inhaler Inhale 2 puffs Every 6 (Six) Hours As Needed.   2018 at Unknown time   • aspirin 81 MG tablet Take 81 mg by mouth Daily.   6/3/2018 at Unknown time   • buPROPion XL (WELLBUTRIN XL) 150 MG 24 hr tablet Take 1 tablet by mouth Daily. 30 tablet 0    • carvedilol (COREG) 3.125 MG tablet Take 1 tablet by mouth Every 12 (Twelve) Hours. 60 tablet 0    • furosemide (LASIX) 20 MG tablet Take 1 tablet by mouth Daily. 30 tablet 5    • magnesium oxide (MAG-OX) 400 MG tablet Take 1 tablet by mouth Daily. 30 tablet 0    • montelukast (SINGULAIR) 10 MG tablet Take 10 mg by mouth Every Night.   6/3/2018 at Unknown time   • nitroglycerin (NITROSTAT) 0.4 MG SL tablet Place 0.4 mg under the tongue Every 5 (Five) Minutes As Needed for Chest Pain. Take no more than 3 doses in 15 minutes.   Unknown at Unknown time   • omeprazole (PRILOSEC) 20 MG capsule Take 20 mg by mouth Daily.   6/3/2018 at Unknown time   • sacubitril-valsartan (ENTRESTO) 49-51 MG tablet Take 1 tablet by mouth Every 12 (Twelve) Hours. 60 tablet 0      Allergies:  Shellfish-derived products    Review of Systems     PHYSICAL EXAM    Vital Signs  tMax 24 hrs:  Temp (24hrs), Av.3 °F (37.4 °C), Min:99 °F (37.2 °C), Max:99.6 °F (37.6 °C)    Vitals Ranges:  Temp:  [99 °F (37.2 °C)-99.6 °F (37.6 °C)] 99.6 °F (37.6 °C)  Heart Rate:  [105-132] 122  Resp:  [18]  18  BP: ()/(49-71) 93/49    Physical Exam   Constitutional: He is oriented to person, place, and time. He appears well-developed and well-nourished. No distress.   He does not appear septic or toxic.  He is somewhat pale and chronically ill-appearing.   HENT:   Head: Normocephalic and atraumatic.   Eyes: EOM are normal. Pupils are equal, round, and reactive to light.   Neck: Neck supple. No tracheal deviation present.   Cardiovascular: Regular rhythm.  Exam reveals no gallop.    Murmur heard.  Mildly tachycardic in the low 100s, appears irregular on telemetry   Pulmonary/Chest: Effort normal. No respiratory distress. He has no wheezes.   Abdominal: Soft. Bowel sounds are normal. He exhibits no distension. There is no tenderness.   Mild abdominal bloating but no pain, distention or other abnormality.  Benign abdominal exam.   Musculoskeletal: He exhibits no edema or tenderness.   Neurological: He is alert and oriented to person, place, and time. No cranial nerve deficit.   Skin: Skin is warm and dry.   Nursing note and vitals reviewed.      Results Review:    Results from last 7 days  Lab Units 07/09/18  2101   WBC 10*3/mm3 13.29*   HEMOGLOBIN g/dL 11.3*   HEMATOCRIT % 34.6*   PLATELETS 10*3/mm3 175       Results from last 7 days  Lab Units 07/09/18  2101   SODIUM mmol/L 133*   POTASSIUM mmol/L 3.7   CHLORIDE mmol/L 93*   CO2 mmol/L 24.9   BUN mg/dL 24*   CREATININE mg/dL 1.51*   CALCIUM mg/dL 9.0   BILIRUBIN mg/dL 0.5   ALK PHOS U/L 100   ALT (SGPT) U/L 11   AST (SGOT) U/L 14   GLUCOSE mg/dL 119*        I reviewed the patient's new clinical results.  I reviewed the patient's new imaging results and agree with the interpretation.  I personally viewed and interpreted the patient's EKG/Telemetry data      Principal Problem:    Sepsis (CMS/[facility])  Active Problems:    A-fib (CMS/HCC)    CHF (congestive heart failure), NYHA class III (CMS/[facility])    Coronary artery disease of native artery of native heart with stable  angina pectoris (CMS/[facility])    Acute renal failure (CMS/[facility])    Hyponatremia    Pneumonia    Hypomagnesemia      Assessment & Plan    The patient will be admitted.  He needs sepsis criteria and current workup is most suggestive of a pneumonia.  Due to his recent hospitalization he will be started on cefepime and vancomycin.  Viral panel, MRSA screen, Legionella and strep pneumo urine antigens will be obtained.  He received bolus IV fluids in the emergency room and we will continue him on gentle maintenance fluids with a close eye on his volume status given his advanced heart failure.  I will ask cardiology to assist with management of this.  He really does not have much in the way of pneumonia symptoms.  I am not sure what to make of his complaints of leg pain/burning earlier today but I will go ahead and order a lower extremity Doppler and check a VQ scan, given his renal insufficiency, for further evaluation of thromboembolism.  He received magnesium replacement and we will recheck a level this morning.  Blood pressure is running on the low side which is not all that abnormal for him.  I will hold his Entresto for the time being.  Additional plans based on his clinical course.    I discussed the patients findings and my recommendations with patient, family and nursing staff    Jamin Lyons MD  07/10/18  1:36 AM

## 2018-07-10 NOTE — PROGRESS NOTES
Discharge Planning Assessment  [facility]     Patient Name: Mello Arias Jr.  MRN: [id number]  Today's Date: 7/10/2018    Admit Date: 7/9/2018          Discharge Needs Assessment    No documentation.           Discharge Plan     Row Name 07/10/18 1352       Plan    Plan Home no needs    Plan Comments IMM 07/09   CCP met with patient at bedside. CCP role explained and discharge planning discussed.  Face sheet verified.   IMM noted.  His emergency contact is his son Dylan [phone]. His PCP is Dr Kelsey Pritchett.  He lives alone in a single story House.  He uses a no DME. He is independent with ADL's.   Pt has no history of home health.  Patient has been to rehab at [facility].   Pt obtains his medications from [facility]'s pharmacy on [address] and [place] [id number]. Patient has transportation.  Plan is Home with no needs.  CCP following.        Destination     No service coordination in this encounter.      Durable Medical Equipment     No service coordination in this encounter.      Dialysis/Infusion     No service coordination in this encounter.      Home Medical Care     No service coordination in this encounter.      Social Care     No service coordination in this encounter.                Demographic Summary    No documentation.           Functional Status    No documentation.           Psychosocial    No documentation.           Abuse/Neglect    No documentation.           Legal    No documentation.           Substance Abuse    No documentation.           Patient Forms    No documentation.         Dawn Andrews RN

## 2018-07-10 NOTE — PROGRESS NOTES
BLEV doppler completed.  Preliminary results:  Negative for DVT and STP in bilateral lower extremities.  Results given to OLIVA Jama.

## 2018-07-10 NOTE — ED NOTES
Patients son attempted to stand the pt at the bedside and informed this RN that pt got dizzy and almost passed out. Pt checked immediately and he did not fall; son assisted pt to the stretcher. Pt was alert and answering questions; he was pale in color and no diaphoresis noted. Advised family and pt to only stand or ambulate with staff present. VSS.     Liz Rose RN  07/10/18 0113

## 2018-07-10 NOTE — CONSULTS
Patient Identification:  Mello Arias Jr.  67 y.o.  male  1950  [id number]          LOS 1    Requesting physician: Dr Juarez    Reason for Consultation:  SOA    History of Present Illness:   67-year-old white male who recently was here in the hospital with bacteremia presented with complaints of generalized weakness, chills and leg pain.  Was admitted to the hospitalist service with signs of pneumonia and sepsis.  Rapid response called due to increasing shortness of breath and hypoxemia..  Tight sounding according to the RRT nursing staff.  Received a dose of Lasix and breathing treatment and transferred acutely to the CCU.  He states that the breathing treatments seem to help the most.  Complains of shortness of breath is severe in nature.  Has a nonproductive cough.  His weakness has been severe and he has been unable to stand he states lately.  He did not note that he had a fever at home but his temperature was 102 here.  Chest x-ray shows left basilar infiltrate.  He does have a chronic cough at baseline.  Chart reviewed.  Discussed with RRT staff.    Past Medical History:  Past Medical History:   Diagnosis Date   • Allergic rhinitis    • Anemia    • Asthma    • Bone fracture    • CAD (coronary artery disease)    • Cardiomyopathy, ischemic    • Carpal tunnel syndrome    • CHF (congestive heart failure), NYHA class III, chronic, systolic (CMS/HCC)    • GERD (gastroesophageal reflux disease)    • Health care maintenance    • Herniated disc, cervical    • Hx of total hip arthroplasty 3/18/2016   • Hyperglycemia    • Hyperlipidemia    • Hypertension    • Idiopathic ventricular tachycardia (CMS/HCC)    • Lumbosacral radiculopathy at L4    • Myocardial infarction     OF INFERIOR WALL, GREATER THAN 8 WEEKS   • Myocardial ischemia     POST MYOCARDIAL INFARCTION, POST PTCA WITH STENT PLACEMENT   • Non-rheumatic tricuspid valve insufficiency    • Nonrheumatic mitral valve regurgitation    • Open wound     OF  LEFT INDEX FINGER WITHOUT DAMAGE TO NAIL   • Osteoarthritis    • PAF (paroxysmal atrial fibrillation) (CMS/HCC)    • Ventricular tachycardia (CMS/HCC)     POST AICD       Past Surgical History:  Past Surgical History:   Procedure Laterality Date   • APPENDECTOMY     • CARDIAC CATHETERIZATION N/A 10/21/2016    Procedure: Coronary angiography;  Surgeon: Naun Calvin MD;  Location: [facility] CATH INVASIVE LOCATION;  Service:    • CARDIAC CATHETERIZATION N/A 10/21/2016    Procedure: Left heart cath;  Surgeon: Naun Calvin MD;  Location: [facility] CATH INVASIVE LOCATION;  Service:    • CARDIAC CATHETERIZATION N/A 10/21/2016    Procedure: Left ventriculography;  Surgeon: Naun Calvin MD;  [facility];  Service:    • CARDIAC CATHETERIZATION N/A 10/21/2016    Procedure: Right heart cath;  Surgeon: Naun Calvin MD;  [facility];  Service:    • CARDIAC DEFIBRILLATOR PLACEMENT     • CARPAL TUNNEL RELEASE     • COLONOSCOPY     • CORONARY ANGIOPLASTY      WITH STENT PLACEMENT TO THE LAD   • CORONARY ARTERY BYPASS GRAFT WITH MITRAL VALVE REPAIR/REPLACEMENT N/A 11/7/2016    Procedure: JAYE STERNOTOMY CORONARY ARTERY BYPASS GRAFT TIMES 4 USING LEFT INTERNAL MAMMARY ARTERY AND LEFT GREATER SAPHENOUS VEIN GRAFT PER ENDOSCOPIC VEIN HARVESTING, MITRAL VALVE REPLACEMENT AND TRICUSPID VALVE REPAIR;  Surgeon: Slade Hurtado MD;  Location: [facility] OR;  Service:    • EYE SURGERY     • FOOT SURGERY Left    • HERNIA REPAIR     • IMPLANTABLE CARDIOVERTER DEFIBRILLATOR LEAD REPLACEMENT/POCKET REVISION     • INGUINAL HERNIA REPAIR Left    • LASIK     • TONSILLECTOMY     • TOTAL HIP ARTHROPLASTY Bilateral         Home Meds:  Prescriptions Prior to Admission   Medication Sig Dispense Refill Last Dose   • acetaminophen (TYLENOL) 325 MG tablet Take 2 tablets by mouth Every 6 (Six) Hours As Needed for Mild Pain .  0    • albuterol (PROAIR HFA) 108 (90 BASE) MCG/ACT  inhaler Inhale 2 puffs Every 6 (Six) Hours As Needed.   6/4/2018 at Unknown time   • aspirin 81 MG tablet Take 81 mg by mouth Daily.   6/3/2018 at Unknown time   • buPROPion XL (WELLBUTRIN XL) 150 MG 24 hr tablet Take 1 tablet by mouth Daily. 30 tablet 0    • carvedilol (COREG) 3.125 MG tablet Take 1 tablet by mouth Every 12 (Twelve) Hours. 60 tablet 0    • furosemide (LASIX) 20 MG tablet Take 1 tablet by mouth Daily. 30 tablet 5    • magnesium oxide (MAG-OX) 400 MG tablet Take 1 tablet by mouth Daily. 30 tablet 0    • montelukast (SINGULAIR) 10 MG tablet Take 10 mg by mouth Every Night.   6/3/2018 at Unknown time   • nitroglycerin (NITROSTAT) 0.4 MG SL tablet Place 0.4 mg under the tongue Every 5 (Five) Minutes As Needed for Chest Pain. Take no more than 3 doses in 15 minutes.   Unknown at Unknown time   • omeprazole (PRILOSEC) 20 MG capsule Take 20 mg by mouth Daily.   6/3/2018 at Unknown time   • sacubitril-valsartan (ENTRESTO) 49-51 MG tablet Take 1 tablet by mouth Every 12 (Twelve) Hours. 60 tablet 0          Allergies:  Allergies   Allergen Reactions   • Shellfish-Derived Products Swelling and Other (See Comments)     THROAT SWELLS       Social History:   Social History     Social History   • Marital status: Single     Spouse name: N/A   • Number of children: N/A   • Years of education: N/A     Occupational History   • Not on file.     Social History Main Topics   • Smoking status: Never Smoker   • Smokeless tobacco: Never Used   • Alcohol use 6.0 oz/week     10 Shots of liquor per week      Comment: social drinker   • Drug use: No   • Sexual activity: Defer     Other Topics Concern   • Not on file     Social History Narrative   • No narrative on file       Family History:  Family History   Problem Relation Age of Onset   • Stroke Mother    • Osteoarthritis Mother        Review of Systems:  DeniesSubjective fevers or chills  Denies nausea or vomiting  No new vision or hearing changes  No chest pain  Positive  "shortness of breath with cough and wheeze  No diarrhea, hematemesis or hematochezia, no dysuria or frequency  No musculoskeletal complaints  No heat or cold intolerance  No skin rashes  No dizziness or confusion.  No seizure activity  No new anxiety or depression  12 system review of systems performed and all else negative    Objective:    PHYSICAL EXAM:    /75   Pulse 109   Temp (!) 102 °F (38.9 °C) (Oral)   Resp 24   Ht 172.7 cm (68\")   Wt 85.3 kg (188 lb)   SpO2 100%   BMI 28.59 kg/m²  Body mass index is 28.59 kg/m². 100% 85.3 kg (188 lb)    GENERAL APPEARANCE:   · Well developed  · Well nourished  · No acute distress   EYES:    · PERRL                                                                           · Conjunctiva normal  · Sclera non-icteric.  HENT:   · Atraumatic, normocephalic  · External ears and nose normal  · Moist mucus membranes and no ulcers  NECK:  · Thyroid not enlarged  · Trachea midline   RESPIRATORY:    · Moderately labored breathing   · Diminished bilateral breath sounds  · No rales.  Mild bilateral expiratory wheezing  · No dullness  CARDIOVASCULAR:    · RRR  · Normal S1, S2  · No murmur  · Lower extremity edema: none    GI:   · Bowel sounds normal  · Abdomen soft , non-distended, non-tender  · No abdominal masses.    MUSCULOSKELETAL:  · Normal movement of extremities  · No tenderness, no deformities  · No clubbing or cyanosis   Skin:    · No visible rashes  · No palpable nodules  PSYCHIATRIC:  · Speech and behavior appropriate  · Normal mood and affect  · Oriented to person, place and time  NEUROLOGIC:      Lab Review:      Lab Results   Component Value Date    WBC 21.69 (H) 07/10/2018    HGB 9.7 (L) 07/10/2018    HCT 30.0 (L) 07/10/2018    MCV 95.2 07/10/2018     07/10/2018            Lab Results   Component Value Date    CREATININE 1.54 (H) 07/10/2018    BUN 24 (H) 07/10/2018     (L) 07/10/2018    K 3.7 07/10/2018    CL 96 (L) 07/10/2018    CO2 22.8 07/10/2018 "        Lab Results   Component Value Date    TROPONINT 0.027 07/10/2018                Imaging reviewed  chest X-ray Showing left basilar infiltrate       Assessment:  Sepsis  Left basilar pneumonia  Acute hypoxemia with rapid response  Bronchospasm with wheezing  Recent hospitalization with bacteremia  Chronic systolic CHF with recent EF 19%  Chronic atrial fibrillation  Coronary artery disease  Anemia  Acute kidney injury  Hyponatremia  Hypomagnesemia    Recommendations:  Agree with transfer to intensive care bed.  Antibiotics and infectious disease is following.  Will require JAYE in near future when stable.  Received a dose of Lasix for CHF symptoms.  Continue bronchodilators for bronchospasm with wheezing.  Repeat 2-D echo is pending.  Wean oxygen as able.  Discussed with Dr. Juarez.    CCT: 40 min      Thank you for allowing me to participate in the care of this patient.  I will continue to follow along with you.      Edmund Chatman MD  [facility], [facility]  Pulmonary and Critical Care Medicine    7/10/2018  5:08 PM

## 2018-07-10 NOTE — ED PROVIDER NOTES
" EMERGENCY DEPARTMENT ENCOUNTER    CHIEF COMPLAINT  Chief Complaint: Chills  History given by: Pt and Pt son  History limited by: none  Room Number: JOSE DANIEL/JOSE DANIEL  PMD: Kelsey Muñoz MD      HPI:  Pt is a 67 y.o. male who presents complaining of shaking and chills that began at 1500 today.  Pt has a history of CHF and admits that his legs are hurting (back of legs all the way down\" that has never happened before but is now resolved. Pt denies chest pain, fever, sore throat, rhinorrhea, vomiting, dysuria. Pt admits to back pain, more frequent urination, weakness, nausea (now resolved).  Pt admits he was here a couple weeks ago when his kidneys shut down and his current symptoms are very similar to the previous time.     Pt son reports the pt was perfectly fine 6 months ago but now becomes fatigued just from walking to the bathroom. Reports that the pt had a eye doctor appointment this morning which he took himself too, and became very weak once he got home.  Pt had a massive MI 18 years ago with severe heart damage. Pt reports that last time the pt was here he was diagnosed with a lung and kidney infection and the symptoms the pt is having now are very similar to that time. Reports the pt described the pain in legs as \"burning\" when it first began    Duration:  Five hours  Onset: gradual  Timing: constant  Quality: Shaking and chills  Intensity/Severity: severe  Progression: progressively better   Associated Symptoms: leg pain (now resolved), nausea (now resolved), back pain, more frequent urination, weakness  Aggravating Factors: None  Alleviating Factors: none  Previous Episodes: Pt reports similar symptoms when he was admitted early June   Treatment before arrival: none    PAST MEDICAL HISTORY  Active Ambulatory Problems     Diagnosis Date Noted   • A-fib (CMS/HCC) 10/28/2016   • Acid reflux 10/28/2016   • Blood glucose elevated 10/28/2016   • HLD (hyperlipidemia) 10/28/2016   • Cardiomyopathy, ischemic 10/28/2016 "   • L-S radiculopathy 10/28/2016   • Idiopathic ventricular tachycardia (CMS/[facility]) 10/28/2016   • CHF (congestive heart failure), NYHA class III (CMS/[facility]) 10/28/2016   • Coronary artery disease of native artery of native heart with stable angina pectoris (CMS/[facility]) 11/03/2016   • Non-rheumatic mitral regurgitation 11/03/2016   • Non-rheumatic tricuspid valve insufficiency 11/03/2016   • CAD in native artery 11/07/2016   • Other specified forms of effusion, except tuberculous 11/15/2016   • S/P CABG x 4 11/15/2016   • S/P MVR (mitral valve replacement) 11/15/2016   • S/P TVR (tricuspid valve repair) 11/15/2016   • CHF (congestive heart failure) (CMS/[facility]) 12/31/2016   • Acute renal failure (CMS/[facility]) 06/04/2018   • Sepsis (CMS/[facility]) 06/04/2018   • Hyponatremia 06/04/2018   • Bacterial pneumonia 06/04/2018   • Bacteremia due to group B Streptococcus 06/08/2018   • Weakness 06/26/2018   • Depression 06/27/2018     Resolved Ambulatory Problems     Diagnosis Date Noted   • Hx of total hip arthroplasty 03/18/2016   • History of total hip arthroplasty 04/13/2016   • Right inguinal pain 04/21/2016   • History of bilateral hip arthroplasty 06/27/2016   • Right hip pain 06/27/2016   • Precordial chest pain 10/04/2016   • Ischemic heart disease 10/27/2016     Past Medical History:   Diagnosis Date   • Allergic rhinitis    • Anemia    • Asthma    • Bone fracture    • CAD (coronary artery disease)    • Cardiomyopathy, ischemic    • Carpal tunnel syndrome    • CHF (congestive heart failure), NYHA class III, chronic, systolic (CMS/[facility])    • GERD (gastroesophageal reflux disease)    • Health care maintenance    • Herniated disc, cervical    • Hx of total hip arthroplasty 3/18/2016   • Hyperglycemia    • Hyperlipidemia    • Hypertension    • Idiopathic ventricular tachycardia (CMS/[facility])    • Lumbosacral radiculopathy at L4    • Myocardial infarction    • Myocardial ischemia    • Non-rheumatic tricuspid valve insufficiency    •  Nonrheumatic mitral valve regurgitation    • Open wound    • Osteoarthritis    • PAF (paroxysmal atrial fibrillation) (CMS/HCC)    • Ventricular tachycardia (CMS/HCC)        PAST SURGICAL HISTORY  Past Surgical History:   Procedure Laterality Date   • APPENDECTOMY     • CARDIAC CATHETERIZATION N/A 10/21/2016    Procedure: Coronary angiography;  Surgeon: Naun Calvin MD;  Location: [facility] CATH INVASIVE LOCATION;  Service:    • CARDIAC CATHETERIZATION N/A 10/21/2016    Procedure: Left heart cath;  Surgeon: Naun Calvin MD;  Location: [facility] CATH INVASIVE LOCATION;  Service:    • CARDIAC CATHETERIZATION N/A 10/21/2016    Procedure: Left ventriculography;  Surgeon: Naun Calvin MD;  Location: [facility] CATH INVASIVE LOCATION;  Service:    • CARDIAC CATHETERIZATION N/A 10/21/2016    Procedure: Right heart cath;  Surgeon: Naun Calvin MD;  Location: [facility] CATH INVASIVE LOCATION;  Service:    • CARDIAC DEFIBRILLATOR PLACEMENT     • CARPAL TUNNEL RELEASE     • COLONOSCOPY     • CORONARY ANGIOPLASTY      WITH STENT PLACEMENT TO THE LAD   • CORONARY ARTERY BYPASS GRAFT WITH MITRAL VALVE REPAIR/REPLACEMENT N/A 11/7/2016    Procedure: JAYE STERNOTOMY CORONARY ARTERY BYPASS GRAFT TIMES 4 USING LEFT INTERNAL MAMMARY ARTERY AND LEFT GREATER SAPHENOUS VEIN GRAFT PER ENDOSCOPIC VEIN HARVESTING, MITRAL VALVE REPLACEMENT AND TRICUSPID VALVE REPAIR;  Surgeon: Slade Hurtado MD;  Location: [facility] OR;  Service:    • EYE SURGERY     • FOOT SURGERY Left    • HERNIA REPAIR     • IMPLANTABLE CARDIOVERTER DEFIBRILLATOR LEAD REPLACEMENT/POCKET REVISION     • INGUINAL HERNIA REPAIR Left    • LASIK     • TONSILLECTOMY     • TOTAL HIP ARTHROPLASTY Bilateral        FAMILY HISTORY  Family History   Problem Relation Age of Onset   • Stroke Mother    • Osteoarthritis Mother        SOCIAL HISTORY  Social History     Social History   • Marital status: Single     Spouse name: N/A   • Number of children: N/A   • Years of  education: N/A     Occupational History   • Not on file.     Social History Main Topics   • Smoking status: Never Smoker   • Smokeless tobacco: Never Used   • Alcohol use 6.0 oz/week     10 Shots of liquor per week      Comment: social drinker   • Drug use: No   • Sexual activity: Defer     Other Topics Concern   • Not on file     Social History Narrative   • No narrative on file       ALLERGIES  Shellfish-derived products    REVIEW OF SYSTEMS  Review of Systems   Constitutional: Positive for chills. Negative for activity change, appetite change and fever.   HENT: Negative for congestion and sore throat.    Eyes: Negative.    Respiratory: Negative for cough and shortness of breath.    Cardiovascular: Negative for chest pain and leg swelling.   Gastrointestinal: Positive for nausea. Negative for abdominal pain, diarrhea and vomiting.   Endocrine: Negative.    Genitourinary: Positive for frequency. Negative for decreased urine volume and dysuria.   Musculoskeletal: Positive for myalgias (legs and back). Negative for neck pain.   Skin: Negative for rash and wound.   Allergic/Immunologic: Negative.    Neurological: Positive for weakness. Negative for numbness and headaches.   Hematological: Negative.    Psychiatric/Behavioral: Negative.    All other systems reviewed and are negative.      PHYSICAL EXAM  ED Triage Vitals   Temp Heart Rate Resp BP SpO2   07/09/18 1941 07/09/18 1941 07/09/18 1941 07/09/18 2026 07/09/18 1941   99 °F (37.2 °C) 118 18 102/71 95 %      Temp src Heart Rate Source Patient Position BP Location FiO2 (%)   -- -- -- 07/09/18 2026 --      Right arm        Physical Exam   Constitutional: He is oriented to person, place, and time. No distress.   HENT:   Head: Normocephalic and atraumatic.   Eyes: EOM are normal. Pupils are equal, round, and reactive to light.   Neck: Normal range of motion. Neck supple.   Cardiovascular: Regular rhythm and normal heart sounds.  Tachycardia present.    Pulmonary/Chest:  Effort normal and breath sounds normal. No respiratory distress.   Abdominal: Soft. There is no tenderness. There is no rebound and no guarding.   Musculoskeletal: Normal range of motion. He exhibits no edema (pedal) or tenderness (backand legs).   Neurological: He is alert and oriented to person, place, and time. He has normal sensation and normal strength.   Skin: Skin is warm and dry.   Psychiatric: Mood and affect normal.   Nursing note and vitals reviewed.        LAB RESULTS  Lab Results (last 24 hours)     Procedure Component Value Units Date/Time    CBC & Differential [[id number]] Collected:  07/09/18 2101    Specimen:  Blood Updated:  07/09/18 2113    Narrative:       The following orders were created for panel order CBC & Differential.  Procedure                               Abnormality         Status                     ---------                               -----------         ------                     CBC Auto Differential[[id number]]        Abnormal            Final result                 Please view results for these tests on the individual orders.    Comprehensive Metabolic Panel [[id number]]  (Abnormal) Collected:  07/09/18 2101    Specimen:  Blood Updated:  07/09/18 2141     Glucose 119 (H) mg/dL      BUN 24 (H) mg/dL      Creatinine 1.51 (H) mg/dL      Sodium 133 (L) mmol/L      Potassium 3.7 mmol/L      Chloride 93 (L) mmol/L      CO2 24.9 mmol/L      Calcium 9.0 mg/dL      Total Protein 8.1 g/dL      Albumin 4.00 g/dL      ALT (SGPT) 11 U/L      AST (SGOT) 14 U/L      Alkaline Phosphatase 100 U/L      Total Bilirubin 0.5 mg/dL      eGFR Non African Amer 46 (L) mL/min/1.73      Globulin 4.1 gm/dL      A/G Ratio 1.0 g/dL      BUN/Creatinine Ratio 15.9     Anion Gap 15.1 mmol/L     Magnesium [[id number]]  (Abnormal) Collected:  07/09/18 2101    Specimen:  Blood Updated:  07/09/18 2149     Magnesium 1.2 (C) mg/dL     BNP [[id number]]  (Abnormal) Collected:  07/09/18 2101    Specimen:  Blood Updated:   "07/09/18 2139     proBNP 1,268.0 (H) pg/mL     Narrative:       Among patients with dyspnea, NT-proBNP is highly sensitive for the detection of acute congestive heart failure. In addition NT-proBNP of <300 pg/ml effectively rules out acute congestive heart failure with 99% negative predictive value.    Troponin [[id number]]  (Abnormal) Collected:  07/09/18 2101    Specimen:  Blood Updated:  07/09/18 2146     Troponin T 0.039 (H) ng/mL     Narrative:       Troponin T Reference Ranges:  Less than 0.03 ng/mL:    Negative for AMI  0.03 to 0.09 ng/mL:      Indeterminant for AMI  Greater than 0.09 ng/mL: Positive for AMI    Blood Culture - Blood, [[id number]] Collected:  07/09/18 2101    Specimen:  Blood from Arm, Left Updated:  07/09/18 2105    Procalcitonin [[id number]]  (Abnormal) Collected:  07/09/18 2101    Specimen:  Blood Updated:  07/09/18 2149     Procalcitonin 1.50 (C) ng/mL     Narrative:       As a Marker for Sepsis (Non-Neonates):   1. <0.5 ng/mL represents a low risk of severe sepsis and/or septic shock.  1. >2 ng/mL represents a high risk of severe sepsis and/or septic shock.    As a Marker for Lower Respiratory Tract Infections that require antibiotic therapy:  PCT on Admission     Antibiotic Therapy             6-12 Hrs later  > 0.5                Strongly Recommended            >0.25 - <0.5         Recommended  0.1 - 0.25           Discouraged                   Remeasure/reassess PCT  <0.1                 Strongly Discouraged          Remeasure/reassess PCT      As 28 day mortality risk marker: \"Change in Procalcitonin Result\" (> 80 % or <=80 %) if Day 0 (or Day 1) and Day 4 values are available. Refer to http://www.[employer]-pct-calculator.com/   Change in PCT <=80 %   A decrease of PCT levels below or equal to 80 % defines a positive change in PCT test result representing a higher risk for 28-day all-cause mortality of patients diagnosed with severe sepsis or septic shock.  Change in PCT > 80 %   A decrease " of PCT levels of more than 80 % defines a negative change in PCT result representing a lower risk for 28-day all-cause mortality of patients diagnosed with severe sepsis or septic shock.                CBC Auto Differential [[id number]]  (Abnormal) Collected:  07/09/18 2101    Specimen:  Blood Updated:  07/09/18 2113     WBC 13.29 (H) 10*3/mm3      RBC 3.67 (L) 10*6/mm3      Hemoglobin 11.3 (L) g/dL      Hematocrit 34.6 (L) %      MCV 94.3 fL      MCH 30.8 pg      MCHC 32.7 g/dL      RDW 13.6 %      RDW-SD 46.9 fl      MPV 10.9 fL      Platelets 175 10*3/mm3      Neutrophil % 95.2 (H) %      Lymphocyte % 2.4 (L) %      Monocyte % 0.8 (L) %      Eosinophil % 1.4 %      Basophil % 0.2 %      Immature Grans % 0.3 %      Neutrophils, Absolute 12.66 (H) 10*3/mm3      Lymphocytes, Absolute 0.32 (L) 10*3/mm3      Monocytes, Absolute 0.11 (L) 10*3/mm3      Eosinophils, Absolute 0.18 10*3/mm3      Basophils, Absolute 0.02 10*3/mm3      Immature Grans, Absolute 0.04 (H) 10*3/mm3     Blood Culture - Blood, [[id number]] Collected:  07/09/18 2134    Specimen:  Blood from Arm, Left Updated:  07/09/18 2138    Lactic Acid, Plasma [[id number]]  (Abnormal) Collected:  07/09/18 2134    Specimen:  Blood Updated:  07/09/18 2157     Lactate 2.1 (C) mmol/L     Lactic Acid, Reflex Timer (This will reflex a repeat order 3-3:15 hours after ordered.) [[id number]] Collected:  07/09/18 2134    Specimen:  Blood Updated:  07/09/18 2157    Urinalysis With Culture If Indicated - Urine, Clean Catch [[id number]]  (Abnormal) Collected:  07/09/18 2144    Specimen:  Urine from Urine, Catheter Updated:  07/09/18 2151     Color, UA Yellow     Appearance, UA Cloudy (A)     pH, UA <=5.0     Specific Gravity, UA 1.018     Glucose, UA Negative     Ketones, UA Negative     Bilirubin, UA Negative     Blood, UA Negative     Protein, UA Negative     Leuk Esterase, UA Negative     Nitrite, UA Negative     Urobilinogen, UA 0.2 E.U./dL    Narrative:       Urine  microscopic not indicated.          I ordered the above labs and reviewed the results    RADIOLOGY  XR Chest 1 View      Shows mild left lower lobe infiltrate which is new since prior.         I ordered the above noted radiological studies. Interpreted by radiologist. Reviewed by me in PACS.       PROCEDURES  Critical Care  Performed by: AICHA DE LA TORRE  Authorized by: AICHA DE LA TORRE     Critical care provider statement:     Critical care time (minutes):  30    Critical care time was exclusive of:  Separately billable procedures and treating other patients    Critical care was necessary to treat or prevent imminent or life-threatening deterioration of the following conditions:  Sepsis and renal failure    Critical care was time spent personally by me on the following activities:  Ordering and performing treatments and interventions, ordering and review of laboratory studies, ordering and review of radiographic studies, pulse oximetry, re-evaluation of patient's condition, review of old charts, development of treatment plan with patient or surrogate, discussions with consultants, evaluation of patient's response to treatment, examination of patient and obtaining history from patient or surrogate        EKG           EKG time: 2118  Rhythm/Rate: sinus tachycardia with a rate of 107  P waves and NE: normal  QRS, axis: LAD, IVCD, Small lateral Q waves   ST and T waves: nonspecific T wave changes     Interpreted Contemporaneously by me, independently viewed  Unchanged compared to prior 6/26/18      PROGRESS AND CONSULTS  ED Course as of Jul 09 2247 Mon Jul 09, 2018 2035 Pt presented with a hx of CAD and CHF. Pt reports that this morning he developed chills and BLE weakness. Pt denies fever, CP, SOA, diarrhea, or dysuria. Son reports that the pt was admitted two weeks ago for similar complaints as well as hypomagnesemia. Pt reports he spoke with Dr. Calvin (Cardiology) who instructed the pt to come to the ED.  Son states the pt is scheduled for evaluation at  for possible VAD.     PEx: Heart is tachycardic, no pedal edema    Scribe Assistance done by Vamshi Blum for Bruce Carlton PA-C  [MARY]   2144 1.09 eleven days ago Creatinine: (!) 1.51 []   2156 7.85 thirteen days ago WBC: (!) 13.29 [WH]      ED Course User Index  [MARY] AYDEE Calvillo  [] Mello Keith MD     2145  Ordered IV fluids.     2158  Ordered cefepime and vancomycin for pt pneumonia. Also ordered a call from [facility].     2206  Pt son reports that the pt attempted to stand and almost fainted. Rechecked patient who is resting comfortably.Pt reports he does get SOA when walking around. Discussed all lab and test results, specifically diagnosis of pneumonia. Discussed plan to start antibiotics and admit the pt due to pneumonia and lab results. Pt understands and agrees with the plan. All questions have been answered.    2215  Discussed case with Dr Lyons, [facility]  Reviewed history, exam, results and treatments.  Discussed concerns and plan of care. Dr Lyons accepts pt to be admitted to telemetry.  Dr Lyons recommends giving the pt 2g of magnesium sulfate.     2218  Ordered magnesium sulfate per Dr. Lyons's recommendations.    MEDICAL DECISION MAKING  Results were reviewed/discussed with the patient and they were also made aware of online access. Pt also made aware that some labs, such as cultures, will not be resulted during ER visit and follow up with PMD is necessary.     MDM  Number of Diagnoses or Management Options  Hypomagnesemia:   Pneumonia of left lower lobe due to infectious organism (CMS/HCC):   Renal insufficiency:   Sepsis, due to unspecified organism (CMS/HCC):   Diagnosis management comments: Chest x-ray showed a left lower lobe infiltrate.  Patient was found to be septic.  His white blood cell count, lactic acid, and pro-calcitonin were elevated.  He was treated with IV fluids, IV vancomycin, and IV cefepime.  Patient was  mildly tachycardic.  He was not hypotensive while in the ER.  O2 sats were normal on room air.  Patient's creatinine was also elevated from baseline.  His magnesium was low and he was given IV magnesium citrate.  Case was discussed with Dr. Lyons and he agreed to admit the patient.  Critical care was performed on this patient.       Amount and/or Complexity of Data Reviewed  Clinical lab tests: reviewed and ordered (WBC 13.29, RBC 3.67, HGB 11.3, Magnesium 1.2, proBNP 1,268.0, Troponin 0.039, Procalcitonin 1.50)  Tests in the radiology section of CPT®: reviewed and ordered (Chest XR - mild left lower lobe infiltrate which is new since prior. )  Tests in the medicine section of CPT®: reviewed and ordered (Refer to the procedure section of the note for EKG results)  Decide to obtain previous medical records or to obtain history from someone other than the patient: yes (Reviewed from EPIC)  Obtain history from someone other than the patient: yes (Pt son)  Review and summarize past medical records: yes (Pt was admittied 6/26-6/28/18 for generalized weakness and depression. Pt was started on bupropion.   Pt also admitted early June 2018 for sepsis, acute renal failure, pneumonia, and bacteremia due to group B strep)  Discuss the patient with other providers: yes (Dr. Lyons)           DIAGNOSIS  Final diagnoses:   Sepsis, due to unspecified organism (CMS/HCC)   Pneumonia of left lower lobe due to infectious organism (CMS/HCC)   Hypomagnesemia   Renal insufficiency       DISPOSITION  ADMISSION    Discussed treatment plan and reason for admission with pt/family and admitting physician.  Pt/family voiced understanding of the plan for admission for further testing/treatment as needed.         Latest Documented Vital Signs:  As of 10:47 PM  BP- 105/52 HR- 117 Temp- 99.4 °F (37.4 °C) (Oral) O2 sat- 97%    --  Documentation assistance provided by naomi López for Dr Keith.  Information recorded by the  scribe was done at my direction and has been verified and validated by me.            Falguni López  07/09/18 2247       Mello Keith MD  07/10/18 0015

## 2018-07-10 NOTE — PLAN OF CARE
Problem: Patient Care Overview  Goal: Plan of Care Review  Outcome: Ongoing (interventions implemented as appropriate)   07/10/18 0619   Coping/Psychosocial   Plan of Care Reviewed With patient   Plan of Care Review   Progress no change   OTHER   Outcome Summary Pt admitted from ER elevated troponin and lactic acid. Sepsis protocol. Pt BP low and 250 bolus orderd. Abx started in ER. Pt c/o fatigue and weekness and being cold. Moved from 440 to 427. Son involved with care of pt. Cont to monitor, safety maintained        Problem: Activity Intolerance (Adult)  Goal: Identify Related Risk Factors and Signs and Symptoms  Outcome: Ongoing (interventions implemented as appropriate)    Goal: Activity Tolerance  Outcome: Ongoing (interventions implemented as appropriate)      Problem: Sepsis/Septic Shock (Adult)  Goal: Signs and Symptoms of Listed Potential Problems Will be Absent, Minimized or Managed (Sepsis/Septic Shock)  Outcome: Ongoing (interventions implemented as appropriate)

## 2018-07-10 NOTE — PROGRESS NOTES
"Pharmacokinetic Consult - Vancomycin Dosing (Initial Note)    Mello Arias Jr. has been consulted for pharmacy to dose vancomycin for PNA.  Pharmacy dosing vancomycin per Dr Lyons's request.   Goal trough: 15-20 mg/L   Other antimicrobials: Cefepime    Relevant clinical data and objective history reviewed:  67 y.o. male 172.7 cm (68\") 86.2 kg (190 lb)    Past Medical History:   Diagnosis Date   • Allergic rhinitis    • Anemia    • Asthma    • Bone fracture    • CAD (coronary artery disease)    • Cardiomyopathy, ischemic    • Carpal tunnel syndrome    • CHF (congestive heart failure), NYHA class III, chronic, systolic (CMS/HCC)    • GERD (gastroesophageal reflux disease)    • Health care maintenance    • Herniated disc, cervical    • Hx of total hip arthroplasty 3/18/2016   • Hyperglycemia    • Hyperlipidemia    • Hypertension    • Idiopathic ventricular tachycardia (CMS/HCC)    • Lumbosacral radiculopathy at L4    • Myocardial infarction     OF INFERIOR WALL, GREATER THAN 8 WEEKS   • Myocardial ischemia     POST MYOCARDIAL INFARCTION, POST PTCA WITH STENT PLACEMENT   • Non-rheumatic tricuspid valve insufficiency    • Nonrheumatic mitral valve regurgitation    • Open wound     OF LEFT INDEX FINGER WITHOUT DAMAGE TO NAIL   • Osteoarthritis    • PAF (paroxysmal atrial fibrillation) (CMS/HCC)    • Ventricular tachycardia (CMS/HCC)     POST AICD     Creatinine   Date Value Ref Range Status   07/10/2018 1.54 (H) 0.76 - 1.27 mg/dL Final   07/09/2018 1.51 (H) 0.76 - 1.27 mg/dL Final   06/28/2018 1.09 0.76 - 1.27 mg/dL Final     BUN   Date Value Ref Range Status   07/10/2018 24 (H) 8 - 23 mg/dL Final     Estimated Creatinine Clearance: 48.3 mL/min (A) (by C-G formula based on SCr of 1.54 mg/dL (H)).    Lab Results   Component Value Date    WBC 21.69 (H) 07/10/2018     Temp Readings from Last 3 Encounters:   07/10/18 99.6 °F (37.6 °C) (Oral)   06/28/18 97.9 °F (36.6 °C) (Oral)   06/14/18 97.9 °F (36.6 °C)       "     Assessment/Plan  Will start vancomycin 1,750 mg IV Q24h. Will monitor serum creatinine every 24 hours for the first 3 days then at least every 48 hours per dosing recommendations. Vancomycin level prior to 4th dose. Pharmacy will continue to follow daily while on vancomycin and adjust as needed.     Ciro Quintero RP

## 2018-07-10 NOTE — CODE DOCUMENTATION
RRT called d/t severe sudden onset SOA. Upon arrival RT at bedside administering breathing treatment. RN states pt had sudden onset SOA, RN spoke with dr Juarez who gave order for cxr, lasix and call rapid response team. RT states pt was completely clamped down with no air movement noted prior to starting breathing treatment. Lasix was being given by RN. Pt with RR 40's. Appears fatigued, decision to move to CCU.

## 2018-07-10 NOTE — PROGRESS NOTES
Patient Active Problem List   Diagnosis   • A-fib (CMS/[facility])   • Acid reflux   • Blood glucose elevated   • HLD (hyperlipidemia)   • Cardiomyopathy, ischemic   • L-S radiculopathy   • Idiopathic ventricular tachycardia (CMS/[facility])   • CHF (congestive heart failure), NYHA class III (CMS/[facility])   • Coronary artery disease of native artery of native heart with stable angina pectoris (CMS/[facility])   • Non-rheumatic mitral regurgitation   • Non-rheumatic tricuspid valve insufficiency   • CAD in native artery   • Other specified forms of effusion, except tuberculous   • S/P CABG x 4   • S/P MVR (mitral valve replacement)   • S/P TVR (tricuspid valve repair)   • CHF (congestive heart failure) (CMS/[facility])   • Acute renal failure (CMS/[facility])   • Sepsis (CMS/[facility])   • Hyponatremia   • Bacterial pneumonia   • Bacteremia due to group B Streptococcus   • Weakness   • Depression   • Pneumonia   • Hypomagnesemia   • Positive blood culture   • Shortness of breath     Called by RN when the patient began having respiratory distress.  His oxygen requirement has been minimal. He was admitted with sepsis and found to have bacteremia probably recurrent streptococcal bacteremia.  He had gotten IVF resuscitation on admission which was later discontinued, though he continues to get antibiotics.  He has advanced CHF with an ejection fraction of 19%.  Rapid response called.  The patient received a dose of IV lasix 40mg.  CXR shows vascular congestion.  He is already feeling better following the dose of lasix.  On my evaluation at bedside he is in mild respiratory distress with tachypnea.  He has mild bibasilar rales and scattered wheezing with overall decreased air movement.  He is febrile and mildly tachycardia.  Awake, alert, and without focal deficits.  Patient transferred to CCU for closer monitoring.  Continue lasix at a lower dose and monitor blood pressure.  Continue albuterol nebulizers PRN.  He does have a history of asthma but will hold off on  steroids for the time being as most of this is probably CHF-related.  Will consult pulmonary/critical care while in CCU. D/w Dr. Chatman.    CC: [id number], 32 minutes

## 2018-07-10 NOTE — CONSULTS
Referring Provider: Dr. Juarez  Reason for Consultation: Positive blood cultures    Subjective   History of present illness:    This a very nice 67-year-old gentleman admitted on 7/9/18 was suspected sepsis.  ID is asked to comment on positive blood cultures.    Per review the past records, he has a history of ischemic cardiomyopathy status post myocardial infarction and PCI to the LAD as well as ventricular tachycardia status post ICD placement.  In 2016 he underwent CABG ×4 and also had a mitral valve tissue prosthesis replacement and tricuspid valve repair by Dr. Juan Ramon Hurtado.  He's been admitted twice in June 2018.  The first was from June 4 through the 8th for CHF was also diagnosed with pneumonia at that time.  He received treatment with ceftriaxone and doxycycline while inpatient and was discharged on oral amoxicillin.  Was also readmitted in the second half of the month for generalized weakness and palpitations but was not felt to be in decompensated heart failure at that time. No cultures were obtained then    Patient reports he was in his usual state of health until possibly 3 days prior to admission when he developed generalized weakness and dyspnea on exertion.  He said he would get out of breath walking to his mailbox.  On the day of admission with the acute onset of shaking chills that were severe and lasted several hours.  There is no associated fevers or night sweats.  They resolve spontaneously after several hours.  After admission to the hospital his been started on vancomycin and Zosyn and blood cultures are again positive for gram-positive cocci in chains.      Past medical history: Asthma, ischemic retinopathy status post PCI, CABG, AICD placement, paroxysmal atrial fibrillation, hyperlipidemia, hypertension, degenerative disc disease, GERD, osteoarthritis status post bilateral total hip arthroplasties, appendectomy, left ankle ORIF, tonsillectomy, hernia repair  No known drug allergies  No  family history of infectious diseases  Social history: He is single and lives alone here in [place], [place].  Nonsmoker.  Denies alcohol socially.  Retired.    Review of Systems  Pertinent items are noted in HPI, all other systems reviewed and negative    Objective     Physical Exam:   Vital Signs   Temp:  [99 °F (37.2 °C)-99.6 °F (37.6 °C)] 99.4 °F (37.4 °C)  Heart Rate:  [] 97  Resp:  [16-18] 16  BP: ()/(49-71) 99/60    GENERAL: Awake and alert, in no acute distress.   HEENT: Oropharynx is clear. Hearing is grossly normal.   EYES: PERRL. No conjunctival injection. No lid lag.   LYMPHATICS: No lymphadenopathy of the neck or inguinal regions.   HEART: Regular rate and rhythm.  Distant heart sounds but no definitive murmur No peripheral edema.   LUNGS: Clear to auscultation anteriorly with normal respiratory effort.   GI: Soft, nontender, nondistended. No appreciable organomegaly.   SKIN: Warm and dry without cutaneous eruptions   PSYCHIATRIC: Appropriate mood, affect, insight, and judgment.     Results Review:   I reviewed the patient's new clinical results.  PCT 14  WBC 21.7 (P95, L 2, M 4)  H/H 9.7/30    Cr 1.54      Estimated Creatinine Clearance: 48.2 mL/min (A) (by C-G formula based on SCr of 1.54 mg/dL (H)).      Microbiology:  7/9 bcx 1/2 GPC in chains  7/10 Strep/Legionella neg  7/10 RVP neg  6/4/18 BCx 2/2 GBS (R-clinda and erythro)    Radiology:  Duplex venography: nl scan of LE  NM V/q Scan: low probability scan  CXR, independently interpreted: left basilar atelectiasis      Assessment/Plan   1. Group B strep septicemia  2. MARISELA  3. Ischemic CM with prosthetic mitral valve and AICD in place    Patient had admission in June 2018 and had 2 sets of blood cultures which were positive for group B streptococci.  He was treated with oral antibiotics and now presents with recurrent bacteremia.  We favor that this is going to end up being group B streptococci again and is very worrisome for  prosthetic valve endocarditis, AICD infection or both.  We will continue on vancomycin while awaiting identification of the gram-positive cocci.  Narrow cefepime to ceftriaxone to cover group B streptococci which was previously isolated. Abx dosed appropriately for MARISELA.  He is going to need echocardiography to assess for endocarditis.  We will need to decide whether or not to start with transthoracic echocardiogram or just skip straight to JAYE. I have a call out to cardiology to discuss.    Thank you for this consult.  We will continue to follow along and tailor antibiotics as the patient's clinical course evolves.      Vamshi Marshall MD  07/10/18  12:30 PM

## 2018-07-10 NOTE — PROGRESS NOTES
Adult Nutrition  Assessment/PES    Patient Name:  Mello Arias Jr.  YOB: 1950  MRN: [id number]  Admit Date:  7/9/2018    Assessment Date:  7/10/2018    Nutrition assessment completed. Patient reported poor appetite since last admission in June 2018; 10# weight loss in that time.  Provided nutrition therapy-encouraged adequate po intake, recommend nutrition supplement until po intake improves.  Will follow.          Reason for Assessment     Row Name 07/10/18 1454          Reason for Assessment    Reason For Assessment identified at risk by screening criteria     Diagnosis   Primary Problem:  Sepsis (CMS/HCC)      Identified At Risk by Screening Criteria MST SCORE 2+;reduced oral intake over the last month;unintentional loss of 10 lbs or more in the past 2 mos               Anthropometrics     Row Name 07/10/18 1455          Usual Body Weight (UBW)    Weight Loss unintentional     Weight Loss Time Frame 10# weight loss in the past month        Body Mass Index (BMI)    BMI Assessment BMI 25-29.9: overweight             Labs/Tests/Procedures/Meds     Row Name 07/10/18 1455          Labs/Procedures/Meds    Lab Results Reviewed reviewed, pertinent        Diagnostic Tests/Procedures    Diagnostic Test/Procedure Reviewed reviewed, pertinent        Medications    Pertinent Medications Reviewed reviewed, pertinent     Pertinent Medications Comments Mag-Ox, PPI             Physical Findings     Row Name 07/10/18 1455          Physical Findings    Overall Physical Appearance --   B=20; weakness             Estimated/Assessed Needs     Row Name 07/10/18 1456          Calculation Measurements    Weight Used For Calculations 85.3 kg (188 lb 0.8 oz)        Estimated/Assessed Needs    Additional Documentation KCAL/KG (Group);Protein Requirements (Group);Fluid Requirements (Group)        KCAL/KG    14 Kcal/Kg (kcal) 1194.2     15 Kcal/Kg (kcal) 1279.5     18 Kcal/Kg (kcal) 1535.4     20 Kcal/Kg (kcal) 1706     25  Kcal/Kg (kcal) 2132.5     30 Kcal/Kg (kcal) 2559     35 Kcal/Kg (kcal) 2985.5     40 Kcal/Kg (kcal) 3412     45 Kcal/Kg (kcal) 3838.5     50 Kcal/Kg (kcal) 4265     kcal/kg (Specify) --   7075-7427        [place]-St. Jeor Equation    RMR ([place]-St. Jeor Equation) 1592.5        Protein Requirements    Est Protein Requirement Amount (gms/kg) 1.0 gm protein     Estimated Protein Requirements (gms/day) 85.3        Fluid Requirements    Estimated Fluid Requirements (mL/day) 2130     RDA Method (mL) 2130     Sumi-Segar Method (over 20 kg) 3206             Nutrition Prescription Ordered     Row Name 07/10/18 1456          Nutrition Prescription PO    Common Modifiers Low Sodium     Low Sodium Details 2,000 mg Sodium             Evaluation of Received Nutrient/Fluid Intake     Row Name 07/10/18 1456          Calculation Measurements    Weight Used For Calculations 85.3 kg (188 lb 0.8 oz)        PO Evaluation    % PO Intake improved since admission             Evaluation of Prescribed Nutrient/Fluid Intake     Row Name 07/10/18 1456          Calculation Measurements    Weight Used For Calculations 85.3 kg (188 lb 0.8 oz)           Problem/Interventions:        Problem 1     Row Name 07/10/18 1457          Nutrition Diagnoses Problem 1    Problem 1 Inadequate Nutrient Intake     Etiology (related to) MNT for Treatment/Condition     Signs/Symptoms (evidenced by) Report of Mnimal PO Intake;Unintended Weight Change     Unintended Weight Change Loss     Number of Pounds Lost 10#     Weight loss time period past month                     Intervention Goal     Row Name 07/10/18 1457          Intervention Goal    General Maintain nutrition;Meet nutritional needs for age/condition     PO Tolerate PO;Increase intake     Weight Maintain weight             Nutrition Intervention     Row Name 07/10/18 1457          Nutrition Intervention    RD/Tech Action Interview for preference;Follow Tx progress;Care plan reviewd;Encourage  intake;Recommend/ordered;Supplement provided     Recommended/Ordered Supplement             Nutrition Prescription     Row Name 07/10/18 1457          Nutrition Prescription PO    PO Prescription Begin/change supplement     Supplement [place] Breakfast Essentials     Supplement Frequency Daily     New PO Prescription Ordered? Yes             Education/Evaluation     Row Name 07/10/18 1457          Education    Education Will Instruct as appropriate        Monitor/Evaluation    Monitor Per protocol     Education Follow-up Reinforce PRN         Electronically signed by:  Kristen Leal RD  07/10/18 2:57 PM

## 2018-07-11 ENCOUNTER — APPOINTMENT (OUTPATIENT)
Dept: CARDIOLOGY | Facility: HOSPITAL | Age: 68
End: 2018-07-11
Attending: INTERNAL MEDICINE

## 2018-07-11 PROBLEM — I33.0 ACUTE BACTERIAL ENDOCARDITIS: Status: ACTIVE | Noted: 2018-07-11

## 2018-07-11 LAB
BH CV ECHO MEAS - BSA(HAYCOCK): 2 M^2
BH CV ECHO MEAS - BSA: 2 M^2
BH CV ECHO MEAS - BZI_BMI: 28.7 KILOGRAMS/M^2
BH CV ECHO MEAS - BZI_METRIC_HEIGHT: 172 CM
BH CV ECHO MEAS - BZI_METRIC_WEIGHT: 85 KG
BH CV VAS BP RIGHT ARM: NORMAL MMHG
LV EF 2D ECHO EST: 20 %

## 2018-07-11 PROCEDURE — 93312 ECHO TRANSESOPHAGEAL: CPT | Performed by: INTERNAL MEDICINE

## 2018-07-11 PROCEDURE — 99153 MOD SED SAME PHYS/QHP EA: CPT | Performed by: INTERNAL MEDICINE

## 2018-07-11 PROCEDURE — 99233 SBSQ HOSP IP/OBS HIGH 50: CPT | Performed by: INTERNAL MEDICINE

## 2018-07-11 PROCEDURE — 94799 UNLISTED PULMONARY SVC/PX: CPT

## 2018-07-11 PROCEDURE — 25010000003 CEFTRIAXONE PER 250 MG: Performed by: INTERNAL MEDICINE

## 2018-07-11 PROCEDURE — 93312 ECHO TRANSESOPHAGEAL: CPT

## 2018-07-11 PROCEDURE — 93320 DOPPLER ECHO COMPLETE: CPT

## 2018-07-11 PROCEDURE — 93325 DOPPLER ECHO COLOR FLOW MAPG: CPT | Performed by: INTERNAL MEDICINE

## 2018-07-11 PROCEDURE — 93325 DOPPLER ECHO COLOR FLOW MAPG: CPT

## 2018-07-11 PROCEDURE — 25010000002 MIDAZOLAM PER 1 MG: Performed by: INTERNAL MEDICINE

## 2018-07-11 PROCEDURE — 99152 MOD SED SAME PHYS/QHP 5/>YRS: CPT | Performed by: INTERNAL MEDICINE

## 2018-07-11 PROCEDURE — 93320 DOPPLER ECHO COMPLETE: CPT | Performed by: INTERNAL MEDICINE

## 2018-07-11 PROCEDURE — 25010000002 FENTANYL CITRATE (PF) 100 MCG/2ML SOLUTION: Performed by: INTERNAL MEDICINE

## 2018-07-11 PROCEDURE — 25010000002 ENOXAPARIN PER 10 MG: Performed by: INTERNAL MEDICINE

## 2018-07-11 RX ORDER — SODIUM CHLORIDE 9 MG/ML
INJECTION, SOLUTION INTRAVENOUS
Status: COMPLETED | OUTPATIENT
Start: 2018-07-11 | End: 2018-07-11

## 2018-07-11 RX ORDER — FENTANYL CITRATE 50 UG/ML
INJECTION, SOLUTION INTRAMUSCULAR; INTRAVENOUS
Status: COMPLETED | OUTPATIENT
Start: 2018-07-11 | End: 2018-07-11

## 2018-07-11 RX ORDER — MIDAZOLAM HYDROCHLORIDE 1 MG/ML
INJECTION INTRAMUSCULAR; INTRAVENOUS
Status: COMPLETED | OUTPATIENT
Start: 2018-07-11 | End: 2018-07-11

## 2018-07-11 RX ADMIN — MIDAZOLAM 1 MG: 1 INJECTION INTRAMUSCULAR; INTRAVENOUS at 09:18

## 2018-07-11 RX ADMIN — MIDAZOLAM 1 MG: 1 INJECTION INTRAMUSCULAR; INTRAVENOUS at 09:14

## 2018-07-11 RX ADMIN — MIDAZOLAM 1 MG: 1 INJECTION INTRAMUSCULAR; INTRAVENOUS at 09:11

## 2018-07-11 RX ADMIN — BUPROPION HYDROCHLORIDE 150 MG: 150 TABLET, EXTENDED RELEASE ORAL at 10:41

## 2018-07-11 RX ADMIN — FENTANYL CITRATE 12.5 MCG: 50 INJECTION INTRAMUSCULAR; INTRAVENOUS at 09:11

## 2018-07-11 RX ADMIN — ENOXAPARIN SODIUM 40 MG: 40 INJECTION SUBCUTANEOUS at 10:44

## 2018-07-11 RX ADMIN — FENTANYL CITRATE 12.5 MCG: 50 INJECTION INTRAMUSCULAR; INTRAVENOUS at 09:18

## 2018-07-11 RX ADMIN — IPRATROPIUM BROMIDE AND ALBUTEROL SULFATE 3 ML: .5; 3 SOLUTION RESPIRATORY (INHALATION) at 07:35

## 2018-07-11 RX ADMIN — MIDAZOLAM 0.5 MG: 1 INJECTION INTRAMUSCULAR; INTRAVENOUS at 09:24

## 2018-07-11 RX ADMIN — IPRATROPIUM BROMIDE AND ALBUTEROL SULFATE 3 ML: .5; 3 SOLUTION RESPIRATORY (INHALATION) at 15:53

## 2018-07-11 RX ADMIN — Medication 400 MG: at 10:41

## 2018-07-11 RX ADMIN — FENTANYL CITRATE 25 MCG: 50 INJECTION INTRAMUSCULAR; INTRAVENOUS at 09:09

## 2018-07-11 RX ADMIN — CEFTRIAXONE SODIUM 2 G: 2 INJECTION, SOLUTION INTRAVENOUS at 17:12

## 2018-07-11 RX ADMIN — SODIUM CHLORIDE 50 ML/HR: 9 INJECTION, SOLUTION INTRAVENOUS at 08:46

## 2018-07-11 RX ADMIN — IPRATROPIUM BROMIDE AND ALBUTEROL SULFATE 3 ML: .5; 3 SOLUTION RESPIRATORY (INHALATION) at 19:54

## 2018-07-11 RX ADMIN — IPRATROPIUM BROMIDE AND ALBUTEROL SULFATE 3 ML: .5; 3 SOLUTION RESPIRATORY (INHALATION) at 00:16

## 2018-07-11 RX ADMIN — PANTOPRAZOLE SODIUM 40 MG: 40 TABLET, DELAYED RELEASE ORAL at 10:44

## 2018-07-11 RX ADMIN — CARVEDILOL 3.12 MG: 3.12 TABLET, FILM COATED ORAL at 22:18

## 2018-07-11 RX ADMIN — FENTANYL CITRATE 12.5 MCG: 50 INJECTION INTRAMUSCULAR; INTRAVENOUS at 09:14

## 2018-07-11 RX ADMIN — ASPIRIN 81 MG: 81 TABLET, CHEWABLE ORAL at 10:41

## 2018-07-11 RX ADMIN — IPRATROPIUM BROMIDE AND ALBUTEROL SULFATE 3 ML: .5; 3 SOLUTION RESPIRATORY (INHALATION) at 10:58

## 2018-07-11 RX ADMIN — MIDAZOLAM 1 MG: 1 INJECTION INTRAMUSCULAR; INTRAVENOUS at 09:08

## 2018-07-11 NOTE — PROGRESS NOTES
[facility] INPATIENT PROGRESS NOTE         [facility] CORONARY CARE    2018      PATIENT IDENTIFICATION:  Name: Mello Arias Jr. ADMIT: 2018   : 1950  PCP: Kelsey Muñoz MD    MRN: [id number] LOS: 2 days   AGE/SEX: 67 y.o. male  ROOM: [facility]/                     LOS 2    Reason for visit: Follow-up sepsis with pneumonia and respiratory failure      SUBJECTIVE:    Resting comfortably. Still with borderline low blood pressure’s in the 90s systolic but not requiring pressor. Denies chest pain or cough. Wheezing has decreased. Possible JAYE still for today.    Objective   OBJECTIVE:    Vital Sign Min/Max for last 24 hours  Temp  Min: 97.6 °F (36.4 °C)  Max: 102 °F (38.9 °C)   BP  Min: 86/63  Max: 145/76   Pulse  Min: 76  Max: 112   Resp  Min: 16  Max: 48   SpO2  Min: 16 %  Max: 100 %   No Data Recorded   No Data Recorded                         Body mass index is 28.59 kg/m².    Intake/Output Summary (Last 24 hours) at 18 0913  Last data filed at 18 0600   Gross per 24 hour   Intake              360 ml   Output             1060 ml   Net             -700 ml         Exam:  GEN:  No distress, appears stated age  EYES:   PERRL, anicteric sclera  ENT:    External ears/nose normal, OP clear  NECK:  No adenopathy, midline trachea  LUNGS: Normal chest on inspection, palpation and Diminished bilateral on auscultation  CV:  Normal S1S2, without murmur  ABD:  Non tender, non distended, no hepatosplenomegaly, +BS  EXT:  No cyanosis or clubbing    Scheduled meds:    phenylephrine      aspirin 81 mg Oral Daily   buPROPion  mg Oral Daily   carvedilol 3.125 mg Oral Q12H   ceftriaxone 2 g Intravenous Q24H   enoxaparin 40 mg Subcutaneous Q24H   ipratropium-albuterol 3 mL Nebulization 4x Daily - RT   magnesium oxide 400 mg Oral Daily   pantoprazole 40 mg Oral QAM     IV meds:                        sodium chloride  Last Rate: 50 mL/hr (18 0846)     Data Review:    Results from last 7  days  Lab Units 07/10/18  0252 07/09/18  2101   SODIUM mmol/L 134* 133*   POTASSIUM mmol/L 3.7 3.7   CHLORIDE mmol/L 96* 93*   CO2 mmol/L 22.8 24.9   BUN mg/dL 24* 24*   CREATININE mg/dL 1.54* 1.51*   GLUCOSE mg/dL 138* 119*   CALCIUM mg/dL 8.0* 9.0         Estimated Creatinine Clearance: 48.2 mL/min (A) (by C-G formula based on SCr of 1.54 mg/dL (H)).    Results from last 7 days  Lab Units 07/10/18  0252 07/09/18  2101   WBC 10*3/mm3 21.69* 13.29*   HEMOGLOBIN g/dL 9.7* 11.3*   PLATELETS 10*3/mm3 149 175           Results from last 7 days  Lab Units 07/09/18  2101   ALT (SGPT) U/L 11   AST (SGOT) U/L 14           Results from last 7 days  Lab Units 07/10/18  0252 07/10/18  0109 07/09/18  2134 07/09/18  2101   PROCALCITONIN ng/mL 14.44*  --   --  1.50*   LACTATE mmol/L  --  1.5 2.1*  --              )Assessment   ASSESSMENT:      Active Hospital Problems    Diagnosis Date Noted   • **Sepsis (CMS/[facility]) [A41.9] 06/04/2018   • Pneumonia [J18.9] 07/10/2018   • Hypomagnesemia [E83.42] 07/10/2018   • Positive blood culture [R78.81] 07/10/2018   • Shortness of breath [R06.02] 07/10/2018   • Acute renal failure (CMS/[facility]) [N17.9] 06/04/2018   • Hyponatremia [E87.1] 06/04/2018   • Coronary artery disease of native artery of native heart with stable angina pectoris (CMS/[facility]) [I25.118] 11/03/2016   • A-fib (CMS/[facility]) [I48.91] 10/28/2016   • CHF (congestive heart failure), NYHA class III (CMS/[facility]) [I50.9] 10/28/2016      Resolved Hospital Problems    Diagnosis Date Noted Date Resolved   No resolved problems to display.     Sepsis  Left basilar pneumonia  Acute hypoxemia with rapid response  Bronchospasm with wheezing  Recent hospitalization with bacteremia  Chronic systolic CHF with recent EF 19%  Chronic atrial fibrillation  Coronary artery disease  Anemia  Acute kidney injury  Hyponatremia  Hypomagnesemia      PLAN:  Continue bronchodilators for bronchospasm.  Wheezing has improved.  Antibiotics per infectious disease  recommendations.  Plan for JAYE.  Not requiring pressors at this time.  DVT prophylaxis.    Discussed with multidisciplinary ICU team on rounds this morning.      Edmund Chatman MD  Pulmonary and Critical Care Medicine  [facility]  7/11/2018    9:13 AM

## 2018-07-11 NOTE — PROGRESS NOTES
Hospital Follow Up    LOS:  LOS: 2 days   Patient Name: Mello Arias Jr.  Age/Sex: 67 y.o. male  : 1950  MRN: [id number]    Date of Hospital Visit: 18  Length of Stay: 2  Encounter Provider: Naun Calvin MD  Place of Service: [facility] CARDIOLOGY    Subjective:     Follow Up for: Congestive heart failure    Interval History: Discussed with Dr. Escaleraing his concerns about endocarditis.  Patient will have her JAYE this morning.  Events of last night noted as well.  She is feeling much better at this point.    Objective:     Objective:  Temp:  [97.6 °F (36.4 °C)-102 °F (38.9 °C)] 98.2 °F (36.8 °C)  Heart Rate:  [] 88  Resp:  [16-48] 18  BP: ()/(48-82) 92/50  Body mass index is 28.59 kg/m².    Intake/Output Summary (Last 24 hours) at 18 0840  Last data filed at 18 0600   Gross per 24 hour   Intake              360 ml   Output             1060 ml   Net             -700 ml     1    18  1940 07/10/18  0520   Weight: 86.2 kg (190 lb) 85.3 kg (188 lb)     Weight change:     Physical Exam:   General Appearance: Alert, cooperative, in no acute distress. AAOx4.   HEENT: Normocephalic.  Neck: Supple. No JVD. No Carotid bruit. No thyromegaly  Lungs: CTAB. Normal respiratory effort and rate.  Heart:: Regular rate and rhythm, normal S1 and S2, no murmurs, gallops or rubs.  Abdomen: Soft, nontender, non-distended. positive bowel sounds  Extremities: Warm, no cyanosis, or clubbing. No edema.     Lab Review:     Results from last 7 days  Lab Units 07/10/18  0252 18  2101   SODIUM mmol/L 134* 133*   POTASSIUM mmol/L 3.7 3.7   CHLORIDE mmol/L 96* 93*   CO2 mmol/L 22.8 24.9   BUN mg/dL 24* 24*   CREATININE mg/dL 1.54* 1.51*   GLUCOSE mg/dL 138* 119*   CALCIUM mg/dL 8.0* 9.0         Results from last 7 days  Lab Units 07/10/18  1448 07/10/18  0851 18  2101   TROPONIN T ng/mL 0.027 0.024 0.039*       Results from last 7 days  Lab Units  07/10/18  0252 07/09/18  2101   WBC 10*3/mm3 21.69* 13.29*   HEMOGLOBIN g/dL 9.7* 11.3*   HEMATOCRIT % 30.0* 34.6*   PLATELETS 10*3/mm3 149 175           Results from last 7 days  Lab Units 07/10/18  0252 07/09/18  2101   MAGNESIUM mg/dL 1.5* 1.2*           Results from last 7 days  Lab Units 07/10/18  1448 07/09/18  2101   PROBNP pg/mL 7,163.0* 1,268.0*             I reviewed the patient's new clinical results.          I personally viewed and interpreted the patient's EKG/Telemetry data.  Current Medications:   Scheduled Meds:  aspirin 81 mg Oral Daily   buPROPion  mg Oral Daily   carvedilol 3.125 mg Oral Q12H   ceftriaxone 2 g Intravenous Q24H   enoxaparin 40 mg Subcutaneous Q24H   ipratropium-albuterol 3 mL Nebulization 4x Daily - RT   magnesium oxide 400 mg Oral Daily   pantoprazole 40 mg Oral QAM     Continuous Infusions:     Allergies:  Allergies   Allergen Reactions   • Shellfish-Derived Products Swelling and Other (See Comments)     THROAT SWELLS       Assessment & Plan     Principal Problem:    Sepsis (CMS/HCC)  Active Problems:    A-fib (CMS/HCC)    CHF (congestive heart failure), NYHA class III (CMS/HCC)    Coronary artery disease of native artery of native heart with stable angina pectoris (CMS/HCC)    Acute renal failure (CMS/HCC)    Hyponatremia    Pneumonia    Hypomagnesemia    Positive blood culture    Shortness of breath          Plan: Await JAYE.  I have spoken with the physicians at the [facility] advanced heart failure clinic.  At this point we will review the JAYE and see what may need to be done.      Naun Calvin MD  07/11/18

## 2018-07-11 NOTE — PROGRESS NOTES
INFECTIOUS DISEASES PROGRESS NOTE    CC: Follow-up group B streptococcal endocarditis    S:   Events of yesterday noted when patient was transferred to CCU for closer monitoring due to hypoxia that resolved with diuresis.  JAYE findings noted for extremely large mobile vegetation on tissue mitral valve and large ICD vegetation and both RA and RV.    Today, the patient denies any active complaints.  He feels better than he did yesterday.  No fevers or chills this morning although did spike to 102 yesterday.  O:  Physical Exam:  Temp:  [97.6 °F (36.4 °C)-102 °F (38.9 °C)] 98.2 °F (36.8 °C)  Heart Rate:  [] 98  Resp:  [16-48] 20  BP: ()/(48-82) 96/48  Physical Exam  GENERAL: Awake and alert, in no acute distress.   HEENT: Oropharynx is clear. Hearing is grossly normal.   EYES: No conjunctival injection. No lid lag.   HEART: Regular rate and rhythm. No peripheral edema.   LUNGS: Clear to auscultation anteriorly with normal respiratory effort.   GI: Soft, nontender, nondistended. No appreciable organomegaly.   SKIN: Warm and dry without cutaneous eruptions   PSYCHIATRIC: Appropriate mood, affect, insight, and judgment.       Diagnostics:    Microbiology:  7/10 Bcx 2/2 NGTD  7/9 bcx 2/2 GBS  6/4/18 BCx 2/2 GBS (R-clinda and erythro)      Estimated Creatinine Clearance: 48.2 mL/min (A) (by C-G formula based on SCr of 1.54 mg/dL (H)).    Assessment/Plan   1. Group B strep septicemia  2. MARISELA  3. Ischemic CM   4. 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    Very difficult problem with mitral valve endocarditis and AICD infection.  Ideally would need surgical correction.  We will see what cardiology has to say.  I have stopped his vancomycin we'll continue on ceftriaxone to cover the group B strep in his blood.  I risk of complications no matter what we do.  Will d/w Dr Calvin.      Vamshi Marshall MD  10:28 AM  07/11/18

## 2018-07-11 NOTE — PROGRESS NOTES
"     LOS: 2 days   Primary Care Physician: Kelsey Muñoz MD     Subjective   Events noted.  Currently patient feels good.  No chest pain or shortness of breath.  No dizziness.    Vital Signs  Body mass index is 28.59 kg/m².  Temp:  [97.6 °F (36.4 °C)-98.8 °F (37.1 °C)] 98.2 °F (36.8 °C)  Heart Rate:  [] 89  Resp:  [16-20] 16  BP: ()/(48-71) 108/68      Objective:  General Appearance:  In no acute distress.    Vital signs: (most recent): Blood pressure 108/68, pulse 89, temperature 98.2 °F (36.8 °C), temperature source Oral, resp. rate 16, height 172.7 cm (68\"), weight 85.3 kg (188 lb), SpO2 96 %.    Lungs:  He is not in respiratory distress.  No stridor.  There are rales and decreased breath sounds.  No wheezes or rhonchi.  (A few crackles right base)  Heart: Normal rate.  Regular rhythm.  No murmur.   Abdomen: Abdomen is soft and non-distended.  Bowel sounds are normal.   There is no abdominal tenderness.   There is no splenomegaly. There is no hepatomegaly.   Extremities: There is no dependent edema.    Neurological: Patient is alert.    Skin:  Warm.          Results Review:    I reviewed the patient's new clinical results.      Results from last 7 days  Lab Units 07/10/18  0252 07/09/18  2101   WBC 10*3/mm3 21.69* 13.29*   HEMOGLOBIN g/dL 9.7* 11.3*   PLATELETS 10*3/mm3 149 175       Results from last 7 days  Lab Units 07/10/18  0252 07/09/18  2101   SODIUM mmol/L 134* 133*   POTASSIUM mmol/L 3.7 3.7   CHLORIDE mmol/L 96* 93*   CO2 mmol/L 22.8 24.9   BUN mg/dL 24* 24*   CREATININE mg/dL 1.54* 1.51*   CALCIUM mg/dL 8.0* 9.0   GLUCOSE mg/dL 138* 119*         Hemoglobin A1C:  Lab Results   Component Value Date    HGBA1C 5.40 12/31/2016       Glucose Range:  Glucose   Date/Time Value Ref Range Status   07/10/2018 2006 159 (H) 70 - 130 mg/dL Final   07/10/2018 1639 89 70 - 130 mg/dL Final       Lab Results   Component Value Date    [id number] 489 12/09/2015       Lab Results   Component Value Date    " TSH 1.360 12/31/2016       Assessment & Plan      Medication Review: Yes    Active Hospital Problems    Diagnosis Date Noted   • **Acute bacterial endocarditis [I33.0] 07/11/2018   • Pneumonia [J18.9] 07/10/2018   • Hypomagnesemia [E83.42] 07/10/2018   • Positive blood culture [R78.81] 07/10/2018   • Shortness of breath [R06.02] 07/10/2018   • Bacteremia due to group B Streptococcus [R78.81] 06/08/2018   • Sepsis (CMS/[facility]) [A41.9] 06/04/2018   • Acute renal failure (CMS/[facility]) [N17.9] 06/04/2018   • Hyponatremia [E87.1] 06/04/2018   • Coronary artery disease of native artery of native heart with stable angina pectoris (CMS/[facility]) [I25.118] 11/03/2016   • A-fib (CMS/[facility]) [I48.91] 10/28/2016   • CHF (congestive heart failure), NYHA class III (CMS/[facility]) [I50.9] 10/28/2016      Resolved Hospital Problems    Diagnosis Date Noted Date Resolved   No resolved problems to display.       Assessment/Plan  1.  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  On Rocephin per infectious diseases.  Dr. Calvin to discuss treatment options with [facility].  Leukocytosis noted.  2.  Hypomagnesemia.  Recheck in a.m.  3.  Acute kidney injury.  Repeat labs in am.  Hypotension earlier today, now resolved.  Adjust Lovenox dosage.  4.  Acute blood loss anemia.  Hemoglobin dropped yesterday.  No labs today.  Ordered for a.m.    Mildred Jimenez MD  07/11/18  7:44 PM

## 2018-07-11 NOTE — PLAN OF CARE
Problem: Patient Care Overview  Goal: Plan of Care Review  Outcome: Ongoing (interventions implemented as appropriate)   07/11/18 1956   Coping/Psychosocial   Plan of Care Reviewed With patient;spouse   Plan of Care Review   Progress improving   OTHER   Outcome Summary breathing treatments to promote respiratory function

## 2018-07-11 NOTE — PLAN OF CARE
Problem: Patient Care Overview  Goal: Plan of Care Review  Outcome: Ongoing (interventions implemented as appropriate)   07/10/18 0619 07/11/18 0000 07/11/18 0542   Coping/Psychosocial   Plan of Care Reviewed With --  patient --    Plan of Care Review   Progress no change --  --    OTHER   Outcome Summary --  --  pt in CCU, 0400 dose of Lasix held and D/C per MD, BP borderline with maps equal to or greater than 65, pt educated on breathing treatments, NPO at midnight, CTM       Problem: Activity Intolerance (Adult)  Goal: Identify Related Risk Factors and Signs and Symptoms  Outcome: Outcome(s) achieved Date Met: 07/11/18    Goal: Activity Tolerance  Outcome: Ongoing (interventions implemented as appropriate)    Goal: Effective Energy Conservation Techniques  Outcome: Ongoing (interventions implemented as appropriate)      Problem: Sepsis/Septic Shock (Adult)  Goal: Signs and Symptoms of Listed Potential Problems Will be Absent, Minimized or Managed (Sepsis/Septic Shock)  Outcome: Ongoing (interventions implemented as appropriate)

## 2018-07-12 PROBLEM — D69.59 SECONDARY THROMBOCYTOPENIA: Status: ACTIVE | Noted: 2018-07-12

## 2018-07-12 PROBLEM — E83.42 HYPOMAGNESEMIA: Status: RESOLVED | Noted: 2018-07-10 | Resolved: 2018-07-12

## 2018-07-12 LAB
ANION GAP SERPL CALCULATED.3IONS-SCNC: 11.4 MMOL/L
BACTERIA SPEC AEROBE CULT: ABNORMAL
BACTERIA SPEC AEROBE CULT: ABNORMAL
BUN BLD-MCNC: 20 MG/DL (ref 8–23)
BUN/CREAT SERPL: 17.5 (ref 7–25)
CALCIUM SPEC-SCNC: 8 MG/DL (ref 8.6–10.5)
CHLORIDE SERPL-SCNC: 94 MMOL/L (ref 98–107)
CO2 SERPL-SCNC: 23.6 MMOL/L (ref 22–29)
CREAT BLD-MCNC: 1.14 MG/DL (ref 0.76–1.27)
DEPRECATED RDW RBC AUTO: 48.5 FL (ref 37–54)
ERYTHROCYTE [DISTWIDTH] IN BLOOD BY AUTOMATED COUNT: 14 % (ref 11.5–14.5)
GFR SERPL CREATININE-BSD FRML MDRD: 64 ML/MIN/1.73
GLUCOSE BLD-MCNC: 92 MG/DL (ref 65–99)
GRAM STN SPEC: ABNORMAL
HCT VFR BLD AUTO: 26.4 % (ref 40.4–52.2)
HGB BLD-MCNC: 8.6 G/DL (ref 13.7–17.6)
MAGNESIUM SERPL-MCNC: 1.9 MG/DL (ref 1.6–2.4)
MCH RBC QN AUTO: 31.3 PG (ref 27–32.7)
MCHC RBC AUTO-ENTMCNC: 32.6 G/DL (ref 32.6–36.4)
MCV RBC AUTO: 96 FL (ref 79.8–96.2)
MRSA SPEC QL CULT: NORMAL
PLATELET # BLD AUTO: 105 10*3/MM3 (ref 140–500)
PMV BLD AUTO: 11.7 FL (ref 6–12)
POTASSIUM BLD-SCNC: 3.6 MMOL/L (ref 3.5–5.2)
RBC # BLD AUTO: 2.75 10*6/MM3 (ref 4.6–6)
SODIUM BLD-SCNC: 129 MMOL/L (ref 136–145)
WBC NRBC COR # BLD: 9.16 10*3/MM3 (ref 4.5–10.7)

## 2018-07-12 PROCEDURE — 99232 SBSQ HOSP IP/OBS MODERATE 35: CPT | Performed by: INTERNAL MEDICINE

## 2018-07-12 PROCEDURE — 94799 UNLISTED PULMONARY SVC/PX: CPT

## 2018-07-12 PROCEDURE — 99233 SBSQ HOSP IP/OBS HIGH 50: CPT | Performed by: INTERNAL MEDICINE

## 2018-07-12 PROCEDURE — 80048 BASIC METABOLIC PNL TOTAL CA: CPT | Performed by: INTERNAL MEDICINE

## 2018-07-12 PROCEDURE — 85027 COMPLETE CBC AUTOMATED: CPT | Performed by: INTERNAL MEDICINE

## 2018-07-12 PROCEDURE — 25010000002 ENOXAPARIN PER 10 MG: Performed by: INTERNAL MEDICINE

## 2018-07-12 PROCEDURE — 25010000003 CEFTRIAXONE PER 250 MG: Performed by: INTERNAL MEDICINE

## 2018-07-12 PROCEDURE — 83735 ASSAY OF MAGNESIUM: CPT | Performed by: INTERNAL MEDICINE

## 2018-07-12 RX ORDER — IPRATROPIUM BROMIDE AND ALBUTEROL SULFATE 2.5; .5 MG/3ML; MG/3ML
3 SOLUTION RESPIRATORY (INHALATION)
Status: DISCONTINUED | OUTPATIENT
Start: 2018-07-12 | End: 2018-07-18 | Stop reason: HOSPADM

## 2018-07-12 RX ORDER — BUMETANIDE 0.25 MG/ML
0.5 INJECTION INTRAMUSCULAR; INTRAVENOUS EVERY 12 HOURS
Status: DISCONTINUED | OUTPATIENT
Start: 2018-07-12 | End: 2018-07-15

## 2018-07-12 RX ADMIN — IPRATROPIUM BROMIDE AND ALBUTEROL SULFATE 3 ML: .5; 3 SOLUTION RESPIRATORY (INHALATION) at 00:14

## 2018-07-12 RX ADMIN — BUMETANIDE 0.5 MG: 0.25 INJECTION INTRAMUSCULAR; INTRAVENOUS at 10:09

## 2018-07-12 RX ADMIN — ENOXAPARIN SODIUM 30 MG: 30 INJECTION SUBCUTANEOUS at 09:22

## 2018-07-12 RX ADMIN — ZOLPIDEM TARTRATE 5 MG: 5 TABLET ORAL at 22:10

## 2018-07-12 RX ADMIN — IPRATROPIUM BROMIDE AND ALBUTEROL SULFATE 3 ML: .5; 3 SOLUTION RESPIRATORY (INHALATION) at 14:42

## 2018-07-12 RX ADMIN — PANTOPRAZOLE SODIUM 40 MG: 40 TABLET, DELAYED RELEASE ORAL at 06:42

## 2018-07-12 RX ADMIN — ZOLPIDEM TARTRATE 5 MG: 5 TABLET ORAL at 00:07

## 2018-07-12 RX ADMIN — Medication 400 MG: at 09:21

## 2018-07-12 RX ADMIN — IPRATROPIUM BROMIDE AND ALBUTEROL SULFATE 3 ML: .5; 3 SOLUTION RESPIRATORY (INHALATION) at 23:07

## 2018-07-12 RX ADMIN — BUMETANIDE 0.5 MG: 0.25 INJECTION INTRAMUSCULAR; INTRAVENOUS at 20:52

## 2018-07-12 RX ADMIN — CARVEDILOL 3.12 MG: 3.12 TABLET, FILM COATED ORAL at 20:52

## 2018-07-12 RX ADMIN — CARVEDILOL 3.12 MG: 3.12 TABLET, FILM COATED ORAL at 09:22

## 2018-07-12 RX ADMIN — IPRATROPIUM BROMIDE AND ALBUTEROL SULFATE 3 ML: .5; 3 SOLUTION RESPIRATORY (INHALATION) at 20:54

## 2018-07-12 RX ADMIN — ASPIRIN 81 MG: 81 TABLET, CHEWABLE ORAL at 09:21

## 2018-07-12 RX ADMIN — CEFTRIAXONE SODIUM 2 G: 2 INJECTION, SOLUTION INTRAVENOUS at 17:19

## 2018-07-12 RX ADMIN — IPRATROPIUM BROMIDE AND ALBUTEROL SULFATE 3 ML: .5; 3 SOLUTION RESPIRATORY (INHALATION) at 07:21

## 2018-07-12 RX ADMIN — BUPROPION HYDROCHLORIDE 150 MG: 150 TABLET, EXTENDED RELEASE ORAL at 09:22

## 2018-07-12 RX ADMIN — IPRATROPIUM BROMIDE AND ALBUTEROL SULFATE 3 ML: .5; 3 SOLUTION RESPIRATORY (INHALATION) at 10:49

## 2018-07-12 NOTE — PROGRESS NOTES
INFECTIOUS DISEASES PROGRESS NOTE    CC: Follow group B streptococcal endocarditis    S:   Patient feels quite a bit better.  Is having fevers or chills or night sweats.  Tolerating the antibiotic.  Some mild chest congestion but no significant dyspnea.  No oxygen requirement.    O:  Physical Exam:  Temp:  [97.8 °F (36.6 °C)-98.8 °F (37.1 °C)] 98.7 °F (37.1 °C)  Heart Rate:  [] 91  Resp:  [16-18] 16  BP: ()/(50-70) 118/70  Physical Exam  No acute distress, rate and rate and rhythm, pulmonary effort normal, abdomen soft nontender nondistended, appropriate mood and affect     Diagnostics:    WBC 9.16  H/H 8.6/26    Cr 1.14    Microbiology:  7/10 Bcx 2/2 NGTD  7/9 bcx 2/2 GBS  6/4/18 BCx 2/2 GBS (R-clinda and erythro)    Assessment/Plan   1. Group B strep septicemia  2. MARISELA, resolved  3. Ischemic CM   4. 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     Discussed with cardiology this morning and given severe cardiomyopathy, not attractive surgical candidate unless receiving LVAD or heart transplantation which will treat his underlying cardiomyopathy.  Dr Calvin reaching out to centers at [facility] as well as the [facility] for consultation.  I discussed with the patient as well as cardiology.  If no surgical options are feasible, we will elect to treat with 6 weeks of ceftriaxone 2 g IV every 24 hours followed by oral suppressive antibiotic therapy for an indefinite period of time.  If not a surgical candidate, no objection to PICC line placement and discharge whenever okay with others    Final Infectious Diseases treatment recommendations:  The patient should receive the following antibiotics:  1.  Ceftriaxone 2 g IV every 24 hours, stop date August 20, 2018    Due to retained hardware this patient may need chronic suppressive antibiotics after completion of the current treatment course. This will be determined upon follow up with the [facility].      Laboratory monitoring:  The next set of labs should be drawn on: 7/16/18    It is recommended that the patient have the following laboratories while he/she is receiving antibiotic therapy as an inpatient and outpatient: Weekly CBC with differential and serum creatinine    Please fax the results of all labs to [facility] clinic at [phone]    Follow up:  The patient will follow-up in the Infectious Disease clinic on 8/20/18        Vamshi Marshall MD  10:03 AM  07/12/18

## 2018-07-12 NOTE — PROGRESS NOTES
[facility] INPATIENT PROGRESS NOTE         [address] CVI    2018      PATIENT IDENTIFICATION:  Name: Mello Arias Jr. ADMIT: 2018   : 1950  PCP: Kelsey Muñoz MD    MRN: [id number] LOS: 3 days   AGE/SEX: 67 y.o. male  ROOM: [facility]                     LOS 3    Reason for visit: Follow-up sepsis with pneumonia and respiratory failure      SUBJECTIVE:    Resting comfortably. No productive cough. Still with a nonproductive cough at times. Bronchodilators seem to help. On room air.    Objective   OBJECTIVE:    Vital Sign Min/Max for last 24 hours  Temp  Min: 98.2 °F (36.8 °C)  Max: 98.8 °F (37.1 °C)   BP  Min: 89/68  Max: 121/69   Pulse  Min: 84  Max: 105   Resp  Min: 16  Max: 18   SpO2  Min: 95 %  Max: 99 %   No Data Recorded   Weight  Min: 87.1 kg (192 lb 2 oz)  Max: 87.1 kg (192 lb 2 oz)                         Body mass index is 29.21 kg/m².    Intake/Output Summary (Last 24 hours) at 18 1329  Last data filed at 18 1246   Gross per 24 hour   Intake              560 ml   Output              900 ml   Net             -340 ml         Exam:  GEN:  No distress, appears stated age  EYES:   PERRL, anicteric sclera  ENT:    External ears/nose normal, OP clear  NECK:  No adenopathy, midline trachea  LUNGS: Normal chest on inspection, palpation and Diminished bilateral on auscultation  CV:  Normal S1S2, without murmur  ABD:  Non tender, non distended, no hepatosplenomegaly, +BS  EXT:  No cyanosis or clubbing    Scheduled meds:      aspirin 81 mg Oral Daily   bumetanide 0.5 mg Intravenous Q12H   buPROPion  mg Oral Daily   carvedilol 3.125 mg Oral Q12H   ceftriaxone 2 g Intravenous Q24H   enoxaparin 30 mg Subcutaneous Q24H   ipratropium-albuterol 3 mL Nebulization 4x Daily - RT   magnesium oxide 400 mg Oral Daily   pantoprazole 40 mg Oral QAM     IV meds:                         Data Review:    Results from last 7 days  Lab Units 18  0333 07/10/18  0252  07/09/18  2101   SODIUM mmol/L 129* 134* 133*   POTASSIUM mmol/L 3.6 3.7 3.7   CHLORIDE mmol/L 94* 96* 93*   CO2 mmol/L 23.6 22.8 24.9   BUN mg/dL 20 24* 24*   CREATININE mg/dL 1.14 1.54* 1.51*   GLUCOSE mg/dL 92 138* 119*   CALCIUM mg/dL 8.0* 8.0* 9.0         Estimated Creatinine Clearance: 65.7 mL/min (by C-G formula based on SCr of 1.14 mg/dL).    Results from last 7 days  Lab Units 07/12/18  0333 07/10/18  0252 07/09/18  2101   WBC 10*3/mm3 9.16 21.69* 13.29*   HEMOGLOBIN g/dL 8.6* 9.7* 11.3*   PLATELETS 10*3/mm3 105* 149 175           Results from last 7 days  Lab Units 07/09/18  2101   ALT (SGPT) U/L 11   AST (SGOT) U/L 14           Results from last 7 days  Lab Units 07/10/18  0252 07/10/18  0109 07/09/18  2134 07/09/18  2101   PROCALCITONIN ng/mL 14.44*  --   --  1.50*   LACTATE mmol/L  --  1.5 2.1*  --          JAYE: EF 20%With large, bulky vegetation noted along the atrial and ventricular aspects of ICD wire    )Assessment   ASSESSMENT:      Active Hospital Problems    Diagnosis Date Noted   • **Acute bacterial endocarditis [I33.0] 07/11/2018   • Pneumonia [J18.9] 07/10/2018   • Hypomagnesemia [E83.42] 07/10/2018   • Positive blood culture [R78.81] 07/10/2018   • Shortness of breath [R06.02] 07/10/2018   • Bacteremia due to group B Streptococcus [R78.81] 06/08/2018   • Sepsis (CMS/[facility]) [A41.9] 06/04/2018   • Acute renal failure (CMS/[facility]) [N17.9] 06/04/2018   • Hyponatremia [E87.1] 06/04/2018   • Coronary artery disease of native artery of native heart with stable angina pectoris (CMS/[facility]) [I25.118] 11/03/2016   • A-fib (CMS/[facility]) [I48.91] 10/28/2016   • CHF (congestive heart failure), NYHA class III (CMS/[facility]) [I50.9] 10/28/2016      Resolved Hospital Problems    Diagnosis Date Noted Date Resolved   No resolved problems to display.     Sepsis  Left basilar pneumonia  Acute hypoxemia with rapid response  Bronchospasm with wheezing  Recent hospitalization with bacteremia  Chronic systolic CHF with recent EF  19%  Chronic atrial fibrillation  Coronary artery disease  Anemia  Acute kidney injury  Hyponatremia  Hypomagnesemia      PLAN:  Continue bronchodilators for bronchospasm.  Wheezing has improved.  Antibiotics per infectious disease recommendations.  JAYE noted vegitation and reduced systolic function.  Not requiring pressors at this time.  DVT prophylaxis.    Discussed with multidisciplinary ICU team on rounds this morning.      Edmund Chatman MD  Pulmonary and Critical Care Medicine  [facility]  7/12/2018    1:29 PM

## 2018-07-12 NOTE — PROGRESS NOTES
Hospital Follow Up    LOS:  LOS: 3 days   Patient Name: Mello Arias Jr.  Age/Sex: 67 y.o. male  : 1950  MRN: [id number]    Date of Hospital Visit: 18  Length of Stay: 3  Encounter Provider: Naun Calvin MD  Place of Service: [facility] CARDIOLOGY    Subjective:     Follow Up for: CHF, endocarditis    Interval History: fairly quiet night.  No dyspnea    Objective:     Objective:  Temp:  [97.8 °F (36.6 °C)-98.8 °F (37.1 °C)] 98.7 °F (37.1 °C)  Heart Rate:  [] 91  Resp:  [16-20] 16  BP: ()/(48-70) 118/70  Body mass index is 29.21 kg/m².    Intake/Output Summary (Last 24 hours) at 18 0802  Last data filed at 18 0333   Gross per 24 hour   Intake              360 ml   Output                0 ml   Net              360 ml     1    18  1940 07/10/18  0520 18  0416   Weight: 86.2 kg (190 lb) 85.3 kg (188 lb) 87.1 kg (192 lb 2 oz)     Weight change:     Physical Exam:   General Appearance: Alert, cooperative, in no acute distress. AAOx4.   HEENT: Normocephalic.  Neck: Supple. No JVD. No Carotid bruit. No thyromegaly  Lungs: CTAB. Normal respiratory effort and rate.  Heart:: Regular rate and rhythm, normal S1 and S2, no murmurs, gallops or rubs.  Abdomen: Soft, nontender, non-distended. positive bowel sounds  Extremities: Warm, no cyanosis, or clubbing. No edema.     Lab Review:     Results from last 7 days  Lab Units 18  0333 07/10/18  0252 18  2101   SODIUM mmol/L 129* 134* 133*   POTASSIUM mmol/L 3.6 3.7 3.7   CHLORIDE mmol/L 94* 96* 93*   CO2 mmol/L 23.6 22.8 24.9   BUN mg/dL 20 24* 24*   CREATININE mg/dL 1.14 1.54* 1.51*   GLUCOSE mg/dL 92 138* 119*   CALCIUM mg/dL 8.0* 8.0* 9.0         Results from last 7 days  Lab Units 07/10/18  1448 07/10/18  0851 18  2101   TROPONIN T ng/mL 0.027 0.024 0.039*       Results from last 7 days  Lab Units 18  0333 07/10/18  0252   WBC 10*3/mm3 9.16 21.69*   HEMOGLOBIN g/dL  8.6* 9.7*   HEMATOCRIT % 26.4* 30.0*   PLATELETS 10*3/mm3 105* 149           Results from last 7 days  Lab Units 07/12/18  0333 07/10/18  0252   MAGNESIUM mg/dL 1.9 1.5*           Results from last 7 days  Lab Units 07/10/18  1448 07/09/18  2101   PROBNP pg/mL 7,163.0* 1,268.0*             I reviewed the patient's new clinical results.          I personally viewed and interpreted the patient's EKG/Telemetry data.  Current Medications:   Scheduled Meds:  aspirin 81 mg Oral Daily   buPROPion  mg Oral Daily   carvedilol 3.125 mg Oral Q12H   ceftriaxone 2 g Intravenous Q24H   enoxaparin 30 mg Subcutaneous Q24H   ipratropium-albuterol 3 mL Nebulization 4x Daily - RT   magnesium oxide 400 mg Oral Daily   pantoprazole 40 mg Oral QAM     Continuous Infusions:     Allergies:  Allergies   Allergen Reactions   • Shellfish-Derived Products Swelling and Other (See Comments)     THROAT SWELLS       Assessment & Plan     Principal Problem:    Acute bacterial endocarditis  Active Problems:    A-fib (CMS/[facility])    CHF (congestive heart failure), NYHA class III (CMS/[facility])    Coronary artery disease of native artery of native heart with stable angina pectoris (CMS/[facility])    Acute renal failure (CMS/[facility])    Sepsis (CMS/[facility])    Hyponatremia    Bacteremia due to group B Streptococcus    Pneumonia    Hypomagnesemia    Positive blood culture    Shortness of breath    1. Endocarditis: on IV antibiotics  2. CHF: poor LV.  EF 20%.  Will need either transplant or LVAD long term.  Not sure just replacing valve and AICD would improve overall prognosis  3. CAD: s/p MI.  No angina  4. Sepsis  5. MARISELA: stable  6. Anemia: in part dilutional.  Restart diuretics          Naun Calvin MD  07/12/18

## 2018-07-12 NOTE — PROGRESS NOTES
"     LOS: 3 days   Primary Care Physician: Kelsey Muñoz MD     Subjective   Feels okay.  Wheezing at times.  Uses inhaler and mini nebs at home.    Vital Signs  Body mass index is 29.21 kg/m².  Temp:  [98.2 °F (36.8 °C)-98.7 °F (37.1 °C)] 98.2 °F (36.8 °C)  Heart Rate:  [] 86  Resp:  [16-18] 16  BP: (103-132)/(65-81) 132/81      Objective:  General Appearance:  In no acute distress.    Vital signs: (most recent): Blood pressure 132/81, pulse 86, temperature 98.2 °F (36.8 °C), temperature source Oral, resp. rate 16, height 172.7 cm (68\"), weight 87.1 kg (192 lb 2 oz), SpO2 90 %.    Lungs:  He is not in respiratory distress.  No stridor.  There are decreased breath sounds and wheezes.  No rales or rhonchi.  (Light scattered expiratory wheezes)  Heart: Normal rate.  Regular rhythm.  No murmur.   Abdomen: Abdomen is soft and non-distended.  Bowel sounds are normal.   There is no abdominal tenderness.   There is no splenomegaly. There is no hepatomegaly.   Extremities: There is no dependent edema.    Neurological: Patient is alert.          Results Review:    I reviewed the patient's new clinical results.      Results from last 7 days  Lab Units 07/12/18  0333 07/10/18  0252   WBC 10*3/mm3 9.16 21.69*   HEMOGLOBIN g/dL 8.6* 9.7*   PLATELETS 10*3/mm3 105* 149       Results from last 7 days  Lab Units 07/12/18  0333 07/10/18  0252   SODIUM mmol/L 129* 134*   POTASSIUM mmol/L 3.6 3.7   CHLORIDE mmol/L 94* 96*   CO2 mmol/L 23.6 22.8   BUN mg/dL 20 24*   CREATININE mg/dL 1.14 1.54*   CALCIUM mg/dL 8.0* 8.0*   GLUCOSE mg/dL 92 138*         Hemoglobin A1C:  Lab Results   Component Value Date    HGBA1C 5.40 12/31/2016       Glucose Range:  Glucose   Date/Time Value Ref Range Status   07/10/2018 2006 159 (H) 70 - 130 mg/dL Final   07/10/2018 1639 89 70 - 130 mg/dL Final       Lab Results   Component Value Date    [id number] 489 12/09/2015       Lab Results   Component Value Date    TSH 1.360 12/31/2016       Assessment " & Plan      Medication Review: Yes    Active Hospital Problems    Diagnosis Date Noted   • **Acute bacterial endocarditis [I33.0] 07/11/2018   • Secondary thrombocytopenia [D69.59] 07/12/2018   • Pneumonia [J18.9] 07/10/2018   • Positive blood culture [R78.81] 07/10/2018   • Shortness of breath [R06.02] 07/10/2018   • Bacteremia due to group B Streptococcus [R78.81] 06/08/2018   • Sepsis (CMS/[facility]) [A41.9] 06/04/2018   • Acute renal failure (CMS/[facility]) [N17.9] 06/04/2018   • Hyponatremia [E87.1] 06/04/2018   • Coronary artery disease of native artery of native heart with stable angina pectoris (CMS/[facility]) [I25.118] 11/03/2016   • A-fib (CMS/[facility]) [I48.91] 10/28/2016   • CHF (congestive heart failure), NYHA class III (CMS/[facility]) [I50.9] 10/28/2016      Resolved Hospital Problems    Diagnosis Date Noted Date Resolved   • Hypomagnesemia [E83.42] 07/10/2018 07/12/2018       Assessment/Plan  1.  Group B strep sepsis with prosthetic mitral valve endocarditis and AICD infection.  Discussed earlier with Dr. Marshall.  IV Rocephin for now.  Await further input per Dr. Calvin and MDs at , [facility]  2.  Wheezing, cardiac versus asthma.  Increase frequency of mini nebs.  IV Bumex started.    3.  Acute kidney injury.  Creatinine improved.  Restart diuretics as above.  4.  Acute blood loss anemia.  Hemoglobin has dropped.  Check stool Hemoccult and usual studies  5.  Thrombocytopenia.  We'll check B12 level. May need to hold Lovenox. ? From mae Jimenez MD  07/12/18  7:25 PM

## 2018-07-12 NOTE — PROGRESS NOTES
Clinical Pharmacy Services: Medication History    Mello Arias Jr. is a 67 y.o. male presenting to [facility] for Hypomagnesemia [E83.42]  Renal insufficiency [N28.9]  Pneumonia of left lower lobe due to infectious organism (CMS/[facility]) [J18.1]  Sepsis, due to unspecified organism (CMS/[facility]) [A41.9]    He  has a past medical history of Allergic rhinitis; Anemia; Asthma; Bone fracture; CAD (coronary artery disease); Cardiomyopathy, ischemic; Carpal tunnel syndrome; CHF (congestive heart failure), NYHA class III, chronic, systolic (CMS/[facility]); GERD (gastroesophageal reflux disease); Health care maintenance; Herniated disc, cervical; total hip arthroplasty (3/18/2016); Hyperglycemia; Hyperlipidemia; Hypertension; Idiopathic ventricular tachycardia (CMS/[facility]); Lumbosacral radiculopathy at L4; Myocardial infarction; Myocardial ischemia; Non-rheumatic tricuspid valve insufficiency; Nonrheumatic mitral valve regurgitation; Open wound; Osteoarthritis; PAF (paroxysmal atrial fibrillation) (CMS/[facility]); and Ventricular tachycardia (CMS/[facility]).    Allergies as of 07/09/2018 - Reviewed 07/09/2018   Allergen Reaction Noted   • Shellfish-derived products Swelling and Other (See Comments) 11/19/2015       Medication information was obtained from: Patient   Pharmacy and Phone Number: [facility] [address] [facility] [address] [facility] [phone] [facility] [phone] FX    Prior to Admission Medications     Prescriptions Last Dose Informant Patient Reported? Taking?    acetaminophen (TYLENOL) 325 MG tablet  Self No No    Take 2 tablets by mouth Every 6 (Six) Hours As Needed for Mild Pain .    albuterol (PROAIR HFA) 108 (90 BASE) MCG/ACT inhaler  Self Yes No    Inhale 2 puffs Every 6 (Six) Hours As Needed.    aspirin 81 MG tablet  Self Yes No    Take 81 mg by mouth Daily.    buPROPion XL (WELLBUTRIN XL) 150 MG 24 hr tablet  Self No No    Take 1 tablet by mouth Daily.    carvedilol (COREG)  3.125 MG tablet  Self No No    Take 1 tablet by mouth Every 12 (Twelve) Hours.    furosemide (LASIX) 20 MG tablet  Self No No    Take 1 tablet by mouth Daily.    magnesium oxide (MAG-OX) 400 MG tablet  Self No No    Take 1 tablet by mouth Daily.    montelukast (SINGULAIR) 10 MG tablet  Self Yes No    Take 10 mg by mouth Every Night.    nitroglycerin (NITROSTAT) 0.4 MG SL tablet  Self Yes No    Place 0.4 mg under the tongue Every 5 (Five) Minutes As Needed for Chest Pain. Take no more than 3 doses in 15 minutes.    omeprazole (PRILOSEC) 20 MG capsule  Self Yes No    Take 20 mg by mouth Daily.    sacubitril-valsartan (ENTRESTO) 49-51 MG tablet  Self No No    Take 1 tablet by mouth Every 12 (Twelve) Hours.            Medication notes: Based on the information obtained from Patient, following changes have been made to patient's PTA medication list.     Patient states he uses albuterol inhaler twice a day due to frequent shortness of breath.    This medication list is complete to the best of my knowledge as of 7/12/2018    Please call pharmacy for any questions.     Andria Ellis, Dodie, BCPS  07/12/18 12:31 PM

## 2018-07-12 NOTE — PLAN OF CARE
Problem: Patient Care Overview  Goal: Plan of Care Review  Outcome: Ongoing (interventions implemented as appropriate)      Problem: Activity Intolerance (Adult)  Goal: Activity Tolerance  Outcome: Ongoing (interventions implemented as appropriate)      Problem: Sepsis/Septic Shock (Adult)  Goal: Signs and Symptoms of Listed Potential Problems Will be Absent, Minimized or Managed (Sepsis/Septic Shock)  Outcome: Ongoing (interventions implemented as appropriate)

## 2018-07-13 LAB
ANION GAP SERPL CALCULATED.3IONS-SCNC: 10.2 MMOL/L
BASOPHILS # BLD AUTO: 0.03 10*3/MM3 (ref 0–0.2)
BASOPHILS NFR BLD AUTO: 0.4 % (ref 0–1.5)
BUN BLD-MCNC: 16 MG/DL (ref 8–23)
BUN/CREAT SERPL: 16.5 (ref 7–25)
CALCIUM SPEC-SCNC: 8.9 MG/DL (ref 8.6–10.5)
CHLORIDE SERPL-SCNC: 94 MMOL/L (ref 98–107)
CO2 SERPL-SCNC: 26.8 MMOL/L (ref 22–29)
CREAT BLD-MCNC: 0.97 MG/DL (ref 0.76–1.27)
DEPRECATED RDW RBC AUTO: 46.4 FL (ref 37–54)
EOSINOPHIL # BLD AUTO: 0.44 10*3/MM3 (ref 0–0.7)
EOSINOPHIL NFR BLD AUTO: 6.3 % (ref 0.3–6.2)
ERYTHROCYTE [DISTWIDTH] IN BLOOD BY AUTOMATED COUNT: 13.6 % (ref 11.5–14.5)
FOLATE SERPL-MCNC: 10.29 NG/ML (ref 4.78–24.2)
GFR SERPL CREATININE-BSD FRML MDRD: 77 ML/MIN/1.73
GLUCOSE BLD-MCNC: 102 MG/DL (ref 65–99)
HCT VFR BLD AUTO: 27.6 % (ref 40.4–52.2)
HGB BLD-MCNC: 9.1 G/DL (ref 13.7–17.6)
IMM GRANULOCYTES # BLD: 0.01 10*3/MM3 (ref 0–0.03)
IMM GRANULOCYTES NFR BLD: 0.1 % (ref 0–0.5)
IRON 24H UR-MRATE: 44 MCG/DL (ref 59–158)
IRON SATN MFR SERPL: 17 % (ref 20–50)
LYMPHOCYTES # BLD AUTO: 1.29 10*3/MM3 (ref 0.9–4.8)
LYMPHOCYTES NFR BLD AUTO: 18.5 % (ref 19.6–45.3)
MCH RBC QN AUTO: 30.8 PG (ref 27–32.7)
MCHC RBC AUTO-ENTMCNC: 33 G/DL (ref 32.6–36.4)
MCV RBC AUTO: 93.6 FL (ref 79.8–96.2)
MONOCYTES # BLD AUTO: 0.55 10*3/MM3 (ref 0.2–1.2)
MONOCYTES NFR BLD AUTO: 7.9 % (ref 5–12)
NEUTROPHILS # BLD AUTO: 4.67 10*3/MM3 (ref 1.9–8.1)
NEUTROPHILS NFR BLD AUTO: 66.9 % (ref 42.7–76)
PLATELET # BLD AUTO: 132 10*3/MM3 (ref 140–500)
PMV BLD AUTO: 11.7 FL (ref 6–12)
POTASSIUM BLD-SCNC: 3.8 MMOL/L (ref 3.5–5.2)
RBC # BLD AUTO: 2.95 10*6/MM3 (ref 4.6–6)
SODIUM BLD-SCNC: 131 MMOL/L (ref 136–145)
TIBC SERPL-MCNC: 253 MCG/DL
TRANSFERRIN SERPL-MCNC: 170 MG/DL (ref 200–360)
TSH SERPL DL<=0.05 MIU/L-ACNC: 1.82 MIU/ML (ref 0.27–4.2)
VIT B12 BLD-MCNC: 319 PG/ML (ref 211–946)
WBC NRBC COR # BLD: 6.98 10*3/MM3 (ref 4.5–10.7)

## 2018-07-13 PROCEDURE — 85025 COMPLETE CBC W/AUTO DIFF WBC: CPT | Performed by: INTERNAL MEDICINE

## 2018-07-13 PROCEDURE — 94799 UNLISTED PULMONARY SVC/PX: CPT

## 2018-07-13 PROCEDURE — 25010000002 ENOXAPARIN PER 10 MG: Performed by: INTERNAL MEDICINE

## 2018-07-13 PROCEDURE — 83540 ASSAY OF IRON: CPT | Performed by: INTERNAL MEDICINE

## 2018-07-13 PROCEDURE — 82607 VITAMIN B-12: CPT | Performed by: INTERNAL MEDICINE

## 2018-07-13 PROCEDURE — 94762 N-INVAS EAR/PLS OXIMTRY CONT: CPT

## 2018-07-13 PROCEDURE — 84466 ASSAY OF TRANSFERRIN: CPT | Performed by: INTERNAL MEDICINE

## 2018-07-13 PROCEDURE — 80048 BASIC METABOLIC PNL TOTAL CA: CPT | Performed by: INTERNAL MEDICINE

## 2018-07-13 PROCEDURE — 99233 SBSQ HOSP IP/OBS HIGH 50: CPT | Performed by: INTERNAL MEDICINE

## 2018-07-13 PROCEDURE — 82746 ASSAY OF FOLIC ACID SERUM: CPT | Performed by: INTERNAL MEDICINE

## 2018-07-13 PROCEDURE — 25010000003 CEFTRIAXONE PER 250 MG: Performed by: INTERNAL MEDICINE

## 2018-07-13 PROCEDURE — 84443 ASSAY THYROID STIM HORMONE: CPT | Performed by: INTERNAL MEDICINE

## 2018-07-13 RX ADMIN — CARVEDILOL 3.12 MG: 3.12 TABLET, FILM COATED ORAL at 09:01

## 2018-07-13 RX ADMIN — SACUBITRIL AND VALSARTAN 1 TABLET: 24; 26 TABLET, FILM COATED ORAL at 20:23

## 2018-07-13 RX ADMIN — PANTOPRAZOLE SODIUM 40 MG: 40 TABLET, DELAYED RELEASE ORAL at 09:01

## 2018-07-13 RX ADMIN — SACUBITRIL AND VALSARTAN 1 TABLET: 24; 26 TABLET, FILM COATED ORAL at 11:26

## 2018-07-13 RX ADMIN — IPRATROPIUM BROMIDE AND ALBUTEROL SULFATE 3 ML: .5; 3 SOLUTION RESPIRATORY (INHALATION) at 15:44

## 2018-07-13 RX ADMIN — ZOLPIDEM TARTRATE 5 MG: 5 TABLET ORAL at 22:44

## 2018-07-13 RX ADMIN — BUMETANIDE 0.5 MG: 0.25 INJECTION INTRAMUSCULAR; INTRAVENOUS at 20:22

## 2018-07-13 RX ADMIN — CARVEDILOL 3.12 MG: 3.12 TABLET, FILM COATED ORAL at 20:23

## 2018-07-13 RX ADMIN — Medication 10 ML: at 09:02

## 2018-07-13 RX ADMIN — Medication 10 ML: at 08:58

## 2018-07-13 RX ADMIN — IPRATROPIUM BROMIDE AND ALBUTEROL SULFATE 3 ML: .5; 3 SOLUTION RESPIRATORY (INHALATION) at 07:36

## 2018-07-13 RX ADMIN — ENOXAPARIN SODIUM 30 MG: 30 INJECTION SUBCUTANEOUS at 09:01

## 2018-07-13 RX ADMIN — ASPIRIN 81 MG: 81 TABLET, CHEWABLE ORAL at 09:01

## 2018-07-13 RX ADMIN — CEFTRIAXONE SODIUM 2 G: 2 INJECTION, SOLUTION INTRAVENOUS at 20:22

## 2018-07-13 RX ADMIN — IPRATROPIUM BROMIDE AND ALBUTEROL SULFATE 3 ML: .5; 3 SOLUTION RESPIRATORY (INHALATION) at 10:33

## 2018-07-13 RX ADMIN — Medication 400 MG: at 09:01

## 2018-07-13 RX ADMIN — BUPROPION HYDROCHLORIDE 150 MG: 150 TABLET, EXTENDED RELEASE ORAL at 09:01

## 2018-07-13 RX ADMIN — IPRATROPIUM BROMIDE AND ALBUTEROL SULFATE 3 ML: .5; 3 SOLUTION RESPIRATORY (INHALATION) at 18:54

## 2018-07-13 RX ADMIN — IPRATROPIUM BROMIDE AND ALBUTEROL SULFATE 3 ML: .5; 3 SOLUTION RESPIRATORY (INHALATION) at 21:45

## 2018-07-13 RX ADMIN — BUMETANIDE 0.5 MG: 0.25 INJECTION INTRAMUSCULAR; INTRAVENOUS at 08:58

## 2018-07-13 NOTE — PLAN OF CARE
Problem: Patient Care Overview  Goal: Plan of Care Review  Outcome: Ongoing (interventions implemented as appropriate)   07/13/18 1720   Coping/Psychosocial   Plan of Care Reviewed With patient   Plan of Care Review   Progress no change   OTHER   Outcome Summary Vitals stable. Dr. Jimenez ordered an IV team consult for tomorrow. Denies complaints. Started on entresto today per cardiology. Will continue to monitor.      Goal: Individualization and Mutuality  Outcome: Ongoing (interventions implemented as appropriate)      Problem: Activity Intolerance (Adult)  Goal: Activity Tolerance  Outcome: Ongoing (interventions implemented as appropriate)    Goal: Effective Energy Conservation Techniques  Outcome: Ongoing (interventions implemented as appropriate)      Problem: Sepsis/Septic Shock (Adult)  Goal: Signs and Symptoms of Listed Potential Problems Will be Absent, Minimized or Managed (Sepsis/Septic Shock)  Outcome: Ongoing (interventions implemented as appropriate)      Problem: Cardiac: Heart Failure (Adult)  Goal: Signs and Symptoms of Listed Potential Problems Will be Absent, Minimized or Managed (Cardiac: Heart Failure)  Outcome: Ongoing (interventions implemented as appropriate)

## 2018-07-13 NOTE — PROGRESS NOTES
Adult Nutrition  Assessment/PES    Patient Name:  Mello Arias Jr.  YOB: 1950  MRN: [id number]  Admit Date:  7/9/2018    Assessment Date:  7/13/2018    Comments:  Intake improved. Continue to supplement with CIB at breakfast.           Reason for Assessment     Row Name 07/13/18 1135          Reason for Assessment    Reason For Assessment follow-up protocol     Diagnosis cardiac disease;infection/sepsis   Group B strep endocarditis (MV), AICD infection.              Nutrition/Diet History     Row Name 07/13/18 1136          Nutrition/Diet History    Typical Food/Fluid Intake Feeling better, intake continues to wax & wane. LT plan is LVAC vs transplant per cardiology notes.              Anthropometrics     Row Name 07/13/18 0445          Anthropometrics    Weight 85.5 kg (188 lb 9.6 oz)             Labs/Tests/Procedures/Meds     Row Name 07/13/18 1137          Labs/Procedures/Meds    Lab Results Reviewed reviewed     Lab Results Comments na 131, glu wnl, PLT, procalc^        Medications    Pertinent Medications Reviewed reviewed     Pertinent Medications Comments abx, diuretic, ppi             Physical Findings     Row Name 07/13/18 1138          Physical Findings    Skin other (see comments)   no impairment               Nutrition Prescription Ordered     Row Name 07/13/18 1138          Nutrition Prescription PO    Supplement [place] Breakfast Essentials;Milk     Supplement Frequency Daily   @ breakfast     Common Modifiers Low Sodium;Cardiac     Low Sodium Details 2,000 mg Sodium             Evaluation of Received Nutrient/Fluid Intake     Row Name 07/13/18 1138          PO Evaluation    % PO Intake %             Problem/Interventions:                  Intervention Goal     Row Name 07/13/18 1139          Intervention Goal    General Maintain nutrition;Disease management/therapy;Reduce/improve symptoms     PO Continue positive trend;Increase intake;PO intake (%)     PO Intake % 75 %      Weight No significant weight loss             Nutrition Intervention     Row Name 07/13/18 1139          Nutrition Intervention    RD/Tech Action Interview for preference;Encourage intake;Follow Tx progress;Care plan reviewd               Education/Evaluation     Row Name 07/13/18 1139          Monitor/Evaluation    Monitor Per protocol         Electronically signed by:  Kristina Manriquez RD  07/13/18 11:40 AM

## 2018-07-13 NOTE — PROGRESS NOTES
[facility] INPATIENT PROGRESS NOTE         [address] CVI    2018      PATIENT IDENTIFICATION:  Name: Mello Arias Jr. ADMIT: 2018   : 1950  PCP: Kelsey Muñoz MD    MRN: [id number] LOS: 4 days   AGE/SEX: 67 y.o. male  ROOM: [facility]                     LOS 4    Reason for visit: Follow-up sepsis with pneumonia and respiratory failure      SUBJECTIVE:    Chart reviewed. Noted long-term plans per cardiology. Resting comfortably at the time of my visit. No productive call with chest pain. Saturations are good on room air.    Objective   OBJECTIVE:    Vital Sign Min/Max for last 24 hours  Temp  Min: 97.7 °F (36.5 °C)  Max: 98.2 °F (36.8 °C)   BP  Min: 110/65  Max: 137/74   Pulse  Min: 84  Max: 94   Resp  Min: 16  Max: 18   SpO2  Min: 90 %  Max: 100 %   No Data Recorded   Weight  Min: 85.5 kg (188 lb 9.6 oz)  Max: 85.5 kg (188 lb 9.6 oz)                         Body mass index is 28.68 kg/m².    Intake/Output Summary (Last 24 hours) at 18 1037  Last data filed at 18 0809   Gross per 24 hour   Intake              240 ml   Output             2125 ml   Net            -1885 ml         Exam:  GEN:  No distress, appears stated age  EYES:   PERRL, anicteric sclera  ENT:    External ears/nose normal, OP clear  NECK:  No adenopathy, midline trachea  LUNGS: Normal chest on inspection, palpation and Diminished bilateral on auscultation  CV:  Normal S1S2, without murmur  ABD:  Non tender, non distended, no hepatosplenomegaly, +BS  EXT:  No cyanosis or clubbing    Scheduled meds:      aspirin 81 mg Oral Daily   bumetanide 0.5 mg Intravenous Q12H   buPROPion  mg Oral Daily   carvedilol 3.125 mg Oral Q12H   ceftriaxone 2 g Intravenous Q24H   enoxaparin 30 mg Subcutaneous Q24H   ipratropium-albuterol 3 mL Nebulization Q4H While Awake - RT   magnesium oxide 400 mg Oral Daily   pantoprazole 40 mg Oral QAM   sacubitril-valsartan 1 tablet Oral Q12H     IV meds:                          Data Review:    Results from last 7 days  Lab Units 07/13/18  0437 07/12/18  0333 07/10/18  0252 07/09/18  2101   SODIUM mmol/L 131* 129* 134* 133*   POTASSIUM mmol/L 3.8 3.6 3.7 3.7   CHLORIDE mmol/L 94* 94* 96* 93*   CO2 mmol/L 26.8 23.6 22.8 24.9   BUN mg/dL 16 20 24* 24*   CREATININE mg/dL 0.97 1.14 1.54* 1.51*   GLUCOSE mg/dL 102* 92 138* 119*   CALCIUM mg/dL 8.9 8.0* 8.0* 9.0         Estimated Creatinine Clearance: 76.4 mL/min (by C-G formula based on SCr of 0.97 mg/dL).    Results from last 7 days  Lab Units 07/13/18  0437 07/12/18  0333 07/10/18  0252 07/09/18  2101   WBC 10*3/mm3 6.98 9.16 21.69* 13.29*   HEMOGLOBIN g/dL 9.1* 8.6* 9.7* 11.3*   PLATELETS 10*3/mm3 132* 105* 149 175           Results from last 7 days  Lab Units 07/09/18  2101   ALT (SGPT) U/L 11   AST (SGOT) U/L 14           Results from last 7 days  Lab Units 07/10/18  0252 07/10/18  0109 07/09/18  2134 07/09/18  2101   PROCALCITONIN ng/mL 14.44*  --   --  1.50*   LACTATE mmol/L  --  1.5 2.1*  --          JAYE: EF 20%With large, bulky vegetation noted along the atrial and ventricular aspects of ICD wire    )Assessment   ASSESSMENT:      Active Hospital Problems    Diagnosis Date Noted   • **Acute bacterial endocarditis [I33.0] 07/11/2018   • Secondary thrombocytopenia [D69.59] 07/12/2018   • Pneumonia [J18.9] 07/10/2018   • Positive blood culture [R78.81] 07/10/2018   • Shortness of breath [R06.02] 07/10/2018   • Bacteremia due to group B Streptococcus [R78.81] 06/08/2018   • Sepsis (CMS/HCC) [A41.9] 06/04/2018   • Acute renal failure (CMS/[facility]) [N17.9] 06/04/2018   • Hyponatremia [E87.1] 06/04/2018   • Coronary artery disease of native artery of native heart with stable angina pectoris (CMS/[facility]) [I25.118] 11/03/2016   • A-fib (CMS/[facility]) [I48.91] 10/28/2016   • CHF (congestive heart failure), NYHA class III (CMS/[facility]) [I50.9] 10/28/2016      Resolved Hospital Problems    Diagnosis Date Noted Date Resolved   • Hypomagnesemia [E83.42] 07/10/2018  07/12/2018     Sepsis  Left basilar pneumonia  Acute hypoxemia with rapid response  Bronchospasm with wheezing  Recent hospitalization with bacteremia  Chronic systolic CHF with recent EF 19%  Chronic atrial fibrillation  Coronary artery disease  Anemia  Acute kidney injury  Hyponatremia  Hypomagnesemia      PLAN:  Continue bronchodilators for bronchospasm.  Wheezing has improved.  Antibiotics per infectious disease recommendations.  JAYE noted vegitation and reduced systolic function.  Cardiology looking into possible transplant or LVAD.  DVT prophylaxis.        Edmund Chatman MD  Pulmonary and Critical Care Medicine  [facility], [facility]  7/13/2018    10:37 AM

## 2018-07-13 NOTE — PROGRESS NOTES
"     LOS: 4 days   Primary Care Physician: Kelsey Muñoz MD     Subjective   Feeling better.  Some dyspnea with walking.  No chest pain    Vital Signs  Body mass index is 28.68 kg/m².  Temp:  [97.7 °F (36.5 °C)-98.2 °F (36.8 °C)] 98.1 °F (36.7 °C)  Heart Rate:  [77-95] 95  Resp:  [16-18] 16  BP: (118-137)/(66-82) 130/82      Objective:  General Appearance:  In no acute distress.    Vital signs: (most recent): Blood pressure 130/82, pulse 95, temperature 98.1 °F (36.7 °C), temperature source Oral, resp. rate 16, height 172.7 cm (68\"), weight 85.5 kg (188 lb 9.6 oz), SpO2 96 %.    Lungs:  He is not in respiratory distress.  No stridor.  There are decreased breath sounds and wheezes.  No rales or rhonchi.  (Light and expiratory wheezes, improved from yesterday)  Heart: Normal rate.  Regular rhythm.  No murmur.   Abdomen: Abdomen is soft and non-distended.  Bowel sounds are normal.   There is no abdominal tenderness.   There is no splenomegaly. There is no hepatomegaly.   Extremities: There is no dependent edema.    Neurological: Patient is alert.    Skin:  Warm and dry.          Results Review:    I reviewed the patient's new clinical results.      Results from last 7 days  Lab Units 07/13/18  0437 07/12/18  0333   WBC 10*3/mm3 6.98 9.16   HEMOGLOBIN g/dL 9.1* 8.6*   PLATELETS 10*3/mm3 132* 105*       Results from last 7 days  Lab Units 07/13/18  0437 07/12/18  0333   SODIUM mmol/L 131* 129*   POTASSIUM mmol/L 3.8 3.6   CHLORIDE mmol/L 94* 94*   CO2 mmol/L 26.8 23.6   BUN mg/dL 16 20   CREATININE mg/dL 0.97 1.14   CALCIUM mg/dL 8.9 8.0*   GLUCOSE mg/dL 102* 92         Hemoglobin A1C:  Lab Results   Component Value Date    HGBA1C 5.40 12/31/2016       Glucose Range:  Glucose   Date/Time Value Ref Range Status   07/10/2018 2006 159 (H) 70 - 130 mg/dL Final       Lab Results   Component Value Date    [id number] 319 07/13/2018       Lab Results   Component Value Date    TSH 1.820 07/13/2018       Assessment & " Plan      Medication Review: Yes    Active Hospital Problems    Diagnosis Date Noted   • **Acute bacterial endocarditis [I33.0] 07/11/2018   • Secondary thrombocytopenia [D69.59] 07/12/2018   • Pneumonia [J18.9] 07/10/2018   • Positive blood culture [R78.81] 07/10/2018   • Shortness of breath [R06.02] 07/10/2018   • Bacteremia due to group B Streptococcus [R78.81] 06/08/2018   • Sepsis (CMS/[facility]) [A41.9] 06/04/2018   • Acute renal failure (CMS/[facility]) [N17.9] 06/04/2018   • Hyponatremia [E87.1] 06/04/2018   • Coronary artery disease of native artery of native heart with stable angina pectoris (CMS/[facility]) [I25.118] 11/03/2016   • A-fib (CMS/[facility]) [I48.91] 10/28/2016   • CHF (congestive heart failure), NYHA class III (CMS/[facility]) [I50.9] 10/28/2016      Resolved Hospital Problems    Diagnosis Date Noted Date Resolved   • Hypomagnesemia [E83.42] 07/10/2018 07/12/2018       Assessment/Plan  1.  Sepsis secondary to group B strep with endocarditis and AICD infection.  Will request PICC line for tomorrow.  Discussed earlier with Dr. Marshall.  2.  Wheezing, improved.  Continue diuretics and mini nebs.  3.  Cardiomyopathy/acute and chronic systolic congestive heart failure.  Entresto started  4.  Acute kidney injury resolved  5.  Anemia.  Improved.  Platelets also better.  B12 normal.    Mildred Jimenez MD  07/13/18  7:44 PM

## 2018-07-13 NOTE — PROGRESS NOTES
Hospital Follow Up    LOS:  LOS: 4 days   Patient Name: Mello Arias Jr.  Age/Sex: 67 y.o. male  : 1950  MRN: [id number]    Date of Hospital Visit: 18  Length of Stay: 4  Encounter Provider: Naun Calvin MD  Place of Service: [facility] CARDIOLOGY    Subjective:     Follow Up for: CHF, endocarditis    Interval History: starting to diurese.  BP and renal function stable.  Pt feeling better    Objective:     Objective:  Temp:  [97.7 °F (36.5 °C)-98.2 °F (36.8 °C)] 97.9 °F (36.6 °C)  Heart Rate:  [84-93] 92  Resp:  [16-18] 18  BP: (110-137)/(65-81) 118/66  Body mass index is 28.68 kg/m².    Intake/Output Summary (Last 24 hours) at 18 0824  Last data filed at 18 0809   Gross per 24 hour   Intake              240 ml   Output             1550 ml   Net            -1310 ml     1    07/10/18  0520 18  0416 18  0445   Weight: 85.3 kg (188 lb) 87.1 kg (192 lb 2 oz) 85.5 kg (188 lb 9.6 oz)     Weight change: -1.599 kg (-3 lb 8.4 oz)    Physical Exam:   General Appearance: Alert, cooperative, in no acute distress. AAOx4.   HEENT: Normocephalic.  Neck: Supple. No JVD. No Carotid bruit. No thyromegaly  Lungs: rhonci and basilar rales bilaterally.  Expiratory wheezes  Heart:: Regular rate and rhythm, normal S1 and S2, no murmurs, gallops or rubs.  Abdomen: Soft, nontender, non-distended. positive bowel sounds  Extremities: Warm, no cyanosis, or clubbing. No edema.     Lab Review:     Results from last 7 days  Lab Units 18  0437 18  0333 07/10/18  0252   SODIUM mmol/L 131* 129* 134*   POTASSIUM mmol/L 3.8 3.6 3.7   CHLORIDE mmol/L 94* 94* 96*   CO2 mmol/L 26.8 23.6 22.8   BUN mg/dL 16 20 24*   CREATININE mg/dL 0.97 1.14 1.54*   GLUCOSE mg/dL 102* 92 138*   CALCIUM mg/dL 8.9 8.0* 8.0*         Results from last 7 days  Lab Units 07/10/18  1448 07/10/18  0851 18  2101   TROPONIN T ng/mL 0.027 0.024 0.039*       Results from last 7  days  Lab Units 07/13/18  0437 07/12/18  0333   WBC 10*3/mm3 6.98 9.16   HEMOGLOBIN g/dL 9.1* 8.6*   HEMATOCRIT % 27.6* 26.4*   PLATELETS 10*3/mm3 132* 105*           Results from last 7 days  Lab Units 07/12/18  0333 07/10/18  0252   MAGNESIUM mg/dL 1.9 1.5*           Results from last 7 days  Lab Units 07/10/18  1448 07/09/18  2101   PROBNP pg/mL 7,163.0* 1,268.0*       Results from last 7 days  Lab Units 07/13/18  0437   TSH mIU/mL 1.820         I reviewed the patient's new clinical results.          I personally viewed and interpreted the patient's EKG/Telemetry data.  Current Medications:   Scheduled Meds:  aspirin 81 mg Oral Daily   bumetanide 0.5 mg Intravenous Q12H   buPROPion  mg Oral Daily   carvedilol 3.125 mg Oral Q12H   ceftriaxone 2 g Intravenous Q24H   enoxaparin 30 mg Subcutaneous Q24H   ipratropium-albuterol 3 mL Nebulization Q4H While Awake - RT   magnesium oxide 400 mg Oral Daily   pantoprazole 40 mg Oral QAM     Continuous Infusions:     Allergies:  Allergies   Allergen Reactions   • Shellfish-Derived Products Swelling and Other (See Comments)     THROAT SWELLS       Assessment & Plan     Principal Problem:    Acute bacterial endocarditis  Active Problems:    A-fib (CMS/[facility])    CHF (congestive heart failure), NYHA class III (CMS/[facility])    Coronary artery disease of native artery of native heart with stable angina pectoris (CMS/[facility])    Acute renal failure (CMS/[facility])    Sepsis (CMS/[facility])    Hyponatremia    Bacteremia due to group B Streptococcus    Pneumonia    Positive blood culture    Shortness of breath    Secondary thrombocytopenia    1. Acute Bacterial endocarditis: Streptococcus.  MV and ICD leads infected.  Very abnormal JAYE.  Continue antibiotics per ID.  WBC down  2. CHF: acute/chronic systolic.  LVEF 20%.  Continue IV diuretics.  Restart entresto.  Long term plan is either LVAD vs transplant.  Will continue to work with  Advanced Heart Failure clinic  3. CAD: s/p MI, s/p cabg.  No  angina  4. MR: s/p MVR with tissue prosthesis.  Large vegitation on JAYE  5. MARISELA: improved  6. Respiratory Failure: improved.  Continue present course  7. Anemia: in part dilutional.  No need to transfuse at present      Plan: plan fully discussed with pt and family.        Naun Calvin MD  07/13/18

## 2018-07-13 NOTE — PLAN OF CARE
Problem: Patient Care Overview  Goal: Plan of Care Review  Outcome: Ongoing (interventions implemented as appropriate)   07/13/18 0530   Coping/Psychosocial   Plan of Care Reviewed With patient   Plan of Care Review   Progress improving   OTHER   Outcome Summary Pt VSS. No complaints of pain, reports feeling better. 1L NC while sleeping. Will continue to monitor.      Goal: Discharge Needs Assessment   07/10/18 0200   Disability   Equipment Currently Used at Home none   Discharge Needs Assessment   Patient/Family Anticipates Transition to home   Patient/Family Anticipated Services at Transition none   Transportation Concerns car, none   Transportation Anticipated family or friend will provide       Problem: Activity Intolerance (Adult)  Goal: Activity Tolerance  Outcome: Ongoing (interventions implemented as appropriate)   07/13/18 0530   Activity Intolerance (Adult)   Activity Tolerance making progress toward outcome     Goal: Effective Energy Conservation Techniques  Outcome: Ongoing (interventions implemented as appropriate)   07/13/18 0530   Activity Intolerance (Adult)   Effective Energy Conservation Techniques making progress toward outcome       Problem: Sepsis/Septic Shock (Adult)  Goal: Signs and Symptoms of Listed Potential Problems Will be Absent, Minimized or Managed (Sepsis/Septic Shock)  Outcome: Ongoing (interventions implemented as appropriate)   07/13/18 0530   Goal/Outcome Evaluation   Problems Assessed (Sepsis) all   Problems Present (Sepsis) none

## 2018-07-14 PROBLEM — E87.1 HYPONATREMIA: Status: RESOLVED | Noted: 2018-06-04 | Resolved: 2018-07-14

## 2018-07-14 PROBLEM — D69.59 SECONDARY THROMBOCYTOPENIA: Status: RESOLVED | Noted: 2018-07-12 | Resolved: 2018-07-14

## 2018-07-14 LAB
ALBUMIN SERPL-MCNC: 3.4 G/DL (ref 3.5–5.2)
ALBUMIN/GLOB SERPL: 0.9 G/DL
ALP SERPL-CCNC: 81 U/L (ref 39–117)
ALT SERPL W P-5'-P-CCNC: 10 U/L (ref 1–41)
ANION GAP SERPL CALCULATED.3IONS-SCNC: 12.1 MMOL/L
AST SERPL-CCNC: 8 U/L (ref 1–40)
BASOPHILS # BLD AUTO: 0.04 10*3/MM3 (ref 0–0.2)
BASOPHILS NFR BLD AUTO: 0.8 % (ref 0–1.5)
BILIRUB SERPL-MCNC: 0.2 MG/DL (ref 0.1–1.2)
BUN BLD-MCNC: 13 MG/DL (ref 8–23)
BUN/CREAT SERPL: 12.5 (ref 7–25)
CALCIUM SPEC-SCNC: 8.5 MG/DL (ref 8.6–10.5)
CHLORIDE SERPL-SCNC: 99 MMOL/L (ref 98–107)
CO2 SERPL-SCNC: 25.9 MMOL/L (ref 22–29)
CREAT BLD-MCNC: 1.04 MG/DL (ref 0.76–1.27)
DEPRECATED RDW RBC AUTO: 46.4 FL (ref 37–54)
EOSINOPHIL # BLD AUTO: 0.45 10*3/MM3 (ref 0–0.7)
EOSINOPHIL NFR BLD AUTO: 8.5 % (ref 0.3–6.2)
ERYTHROCYTE [DISTWIDTH] IN BLOOD BY AUTOMATED COUNT: 13.3 % (ref 11.5–14.5)
GFR SERPL CREATININE-BSD FRML MDRD: 71 ML/MIN/1.73
GLOBULIN UR ELPH-MCNC: 3.9 GM/DL
GLUCOSE BLD-MCNC: 100 MG/DL (ref 65–99)
HCT VFR BLD AUTO: 30.9 % (ref 40.4–52.2)
HGB BLD-MCNC: 9.6 G/DL (ref 13.7–17.6)
IMM GRANULOCYTES # BLD: 0 10*3/MM3 (ref 0–0.03)
IMM GRANULOCYTES NFR BLD: 0 % (ref 0–0.5)
LYMPHOCYTES # BLD AUTO: 1.34 10*3/MM3 (ref 0.9–4.8)
LYMPHOCYTES NFR BLD AUTO: 25.2 % (ref 19.6–45.3)
MCH RBC QN AUTO: 29.6 PG (ref 27–32.7)
MCHC RBC AUTO-ENTMCNC: 31.1 G/DL (ref 32.6–36.4)
MCV RBC AUTO: 95.4 FL (ref 79.8–96.2)
MONOCYTES # BLD AUTO: 0.69 10*3/MM3 (ref 0.2–1.2)
MONOCYTES NFR BLD AUTO: 13 % (ref 5–12)
NEUTROPHILS # BLD AUTO: 2.79 10*3/MM3 (ref 1.9–8.1)
NEUTROPHILS NFR BLD AUTO: 52.5 % (ref 42.7–76)
PLATELET # BLD AUTO: 189 10*3/MM3 (ref 140–500)
PMV BLD AUTO: 11.4 FL (ref 6–12)
POTASSIUM BLD-SCNC: 3.8 MMOL/L (ref 3.5–5.2)
PROT SERPL-MCNC: 7.3 G/DL (ref 6–8.5)
RBC # BLD AUTO: 3.24 10*6/MM3 (ref 4.6–6)
SODIUM BLD-SCNC: 137 MMOL/L (ref 136–145)
WBC NRBC COR # BLD: 5.31 10*3/MM3 (ref 4.5–10.7)

## 2018-07-14 PROCEDURE — 94799 UNLISTED PULMONARY SVC/PX: CPT

## 2018-07-14 PROCEDURE — 05HB33Z INSERTION OF INFUSION DEVICE INTO RIGHT BASILIC VEIN, PERCUTANEOUS APPROACH: ICD-10-PCS | Performed by: INTERNAL MEDICINE

## 2018-07-14 PROCEDURE — C1751 CATH, INF, PER/CENT/MIDLINE: HCPCS

## 2018-07-14 PROCEDURE — 85025 COMPLETE CBC W/AUTO DIFF WBC: CPT | Performed by: INTERNAL MEDICINE

## 2018-07-14 PROCEDURE — 99232 SBSQ HOSP IP/OBS MODERATE 35: CPT | Performed by: NURSE PRACTITIONER

## 2018-07-14 PROCEDURE — 25010000002 ENOXAPARIN PER 10 MG: Performed by: INTERNAL MEDICINE

## 2018-07-14 PROCEDURE — 80053 COMPREHEN METABOLIC PANEL: CPT | Performed by: INTERNAL MEDICINE

## 2018-07-14 PROCEDURE — 25010000003 CEFTRIAXONE PER 250 MG: Performed by: INTERNAL MEDICINE

## 2018-07-14 RX ORDER — SODIUM CHLORIDE 0.9 % (FLUSH) 0.9 %
10 SYRINGE (ML) INJECTION AS NEEDED
Status: DISCONTINUED | OUTPATIENT
Start: 2018-07-14 | End: 2018-07-18 | Stop reason: HOSPADM

## 2018-07-14 RX ORDER — SODIUM CHLORIDE 0.9 % (FLUSH) 0.9 %
20 SYRINGE (ML) INJECTION AS NEEDED
Status: DISCONTINUED | OUTPATIENT
Start: 2018-07-14 | End: 2018-07-18 | Stop reason: HOSPADM

## 2018-07-14 RX ADMIN — ASPIRIN 81 MG: 81 TABLET, CHEWABLE ORAL at 08:37

## 2018-07-14 RX ADMIN — IPRATROPIUM BROMIDE AND ALBUTEROL SULFATE 3 ML: .5; 3 SOLUTION RESPIRATORY (INHALATION) at 19:46

## 2018-07-14 RX ADMIN — CARVEDILOL 3.12 MG: 3.12 TABLET, FILM COATED ORAL at 21:41

## 2018-07-14 RX ADMIN — SACUBITRIL AND VALSARTAN 1 TABLET: 24; 26 TABLET, FILM COATED ORAL at 08:37

## 2018-07-14 RX ADMIN — BUPROPION HYDROCHLORIDE 150 MG: 150 TABLET, EXTENDED RELEASE ORAL at 11:15

## 2018-07-14 RX ADMIN — CEFTRIAXONE SODIUM 2 G: 2 INJECTION, SOLUTION INTRAVENOUS at 17:35

## 2018-07-14 RX ADMIN — IPRATROPIUM BROMIDE AND ALBUTEROL SULFATE 3 ML: .5; 3 SOLUTION RESPIRATORY (INHALATION) at 07:13

## 2018-07-14 RX ADMIN — PANTOPRAZOLE SODIUM 40 MG: 40 TABLET, DELAYED RELEASE ORAL at 08:36

## 2018-07-14 RX ADMIN — IPRATROPIUM BROMIDE AND ALBUTEROL SULFATE 3 ML: .5; 3 SOLUTION RESPIRATORY (INHALATION) at 10:56

## 2018-07-14 RX ADMIN — BUMETANIDE 0.5 MG: 0.25 INJECTION INTRAMUSCULAR; INTRAVENOUS at 08:37

## 2018-07-14 RX ADMIN — Medication 400 MG: at 08:37

## 2018-07-14 RX ADMIN — ZOLPIDEM TARTRATE 5 MG: 5 TABLET ORAL at 21:41

## 2018-07-14 RX ADMIN — SACUBITRIL AND VALSARTAN 1 TABLET: 24; 26 TABLET, FILM COATED ORAL at 21:41

## 2018-07-14 RX ADMIN — CARVEDILOL 3.12 MG: 3.12 TABLET, FILM COATED ORAL at 08:37

## 2018-07-14 RX ADMIN — IPRATROPIUM BROMIDE AND ALBUTEROL SULFATE 3 ML: .5; 3 SOLUTION RESPIRATORY (INHALATION) at 22:18

## 2018-07-14 RX ADMIN — ENOXAPARIN SODIUM 30 MG: 30 INJECTION SUBCUTANEOUS at 08:37

## 2018-07-14 RX ADMIN — IPRATROPIUM BROMIDE AND ALBUTEROL SULFATE 3 ML: .5; 3 SOLUTION RESPIRATORY (INHALATION) at 14:45

## 2018-07-14 RX ADMIN — BUMETANIDE 0.5 MG: 0.25 INJECTION INTRAMUSCULAR; INTRAVENOUS at 21:42

## 2018-07-14 NOTE — PROGRESS NOTES
"                                              LOS: 5 days   Patient Care Team:  Kelsey Mñuoz MD as PCP - General (Family Medicine)  Kelsey Muñoz MD as PCP - Family Medicine  Christy Mcwilliams MD as PCP - Claims Attributed  Yesenia Saucedo RN as Care Coordinator (Population Health)    Chief Complaint:  Follow up wheezing, asthma, hypoxia    Interval History:   On RA. Breathing improved overall. Thinks that the nebs helping. No cough.     REVIEW OF SYSTEMS:   CARDIOVASCULAR: No chest pain, chest pressure or chest discomfort. No palpitations but edema.   RESPIRATORY: No shortness of breath at rest, cough or sputum.       Ventilator/Non-Invasive Ventilation Settings     None            Vital Signs  Temp:  [97.8 °F (36.6 °C)-98.6 °F (37 °C)] 98.6 °F (37 °C)  Heart Rate:  [73-98] 92  Resp:  [16-20] 20  BP: (117-127)/(70-86) 117/86    Intake/Output Summary (Last 24 hours) at 07/14/18 1940  Last data filed at 07/14/18 1757   Gross per 24 hour   Intake             1250 ml   Output             1850 ml   Net             -600 ml     Flowsheet Rows      First Filed Value   Admission Height  172.7 cm (68\") Documented at 07/09/2018 1940   Admission Weight  86.2 kg (190 lb) Documented at 07/09/2018 1940          Physical Exam:   General Appearance:    Alert, cooperative, in no acute distress   Lungs:     Minimal wheezing on forced expiration    Heart:    Regular rhythm and normal rate, normal S1 and S2, no            murmur, no gallop, no rub, no click   HEENT:    [place] 3   Abdomen:     Obese. Soft   Neuro:   Conscious, alert, oriented x3   Extremities:   Moves all extremities well, + edema, no cyanosis, no             Redness          Results Review:          Results from last 7 days  Lab Units 07/14/18  0443 07/13/18  0437 07/12/18  0333   SODIUM mmol/L 137 131* 129*   POTASSIUM mmol/L 3.8 3.8 3.6   CHLORIDE mmol/L 99 94* 94*   CO2 mmol/L 25.9 26.8 23.6   BUN mg/dL 13 16 20   CREATININE mg/dL 1.04 0.97 1.14   GLUCOSE " mg/dL 100* 102* 92   CALCIUM mg/dL 8.5* 8.9 8.0*       Results from last 7 days  Lab Units 07/10/18  1448 07/10/18  0851 07/09/18  2101   TROPONIN T ng/mL 0.027 0.024 0.039*       Results from last 7 days  Lab Units 07/14/18  0443 07/13/18  0437 07/12/18  0333   WBC 10*3/mm3 5.31 6.98 9.16   HEMOGLOBIN g/dL 9.6* 9.1* 8.6*   HEMATOCRIT % 30.9* 27.6* 26.4*   PLATELETS 10*3/mm3 189 132* 105*           Results from last 7 days  Lab Units 07/10/18  1448   PROBNP pg/mL 7,163.0*       I reviewed the patient's new clinical results.  I personally viewed and interpreted the patient's CXR        Medication Review:     aspirin 81 mg Oral Daily   bumetanide 0.5 mg Intravenous Q12H   buPROPion  mg Oral Daily   carvedilol 3.125 mg Oral Q12H   ceftriaxone 2 g Intravenous Q24H   [START ON 7/15/2018] enoxaparin 40 mg Subcutaneous Q24H   ipratropium-albuterol 3 mL Nebulization Q4H While Awake - RT   magnesium oxide 400 mg Oral Daily   pantoprazole 40 mg Oral QAM   sacubitril-valsartan 1 tablet Oral Q12H            Diagnostic imaging:  I personally and independently reviewed the CXR dated 7/10/18:  Pulmonary vascular congestion.  Elevated right hemidiaphragm        Assessment:  1. Persistent asthma, no exacerbation   2. Pulmonary vascular congestion  3. Acute and chronic systolic congestive heart failure, EF 20%  4. Wheezing, due to #1 and #2  5. Multiple allergies (cats, dogs and environmental)  6. Elevated right hemidiaphragm, chronic, could represent paralysis    Plan:  Continue nebs. He has neb at home, rescue Albuterl and a long acting inhaler which he does not recall. He needs to be on LABA/ICS as OP    Agree with diuresis          Paul Vale MD  07/14/18  7:40 PM            This note was dictated utilizing [employer] dictation

## 2018-07-14 NOTE — NURSING NOTE
REGARDING PICC LINE   Chart reviewed .  Will plan for placement some time 7/14/18 if patient is consented and agreeable to POC spoke with primary nurse.  Will follow.

## 2018-07-14 NOTE — PLAN OF CARE
Problem: Patient Care Overview  Goal: Plan of Care Review  Outcome: Ongoing (interventions implemented as appropriate)   07/14/18 1147   Coping/Psychosocial   Plan of Care Reviewed With patient   Plan of Care Review   Progress improving   OTHER   Outcome Summary ambulating in hallway without SOA; continue IV diuretics; PICC placed, IV ABX as ordered; possible DE monday;        Problem: Activity Intolerance (Adult)  Goal: Activity Tolerance  Outcome: Ongoing (interventions implemented as appropriate)   07/14/18 1147   Activity Intolerance (Adult)   Activity Tolerance making progress toward outcome     Goal: Effective Energy Conservation Techniques  Outcome: Ongoing (interventions implemented as appropriate)   07/14/18 1147   Activity Intolerance (Adult)   Effective Energy Conservation Techniques making progress toward outcome       Problem: Sepsis/Septic Shock (Adult)  Goal: Signs and Symptoms of Listed Potential Problems Will be Absent, Minimized or Managed (Sepsis/Septic Shock)  Outcome: Ongoing (interventions implemented as appropriate)   07/14/18 1147   Goal/Outcome Evaluation   Problems Assessed (Sepsis) all   Problems Present (Sepsis) situational response       Problem: Cardiac: Heart Failure (Adult)  Goal: Signs and Symptoms of Listed Potential Problems Will be Absent, Minimized or Managed (Cardiac: Heart Failure)  Outcome: Ongoing (interventions implemented as appropriate)   07/14/18 1147   Goal/Outcome Evaluation   Problems Assessed (Heart Failure) all   Problems Present (Heart Failure) cardiac pump dysfunction;decreased quality of life;respiratory compromise;situational response

## 2018-07-14 NOTE — PROGRESS NOTES
"     LOS: 5 days   Primary Care Physician: Kelsey Muñoz MD     Subjective   Feels pretty good.  No chest pain.  Has been up walking a little.    Vital Signs  Body mass index is 28.59 kg/m².  Temp:  [97.8 °F (36.6 °C)-98.4 °F (36.9 °C)] 98 °F (36.7 °C)  Heart Rate:  [73-98] 73  Resp:  [16-20] 20  BP: (118-127)/(70-76) 118/70      Objective:  General Appearance:  In no acute distress.    Vital signs: (most recent): Blood pressure 118/70, pulse 73, temperature 98 °F (36.7 °C), temperature source Oral, resp. rate 20, height 172.7 cm (68\"), weight 85.3 kg (188 lb), SpO2 96 %.    Lungs:  He is not in respiratory distress.  No stridor.  There are decreased breath sounds.  No rales, wheezes or rhonchi.    Heart: Normal rate.  Regular rhythm.  No murmur.   Abdomen: Abdomen is soft and non-distended.  Bowel sounds are normal.   There is no abdominal tenderness.   There is no splenomegaly. There is no hepatomegaly.   Extremities: There is no dependent edema.    Neurological: Patient is alert.    Skin:  Warm and dry.          Results Review:    I reviewed the patient's new clinical results.      Results from last 7 days  Lab Units 07/14/18  0443 07/13/18  0437   WBC 10*3/mm3 5.31 6.98   HEMOGLOBIN g/dL 9.6* 9.1*   PLATELETS 10*3/mm3 189 132*       Results from last 7 days  Lab Units 07/14/18  0443 07/13/18  0437   SODIUM mmol/L 137 131*   POTASSIUM mmol/L 3.8 3.8   CHLORIDE mmol/L 99 94*   CO2 mmol/L 25.9 26.8   BUN mg/dL 13 16   CREATININE mg/dL 1.04 0.97   CALCIUM mg/dL 8.5* 8.9   GLUCOSE mg/dL 100* 102*         Hemoglobin A1C:  Lab Results   Component Value Date    HGBA1C 5.40 12/31/2016       Glucose Range:No results found for: POCGLU    Lab Results   Component Value Date    TODOJCMO65 319 07/13/2018       Lab Results   Component Value Date    TSH 1.820 07/13/2018       Assessment & Plan      Medication Review: Yes    Active Hospital Problems    Diagnosis Date Noted   • **Acute bacterial endocarditis [I33.0] 07/11/2018 "   • Pneumonia [J18.9] 07/10/2018   • Positive blood culture [R78.81] 07/10/2018   • Shortness of breath [R06.02] 07/10/2018   • Bacteremia due to group B Streptococcus [R78.81] 06/08/2018   • Sepsis (CMS/[facility]) [A41.9] 06/04/2018   • Acute renal failure (CMS/[facility]) [N17.9] 06/04/2018   • Coronary artery disease of native artery of native heart with stable angina pectoris (CMS/[facility]) [I25.118] 11/03/2016   • A-fib (CMS/[facility]) [I48.91] 10/28/2016   • CHF (congestive heart failure), NYHA class III (CMS/[facility]) [I50.9] 10/28/2016      Resolved Hospital Problems    Diagnosis Date Noted Date Resolved   • Secondary thrombocytopenia [D69.59] 07/12/2018 07/14/2018   • Hypomagnesemia [E83.42] 07/10/2018 07/12/2018   • Hyponatremia [E87.1] 06/04/2018 07/14/2018       Assessment/Plan  1.  Sepsis secondary to group B strep with endocarditis and AICD infection.  PICC line placed.  IV antibiotics ×6 weeks.  LVAD versus transplant per cardiology; probably will be done at .  2.  Acute and chronic systolic congestive heart failure and cardiomyopathy.  Tolerating Entresto.  Continue other n Coreg, IV Bumex.  Acute kidney injury resolved.  3.  Wheezing, resolved.  Secondary to CHF and asthma.  Continue duo nebs  4.  Thrombocytopenia, resolved    Mildred Jimenez MD  07/14/18  3:22 PM

## 2018-07-14 NOTE — PLAN OF CARE
Problem: Patient Care Overview  Goal: Discharge Needs Assessment  Outcome: Ongoing (interventions implemented as appropriate)      Problem: Activity Intolerance (Adult)  Goal: Activity Tolerance  Outcome: Ongoing (interventions implemented as appropriate)   07/14/18 0200   Activity Intolerance (Adult)   Activity Tolerance making progress toward outcome     Goal: Effective Energy Conservation Techniques  Outcome: Ongoing (interventions implemented as appropriate)   07/14/18 0200   Activity Intolerance (Adult)   Effective Energy Conservation Techniques making progress toward outcome       Problem: Sepsis/Septic Shock (Adult)  Goal: Signs and Symptoms of Listed Potential Problems Will be Absent, Minimized or Managed (Sepsis/Septic Shock)  Outcome: Ongoing (interventions implemented as appropriate)   07/14/18 0200   Goal/Outcome Evaluation   Problems Assessed (Sepsis) all   Problems Present (Sepsis) none       Problem: Cardiac: Heart Failure (Adult)  Goal: Signs and Symptoms of Listed Potential Problems Will be Absent, Minimized or Managed (Cardiac: Heart Failure)  Outcome: Ongoing (interventions implemented as appropriate)   07/14/18 0200   Goal/Outcome Evaluation   Problems Assessed (Heart Failure) all   Problems Present (Heart Failure) cardiac pump dysfunction;dysrhythmia/arrhythmia;fluid/electrolyte imbalance;situational response;sleep-disordered breathing;functional decline/self-care deficit

## 2018-07-14 NOTE — PLAN OF CARE
Problem: Patient Care Overview  Goal: Plan of Care Review  Outcome: Ongoing (interventions implemented as appropriate)   07/14/18 0207   Coping/Psychosocial   Plan of Care Reviewed With patient   Plan of Care Review   Progress improving   OTHER   Outcome Summary Pt reports increased activity tolerance and improved breathing. IV Diuretics continued. IV ABX continued. VSS. Overnight oximetry monitoring conducted. Plan for PICC consult tomorrow. Will continue to monitor.      Goal: Individualization and Mutuality  Outcome: Ongoing (interventions implemented as appropriate)   07/13/18 1720   Individualization   Patient Specific Preferences Prefers door closed in the mornings

## 2018-07-14 NOTE — PROGRESS NOTES
"    Patient Name: Mello Arias Jr.  :1950  67 y.o.      Patient Care Team:  Kelsey Muñoz MD as PCP - General (Family Medicine)  Kelsey Muñoz MD as PCP - Family Medicine  Christy Mcwilliams MD as PCP - Claims Attributed  Yesenia Saucedo RN as Care Coordinator (Population Health)    Chief Complaint: follow up CHF, endocarditis    Interval History: He feels well. Walking in the [address] well without significant dyspnea. Does describe some orthopnea. Moderate diuresis overnight.        Objective   Vital Signs  Temp:  [97.8 °F (36.6 °C)-98.4 °F (36.9 °C)] 97.8 °F (36.6 °C)  Heart Rate:  [77-98] 87  Resp:  [16-20] 20  BP: (122-130)/(70-82) 122/70    Intake/Output Summary (Last 24 hours) at 18 0955  Last data filed at 18 0851   Gross per 24 hour   Intake              720 ml   Output             1300 ml   Net             -580 ml     Flowsheet Rows      First Filed Value   Admission Height  172.7 cm (68\") Documented at 2018   Admission Weight  86.2 kg (190 lb) Documented at 2018          Physical Exam:   General Appearance:    Alert, cooperative, in no acute distress   Lungs:     Clear to auscultation.  Normal respiratory effort and rate.      Heart:    Regular rhythm and normal rate, normal S1 and S2, no murmurs, gallops or rubs.     Chest Wall:    No abnormalities observed   Abdomen:     Soft, nontender, positive bowel sounds.     Extremities:   no cyanosis, clubbing or edema.  No marked joint deformities.  Adequate musculoskeletal strength.       Results Review:      Results from last 7 days  Lab Units 18  0443   SODIUM mmol/L 137   POTASSIUM mmol/L 3.8   CHLORIDE mmol/L 99   CO2 mmol/L 25.9   BUN mg/dL 13   CREATININE mg/dL 1.04   GLUCOSE mg/dL 100*   CALCIUM mg/dL 8.5*       Results from last 7 days  Lab Units 07/10/18  1448 07/10/18  0851 18  2101   TROPONIN T ng/mL 0.027 0.024 0.039*       Results from last 7 days  Lab Units 18  0443   WBC 10*3/mm3 " 5.31   HEMOGLOBIN g/dL 9.6*   HEMATOCRIT % 30.9*   PLATELETS 10*3/mm3 189           Results from last 7 days  Lab Units 07/12/18  0333   MAGNESIUM mg/dL 1.9                   Medication Review:     aspirin 81 mg Oral Daily   bumetanide 0.5 mg Intravenous Q12H   buPROPion  mg Oral Daily   carvedilol 3.125 mg Oral Q12H   ceftriaxone 2 g Intravenous Q24H   enoxaparin 30 mg Subcutaneous Q24H   ipratropium-albuterol 3 mL Nebulization Q4H While Awake - RT   magnesium oxide 400 mg Oral Daily   pantoprazole 40 mg Oral QAM   sacubitril-valsartan 1 tablet Oral Q12H             Assessment/Plan   1. Acute Bacterial endocarditis: Streptococcus.  MV and ICD leads infected.  Very abnormal JAYE.  Continue antibiotics per ID.  WBC down. No fevers.  2. CHF: acute/chronic systolic.  LVEF 20%.  Continue IV diuretics.    Long term plan is either LVAD vs transplant.  Will continue to work with  Advanced Heart Failure clinic BP tolerating heart failure regimen.   3. CAD: s/p MI, s/p cabg.  No angina  4. MR: s/p MVR with tissue prosthesis.  Large vegitation on JAYE  5. MARISELA: improved  6. Respiratory Failure: improved.  Continue present course  7. Anemia: in part dilutional.  No need to transfuse at present     Continues to improve. Tolerating meds. Continue same today. Recheck BMP in AM.     ALEX Galvan  [facility]  07/14/18  9:55 AM

## 2018-07-15 PROBLEM — D63.8 ANEMIA OF CHRONIC DISEASE: Status: ACTIVE | Noted: 2018-07-15

## 2018-07-15 PROBLEM — J18.9 PNEUMONIA: Status: RESOLVED | Noted: 2018-07-10 | Resolved: 2018-07-15

## 2018-07-15 PROBLEM — N17.9 ACUTE RENAL FAILURE (HCC): Status: RESOLVED | Noted: 2018-06-04 | Resolved: 2018-07-15

## 2018-07-15 PROBLEM — R78.81 POSITIVE BLOOD CULTURE: Status: RESOLVED | Noted: 2018-07-10 | Resolved: 2018-07-15

## 2018-07-15 LAB
ANION GAP SERPL CALCULATED.3IONS-SCNC: 10.2 MMOL/L
BACTERIA SPEC AEROBE CULT: NORMAL
BACTERIA SPEC AEROBE CULT: NORMAL
BUN BLD-MCNC: 11 MG/DL (ref 8–23)
BUN/CREAT SERPL: 10.3 (ref 7–25)
CALCIUM SPEC-SCNC: 8.6 MG/DL (ref 8.6–10.5)
CHLORIDE SERPL-SCNC: 95 MMOL/L (ref 98–107)
CO2 SERPL-SCNC: 28.8 MMOL/L (ref 22–29)
CREAT BLD-MCNC: 1.07 MG/DL (ref 0.76–1.27)
DEPRECATED RDW RBC AUTO: 47.4 FL (ref 37–54)
ERYTHROCYTE [DISTWIDTH] IN BLOOD BY AUTOMATED COUNT: 13.5 % (ref 11.5–14.5)
GFR SERPL CREATININE-BSD FRML MDRD: 69 ML/MIN/1.73
GLUCOSE BLD-MCNC: 103 MG/DL (ref 65–99)
GLUCOSE BLDC GLUCOMTR-MCNC: 118 MG/DL (ref 70–130)
HCT VFR BLD AUTO: 31.9 % (ref 40.4–52.2)
HGB BLD-MCNC: 9.8 G/DL (ref 13.7–17.6)
MAGNESIUM SERPL-MCNC: 1.8 MG/DL (ref 1.6–2.4)
MCH RBC QN AUTO: 29.5 PG (ref 27–32.7)
MCHC RBC AUTO-ENTMCNC: 30.7 G/DL (ref 32.6–36.4)
MCV RBC AUTO: 96.1 FL (ref 79.8–96.2)
PLATELET # BLD AUTO: 220 10*3/MM3 (ref 140–500)
PMV BLD AUTO: 11.2 FL (ref 6–12)
POTASSIUM BLD-SCNC: 3.6 MMOL/L (ref 3.5–5.2)
RBC # BLD AUTO: 3.32 10*6/MM3 (ref 4.6–6)
SODIUM BLD-SCNC: 134 MMOL/L (ref 136–145)
WBC NRBC COR # BLD: 6.54 10*3/MM3 (ref 4.5–10.7)

## 2018-07-15 PROCEDURE — 25010000003 CEFTRIAXONE PER 250 MG: Performed by: INTERNAL MEDICINE

## 2018-07-15 PROCEDURE — 25010000002 ENOXAPARIN PER 10 MG: Performed by: INTERNAL MEDICINE

## 2018-07-15 PROCEDURE — 83735 ASSAY OF MAGNESIUM: CPT | Performed by: INTERNAL MEDICINE

## 2018-07-15 PROCEDURE — 94799 UNLISTED PULMONARY SVC/PX: CPT

## 2018-07-15 PROCEDURE — 99232 SBSQ HOSP IP/OBS MODERATE 35: CPT | Performed by: NURSE PRACTITIONER

## 2018-07-15 PROCEDURE — 82962 GLUCOSE BLOOD TEST: CPT

## 2018-07-15 PROCEDURE — 85027 COMPLETE CBC AUTOMATED: CPT | Performed by: INTERNAL MEDICINE

## 2018-07-15 PROCEDURE — 80048 BASIC METABOLIC PNL TOTAL CA: CPT | Performed by: INTERNAL MEDICINE

## 2018-07-15 RX ORDER — BUMETANIDE 1 MG/1
1 TABLET ORAL DAILY
Status: DISCONTINUED | OUTPATIENT
Start: 2018-07-15 | End: 2018-07-18 | Stop reason: HOSPADM

## 2018-07-15 RX ADMIN — IPRATROPIUM BROMIDE AND ALBUTEROL SULFATE 3 ML: .5; 3 SOLUTION RESPIRATORY (INHALATION) at 10:37

## 2018-07-15 RX ADMIN — CARVEDILOL 3.12 MG: 3.12 TABLET, FILM COATED ORAL at 08:11

## 2018-07-15 RX ADMIN — IPRATROPIUM BROMIDE AND ALBUTEROL SULFATE 3 ML: .5; 3 SOLUTION RESPIRATORY (INHALATION) at 21:12

## 2018-07-15 RX ADMIN — IPRATROPIUM BROMIDE AND ALBUTEROL SULFATE 3 ML: .5; 3 SOLUTION RESPIRATORY (INHALATION) at 06:37

## 2018-07-15 RX ADMIN — SACUBITRIL AND VALSARTAN 1 TABLET: 24; 26 TABLET, FILM COATED ORAL at 08:11

## 2018-07-15 RX ADMIN — CEFTRIAXONE SODIUM 2 G: 2 INJECTION, SOLUTION INTRAVENOUS at 18:29

## 2018-07-15 RX ADMIN — Medication 400 MG: at 08:11

## 2018-07-15 RX ADMIN — ENOXAPARIN SODIUM 40 MG: 100 INJECTION SUBCUTANEOUS at 08:11

## 2018-07-15 RX ADMIN — IPRATROPIUM BROMIDE AND ALBUTEROL SULFATE 3 ML: .5; 3 SOLUTION RESPIRATORY (INHALATION) at 14:44

## 2018-07-15 RX ADMIN — SACUBITRIL AND VALSARTAN 1 TABLET: 24; 26 TABLET, FILM COATED ORAL at 20:50

## 2018-07-15 RX ADMIN — ZOLPIDEM TARTRATE 5 MG: 5 TABLET ORAL at 21:56

## 2018-07-15 RX ADMIN — BUPROPION HYDROCHLORIDE 150 MG: 150 TABLET, EXTENDED RELEASE ORAL at 08:13

## 2018-07-15 RX ADMIN — BUMETANIDE 1 MG: 1 TABLET ORAL at 10:59

## 2018-07-15 RX ADMIN — CARVEDILOL 3.12 MG: 3.12 TABLET, FILM COATED ORAL at 20:48

## 2018-07-15 RX ADMIN — ASPIRIN 81 MG: 81 TABLET, CHEWABLE ORAL at 08:11

## 2018-07-15 RX ADMIN — PANTOPRAZOLE SODIUM 40 MG: 40 TABLET, DELAYED RELEASE ORAL at 06:03

## 2018-07-15 NOTE — PLAN OF CARE
Problem: Patient Care Overview  Goal: Plan of Care Review  Outcome: Ongoing (interventions implemented as appropriate)   07/15/18 8926   Coping/Psychosocial   Plan of Care Reviewed With patient   Plan of Care Review   Progress improving   OTHER   Outcome Summary Transfer from [facility] today, IV rocephin continues, PICC line, up ad aisha, no new c/o       Problem: Activity Intolerance (Adult)  Goal: Activity Tolerance  Outcome: Ongoing (interventions implemented as appropriate)    Goal: Effective Energy Conservation Techniques  Outcome: Ongoing (interventions implemented as appropriate)      Problem: Sepsis/Septic Shock (Adult)  Goal: Signs and Symptoms of Listed Potential Problems Will be Absent, Minimized or Managed (Sepsis/Septic Shock)  Outcome: Ongoing (interventions implemented as appropriate)      Problem: Cardiac: Heart Failure (Adult)  Goal: Signs and Symptoms of Listed Potential Problems Will be Absent, Minimized or Managed (Cardiac: Heart Failure)  Outcome: Ongoing (interventions implemented as appropriate)

## 2018-07-15 NOTE — PROGRESS NOTES
"     LOS: 6 days   Primary Care Physician: Kelsey Muñoz MD     Subjective   Feels good today.  Has occasional cough and wheezing from asthma which is typical for him.    Vital Signs  Body mass index is 28.51 kg/m².  Temp:  [97.8 °F (36.6 °C)-98.6 °F (37 °C)] 98 °F (36.7 °C)  Heart Rate:  [81-99] 91  Resp:  [18-20] 20  BP: ()/(65-90) 98/65      Objective:  General Appearance:  In no acute distress.    Vital signs: (most recent): Blood pressure 98/65, pulse 91, temperature 98 °F (36.7 °C), temperature source Oral, resp. rate 20, height 172.7 cm (68\"), weight 85 kg (187 lb 8 oz), SpO2 95 %.    Lungs:  He is not in respiratory distress.  No stridor.  There are decreased breath sounds.  No rales, wheezes or rhonchi.    Heart: Normal rate.  Regular rhythm.  No murmur.   Abdomen: Abdomen is soft and non-distended.  Bowel sounds are normal.   There is no abdominal tenderness.   There is no splenomegaly. There is no hepatomegaly.   Extremities: There is no dependent edema.    Neurological: Patient is alert.    Skin:  Warm and dry.          Results Review:    I reviewed the patient's new clinical results.      Results from last 7 days  Lab Units 07/15/18  0554 07/14/18  0443   WBC 10*3/mm3 6.54 5.31   HEMOGLOBIN g/dL 9.8* 9.6*   PLATELETS 10*3/mm3 220 189       Results from last 7 days  Lab Units 07/15/18  0554 07/14/18  0443   SODIUM mmol/L 134* 137   POTASSIUM mmol/L 3.6 3.8   CHLORIDE mmol/L 95* 99   CO2 mmol/L 28.8 25.9   BUN mg/dL 11 13   CREATININE mg/dL 1.07 1.04   CALCIUM mg/dL 8.6 8.5*   GLUCOSE mg/dL 103* 100*         Hemoglobin A1C:  Lab Results   Component Value Date    HGBA1C 5.40 12/31/2016       Glucose Range:  Glucose   Date/Time Value Ref Range Status   07/15/2018 0607 118 70 - 130 mg/dL Final       Lab Results   Component Value Date    [id number] 319 07/13/2018       Lab Results   Component Value Date    TSH 1.820 07/13/2018       Assessment & Plan      Medication Review: Yes    Active Hospital " Problems    Diagnosis Date Noted   • **Acute bacterial endocarditis [I33.0] 07/11/2018   • Anemia of chronic disease [D63.8] 07/15/2018   • Shortness of breath [R06.02] 07/10/2018   • Bacteremia due to group B Streptococcus [R78.81] 06/08/2018   • Sepsis (CMS/[facility]) [A41.9] 06/04/2018   • Coronary artery disease of native artery of native heart with stable angina pectoris (CMS/[facility]) [I25.118] 11/03/2016   • A-fib (CMS/[facility]) [I48.91] 10/28/2016   • CHF (congestive heart failure), NYHA class III (CMS/[facility]) [I50.9] 10/28/2016      Resolved Hospital Problems    Diagnosis Date Noted Date Resolved   • Secondary thrombocytopenia [D69.59] 07/12/2018 07/14/2018   • Pneumonia [J18.9] 07/10/2018 07/15/2018   • Hypomagnesemia [E83.42] 07/10/2018 07/12/2018   • Positive blood culture [R78.81] 07/10/2018 07/15/2018   • Acute renal failure (CMS/[facility]) [N17.9] 06/04/2018 07/15/2018   • Hyponatremia [E87.1] 06/04/2018 07/14/2018       Assessment/Plan  1.  Group B strep bacteremia with endocarditis involving prosthetic mitral valve and AICD.  PICC line placed.  IV Rocephin 2 g daily with stop date 8/20/18 and then chronic suppressive antibiotics.  Transplant versus LVAD per cardiology.  Dr. Marshall has discussed with .  2.  Cardiomyopathy/chronic systolic congestive heart failure with ejection fraction 20%.  Tolerating Entresto and Coreg.  Bumex changed to oral today.  3.  Mild persistent asthma.  No wheezing today.  Continue nebs.  4.  Anemia.  Stable.  Probably secondary to chronic disease    Disposition- home when okay with cardiology.  Patient lives alone.  His friend at the bedside expressed concern about the patient self administering antibiotic.  Home health will be on board.    Mildred Jimenez MD  07/15/18  3:29 PM

## 2018-07-15 NOTE — PROGRESS NOTES
"    Patient Name: Mello Arias Jr.  :1950  67 y.o.      Patient Care Team:  Kelsey Muñoz MD as PCP - General (Family Medicine)  Kelsey Muñoz MD as PCP - Family Medicine  Christy Mcwilliams MD as PCP - Claims Attributed  Yesenia Saucedo RN as Care Coordinator ([facility])    Chief Complaint: follow up CHF, endocarditis    Interval History: oxygen desaturation noted overnight. Oxygen was placed. He continues to walk around without symptoms. Urine output is trending down.        Objective   Vital Signs  Temp:  [97.8 °F (36.6 °C)-98.6 °F (37 °C)] 98 °F (36.7 °C)  Heart Rate:  [73-99] 81  Resp:  [18-20] 18  BP: (104-122)/(70-90) 104/70    Intake/Output Summary (Last 24 hours) at 07/15/18 0756  Last data filed at 18 1757   Gross per 24 hour   Intake             1250 ml   Output              550 ml   Net              700 ml     Flowsheet Rows      First Filed Value   Admission Height  172.7 cm (68\") Documented at 2018   Admission Weight  86.2 kg (190 lb) Documented at 2018          Physical Exam:   General Appearance:    Alert, cooperative, in no acute distress   Lungs:     Clear to auscultation.  Normal respiratory effort and rate.      Heart:    Regular rhythm and normal rate, normal S1 and S2, no murmurs, gallops or rubs.     Chest Wall:    No abnormalities observed   Abdomen:     Soft, nontender, positive bowel sounds.     Extremities:   no cyanosis, clubbing or edema.  No marked joint deformities.  Adequate musculoskeletal strength.       Results Review:      Results from last 7 days  Lab Units 07/15/18  0554   SODIUM mmol/L 134*   POTASSIUM mmol/L 3.6   CHLORIDE mmol/L 95*   CO2 mmol/L 28.8   BUN mg/dL 11   CREATININE mg/dL 1.07   GLUCOSE mg/dL 103*   CALCIUM mg/dL 8.6       Results from last 7 days  Lab Units 07/10/18  1448 07/10/18  0851 18  2101   TROPONIN T ng/mL 0.027 0.024 0.039*       Results from last 7 days  Lab Units 07/15/18  0554   WBC 10*3/mm3 6.54 "   HEMOGLOBIN g/dL 9.8*   HEMATOCRIT % 31.9*   PLATELETS 10*3/mm3 220           Results from last 7 days  Lab Units 07/15/18  0554   MAGNESIUM mg/dL 1.8                   Medication Review:     aspirin 81 mg Oral Daily   bumetanide 1 mg Oral Daily   buPROPion  mg Oral Daily   carvedilol 3.125 mg Oral Q12H   ceftriaxone 2 g Intravenous Q24H   enoxaparin 40 mg Subcutaneous Q24H   ipratropium-albuterol 3 mL Nebulization Q4H While Awake - RT   magnesium oxide 400 mg Oral Daily   pantoprazole 40 mg Oral QAM   sacubitril-valsartan 1 tablet Oral Q12H             Assessment/Plan   1. Acute Bacterial endocarditis: Streptococcus.  MV and ICD leads infected.  Very abnormal JAYE.  Continue antibiotics per ID.  WBC down. No fevers.  2. CHF: acute/chronic systolic.  LVEF 20%.  Long term plan is either LVAD vs transplant.  Will continue to work with  Advanced Heart Failure clinic. BP tolerating heart failure regimen.   3. CAD: s/p MI, s/p cabg.  No angina  4. MR: s/p MVR with tissue prosthesis.  Large vegitation on JAYE  5. MARISELA: improved  6. Respiratory Failure: improved.  Continue present course. Pulm is following.   7. Anemia: stable    Urine output is trending down. Change to oral bumex today. Check labs in AM.     ALEX Galvan  [facility]  07/15/18  7:56 AM

## 2018-07-15 NOTE — PLAN OF CARE
Problem: Patient Care Overview  Goal: Plan of Care Review  Outcome: Ongoing (interventions implemented as appropriate)   07/15/18 1115   Coping/Psychosocial   Plan of Care Reviewed With patient   Plan of Care Review   Progress improving   OTHER   Outcome Summary up to bathroom and ambulating in hallway; changed to PO Bumex; no c/o pain; adequate output; AAOx4; VSS       Problem: Activity Intolerance (Adult)  Goal: Activity Tolerance  Outcome: Ongoing (interventions implemented as appropriate)   07/15/18 1115   Activity Intolerance (Adult)   Activity Tolerance making progress toward outcome     Goal: Effective Energy Conservation Techniques  Outcome: Ongoing (interventions implemented as appropriate)   07/15/18 1115   Activity Intolerance (Adult)   Effective Energy Conservation Techniques making progress toward outcome       Problem: Sepsis/Septic Shock (Adult)  Goal: Signs and Symptoms of Listed Potential Problems Will be Absent, Minimized or Managed (Sepsis/Septic Shock)  Outcome: Ongoing (interventions implemented as appropriate)   07/15/18 1115   Goal/Outcome Evaluation   Problems Assessed (Sepsis) all   Problems Present (Sepsis) none       Problem: Cardiac: Heart Failure (Adult)  Goal: Signs and Symptoms of Listed Potential Problems Will be Absent, Minimized or Managed (Cardiac: Heart Failure)  Outcome: Ongoing (interventions implemented as appropriate)   07/15/18 1115   Goal/Outcome Evaluation   Problems Assessed (Heart Failure) all   Problems Present (Heart Failure) cardiac pump dysfunction;decreased quality of life;functional decline/self-care deficit

## 2018-07-15 NOTE — PLAN OF CARE
Problem: Patient Care Overview  Goal: Plan of Care Review  Outcome: Ongoing (interventions implemented as appropriate)   07/15/18 0968   Coping/Psychosocial   Plan of Care Reviewed With patient   Plan of Care Review   Progress improving   OTHER   Outcome Summary nebulizer for optimal breathing, encourage deep breathe and cough       Problem: Cardiac: Heart Failure (Adult)  Goal: Signs and Symptoms of Listed Potential Problems Will be Absent, Minimized or Managed (Cardiac: Heart Failure)  Outcome: Ongoing (interventions implemented as appropriate)

## 2018-07-15 NOTE — PLAN OF CARE
Problem: Patient Care Overview  Goal: Plan of Care Review  Outcome: Ongoing (interventions implemented as appropriate)   07/15/18 0329   Coping/Psychosocial   Plan of Care Reviewed With patient   Plan of Care Review   Progress improving   OTHER   Outcome Summary Pt denies any discomfort or SOA. Ambulating in room and on unit without fatigue. While sleeping, the patient had two concurrent episodes of his oxygen saturation decreasing to the high 70s while on 1L O2 via NC. Good pleth. Increased O2. Will continue to monitor.      Goal: Discharge Needs Assessment   07/10/18 0200 07/14/18 0200   Discharge Needs Assessment   Concerns to be Addressed --  adjustment to diagnosis/illness;discharge planning   Concerns Comments --  Pt and family requests assistance in helping the patient schedule a sleep study, they believe he has sleep apnea.    Patient/Family Anticipates Transition to --  home   Patient/Family Anticipated Services at Transition --  home health care   Transportation Concerns car, none --    Transportation Anticipated family or friend will provide --    Current Discharge Risk --  lives alone;physical impairment   Disability   Equipment Currently Used at Home none --        Problem: Activity Intolerance (Adult)  Goal: Activity Tolerance  Outcome: Ongoing (interventions implemented as appropriate)   07/15/18 0329   Activity Intolerance (Adult)   Activity Tolerance making progress toward outcome     Goal: Effective Energy Conservation Techniques  Outcome: Ongoing (interventions implemented as appropriate)   07/15/18 0329   Activity Intolerance (Adult)   Effective Energy Conservation Techniques making progress toward outcome       Problem: Sepsis/Septic Shock (Adult)  Goal: Signs and Symptoms of Listed Potential Problems Will be Absent, Minimized or Managed (Sepsis/Septic Shock)  Outcome: Ongoing (interventions implemented as appropriate)      Problem: Cardiac: Heart Failure (Adult)  Goal: Signs and Symptoms of  Listed Potential Problems Will be Absent, Minimized or Managed (Cardiac: Heart Failure)  Outcome: Ongoing (interventions implemented as appropriate)   07/15/18 0329   Goal/Outcome Evaluation   Problems Assessed (Heart Failure) all   Problems Present (Heart Failure) respiratory compromise;situational response;sleep-disordered breathing

## 2018-07-16 LAB
ANION GAP SERPL CALCULATED.3IONS-SCNC: 11.2 MMOL/L
BUN BLD-MCNC: 13 MG/DL (ref 8–23)
BUN/CREAT SERPL: 12.5 (ref 7–25)
CALCIUM SPEC-SCNC: 8.7 MG/DL (ref 8.6–10.5)
CHLORIDE SERPL-SCNC: 99 MMOL/L (ref 98–107)
CO2 SERPL-SCNC: 28.8 MMOL/L (ref 22–29)
CREAT BLD-MCNC: 1.04 MG/DL (ref 0.76–1.27)
GFR SERPL CREATININE-BSD FRML MDRD: 71 ML/MIN/1.73
GLUCOSE BLD-MCNC: 106 MG/DL (ref 65–99)
POTASSIUM BLD-SCNC: 3.8 MMOL/L (ref 3.5–5.2)
SODIUM BLD-SCNC: 139 MMOL/L (ref 136–145)

## 2018-07-16 PROCEDURE — 94799 UNLISTED PULMONARY SVC/PX: CPT

## 2018-07-16 PROCEDURE — 25010000003 CEFTRIAXONE PER 250 MG: Performed by: INTERNAL MEDICINE

## 2018-07-16 PROCEDURE — 99232 SBSQ HOSP IP/OBS MODERATE 35: CPT | Performed by: INTERNAL MEDICINE

## 2018-07-16 PROCEDURE — 25010000002 ENOXAPARIN PER 10 MG: Performed by: INTERNAL MEDICINE

## 2018-07-16 PROCEDURE — 97162 PT EVAL MOD COMPLEX 30 MIN: CPT | Performed by: PHYSICAL THERAPIST

## 2018-07-16 PROCEDURE — 97162 PT EVAL MOD COMPLEX 30 MIN: CPT

## 2018-07-16 PROCEDURE — 80048 BASIC METABOLIC PNL TOTAL CA: CPT | Performed by: NURSE PRACTITIONER

## 2018-07-16 RX ADMIN — BUPROPION HYDROCHLORIDE 150 MG: 150 TABLET, EXTENDED RELEASE ORAL at 09:18

## 2018-07-16 RX ADMIN — Medication 400 MG: at 09:18

## 2018-07-16 RX ADMIN — CEFTRIAXONE SODIUM 2 G: 2 INJECTION, SOLUTION INTRAVENOUS at 17:35

## 2018-07-16 RX ADMIN — PANTOPRAZOLE SODIUM 40 MG: 40 TABLET, DELAYED RELEASE ORAL at 05:53

## 2018-07-16 RX ADMIN — BUMETANIDE 1 MG: 1 TABLET ORAL at 09:18

## 2018-07-16 RX ADMIN — CARVEDILOL 3.12 MG: 3.12 TABLET, FILM COATED ORAL at 21:26

## 2018-07-16 RX ADMIN — PANTOPRAZOLE SODIUM 40 MG: 40 TABLET, DELAYED RELEASE ORAL at 09:17

## 2018-07-16 RX ADMIN — IPRATROPIUM BROMIDE AND ALBUTEROL SULFATE 3 ML: .5; 3 SOLUTION RESPIRATORY (INHALATION) at 10:44

## 2018-07-16 RX ADMIN — IPRATROPIUM BROMIDE AND ALBUTEROL SULFATE 3 ML: .5; 3 SOLUTION RESPIRATORY (INHALATION) at 07:39

## 2018-07-16 RX ADMIN — ZOLPIDEM TARTRATE 5 MG: 5 TABLET ORAL at 23:14

## 2018-07-16 RX ADMIN — SACUBITRIL AND VALSARTAN 1 TABLET: 24; 26 TABLET, FILM COATED ORAL at 09:18

## 2018-07-16 RX ADMIN — SACUBITRIL AND VALSARTAN 1 TABLET: 24; 26 TABLET, FILM COATED ORAL at 21:15

## 2018-07-16 RX ADMIN — IPRATROPIUM BROMIDE AND ALBUTEROL SULFATE 3 ML: .5; 3 SOLUTION RESPIRATORY (INHALATION) at 19:46

## 2018-07-16 RX ADMIN — ASPIRIN 81 MG: 81 TABLET, CHEWABLE ORAL at 09:18

## 2018-07-16 RX ADMIN — CARVEDILOL 3.12 MG: 3.12 TABLET, FILM COATED ORAL at 09:18

## 2018-07-16 RX ADMIN — ENOXAPARIN SODIUM 40 MG: 100 INJECTION SUBCUTANEOUS at 09:18

## 2018-07-16 NOTE — PROGRESS NOTES
"                                              LOS: 7 days   Patient Care Team:  Kelsey Muñoz MD as PCP - General (Family Medicine)  Kelsey Muñoz MD as PCP - Family Medicine  Christy Mcwilliams MD as PCP - Claims Attributed  Yesenia Saucedo RN as Care Coordinator (Population Health)    Chief Complaint:  Follow up wheezing, asthma, hypoxia    Interval History:   Denies shortness of breath.  No cough.      Ventilator/Non-Invasive Ventilation Settings     None            Vital Signs  Temp:  [98.1 °F (36.7 °C)-98.9 °F (37.2 °C)] 98.9 °F (37.2 °C)  Heart Rate:  [87-95] 95  Resp:  [16-20] 20  BP: ()/(58-84) 115/58    Intake/Output Summary (Last 24 hours) at 07/16/18 1550  Last data filed at 07/16/18 0600   Gross per 24 hour   Intake              360 ml   Output              400 ml   Net              -40 ml     Flowsheet Rows      First Filed Value   Admission Height  172.7 cm (68\") Documented at 07/09/2018 1940   Admission Weight  86.2 kg (190 lb) Documented at 07/09/2018 1940          Physical Exam:   General Appearance:    Alert, cooperative, in no acute distress   Lungs:     No wheezing     Heart:    Regular rhythm and normal rate, normal S1 and S2, no            murmur, no gallop, no rub, no click   HEENT:    [place] 3   Abdomen:     Obese. Soft   Neuro:   Conscious, alert, oriented x3   Extremities:   Moves all extremities well, + edema, no cyanosis, no             Redness          Results Review:          Results from last 7 days  Lab Units 07/16/18  0544 07/15/18  0554 07/14/18  0443   SODIUM mmol/L 139 134* 137   POTASSIUM mmol/L 3.8 3.6 3.8   CHLORIDE mmol/L 99 95* 99   CO2 mmol/L 28.8 28.8 25.9   BUN mg/dL 13 11 13   CREATININE mg/dL 1.04 1.07 1.04   GLUCOSE mg/dL 106* 103* 100*   CALCIUM mg/dL 8.7 8.6 8.5*       Results from last 7 days  Lab Units 07/10/18  1448 07/10/18  0851 07/09/18  2101   TROPONIN T ng/mL 0.027 0.024 0.039*       Results from last 7 days  Lab Units 07/15/18  0554 07/14/18  0443 " 07/13/18  0437   WBC 10*3/mm3 6.54 5.31 6.98   HEMOGLOBIN g/dL 9.8* 9.6* 9.1*   HEMATOCRIT % 31.9* 30.9* 27.6*   PLATELETS 10*3/mm3 220 189 132*           Results from last 7 days  Lab Units 07/10/18  1448   PROBNP pg/mL 7,163.0*       I reviewed the patient's new clinical results.  I personally viewed and interpreted the patient's CXR        Medication Review:     aspirin 81 mg Oral Daily   bumetanide 1 mg Oral Daily   buPROPion  mg Oral Daily   carvedilol 3.125 mg Oral Q12H   ceftriaxone 2 g Intravenous Q24H   enoxaparin 40 mg Subcutaneous Q24H   ipratropium-albuterol 3 mL Nebulization Q4H While Awake - RT   magnesium oxide 400 mg Oral Daily   pantoprazole 40 mg Oral QAM   sacubitril-valsartan 1 tablet Oral Q12H            Diagnostic imaging:  I personally and independently reviewed the CXR dated 7/10/18:  Pulmonary vascular congestion.  Elevated right hemidiaphragm        Assessment:  1. Persistent asthma, no exacerbation   2. Pulmonary vascular congestion  3. Acute and chronic systolic congestive heart failure, EF 20%  4. Wheezing, due to #1 and #2  5. Multiple allergies (cats, dogs and environmental)  6. Elevated right hemidiaphragm, chronic, could represent paralysis    Plan:  Continue nebs while inpatient.  He has nebulizers at home.  His wife is going to bring the list of his home inhalers        Paul Vale MD  07/16/18  3:50 PM            This note was dictated utilizing Dragon dictation

## 2018-07-16 NOTE — PLAN OF CARE
Problem: Patient Care Overview  Goal: Plan of Care Review   07/16/18 1517   OTHER   Outcome Summary Pt is a 67 yr old male with a dx of acute bacterial endocarditis. Pt is independent with gait and stairs and does not present with any deficits in functional mobility. Pt does not require any skilled PT services at this time.

## 2018-07-16 NOTE — PROGRESS NOTES
LOS: 7 days     Name: Mello Arias Jr.  Age/Sex: 67 y.o. male  :  1950        PCP: Kelsey Muñoz MD    Subjective   He feels pretty good today.  Is asking a lot of questions about discharge.  Denies any chest pain or palpitations.  Still pretty short of breath especially with exertion.  He is worried about going home as he is afraid that he will be able to handle the antibiotics will be to take care of himself.  General: No Fever or Chills, Cardiac: No Chest Pain or Palpitations, Resp: No Cough or SOA, GI: No Nausea, Vomiting, or Diarrhea and Other: No bleeding      aspirin 81 mg Oral Daily   bumetanide 1 mg Oral Daily   buPROPion  mg Oral Daily   carvedilol 3.125 mg Oral Q12H   ceftriaxone 2 g Intravenous Q24H   enoxaparin 40 mg Subcutaneous Q24H   ipratropium-albuterol 3 mL Nebulization Q4H While Awake - RT   magnesium oxide 400 mg Oral Daily   pantoprazole 40 mg Oral QAM   sacubitril-valsartan 1 tablet Oral Q12H          Objective   Vital Signs  Temp:  [97.8 °F (36.6 °C)-98.5 °F (36.9 °C)] 98.1 °F (36.7 °C)  Heart Rate:  [84-95] 95  Resp:  [16-20] 16  BP: ()/(59-84) 97/59  Body mass index is 28.51 kg/m².    Intake/Output Summary (Last 24 hours) at 18 0709  Last data filed at 18 0600   Gross per 24 hour   Intake              600 ml   Output             1300 ml   Net             -700 ml       Physical Exam   Constitutional: He is oriented to person, place, and time. He appears well-developed and well-nourished.   HENT:   Head: Normocephalic and atraumatic.   Nose: Nose normal.   Eyes: Conjunctivae and EOM are normal.   Cardiovascular: Normal rate, regular rhythm and normal heart sounds.    Pulmonary/Chest: Effort normal and breath sounds normal. No respiratory distress.   Abdominal: Soft. Bowel sounds are normal.   Neurological: He is alert and oriented to person, place, and time.   Skin: Skin is warm and dry.   Psychiatric: He has a normal mood and affect. His behavior is  normal.   Nursing note and vitals reviewed.        Results Review:       I reviewed the patient's new clinical results.    Results from last 7 days  Lab Units 07/15/18  0554 07/14/18  0443 07/13/18  0437 07/12/18  0333 07/10/18  0252 07/09/18  2101   WBC 10*3/mm3 6.54 5.31 6.98 9.16 21.69* 13.29*   HEMOGLOBIN g/dL 9.8* 9.6* 9.1* 8.6* 9.7* 11.3*   PLATELETS 10*3/mm3 220 189 132* 105* 149 175     Results from last 7 days  Lab Units 07/16/18  0544 07/15/18  0554 07/14/18  0443 07/13/18  0437 07/12/18  0333 07/10/18  0252 07/09/18  2101   SODIUM mmol/L 139 134* 137 131* 129* 134* 133*   POTASSIUM mmol/L 3.8 3.6 3.8 3.8 3.6 3.7 3.7   CHLORIDE mmol/L 99 95* 99 94* 94* 96* 93*   CO2 mmol/L 28.8 28.8 25.9 26.8 23.6 22.8 24.9   BUN mg/dL 13 11 13 16 20 24* 24*   CREATININE mg/dL 1.04 1.07 1.04 0.97 1.14 1.54* 1.51*   CALCIUM mg/dL 8.7 8.6 8.5* 8.9 8.0* 8.0* 9.0   MAGNESIUM mg/dL  --  1.8  --   --  1.9 1.5* 1.2*   Estimated Creatinine Clearance: 71.1 mL/min (by C-G formula based on SCr of 1.04 mg/dL).      Assessment/Plan   Principal Problem:    Acute bacterial endocarditis  Active Problems:    A-fib (CMS/[facility])    CHF (congestive heart failure), NYHA class III (CMS/[facility])    Coronary artery disease of native artery of native heart with stable angina pectoris (CMS/[facility])    Sepsis (CMS/[facility])    Bacteremia due to group B Streptococcus    Shortness of breath    Anemia of chronic disease      PLAN  1.  Group B strep bacteremia with endocarditis involving prosthetic mitral valve and AICD.  PICC line placed.  IV Rocephin 2 g daily with stop date 8/20/18 and then chronic suppressive antibiotics.  Transplant versus LVAD per cardiology.  Dr. Marshall has discussed with .  2.  Cardiomyopathy/chronic systolic congestive heart failure with ejection fraction 20%.  Tolerating Entresto and Coreg.  Bumex changed to oral today.  3.  Mild persistent asthma.  No wheezing today.  Continue nebs.  4.  Anemia.  Stable.  Probably secondary to chronic  disease    Disposition  Discharged tomorrow or Wednesday based on plan for discharge.  Working to determine today whether he'll go to skilled nursing facility versus home with home health and further nursing assistance      Dhaval Valiente MD  [facility]  07/16/18  7:09 AM

## 2018-07-16 NOTE — PROGRESS NOTES
Hospital Follow Up    LOS:  LOS: 7 days   Patient Name: Mello Arias Jr.  Age/Sex: 67 y.o. male  : 1950  MRN: [id number]    Date of Hospital Visit: 18  Length of Stay: 7  Encounter Provider: Naun Calvin MD  Place of Service: [facility] CARDIOLOGY    Subjective:     Follow Up for: endocarditis, chf    Interval History: continues afebrile, BP marginal    Objective:     Objective:  Temp:  [97.8 °F (36.6 °C)-98.5 °F (36.9 °C)] 98.1 °F (36.7 °C)  Heart Rate:  [84-95] 87  Resp:  [16-20] 16  BP: ()/(59-84) 97/59  Body mass index is 28.51 kg/m².    Intake/Output Summary (Last 24 hours) at 18 0856  Last data filed at 18 0600   Gross per 24 hour   Intake              600 ml   Output             1300 ml   Net             -700 ml     1    18  0445 18  0540 07/15/18  0640   Weight: 85.5 kg (188 lb 9.6 oz) 85.3 kg (188 lb) 85 kg (187 lb 8 oz)     Weight change:     Physical Exam:   General Appearance: Alert, cooperative, in no acute distress. AAOx4.   HEENT: Normocephalic.  Neck: Supple. No JVD. No Carotid bruit. No thyromegaly  Lungs: CTAB. Normal respiratory effort and rate.  Heart:: Regular rate and rhythm, normal S1 and S2, no murmurs, gallops or rubs.  Abdomen: Soft, nontender, non-distended. positive bowel sounds  Extremities: Warm, no cyanosis, or clubbing. No edema.     Lab Review:     Results from last 7 days  Lab Units 18  0544 07/15/18  0554 18  0443   SODIUM mmol/L 139 134* 137   POTASSIUM mmol/L 3.8 3.6 3.8   CHLORIDE mmol/L 99 95* 99   CO2 mmol/L 28.8 28.8 25.9   BUN mg/dL 13 11 13   CREATININE mg/dL 1.04 1.07 1.04   GLUCOSE mg/dL 106* 103* 100*   CALCIUM mg/dL 8.7 8.6 8.5*         Results from last 7 days  Lab Units 07/10/18  1448 07/10/18  0851 18  2101   TROPONIN T ng/mL 0.027 0.024 0.039*       Results from last 7 days  Lab Units 07/15/18  0554 18  0443   WBC 10*3/mm3 6.54 5.31   HEMOGLOBIN g/dL 9.8*  9.6*   HEMATOCRIT % 31.9* 30.9*   PLATELETS 10*3/mm3 220 189           Results from last 7 days  Lab Units 07/15/18  0554 07/12/18  0333   MAGNESIUM mg/dL 1.8 1.9           Results from last 7 days  Lab Units 07/10/18  1448 07/09/18  2101   PROBNP pg/mL 7,163.0* 1,268.0*       Results from last 7 days  Lab Units 07/13/18  0437   TSH mIU/mL 1.820         I reviewed the patient's new clinical results.          I personally viewed and interpreted the patient's EKG/Telemetry data.  Current Medications:   Scheduled Meds:  aspirin 81 mg Oral Daily   bumetanide 1 mg Oral Daily   buPROPion  mg Oral Daily   carvedilol 3.125 mg Oral Q12H   ceftriaxone 2 g Intravenous Q24H   enoxaparin 40 mg Subcutaneous Q24H   ipratropium-albuterol 3 mL Nebulization Q4H While Awake - RT   magnesium oxide 400 mg Oral Daily   pantoprazole 40 mg Oral QAM   sacubitril-valsartan 1 tablet Oral Q12H     Continuous Infusions:     Allergies:  Allergies   Allergen Reactions   • Shellfish-Derived Products Swelling and Other (See Comments)     THROAT SWELLS       Assessment & Plan     Principal Problem:    Acute bacterial endocarditis  Active Problems:    A-fib (CMS/[facility])    CHF (congestive heart failure), NYHA class III (CMS/[facility])    Coronary artery disease of native artery of native heart with stable angina pectoris (CMS/[facility])    Sepsis (CMS/[facility])    Bacteremia due to group B Streptococcus    Shortness of breath    Anemia of chronic disease    1. Acute Bacterial endocarditis: Streptococcus.  MV and ICD leads infected.  Very abnormal JAYE.  Continue antibiotics per ID.  WBC down  2. CHF: acute/chronic systolic.  LVEF 20%.  Continue oral diuretics.  On entresto.  Long term plan is either LVAD vs transplant.  Will continue to work with  Advanced Heart Failure clinic  3. CAD: s/p MI, s/p cabg.  No angina  4. MR: s/p MVR with tissue prosthesis.  Large vegitation on JAYE  5. MARISELA: improved  6. Respiratory Failure: improved.  Continue present course  7.  Anemia: in part dilutional.  No need to transfuse at present    Plan: op disposition pending.  CV stable at present.      Naun Calvin MD  07/16/18

## 2018-07-16 NOTE — PLAN OF CARE
Problem: Patient Care Overview  Goal: Plan of Care Review  Outcome: Ongoing (interventions implemented as appropriate)   07/16/18 1704   Coping/Psychosocial   Plan of Care Reviewed With patient   Plan of Care Review   Progress no change   OTHER   Outcome Summary VSS. Pt. continues on antibiotics IV. Ambulates independently in halls. Appetite good. No skin issues. Pt. awaiting possible [facility] with[facility]. Probable discharge in next 1-2 days. Will continue to monitor. 07/16/18       Problem: Activity Intolerance (Adult)  Goal: Activity Tolerance  Outcome: Ongoing (interventions implemented as appropriate)    Goal: Effective Energy Conservation Techniques  Outcome: Ongoing (interventions implemented as appropriate)      Problem: Sepsis/Septic Shock (Adult)  Goal: Signs and Symptoms of Listed Potential Problems Will be Absent, Minimized or Managed (Sepsis/Septic Shock)  Outcome: Ongoing (interventions implemented as appropriate)      Problem: Cardiac: Heart Failure (Adult)  Goal: Signs and Symptoms of Listed Potential Problems Will be Absent, Minimized or Managed (Cardiac: Heart Failure)  Outcome: Ongoing (interventions implemented as appropriate)

## 2018-07-16 NOTE — PROGRESS NOTES
"Continued Stay Note  [facility]     Patient Name: Mello Arias Jr.  MRN: [id number]  Today's Date: 7/16/2018    Admit Date: 7/9/2018          Discharge Plan     Row Name 07/16/18 1403       Plan    Plan Home with HH vs. SNF    Patient/Family in Agreement with Plan yes    Plan Comments Spoke with patient and family member Ree ([phone])  at bedside.  Patient is concerned about doing IV antibiotics at home.  Talked about options - SNF - he would consider [facility] - spoke with Shr[facility], or [facility] - left message for [facility].  Unsure if patient will qualify, awaiting PT eval.  Patient states if he uses HH, he would like [facility] - spoke with [facility], co-pay for 6 weeks of IV antibiotics would be $145.  Patient is also considering coming to [facility].  Also given list of \"In-Home Personal Care Agencies\" -may consider having a RN come in to hang the antibiotic for him.  CCP will continue to follow.  BHumeniuk RN              Discharge Codes    No documentation.           Becky S. Humeniuk, RN    "

## 2018-07-16 NOTE — DISCHARGE PLACEMENT REQUEST
"Mello Lane (67 y.o. Male)     Date of Birth Social Security Number Address Home Phone MRN    1950  [address] [address] [phone] [id number]    Confucianism Marital Status          Christian Single       Admission Date Admission Type Admitting Provider Attending Provider Department, Room/Bed    7/9/18 Emergency Jamin Lyons MD Richards, Stephen J, MD [address], 622/1    Discharge Date Discharge Disposition Discharge Destination                       Attending Provider:  Dhaval Valiente MD    Allergies:  Shellfish-derived Products    Isolation:  None   Infection:  None   Code Status:  CPR    Ht:  172.7 cm (68\")   Wt:  85 kg (187 lb 8 oz)    Admission Cmt:  None   Principal Problem:  Acute bacterial endocarditis [I33.0]                 Active Insurance as of 7/9/2018     Primary Coverage     Payor Plan Insurance Group Employer/Plan Group    MEDICARE MEDICARE A & B      Payor Plan Address Payor Plan Phone Number Effective From Effective To    [address] [phone] 9/1/2013     [facility] [id number]       Subscriber Name Subscriber Birth Date Member ID       MELLO LANE JRPrecious 1950 [id number]           Secondary Coverage     Payor Plan Insurance Group Employer/Plan Group    [employer] [place] [employer] [place]      Payor Plan Address Payor Plan Phone Number Effective From Effective To    [employer] [place] [phone] 10/1/2013     [place] [address]       Subscriber Name Subscriber Birth Date Member ID       MELLO LANE  1950 [id number]                 Emergency Contacts      (Rel.) Home Phone Work Phone Mobile Phone    Dylan Lane (Son) [phone] -- [phone]              "

## 2018-07-16 NOTE — PLAN OF CARE
Problem: Patient Care Overview  Goal: Plan of Care Review  Outcome: Ongoing (interventions implemented as appropriate)   07/16/18 0514   Coping/Psychosocial   Plan of Care Reviewed With patient   Plan of Care Review   Progress improving   OTHER   Outcome Summary Pt is A&Ox4. Pt does not c/o any pain or distress. PICC line in rt arm, dressing to be changed 7/21/2018. Will draw labs from PICC later in AM. Given nightly medications, requested ambien to help him sleep/. Up ad aisha. Son stated they did sleep study on pt in [facility] and pt has sleep apnea and has been wearing 1L of O2 at night. Applied O2 for night. VSS, will continue to monitor pt.     Goal: Individualization and Mutuality  Outcome: Ongoing (interventions implemented as appropriate)    Goal: Discharge Needs Assessment  Outcome: Ongoing (interventions implemented as appropriate)    Goal: Interprofessional Rounds/Family Conf  Outcome: Ongoing (interventions implemented as appropriate)      Problem: Activity Intolerance (Adult)  Goal: Activity Tolerance  Outcome: Ongoing (interventions implemented as appropriate)    Goal: Effective Energy Conservation Techniques  Outcome: Ongoing (interventions implemented as appropriate)      Problem: Sepsis/Septic Shock (Adult)  Goal: Signs and Symptoms of Listed Potential Problems Will be Absent, Minimized or Managed (Sepsis/Septic Shock)  Outcome: Ongoing (interventions implemented as appropriate)      Problem: Cardiac: Heart Failure (Adult)  Goal: Signs and Symptoms of Listed Potential Problems Will be Absent, Minimized or Managed (Cardiac: Heart Failure)  Outcome: Ongoing (interventions implemented as appropriate)

## 2018-07-16 NOTE — THERAPY EVALUATION
Acute Care - Physical Therapy Initial Evaluation  [facility]     Patient Name: Mello Arias Jr.  : 1950  MRN: [id number]  Today's Date: 2018         Referring Physician: Steffanie      Admit Date: 2018    Visit Dx:     ICD-10-CM ICD-9-CM   1. Sepsis, due to unspecified organism (CMS/[facility]) A41.9 038.9     995.91   2. Pneumonia of left lower lobe due to infectious organism (CMS/[facility]) J18.1 486   3. Hypomagnesemia E83.42 275.2   4. Renal insufficiency N28.9 593.9   5. Generalized weakness R53.1 780.79     Patient Active Problem List   Diagnosis   • A-fib (CMS/[facility])   • Acid reflux   • Blood glucose elevated   • HLD (hyperlipidemia)   • Cardiomyopathy, ischemic   • L-S radiculopathy   • Idiopathic ventricular tachycardia (CMS/HCC)   • CHF (congestive heart failure), NYHA class III (CMS/HCC)   • Coronary artery disease of native artery of native heart with stable angina pectoris (CMS/HCC)   • Non-rheumatic mitral regurgitation   • Non-rheumatic tricuspid valve insufficiency   • CAD in native artery   • Other specified forms of effusion, except tuberculous   • S/P CABG x 4   • S/P MVR (mitral valve replacement)   • S/P TVR (tricuspid valve repair)   • CHF (congestive heart failure) (CMS/HCC)   • Sepsis (CMS/HCC)   • Bacterial pneumonia   • Bacteremia due to group B Streptococcus   • Weakness   • Depression   • Shortness of breath   • Acute bacterial endocarditis   • Anemia of chronic disease     Past Medical History:   Diagnosis Date   • Allergic rhinitis    • Anemia    • Asthma    • Bone fracture    • CAD (coronary artery disease)    • Cardiomyopathy, ischemic    • Carpal tunnel syndrome    • CHF (congestive heart failure), NYHA class III, chronic, systolic (CMS/[facility])    • GERD (gastroesophageal reflux disease)    • Health care maintenance    • Herniated disc, cervical    • Hx of total hip arthroplasty 3/18/2016   • Hyperglycemia    • Hyperlipidemia    • Hypertension    • Idiopathic ventricular  tachycardia (CMS/HCC)    • Lumbosacral radiculopathy at L4    • Myocardial infarction     OF INFERIOR WALL, GREATER THAN 8 WEEKS   • Myocardial ischemia     POST MYOCARDIAL INFARCTION, POST PTCA WITH STENT PLACEMENT   • Non-rheumatic tricuspid valve insufficiency    • Nonrheumatic mitral valve regurgitation    • Open wound     OF LEFT INDEX FINGER WITHOUT DAMAGE TO NAIL   • Osteoarthritis    • PAF (paroxysmal atrial fibrillation) (CMS/HCC)    • Ventricular tachycardia (CMS/HCC)     POST AICD     Past Surgical History:   Procedure Laterality Date   • APPENDECTOMY     • CARDIAC CATHETERIZATION N/A 10/21/2016    Procedure: Coronary angiography;  Surgeon: Naun Calvin MD;  [place] [facility] [place];  Service:    • CARDIAC CATHETERIZATION N/A 10/21/2016    Procedure: Left heart cath;  Surgeon: Naun Calvin MD;  [place] [facility] [place] [facility] [place];  Service:    • CARDIAC CATHETERIZATION N/A 10/21/2016    Procedure: Left ventriculography;  Surgeon: Naun Calvin MD;  [place] [facility] [place] [facility] [place] [place];  Service:    • CARDIAC CATHETERIZATION N/A 10/21/2016    Procedure: Right heart cath;  Surgeon: Naun Calvin MD;  [place] [facility] [place] [facility] [place];  Service:    • CARDIAC DEFIBRILLATOR PLACEMENT     • CARPAL TUNNEL RELEASE     • COLONOSCOPY     • CORONARY ANGIOPLASTY      WITH STENT PLACEMENT TO THE LAD   • CORONARY ARTERY BYPASS GRAFT WITH MITRAL VALVE REPAIR/REPLACEMENT N/A 11/7/2016    Procedure: JAYE STERNOTOMY CORONARY ARTERY BYPASS GRAFT TIMES 4 USING LEFT INTERNAL MAMMARY ARTERY AND LEFT GREATER SAPHENOUS VEIN GRAFT PER ENDOSCOPIC VEIN HARVESTING, MITRAL VALVE REPLACEMENT AND TRICUSPID VALVE REPAIR;  Surgeon: Slade Hurtado MD;  Location: [facility] OR;  Service:    • EYE SURGERY     • FOOT SURGERY Left    • HERNIA REPAIR     • IMPLANTABLE CARDIOVERTER DEFIBRILLATOR LEAD REPLACEMENT/POCKET REVISION     • INGUINAL HERNIA REPAIR Left    • LASIK     •  TONSILLECTOMY     • TOTAL HIP ARTHROPLASTY Bilateral         PT ASSESSMENT (last 12 hours)      Physical Therapy Evaluation     Row Name 07/16/18 1505          PT Evaluation Time/Intention    Subjective Information complains of;fatigue  -KH (r) PM (t) KH (c)     Document Type evaluation  -KH (r) PM (t) KH (c)     Mode of Treatment physical therapy  -KH (r) PM (t) KH (c)     Patient Effort good  -KH (r) PM (t) KH (c)     Symptoms Noted During/After Treatment fatigue  -KH (r) PM (t) KH (c)     Row Name 07/16/18 1505          General Information    Patient Profile Reviewed? yes  -KH (r) PM (t) KH (c)     Referring Physician Steffanie  -KH (r) PM (t) KH (c)     Patient Observations alert;cooperative;agree to therapy  -KH (r) PM (t) KH (c)     General Observations of Patient Pt seated in chair, wife in room.   -KH (r) PM (t) KH (c)     Prior Level of Function independent:  -KH (r) PM (t) KH (c)     Pertinent History of Current Functional Problem CHF, mitral valve endocarditis  -KH (r) PM (t) KH (c)     Row Name 07/16/18 1505          Relationship/Environment    Lives With alone  -KH (r) PM (t) KH (c)     Row Name 07/16/18 1505          Resource/Environmental Concerns    Current Living Arrangements home/apartment/condo  -KH (r) PM (t) KH (c)     Row Name 07/16/18 1505          Home Main Entrance    Number of Stairs, Main Entrance three  -KH (r) PM (t) KH (c)     Row Name 07/16/18 1505          Cognitive Assessment/Interventions    Additional Documentation Cognitive Assessment/Intervention (Group)  -KH (r) PM (t) KH (c)     Row Name 07/16/18 1505          Cognitive Assessment/Intervention- PT/OT    Orientation Status (Cognition) oriented x 4  -KH (r) PM (t) KH (c)     Follows Commands (Cognition) WFL  -KH (r) PM (t) KH (c)     Personal Safety Interventions fall prevention program maintained;gait belt;nonskid shoes/slippers when out of bed  -KH (r) PM (t) KH (c)     Row Name 07/16/18 1505          Transfer  Assessment/Treatment    Transfer Assessment/Treatment sit-stand transfer;stand-sit transfer  -KH (r) PM (t) KH (c)     Sit-Stand [place] (Transfers) independent  -KH (r) PM (t) KH (c)     Stand-Sit [place] (Transfers) independent  -KH (r) PM (t) KH (c)     Row Name 07/16/18 1505          Gait/Stairs Assessment/Training    [place] Level (Gait) supervision  -KH (r) PM (t) KH (c)     Distance in Feet (Gait) 150  -KH (r) PM (t) KH (c)     Deviations/Abnormal Patterns (Gait) base of support, narrow  -KH (r) PM (t) KH (c)     [place] Level (Stairs) contact guard  -KH (r) PM (t) KH (c)     Handrail Location (Stairs) left side (ascending)  -KH (r) PM (t) KH (c)     Number of Steps (Stairs) 3  -KH (r) PM (t) KH (c)     Ascending Technique (Stairs) step-to-step  -KH (r) PM (t) KH (c)     Descending Technique (Stairs) step-over-step  -KH (r) PM (t) KH (c)     Row Name 07/16/18 1505          General ROM    GENERAL ROM COMMENTS WFL  -KH (r) PM (t) KH (c)     Row Name 07/16/18 1505          MMT (Manual Muscle Testing)    Additional Documentation General Assessment (Manual Muscle Testing) (Group)  -KH (r) PM (t) KH (c)     Row Name 07/16/18 1505          General Assessment (Manual Muscle Testing)    General Manual Muscle Testing (MMT) Assessment no strength deficits identified  -KH (r) PM (t) KH (c)     Row Name 07/16/18 1505          Motor Assessment/Intervention    Additional Documentation Balance (Group)  -KH (r) PM (t) KH (c)     Row Name 07/16/18 1505          Balance    Balance static standing balance  -KH (r) PM (t) KH (c)     Row Name 07/16/18 1505          Static Standing Balance    Level of [place] (Supported Standing, Static Balance) supervision  -KH (r) PM (t) KH (c)     Time Able to Maintain Position (Supported Standing, Static Balance) 2 to 3 minutes  -KH (r) PM (t) KH (c)     Row Name 07/16/18 1505          Sensory Assessment/Intervention    Sensory General Assessment no sensation deficits  identified  -KH (r) PM (t) KH (c)     Row Name 07/16/18 1505          Pain Assessment    Additional Documentation Pain Scale: Numbers Pre/Post-Treatment (Group)  -KH (r) PM (t) KH (c)     Row Name 07/16/18 1505          Pain Scale: Numbers Pre/Post-Treatment    Pain Scale: Numbers, Pretreatment 0/10 - no pain  -KH (r) PM (t) KH (c)     Pain Scale: Numbers, Post-Treatment 0/10 - no pain  -KH (r) PM (t) KH (c)     Row Name 07/16/18 1505          Plan of Care Review    Plan of Care Reviewed With patient  -KH (r) PM (t) KH (c)     Row Name 07/16/18 1505          Physical Therapy Clinical Impression    Patient/Family Goals Statement (PT Clinical Impression) return to PLOF  -KH (r) PM (t) KH (c)     Criteria for Skilled Interventions Met (PT Clinical Impression) current level of function same as previous level of function  -KH (r) PM (t) KH (c)     Row Name 07/16/18 1505          Positioning and Restraints    Pre-Treatment Position sitting in chair/recliner  -KH (r) PM (t) KH (c)     Post Treatment Position chair  -KH (r) PM (t) KH (c)     In Chair call light within reach;encouraged to call for assist  -KH (r) PM (t) KH (c)     Row Name 07/16/18 150          Living Environment    Home Accessibility stairs to enter home  -KH (r) PM (t) KH (c)       User Key  (r) = Recorded By, (t) = Taken By, (c) = Cosigned By    Initials Name Provider Type    LUPE Galvan, PT Physical Therapist    BEV Zamora, PT Student PT Student          Physical Therapy Education     Title: PT OT SLP Therapies (Active)     Topic: Physical Therapy (Active)     Point: Mobility training (Done)    Learning Progress Summary     Learner Status Readiness Method Response Comment Documented by    Patient Done Acceptance E VU  PM 07/16/18 1517          Point: Body mechanics (Done)    Learning Progress Summary     Learner Status Readiness Method Response Comment Documented by    Patient Done Acceptance E VU  PM 07/16/18 1517          Point:  Precautions (Done)    Learning Progress Summary     Learner Status Readiness Method Response Comment Documented by    Patient Done Acceptance E VU  PM 07/16/18 1517                      User Key     Initials Effective Dates Name Provider Type Discipline    PM 06/25/18 -  Matteo Zamora, PT Student PT Student PT                PT Recommendation and Plan  Anticipated Discharge Disposition (PT): home with home health  Outcome Summary/Treatment Plan (PT)  Anticipated Discharge Disposition (PT): home with home health  Plan of Care Reviewed With: patient  Outcome Summary: Pt is a 67 yr old male with a dx of acute bacterial endocarditis. Pt is independent with gait and stairs and does not present with any deficits in functional mobility. Pt does not require any skilled PT services at this time.           Outcome Measures     Row Name 07/16/18 1500             How much help from another person do you currently need...    Turning from your back to your side while in flat bed without using bedrails? 4  -KH (r) PM (t) KH (c)      Moving from lying on back to sitting on the side of a flat bed without bedrails? 4  -KH (r) PM (t) KH (c)      Moving to and from a bed to a chair (including a wheelchair)? 4  -KH (r) PM (t) KH (c)      Standing up from a chair using your arms (e.g., wheelchair, bedside chair)? 4  -KH (r) PM (t) KH (c)      Climbing 3-5 steps with a railing? 4  -KH (r) PM (t) KH (c)      To walk in hospital room? 4  -KH (r) PM (t) KH (c)      AM-PAC 6 Clicks Score 24  -KH (r) PM (t)         Functional Assessment    Outcome Measure Options AM-PAC 6 Clicks Basic Mobility (PT)  -KH (r) PM (t) KH (c)        User Key  (r) = Recorded By, (t) = Taken By, (c) = Cosigned By    Initials Name Provider Type    LUPE Galvan, PT Physical Therapist    PM Matteo Zamora, PT Student PT Student           Time Calculation:         PT Charges     Row Name 07/16/18 1521             Time Calculation    Start Time 1447  -KH (r)  PM (t) KH (c)      Stop Time 2977  -KH (r) PM (t) KH (c)      Time Calculation (min) 10 min  -KH (r) PM (t)      PT Received On 07/16/18  -KH (r) PM (t) KH (c)        User Key  (r) = Recorded By, (t) = Taken By, (c) = Cosigned By    Initials Name Provider Type    LUPE Galvan, PT Physical Therapist    PM Matteo Zamora, PT Student PT Student        Therapy Suggested Charges     Code   Minutes Charges    None           Therapy Charges for Today     Code Description Service Date Service Provider Modifiers Qty    08334301627 HC PT EVAL MOD COMPLEXITY 2 7/16/2018 Matteo Zamora, PT Student GP 1          PT G-Codes  Outcome Measure Options: AM-PAC 6 Clicks Basic Mobility (PT)      Matteo Zamora, PT Student  7/16/2018

## 2018-07-16 NOTE — PROGRESS NOTES
"                                              LOS: 6 days   Patient Care Team:  Kelsey Muñoz MD as PCP - General (Family Medicine)  Kelsey Muñoz MD as PCP - Family Medicine  Christy Mcwilliams MD as PCP - Claims Attributed  Yesenia Saucedo RN as Care Coordinator (Population Health)    Chief Complaint:  Follow up wheezing, asthma, hypoxia    Interval History:   Denies shortness of breath or cough       Ventilator/Non-Invasive Ventilation Settings     None            Vital Signs  Temp:  [97.8 °F (36.6 °C)-98.5 °F (36.9 °C)] 98.5 °F (36.9 °C)  Heart Rate:  [81-95] 95  Resp:  [16-20] 16  BP: ()/(65-84) 121/84    Intake/Output Summary (Last 24 hours) at 07/15/18 2300  Last data filed at 07/15/18 2131   Gross per 24 hour   Intake              240 ml   Output             1300 ml   Net            -1060 ml     Flowsheet Rows      First Filed Value   Admission Height  172.7 cm (68\") Documented at 07/09/2018 1940   Admission Weight  86.2 kg (190 lb) Documented at 07/09/2018 1940          Physical Exam:   General Appearance:    Alert, cooperative, in no acute distress   Lungs:     Minimal wheezing on forced expiration    Heart:    Regular rhythm and normal rate, normal S1 and S2, no            murmur, no gallop, no rub, no click   HEENT:    [place] 3   Abdomen:     Obese. Soft   Neuro:   Conscious, alert, oriented x3   Extremities:   Moves all extremities well, + edema, no cyanosis, no             Redness          Results Review:          Results from last 7 days  Lab Units 07/15/18  0554 07/14/18  0443 07/13/18  0437   SODIUM mmol/L 134* 137 131*   POTASSIUM mmol/L 3.6 3.8 3.8   CHLORIDE mmol/L 95* 99 94*   CO2 mmol/L 28.8 25.9 26.8   BUN mg/dL 11 13 16   CREATININE mg/dL 1.07 1.04 0.97   GLUCOSE mg/dL 103* 100* 102*   CALCIUM mg/dL 8.6 8.5* 8.9       Results from last 7 days  Lab Units 07/10/18  1448 07/10/18  0851 07/09/18  2101   TROPONIN T ng/mL 0.027 0.024 0.039*       Results from last 7 days  Lab Units " 07/15/18  0554 07/14/18  0443 07/13/18  0437   WBC 10*3/mm3 6.54 5.31 6.98   HEMOGLOBIN g/dL 9.8* 9.6* 9.1*   HEMATOCRIT % 31.9* 30.9* 27.6*   PLATELETS 10*3/mm3 220 189 132*           Results from last 7 days  Lab Units 07/10/18  1448   PROBNP pg/mL 7,163.0*       I reviewed the patient's new clinical results.  I personally viewed and interpreted the patient's CXR        Medication Review:     aspirin 81 mg Oral Daily   bumetanide 1 mg Oral Daily   buPROPion  mg Oral Daily   carvedilol 3.125 mg Oral Q12H   ceftriaxone 2 g Intravenous Q24H   enoxaparin 40 mg Subcutaneous Q24H   ipratropium-albuterol 3 mL Nebulization Q4H While Awake - RT   magnesium oxide 400 mg Oral Daily   pantoprazole 40 mg Oral QAM   sacubitril-valsartan 1 tablet Oral Q12H            Diagnostic imaging:  I personally and independently reviewed the CXR dated 7/10/18:  Pulmonary vascular congestion.  Elevated right hemidiaphragm        Assessment:  1. Persistent asthma, no exacerbation   2. Pulmonary vascular congestion  3. Acute and chronic systolic congestive heart failure, EF 20%  4. Wheezing, due to #1 and #2  5. Multiple allergies (cats, dogs and environmental)  6. Elevated right hemidiaphragm, chronic, could represent paralysis    Plan:  Continue nebs. He has nebs at home, rescue Albuterol and a long acting inhaler which he does not recall. He needs to be on LABA/ICS as OP. I asked him to get the name of his inhalers.    Agree with ditania Vale MD  07/15/18  11:00 PM            This note was dictated utilizing Dragon dictation

## 2018-07-17 DIAGNOSIS — I33.0 ACUTE BACTERIAL ENDOCARDITIS: Primary | ICD-10-CM

## 2018-07-17 LAB
ALBUMIN SERPL-MCNC: 3.5 G/DL (ref 3.5–5.2)
ANION GAP SERPL CALCULATED.3IONS-SCNC: 10.9 MMOL/L
BASOPHILS # BLD AUTO: 0.04 10*3/MM3 (ref 0–0.2)
BASOPHILS NFR BLD AUTO: 0.6 % (ref 0–1.5)
BUN BLD-MCNC: 18 MG/DL (ref 8–23)
BUN/CREAT SERPL: 15.9 (ref 7–25)
CALCIUM SPEC-SCNC: 8.7 MG/DL (ref 8.6–10.5)
CHLORIDE SERPL-SCNC: 96 MMOL/L (ref 98–107)
CO2 SERPL-SCNC: 26.1 MMOL/L (ref 22–29)
CREAT BLD-MCNC: 1.13 MG/DL (ref 0.76–1.27)
DEPRECATED RDW RBC AUTO: 47.9 FL (ref 37–54)
EOSINOPHIL # BLD AUTO: 0.58 10*3/MM3 (ref 0–0.7)
EOSINOPHIL NFR BLD AUTO: 8.2 % (ref 0.3–6.2)
ERYTHROCYTE [DISTWIDTH] IN BLOOD BY AUTOMATED COUNT: 13.8 % (ref 11.5–14.5)
GFR SERPL CREATININE-BSD FRML MDRD: 64 ML/MIN/1.73
GLUCOSE BLD-MCNC: 95 MG/DL (ref 65–99)
HCT VFR BLD AUTO: 32.7 % (ref 40.4–52.2)
HGB BLD-MCNC: 10.3 G/DL (ref 13.7–17.6)
IMM GRANULOCYTES # BLD: 0.12 10*3/MM3 (ref 0–0.03)
IMM GRANULOCYTES NFR BLD: 1.7 % (ref 0–0.5)
LYMPHOCYTES # BLD AUTO: 1.97 10*3/MM3 (ref 0.9–4.8)
LYMPHOCYTES NFR BLD AUTO: 27.7 % (ref 19.6–45.3)
MCH RBC QN AUTO: 29.9 PG (ref 27–32.7)
MCHC RBC AUTO-ENTMCNC: 31.5 G/DL (ref 32.6–36.4)
MCV RBC AUTO: 94.8 FL (ref 79.8–96.2)
MONOCYTES # BLD AUTO: 0.72 10*3/MM3 (ref 0.2–1.2)
MONOCYTES NFR BLD AUTO: 10.1 % (ref 5–12)
NEUTROPHILS # BLD AUTO: 3.79 10*3/MM3 (ref 1.9–8.1)
NEUTROPHILS NFR BLD AUTO: 53.4 % (ref 42.7–76)
NRBC BLD MANUAL-RTO: 0 /100 WBC (ref 0–0)
PHOSPHATE SERPL-MCNC: 3.3 MG/DL (ref 2.5–4.5)
PLATELET # BLD AUTO: 283 10*3/MM3 (ref 140–500)
PMV BLD AUTO: 11 FL (ref 6–12)
POTASSIUM BLD-SCNC: 3.8 MMOL/L (ref 3.5–5.2)
RBC # BLD AUTO: 3.45 10*6/MM3 (ref 4.6–6)
SODIUM BLD-SCNC: 133 MMOL/L (ref 136–145)
WBC NRBC COR # BLD: 7.1 10*3/MM3 (ref 4.5–10.7)

## 2018-07-17 PROCEDURE — 99232 SBSQ HOSP IP/OBS MODERATE 35: CPT | Performed by: INTERNAL MEDICINE

## 2018-07-17 PROCEDURE — 25010000003 CEFTRIAXONE PER 250 MG: Performed by: INTERNAL MEDICINE

## 2018-07-17 PROCEDURE — 85025 COMPLETE CBC W/AUTO DIFF WBC: CPT | Performed by: HOSPITALIST

## 2018-07-17 PROCEDURE — 25010000002 ENOXAPARIN PER 10 MG: Performed by: INTERNAL MEDICINE

## 2018-07-17 PROCEDURE — 80069 RENAL FUNCTION PANEL: CPT | Performed by: HOSPITALIST

## 2018-07-17 PROCEDURE — 94799 UNLISTED PULMONARY SVC/PX: CPT

## 2018-07-17 RX ORDER — CARVEDILOL 6.25 MG/1
6.25 TABLET ORAL EVERY 12 HOURS SCHEDULED
Status: DISCONTINUED | OUTPATIENT
Start: 2018-07-17 | End: 2018-07-18 | Stop reason: HOSPADM

## 2018-07-17 RX ADMIN — CARVEDILOL 6.25 MG: 6.25 TABLET, FILM COATED ORAL at 21:20

## 2018-07-17 RX ADMIN — IPRATROPIUM BROMIDE AND ALBUTEROL SULFATE 3 ML: .5; 3 SOLUTION RESPIRATORY (INHALATION) at 16:48

## 2018-07-17 RX ADMIN — ENOXAPARIN SODIUM 40 MG: 100 INJECTION SUBCUTANEOUS at 08:15

## 2018-07-17 RX ADMIN — IPRATROPIUM BROMIDE AND ALBUTEROL SULFATE 3 ML: .5; 3 SOLUTION RESPIRATORY (INHALATION) at 12:49

## 2018-07-17 RX ADMIN — ASPIRIN 81 MG: 81 TABLET, CHEWABLE ORAL at 08:15

## 2018-07-17 RX ADMIN — Medication 400 MG: at 08:14

## 2018-07-17 RX ADMIN — CARVEDILOL 3.12 MG: 3.12 TABLET, FILM COATED ORAL at 08:14

## 2018-07-17 RX ADMIN — BUMETANIDE 1 MG: 1 TABLET ORAL at 08:15

## 2018-07-17 RX ADMIN — IPRATROPIUM BROMIDE AND ALBUTEROL SULFATE 3 ML: .5; 3 SOLUTION RESPIRATORY (INHALATION) at 19:25

## 2018-07-17 RX ADMIN — ZOLPIDEM TARTRATE 5 MG: 5 TABLET ORAL at 21:53

## 2018-07-17 RX ADMIN — PANTOPRAZOLE SODIUM 40 MG: 40 TABLET, DELAYED RELEASE ORAL at 06:17

## 2018-07-17 RX ADMIN — SACUBITRIL AND VALSARTAN 1 TABLET: 24; 26 TABLET, FILM COATED ORAL at 21:20

## 2018-07-17 RX ADMIN — CEFTRIAXONE SODIUM 2 G: 2 INJECTION, SOLUTION INTRAVENOUS at 17:01

## 2018-07-17 RX ADMIN — BUPROPION HYDROCHLORIDE 150 MG: 150 TABLET, EXTENDED RELEASE ORAL at 08:15

## 2018-07-17 RX ADMIN — SACUBITRIL AND VALSARTAN 1 TABLET: 24; 26 TABLET, FILM COATED ORAL at 08:15

## 2018-07-17 NOTE — PLAN OF CARE
Problem: Patient Care Overview  Goal: Interprofessional Rounds/Family Conf  Outcome: Ongoing (interventions implemented as appropriate)   07/17/18 0043   Interdisciplinary Rounds/Family Conf   Participants respiratory therapy

## 2018-07-17 NOTE — PLAN OF CARE
Problem: Patient Care Overview  Goal: Plan of Care Review  Outcome: Ongoing (interventions implemented as appropriate)   07/17/18 5480   Coping/Psychosocial   Plan of Care Reviewed With patient   Plan of Care Review   Progress improving   OTHER   Outcome Summary Vss. Appetite good. Pt. ambulating in halls independently. Pt. to continue IV antibiotics outpatient. Appts. set per CCP. Discharge planned for am 07/18/18. Will continue to monitor. 07/17/18       Problem: Activity Intolerance (Adult)  Goal: Activity Tolerance  Outcome: Ongoing (interventions implemented as appropriate)    Goal: Effective Energy Conservation Techniques  Outcome: Ongoing (interventions implemented as appropriate)      Problem: Sepsis/Septic Shock (Adult)  Goal: Signs and Symptoms of Listed Potential Problems Will be Absent, Minimized or Managed (Sepsis/Septic Shock)  Outcome: Ongoing (interventions implemented as appropriate)      Problem: Cardiac: Heart Failure (Adult)  Goal: Signs and Symptoms of Listed Potential Problems Will be Absent, Minimized or Managed (Cardiac: Heart Failure)  Outcome: Ongoing (interventions implemented as appropriate)

## 2018-07-17 NOTE — PROGRESS NOTES
LOS: 8 days     Name: Mello Arias Jr.  Age/Sex: 67 y.o. male  :  1950        PCP: Kelsey Muñoz MD    Subjective   He feels pretty good today.  Denies any new issues or complaints.  We discussed how well he did with physical therapy yesterday and plan is for outpatient infusion Center for his antibiotics.  General: No Fever or Chills, Cardiac: No Chest Pain or Palpitations, Resp: No Cough or SOA, GI: No Nausea, Vomiting, or Diarrhea and Other: No bleeding      aspirin 81 mg Oral Daily   bumetanide 1 mg Oral Daily   buPROPion  mg Oral Daily   carvedilol 6.25 mg Oral Q12H   ceftriaxone 2 g Intravenous Q24H   enoxaparin 40 mg Subcutaneous Q24H   ipratropium-albuterol 3 mL Nebulization Q4H While Awake - RT   magnesium oxide 400 mg Oral Daily   pantoprazole 40 mg Oral QAM   sacubitril-valsartan 1 tablet Oral Q12H          Objective   Vital Signs  Temp:  [98.3 °F (36.8 °C)-98.9 °F (37.2 °C)] 98.3 °F (36.8 °C)  Heart Rate:  [86-98] 92  Resp:  [16-20] 20  BP: (102-121)/(61-70) 120/62  Body mass index is 28.51 kg/m².    Intake/Output Summary (Last 24 hours) at 18 1527  Last data filed at 18 1249   Gross per 24 hour   Intake              480 ml   Output             1800 ml   Net            -1320 ml       Physical Exam   Constitutional: He is oriented to person, place, and time. He appears well-developed and well-nourished.   HENT:   Head: Normocephalic and atraumatic.   Nose: Nose normal.   Eyes: Conjunctivae and EOM are normal.   Cardiovascular: Normal rate, regular rhythm and normal heart sounds.    Pulmonary/Chest: Effort normal and breath sounds normal. No respiratory distress.   Abdominal: Soft. Bowel sounds are normal.   Neurological: He is alert and oriented to person, place, and time.   Skin: Skin is warm and dry.   Psychiatric: He has a normal mood and affect. His behavior is normal.   Nursing note and vitals reviewed.        Results Review:       I reviewed the patient's new  clinical results.    Results from last 7 days  Lab Units 07/17/18  0407 07/15/18  0554 07/14/18  0443 07/13/18  0437 07/12/18  0333   WBC 10*3/mm3 7.10 6.54 5.31 6.98 9.16   HEMOGLOBIN g/dL 10.3* 9.8* 9.6* 9.1* 8.6*   PLATELETS 10*3/mm3 283 220 189 132* 105*       Results from last 7 days  Lab Units 07/17/18  0407 07/16/18  0544 07/15/18  0554 07/14/18  0443 07/13/18  0437 07/12/18  0333   SODIUM mmol/L 133* 139 134* 137 131* 129*   POTASSIUM mmol/L 3.8 3.8 3.6 3.8 3.8 3.6   CHLORIDE mmol/L 96* 99 95* 99 94* 94*   CO2 mmol/L 26.1 28.8 28.8 25.9 26.8 23.6   BUN mg/dL 18 13 11 13 16 20   CREATININE mg/dL 1.13 1.04 1.07 1.04 0.97 1.14   CALCIUM mg/dL 8.7 8.7 8.6 8.5* 8.9 8.0*   MAGNESIUM mg/dL  --   --  1.8  --   --  1.9   PHOSPHORUS mg/dL 3.3  --   --   --   --   --    Estimated Creatinine Clearance: 65.4 mL/min (by C-G formula based on SCr of 1.13 mg/dL).      Assessment/Plan   Principal Problem:    Acute bacterial endocarditis  Active Problems:    A-fib (CMS/[facility])    CHF (congestive heart failure), NYHA class III (CMS/[facility])    Coronary artery disease of native artery of native heart with stable angina pectoris (CMS/[facility])    Sepsis (CMS/[facility])    Bacteremia due to group B Streptococcus    Shortness of breath    Anemia of chronic disease      PLAN  1.  Group B strep bacteremia with endocarditis involving prosthetic mitral valve and AICD.  PICC line placed.  IV Rocephin 2 g daily with stop date 8/20/18 and then chronic suppressive antibiotics.  Transplant versus LVAD per cardiology.  Dr. Marshall has discussed with .  2.  Cardiomyopathy/chronic systolic congestive heart failure with ejection fraction 20%.  Tolerating Entresto and Coreg.  Bumex changed to oral today.  3.  Mild persistent asthma.  No wheezing today.  Continue nebs.  4.  Anemia.  Stable.  Probably secondary to chronic disease    Disposition  Discharged Tomorrow morning.  Plan will be outpatient antibiotics in the ambulatory infusion center.    Dhaval MARTIN  MD Steffanie  [facility]  07/17/18  3:27 PM

## 2018-07-17 NOTE — PROGRESS NOTES
Hospital Follow Up    LOS:  LOS: 8 days   Patient Name: Mello Arias Jr.  Age/Sex: 67 y.o. male  : 1950  MRN: [id number]    Date of Hospital Visit: 18  Length of Stay: 8  Encounter Provider: Naun Calvin MD  Place of Service: [facility] CARDIOLOGY    Subjective:     Follow Up for: chf, endocarditis    Interval History: feeling much better, slightly tachycardic      Objective:     Objective:  Temp:  [98.3 °F (36.8 °C)-98.9 °F (37.2 °C)] 98.9 °F (37.2 °C)  Heart Rate:  [86-98] 86  Resp:  [16-20] 18  BP: (102-121)/(58-70) 121/70  Body mass index is 28.51 kg/m².    Intake/Output Summary (Last 24 hours) at 18 0902  Last data filed at 18 1735   Gross per 24 hour   Intake                0 ml   Output              700 ml   Net             -700 ml     1    18  0445 18  0540 07/15/18  0640   Weight: 85.5 kg (188 lb 9.6 oz) 85.3 kg (188 lb) 85 kg (187 lb 8 oz)     Weight change:     Physical Exam:   General Appearance: Alert, cooperative, in no acute distress. AAOx4.   HEENT: Normocephalic.  Neck: Supple. No JVD. No Carotid bruit. No thyromegaly  Lungs: CTAB. Normal respiratory effort and rate.  Heart:: Regular rate and rhythm, normal S1 and S2, no murmurs, gallops or rubs.  Abdomen: Soft, nontender, non-distended. positive bowel sounds  Extremities: Warm, no cyanosis, or clubbing. No edema.     Lab Review:     Results from last 7 days  Lab Units 18  0407 18  0544 07/15/18  0554   SODIUM mmol/L 133* 139 134*   POTASSIUM mmol/L 3.8 3.8 3.6   CHLORIDE mmol/L 96* 99 95*   CO2 mmol/L 26.1 28.8 28.8   BUN mg/dL 18 13 11   CREATININE mg/dL 1.13 1.04 1.07   GLUCOSE mg/dL 95 106* 103*   CALCIUM mg/dL 8.7 8.7 8.6         Results from last 7 days  Lab Units 07/10/18  1448   TROPONIN T ng/mL 0.027       Results from last 7 days  Lab Units 18  0407 07/15/18  0554   WBC 10*3/mm3 7.10 6.54   HEMOGLOBIN g/dL 10.3* 9.8*   HEMATOCRIT % 32.7*  31.9*   PLATELETS 10*3/mm3 283 220           Results from last 7 days  Lab Units 07/15/18  0554 07/12/18  0333   MAGNESIUM mg/dL 1.8 1.9           Results from last 7 days  Lab Units 07/10/18  1448   PROBNP pg/mL 7,163.0*       Results from last 7 days  Lab Units 07/13/18  0437   TSH mIU/mL 1.820         I reviewed the patient's new clinical results.          I personally viewed and interpreted the patient's EKG/Telemetry data.  Current Medications:   Scheduled Meds:  aspirin 81 mg Oral Daily   bumetanide 1 mg Oral Daily   buPROPion  mg Oral Daily   carvedilol 3.125 mg Oral Q12H   ceftriaxone 2 g Intravenous Q24H   enoxaparin 40 mg Subcutaneous Q24H   ipratropium-albuterol 3 mL Nebulization Q4H While Awake - RT   magnesium oxide 400 mg Oral Daily   pantoprazole 40 mg Oral QAM   sacubitril-valsartan 1 tablet Oral Q12H     Continuous Infusions:     Allergies:  Allergies   Allergen Reactions   • Shellfish-Derived Products Swelling and Other (See Comments)     THROAT SWELLS       Assessment & Plan     Principal Problem:    Acute bacterial endocarditis  Active Problems:    A-fib (CMS/[facility])    CHF (congestive heart failure), NYHA class III (CMS/[facility])    Coronary artery disease of native artery of native heart with stable angina pectoris (CMS/[facility])    Sepsis (CMS/[facility])    Bacteremia due to group B Streptococcus    Shortness of breath    Anemia of chronic disease    1. Acute Bacterial endocarditis: Streptococcus.  MV and ICD leads infected.  Very abnormal JAYE.  Continue antibiotics per ID.  WBC down  2. CHF: acute/chronic systolic.  LVEF 20%.  Continue IV diuretics. continue entresto. Increase coreg. Long term plan is either LVAD vs transplant.  Will continue to work with  Advanced Heart Failure clinic  3. CAD: s/p MI, s/p cabg.  No angina  4. MR: s/p MVR with tissue prosthesis.  Large vegitation on JAYE  5. MARISELA: improved  6. Respiratory Failure: improved.  Continue present course  7. Anemia: in part dilutional.  No need  to transfuse at present        Plan: increase coreg today.  Ok with home tomorrow      Naun Calvin MD  07/17/18

## 2018-07-17 NOTE — PROGRESS NOTES
Continued Stay Note  [facility]     Patient Name: Mello Arias Jr.  MRN: [id number]  Today's Date: 7/17/2018    Admit Date: 7/9/2018          Discharge Plan     Row Name 07/17/18 1139       Plan    Plan Home and returning to ACU for IV antibiotics.    Patient/Family in Agreement with Plan yes    Plan Comments Spoke with patient and Ree at bedside.  Patient wishes to come to ACU for his IV antibiotics.  Spoke with Bianca/scheduling - 1st appointment is scheduled for Thursday 19th @ 11:30 - he will need to arrive at 11:15.  Spoke with RN, she is aware patient will need antibiotic on 7/18 prior to DC. Appointments will list on AVS at DC.  Spoke with Zayra/SHILO and she is aware.  Spoke with Shruthi/Jacob and Belkys/[facility] and they are aware.  CCP will continue to follow.  BHumeniuk RN            Expected Discharge Date and Time     Expected Discharge Date Expected Discharge Time    Jul 18, 2018             Becky S. Humeniuk, RN

## 2018-07-17 NOTE — PLAN OF CARE
Problem: Patient Care Overview  Goal: Plan of Care Review  Outcome: Ongoing (interventions implemented as appropriate)   07/17/18 0343   Coping/Psychosocial   Plan of Care Reviewed With patient   Plan of Care Review   Progress no change   OTHER   Outcome Summary Patient VSS stable. Patient requested 2L nasal cannula overnight for comfort. Patient appeared to rest well during shift. will continue to monitor       Problem: Activity Intolerance (Adult)  Goal: Activity Tolerance  Outcome: Ongoing (interventions implemented as appropriate)    Goal: Effective Energy Conservation Techniques  Outcome: Ongoing (interventions implemented as appropriate)      Problem: Sepsis/Septic Shock (Adult)  Goal: Signs and Symptoms of Listed Potential Problems Will be Absent, Minimized or Managed (Sepsis/Septic Shock)  Outcome: Ongoing (interventions implemented as appropriate)      Problem: Cardiac: Heart Failure (Adult)  Goal: Signs and Symptoms of Listed Potential Problems Will be Absent, Minimized or Managed (Cardiac: Heart Failure)  Outcome: Ongoing (interventions implemented as appropriate)

## 2018-07-18 VITALS
WEIGHT: 189.82 LBS | RESPIRATION RATE: 16 BRPM | DIASTOLIC BLOOD PRESSURE: 69 MMHG | BODY MASS INDEX: 28.77 KG/M2 | TEMPERATURE: 97.3 F | HEIGHT: 68 IN | OXYGEN SATURATION: 100 % | HEART RATE: 68 BPM | SYSTOLIC BLOOD PRESSURE: 119 MMHG

## 2018-07-18 PROCEDURE — 94799 UNLISTED PULMONARY SVC/PX: CPT

## 2018-07-18 PROCEDURE — 25010000003 CEFTRIAXONE PER 250 MG: Performed by: INTERNAL MEDICINE

## 2018-07-18 PROCEDURE — 99232 SBSQ HOSP IP/OBS MODERATE 35: CPT | Performed by: INTERNAL MEDICINE

## 2018-07-18 RX ORDER — CARVEDILOL 6.25 MG/1
6.25 TABLET ORAL EVERY 12 HOURS SCHEDULED
Qty: 60 TABLET | Refills: 1 | Status: SHIPPED | OUTPATIENT
Start: 2018-07-18 | End: 2018-08-17

## 2018-07-18 RX ORDER — BUMETANIDE 1 MG/1
1 TABLET ORAL DAILY
Qty: 30 TABLET | Refills: 1 | Status: SHIPPED | OUTPATIENT
Start: 2018-07-18

## 2018-07-18 RX ORDER — ZOLPIDEM TARTRATE 5 MG/1
5 TABLET ORAL NIGHTLY PRN
Qty: 5 TABLET | Refills: 0 | Status: SHIPPED | OUTPATIENT
Start: 2018-07-18 | End: 2018-08-02

## 2018-07-18 RX ADMIN — IPRATROPIUM BROMIDE AND ALBUTEROL SULFATE 3 ML: .5; 3 SOLUTION RESPIRATORY (INHALATION) at 11:51

## 2018-07-18 RX ADMIN — BUPROPION HYDROCHLORIDE 150 MG: 150 TABLET, EXTENDED RELEASE ORAL at 10:03

## 2018-07-18 RX ADMIN — ASPIRIN 81 MG: 81 TABLET, CHEWABLE ORAL at 10:03

## 2018-07-18 RX ADMIN — BUMETANIDE 1 MG: 1 TABLET ORAL at 10:03

## 2018-07-18 RX ADMIN — PANTOPRAZOLE SODIUM 40 MG: 40 TABLET, DELAYED RELEASE ORAL at 06:39

## 2018-07-18 RX ADMIN — IPRATROPIUM BROMIDE AND ALBUTEROL SULFATE 3 ML: .5; 3 SOLUTION RESPIRATORY (INHALATION) at 07:42

## 2018-07-18 RX ADMIN — Medication 400 MG: at 10:03

## 2018-07-18 RX ADMIN — SACUBITRIL AND VALSARTAN 1 TABLET: 24; 26 TABLET, FILM COATED ORAL at 10:03

## 2018-07-18 RX ADMIN — CARVEDILOL 6.25 MG: 6.25 TABLET, FILM COATED ORAL at 10:03

## 2018-07-18 RX ADMIN — CEFTRIAXONE SODIUM 2 G: 2 INJECTION, SOLUTION INTRAVENOUS at 11:31

## 2018-07-18 NOTE — PROGRESS NOTES
Hospital Follow Up    LOS:  LOS: 9 days   Patient Name: Mello Arias Jr.  Age/Sex: 67 y.o. male  : 1950  MRN: [id number]    Date of Hospital Visit: 18  Length of Stay: 9  Encounter Provider: Naun Calvin MD  Place of Service: [facility] CARDIOLOGY    Subjective:     Follow Up for: CHF, endocarditis    Interval History: tolerating increased coreg      Objective:     Objective:  Temp:  [97.3 °F (36.3 °C)-98.7 °F (37.1 °C)] 97.3 °F (36.3 °C)  Heart Rate:  [] 88  Resp:  [16-20] 16  BP: (112-120)/(62-69) 119/69  Body mass index is 28.86 kg/m².    Intake/Output Summary (Last 24 hours) at 18 0821  Last data filed at 18 0600   Gross per 24 hour   Intake             1030 ml   Output             2200 ml   Net            -1170 ml     1    18  0540 07/15/18  0640 18  0500   Weight: 85.3 kg (188 lb) 85 kg (187 lb 8 oz) 86.1 kg (189 lb 13.1 oz)     Weight change:     Physical Exam:   General Appearance: Alert, cooperative, in no acute distress. AAOx4.   HEENT: Normocephalic.  Neck: Supple. No JVD. No Carotid bruit. No thyromegaly  Lungs: CTAB. Normal respiratory effort and rate.  Heart:: Regular rate and rhythm, normal S1 and S2, no murmurs, gallops or rubs.  Abdomen: Soft, nontender, non-distended. positive bowel sounds  Extremities: Warm, no cyanosis, or clubbing. No edema.     Lab Review:     Results from last 7 days  Lab Units 18  0407 18  0544 07/15/18  0554   SODIUM mmol/L 133* 139 134*   POTASSIUM mmol/L 3.8 3.8 3.6   CHLORIDE mmol/L 96* 99 95*   CO2 mmol/L 26.1 28.8 28.8   BUN mg/dL 18 13 11   CREATININE mg/dL 1.13 1.04 1.07   GLUCOSE mg/dL 95 106* 103*   CALCIUM mg/dL 8.7 8.7 8.6             Results from last 7 days  Lab Units 18  0407 07/15/18  0554   WBC 10*3/mm3 7.10 6.54   HEMOGLOBIN g/dL 10.3* 9.8*   HEMATOCRIT % 32.7* 31.9*   PLATELETS 10*3/mm3 283 220           Results from last 7 days  Lab Units  07/15/18  0554 07/12/18  0333   MAGNESIUM mg/dL 1.8 1.9               Results from last 7 days  Lab Units 07/13/18  0437   TSH mIU/mL 1.820         I reviewed the patient's new clinical results.          I personally viewed and interpreted the patient's EKG/Telemetry data.  Current Medications:   Scheduled Meds:  aspirin 81 mg Oral Daily   bumetanide 1 mg Oral Daily   buPROPion  mg Oral Daily   carvedilol 6.25 mg Oral Q12H   ceftriaxone 2 g Intravenous Q24H   enoxaparin 40 mg Subcutaneous Q24H   ipratropium-albuterol 3 mL Nebulization Q4H While Awake - RT   magnesium oxide 400 mg Oral Daily   pantoprazole 40 mg Oral QAM   sacubitril-valsartan 1 tablet Oral Q12H     Continuous Infusions:     Allergies:  Allergies   Allergen Reactions   • Shellfish-Derived Products Swelling and Other (See Comments)     THROAT SWELLS       Assessment & Plan     Principal Problem:    Acute bacterial endocarditis  Active Problems:    A-fib (CMS/[facility])    CHF (congestive heart failure), NYHA class III (CMS/[facility])    Coronary artery disease of native artery of native heart with stable angina pectoris (CMS/[facility])    Sepsis (CMS/[facility])    Bacteremia due to group B Streptococcus    Shortness of breath    Anemia of chronic disease         1. Acute Bacterial endocarditis: Streptococcus.  MV and ICD leads infected.  Very abnormal JAYE.  Continue antibiotics per ID.  WBC down  2. CHF: acute/chronic systolic.  LVEF 20%.  Continue IV diuretics. continue entresto. Tolerating increased coreg. Long term plan is either LVAD vs transplant.  Will continue to work with  Advanced Heart Failure clinic  3. CAD: s/p MI, s/p cabg.  No angina  4. MR: s/p MVR with tissue prosthesis.  Large vegitation on JAYE  5. MARISELA: improved  6. Respiratory Failure: improved.  Continue present course  7. Anemia: in part dilutional.  No need to transfuse at present        Plan: ok for discharge on present meds.  I will see him in 2 weeks.  He will see  Advanced heart failure  clinic next week      Naun Calvin MD  07/18/18

## 2018-07-18 NOTE — DISCHARGE SUMMARY
Name: Mello Arias Jr.  Age: 67 y.o.  Sex: male  :  1950  MRN: [id number]         Primary Care Physician: Kelsey Muñoz MD      Date of Admission:  2018  Date of Discharge:  2018      CHIEF COMPLAINT  Weakness - Generalized      DISCHARGE DIAGNOSIS  Active Hospital Problems    Diagnosis Date Noted   • **Acute bacterial endocarditis [I33.0] 2018   • Anemia of chronic disease [D63.8] 07/15/2018   • Shortness of breath [R06.02] 07/10/2018   • Bacteremia due to group B Streptococcus [R78.81] 2018   • Sepsis (CMS/[facility]) [A41.9] 2018   • Coronary artery disease of native artery of native heart with stable angina pectoris (CMS/[facility]) [I25.118] 2016   • A-fib (CMS/[facility]) [I48.91] 10/28/2016   • CHF (congestive heart failure), NYHA class III (CMS/[facility]) [I50.9] 10/28/2016      Resolved Hospital Problems    Diagnosis Date Noted Date Resolved   • Secondary thrombocytopenia [D69.59] 2018   • Pneumonia [J18.9] 07/10/2018 07/15/2018   • Hypomagnesemia [E83.42] 07/10/2018 2018   • Positive blood culture [R78.81] 07/10/2018 07/15/2018   • Acute renal failure (CMS/[facility]) [N17.9] 2018 07/15/2018   • Hyponatremia [E87.1] 2018       SECONDARY DIAGNOSES  Past Medical History:   Diagnosis Date   • Allergic rhinitis    • Anemia    • Asthma    • Bone fracture    • CAD (coronary artery disease)    • Cardiomyopathy, ischemic    • Carpal tunnel syndrome    • CHF (congestive heart failure), NYHA class III, chronic, systolic (CMS/[facility])    • GERD (gastroesophageal reflux disease)    • Health care maintenance    • Herniated disc, cervical    • Hx of total hip arthroplasty 3/18/2016   • Hyperglycemia    • Hyperlipidemia    • Hypertension    • Idiopathic ventricular tachycardia (CMS/[facility])    • Lumbosacral radiculopathy at L4    • Myocardial infarction     OF INFERIOR WALL, GREATER THAN 8 WEEKS   • Myocardial ischemia     POST MYOCARDIAL INFARCTION, POST PTCA  WITH STENT PLACEMENT   • Non-rheumatic tricuspid valve insufficiency    • Nonrheumatic mitral valve regurgitation    • Open wound     OF LEFT INDEX FINGER WITHOUT DAMAGE TO NAIL   • Osteoarthritis    • PAF (paroxysmal atrial fibrillation) (CMS/HCC)    • Ventricular tachycardia (CMS/HCC)     POST AICD       CONSULTS   Consult Orders (all)     Start     Ordered    07/14/18 0900  Inpatient IV Team Consult  Once     Provider:  (Not yet assigned)    07/13/18 1607    07/10/18 1646  Inpatient Pulmonology Consult  Once     Specialty:  Pulmonary Disease  Provider:  Paul Vale MD    07/10/18 1645    07/10/18 1221  Inpatient Infectious Diseases Consult  Once     Specialty:  Infectious Diseases  Provider:  Bertrand Hopkins MD    07/10/18 1220    07/10/18 0130  Inpatient Cardiology Consult  Once     Specialty:  Cardiology  Provider:  Naun Calvin MD    07/10/18 0131    07/09/18 2159  [facility] (on-call MD unless specified)  Once     Specialty:  Internal Medicine  Provider:  Jamin Lyons MD    07/09/18 2158        Consulting Physician(s)     Provider Relationship Specialty    Paul Vale MD Consulting Physician Pulmonary Disease    Naun Calvin MD Consulting Physician Cardiology            PROCEDURES PERFORMED  .  XR Chest 1 View   Final Result      XR Chest 1 View   Final Result      NM Lung Ventilation Perfusion   Final Result   Low probability ventilation/perfusion scintigraphy.       This report was finalized on 7/10/2018 10:24 AM by Dr. Tej Francis M.D.            Transthoracic echocardiogram  · Left ventricular systolic function is severely decreased. Estimated EF = 20%. Global left ventricular wall motion appears abnormal. The left ventricular cavity is severely dilated. Left ventricular diastolic function was indeterminate. There is no evidence of a left ventricular thrombus present. Endocardial visualization is poor and Definity was not given. The apex is aneurysmal/dyskinetic.  The anterior wall and septum appear to be as well.  · There is an ICD wire traversing the tricuspid valve, with a very large, bulky vegetation noted along the atrial and ventricular aspects, encasing the wire itself.  · There is a bioprosthetic mitral valve present. Theres is an extremely large vegetation that is attached to what is likely the posterior mitral leaflet. It involves the central portion of the valve where it is globular. It is thick and string-like on the atrial surface, and measures at least 2.5 cm. Interestingly there is no significant mitral regurgitation. I could not obtain an en face view of the valve due to limited transgastric views        HOSPITAL COURSE  This a very pleasant 67-year-old gentleman with a history of atrial fibrillation and cardiomyopathy who presents to the hospital with sepsis.  He was found to have group B strep bacteremia and was started on appropriate antibiotics.  He was found to have large vegetation along his prosthetic mitral valve and on his AICD.  Infectious disease and cardiology were both consulted for assistance.  He was receiving IV Rocephin in the hospital is been fever free since appropriate antibiotics have been started.  Cardiology's recommended follow-up with [facility] cardiology program to discuss left ventricular system device versus cardiac transplant in the future.  His ejection fraction was measured here in the hospital at only 20%.  Symptomatically he has improved and symptoms are stable he is very weak and still somewhat dyspneic on exertion but is euvolemic and with normal blood pressure and heart rate at the time of discharge.  We discussed antibiotics at home through PICC line versus following up in the ambulatory infusion center versus transfer to skilled nursing facility.  Ultimately he was at too high a level of function to qualify for skilled nursing facility he did not quite feel comfortable with the IV antibiotics at home and plan  is for him to follow-up for his 6 weeks of antibiotics within the ambulatory infusion Center at [facility].  He has follow-up arranged with both cardiology and infectious disease following discharge and has follow-up arranged with the  cardiology clinic in the next few weeks as well.    PHYSICAL EXAM  Temp:  [97.3 °F (36.3 °C)-98.7 °F (37.1 °C)] 97.3 °F (36.3 °C)  Heart Rate:  [] 88  Resp:  [16-20] 16  BP: (112-120)/(62-69) 119/69  Body mass index is 28.86 kg/m².  Physical Exam   Constitutional: He is oriented to person, place, and time. He appears well-developed and well-nourished.   Eyes: EOM are normal.   Neck: Neck supple.   Cardiovascular: Normal rate, regular rhythm and normal heart sounds.    Pulmonary/Chest: Effort normal and breath sounds normal.   Abdominal: Soft. Bowel sounds are normal.   Neurological: He is alert and oriented to person, place, and time.   Nursing note reviewed.        CONDITION ON DISCHARGE  Stable.      DISCHARGE DISPOSITION   Home with family      ALLERGIES  Allergies   Allergen Reactions   • Shellfish-Derived Products Swelling and Other (See Comments)     THROAT SWELLS         DISCHARGE MEDICATIONS     Your medication list      START taking these medications      Instructions Last Dose Given Next Dose Due   bumetanide 1 MG tablet  Commonly known as:  BUMEX      Take 1 tablet by mouth Daily.          CHANGE how you take these medications      Instructions Last Dose Given Next Dose Due   carvedilol 6.25 MG tablet  Commonly known as:  COREG  What changed:  · medication strength  · how much to take      Take 1 tablet by mouth Every 12 (Twelve) Hours for 30 days.          CONTINUE taking these medications      Instructions Last Dose Given Next Dose Due   acetaminophen 325 MG tablet  Commonly known as:  TYLENOL      Take 2 tablets by mouth Every 6 (Six) Hours As Needed for Mild Pain .       aspirin 81 MG tablet      Take 81 mg by mouth Daily.       buPROPion   MG 24 hr tablet  Commonly known as:  WELLBUTRIN XL      Take 1 tablet by mouth Daily.       magnesium oxide 400 MG tablet  Commonly known as:  MAG-OX      Take 1 tablet by mouth Daily.       montelukast 10 MG tablet  Commonly known as:  SINGULAIR      Take 10 mg by mouth Every Night.       nitroglycerin 0.4 MG SL tablet  Commonly known as:  NITROSTAT      Place 0.4 mg under the tongue Every 5 (Five) Minutes As Needed for Chest Pain. Take no more than 3 doses in 15 minutes.       PRILOSEC 20 MG capsule  Generic drug:  omeprazole      Take 20 mg by mouth Daily.       PROAIR  (90 Base) MCG/ACT inhaler  Generic drug:  albuterol      Inhale 2 puffs Every 6 (Six) Hours As Needed.       sacubitril-valsartan 49-51 MG tablet  Commonly known as:  ENTRESTO      Take 1 tablet by mouth Every 12 (Twelve) Hours.          STOP taking these medications    furosemide 20 MG tablet  Commonly known as:  LASIX              Where to Get Your Medications      These medications were sent to [facility] [address] [facility] [phone] [facility] [phone] [address] , [place] [address]    Phone:  [phone]   · bumetanide 1 MG tablet  · carvedilol 6.25 MG tablet       Diet Instructions     Diet: Regular, Consistent Carbohydrate, Cardiac; Thin       Discharge Diet:   Regular  Consistent Carbohydrate  Cardiac       Fluid Consistency:  Thin       Activity Instructions     Activity as Tolerated         Future Appointments  Date Time Provider Department Center   7/19/2018 11:30 AM ROOM 1 [facility]   [facility]2018 [facility]   7/21/2018 9:00 AM ROOM 9 [facility]   7/22/2018 9:00 [facility]   7/23/2018 9:30 AM [facility]   7[facility]24/2018 11:30 [facility]   7[facility]25[facility]2018 12[facility]30 [facility]   7/26/2018 11:30 AM ROOM 2 [facility]   7/27/2018  11:00 AM ROOM 1 [facility]   7/28/2018 9:00 AM ROOM 9 [facility]   7/29/2018 9:00 AM ROOM 9 [facility]   7/30/2018 9:00 AM ROOM 2 [facility]   7/31/2018 12:30 PM ROOM 5 [facility]   8/1/2018 9:30 AM ROOM 3 [facility]   8/2/2018 10:30 AM ROOM 4 [facility]   8/2/2018 2:45 PM Naun Calvin MD [facility] None   8/3/2018 9:00 AM ROOM 4 [facility]   8/4/2018 9:00 AM ROOM 9 [facility]   8/5/2018 9:00 AM ROOM 9 [facility]   8/6/2018 10:00 AM ROOM 5 [facility]   8/7/2018 9:30 AM ROOM 4 [facility]   8/8/2018 12:30 PM ROOM 3 [facility]   8/9/2018 10:00 AM ROOM 5 [facility]   8/10/2018 11:30 AM ROOM 3 [facility]   8/11/2018 9:00 AM ROOM 9 [facility]   8/12/2018 9:00 AM ROOM 9 [facility]   8/13/2018 10:00 AM ROOM 1 [facility]   8/14/2018 9:00 AM ROOM 1 [facility]   8/15/2018 9:00 AM ROOM 3 [facility]   8/16/2018 12:00 AM PRESLEY SCHMID [facility] MGMONET CD LCGKR None   8/16/2018 11:30 AM ROOM 1 [facility]   8/17/2018 10:00 AM ROOM 1 [facility]   8/18/2018 9:00 AM ROOM 9 [facility]   8/19/2018 9:00 AM ROOM 9 [facility]   8/20/2018 10:30 AM ROOM 3 [facility]   8/20/2018 1:20 PM Vamshi Marshall MD MGK ID [facility] None   10/4/2018 2:30 PM Nuan Calvin MD [facility] CD LCGKR None     Additional Instructions for the Follow-ups that You Need to Schedule     Discharge Follow-up with PCP    As directed      Currently Documented PCP:  Kelsey Muñoz MD  PCP Phone Number:  [phone]    Follow Up Details:  4-6 weeks         Discharge Follow-up with Specified Provider: cardiology as directed    As directed      To:  cardiology as directed            Contact information for follow-up providers     Kelsey Muñoz MD .    Specialty:   Family Medicine  Why:  4-6 weeks  Contact information:  [address]  [facility] [id number] [phone]             Christy Mcwilliams MD .    Specialty:  Ophthalmology  Why:  4-6 weeks  Contact information:  [address] [facility] [address] [phone]                   Contact information for after-discharge care     Dialysis/Infusion     [facility] .    Specialty:  Infusion Therapy  Contact information:  [address] [facility] [address] [phone]                             TEST  RESULTS PENDING AT DISCHARGE  None       CODE STATUS  Code Status and Medical Interventions:   Ordered at: 07/10/18 0131     Code Status:    CPR     Medical Interventions (Level of Support Prior to Arrest):    Full           Dhaval Valiente MD  [facility]  07/18/18  9:25 AM      Time: greater than 30 minutes.

## 2018-07-18 NOTE — PROGRESS NOTES
"                                              LOS: 8 days   Patient Care Team:  Kelsey Muñoz MD as PCP - General (Family Medicine)  Kelsey Muñoz MD as PCP - Family Medicine  Christy Mcwilliams MD as PCP - Claims Attributed  Yesenia Saucedo RN as Care Coordinator (Population Health)    Chief Complaint:  Follow up wheezing, asthma, hypoxia    Interval History:   Denies shortness of breath.  No cough.      Ventilator/Non-Invasive Ventilation Settings     None            Vital Signs  Temp:  [98.1 °F (36.7 °C)-98.9 °F (37.2 °C)] 98.1 °F (36.7 °C)  Heart Rate:  [] 88  Resp:  [17-20] 18  BP: (102-121)/(61-70) 114/69    Intake/Output Summary (Last 24 hours) at 07/17/18 2017  Last data filed at 07/17/18 1849   Gross per 24 hour   Intake              820 ml   Output             1500 ml   Net             -680 ml     Flowsheet Rows      First Filed Value   Admission Height  172.7 cm (68\") Documented at 07/09/2018 1940   Admission Weight  86.2 kg (190 lb) Documented at 07/09/2018 1940          Physical Exam:   General Appearance:    Alert, cooperative, in no acute distress   Lungs:     No wheezing     Heart:    Regular rhythm and normal rate, normal S1 and S2, no            murmur, no gallop, no rub, no click   HEENT:    [place] 3   Abdomen:     Obese. Soft   Neuro:   Conscious, alert, oriented x3   Extremities:   Moves all extremities well, + edema, no cyanosis, no             Redness          Results Review:          Results from last 7 days  Lab Units 07/17/18  0407 07/16/18  0544 07/15/18  0554   SODIUM mmol/L 133* 139 134*   POTASSIUM mmol/L 3.8 3.8 3.6   CHLORIDE mmol/L 96* 99 95*   CO2 mmol/L 26.1 28.8 28.8   BUN mg/dL 18 13 11   CREATININE mg/dL 1.13 1.04 1.07   GLUCOSE mg/dL 95 106* 103*   CALCIUM mg/dL 8.7 8.7 8.6           Results from last 7 days  Lab Units 07/17/18  0407 07/15/18  0554 07/14/18  0443   WBC 10*3/mm3 7.10 6.54 5.31   HEMOGLOBIN g/dL 10.3* 9.8* 9.6*   HEMATOCRIT % 32.7* 31.9* 30.9* "   PLATELETS 10*3/mm3 283 220 189               I reviewed the patient's new clinical results.  I personally viewed and interpreted the patient's CXR        Medication Review:     aspirin 81 mg Oral Daily   bumetanide 1 mg Oral Daily   buPROPion  mg Oral Daily   carvedilol 6.25 mg Oral Q12H   ceftriaxone 2 g Intravenous Q24H   enoxaparin 40 mg Subcutaneous Q24H   ipratropium-albuterol 3 mL Nebulization Q4H While Awake - RT   magnesium oxide 400 mg Oral Daily   pantoprazole 40 mg Oral QAM   sacubitril-valsartan 1 tablet Oral Q12H            Diagnostic imaging:  I personally and independently reviewed the CXR dated 7/10/18:  Pulmonary vascular congestion.  Elevated right hemidiaphragm        Assessment:  1. Persistent asthma, no exacerbation   2. Pulmonary vascular congestion  3. Acute and chronic systolic congestive heart failure, EF 20%  4. Wheezing, due to #1 and #2  5. Multiple allergies (cats, dogs and environmental)  6. Elevated right hemidiaphragm, chronic, could represent paralysis    Plan:  Continue nebs.  No change needed on his home inhalers. He has Advair, Spiriva and Albuterol neb and MDI. I confirmed these meds with him.        Paul Vale MD  07/17/18  8:17 PM            This note was dictated utilizing Dragon dictation

## 2018-07-18 NOTE — PLAN OF CARE
Problem: Patient Care Overview  Goal: Plan of Care Review  Outcome: Ongoing (interventions implemented as appropriate)   07/18/18 0526   Coping/Psychosocial   Plan of Care Reviewed With patient   Plan of Care Review   Progress improving   OTHER   Outcome Summary Patient has had no complaints this shift. Ambien at bedtime, has slept well through the night. VSS. Plan to discharge today. Will check with clinical pharmacist this morning about giving IV antibiotic before discharge to accomodate outpatient clinic schedule starting Thursday.      Goal: Individualization and Mutuality  Outcome: Ongoing (interventions implemented as appropriate)    Goal: Discharge Needs Assessment  Outcome: Ongoing (interventions implemented as appropriate)      Problem: Activity Intolerance (Adult)  Goal: Activity Tolerance  Outcome: Ongoing (interventions implemented as appropriate)    Goal: Effective Energy Conservation Techniques  Outcome: Ongoing (interventions implemented as appropriate)      Problem: Sepsis/Septic Shock (Adult)  Goal: Signs and Symptoms of Listed Potential Problems Will be Absent, Minimized or Managed (Sepsis/Septic Shock)  Outcome: Ongoing (interventions implemented as appropriate)      Problem: Cardiac: Heart Failure (Adult)  Goal: Signs and Symptoms of Listed Potential Problems Will be Absent, Minimized or Managed (Cardiac: Heart Failure)  Outcome: Ongoing (interventions implemented as appropriate)

## 2018-07-18 NOTE — PROGRESS NOTES
Continued Stay Note  [facility]     Patient Name: Mello Arias Jr.  MRN: [id number]  Today's Date: 7/18/2018    Admit Date: 7/9/2018          Discharge Plan     Row Name 07/18/18 1106       Plan    Plan Home and returning to ACU for IV antibiotics.     Plan Comments CCP gave Entresto 30 day free trial card and reminded of first ACU appointment 7/19 at 11:30, patient verbalized understanding. OLIVA Oneal              Discharge Codes    No documentation.       Expected Discharge Date and Time     Expected Discharge Date Expected Discharge Time    Jul 18, 2018             Naz Olivera RN

## 2018-07-18 NOTE — PLAN OF CARE
Problem: Patient Care Overview  Goal: Plan of Care Review   07/18/18 1051   Coping/Psychosocial   Plan of Care Reviewed With patient   Plan of Care Review   Progress improving   OTHER   Outcome Summary patient to DC with PICC line and daily IV rocephin administrations in ACU. VSS. home with wife today after antibiotic administration

## 2018-07-19 ENCOUNTER — HOSPITAL ENCOUNTER (OUTPATIENT)
Dept: INFUSION THERAPY | Facility: HOSPITAL | Age: 68
Discharge: HOME | End: 2018-07-19
Attending: INTERNAL MEDICINE

## 2018-07-19 VITALS
OXYGEN SATURATION: 95 % | RESPIRATION RATE: 16 BRPM | TEMPERATURE: 97.4 F | DIASTOLIC BLOOD PRESSURE: 73 MMHG | SYSTOLIC BLOOD PRESSURE: 136 MMHG | HEART RATE: 87 BPM

## 2018-07-19 DIAGNOSIS — I33.0 ACUTE BACTERIAL ENDOCARDITIS: ICD-10-CM

## 2018-07-19 PROCEDURE — 96365 THER/PROPH/DIAG IV INF INIT: CPT

## 2018-07-19 PROCEDURE — 25010000003 CEFTRIAXONE PER 250 MG: Performed by: INTERNAL MEDICINE

## 2018-07-19 RX ORDER — CEFTRIAXONE SODIUM 2 G/50ML
2 INJECTION, SOLUTION INTRAVENOUS ONCE
Status: COMPLETED | OUTPATIENT
Start: 2018-07-19 | End: 2018-07-19

## 2018-07-19 RX ORDER — CEFTRIAXONE SODIUM 2 G/50ML
2 INJECTION, SOLUTION INTRAVENOUS ONCE
Status: CANCELLED | OUTPATIENT
Start: 2018-07-19

## 2018-07-19 RX ADMIN — CEFTRIAXONE SODIUM 2 G: 2 INJECTION, SOLUTION INTRAVENOUS at 11:10

## 2018-07-19 NOTE — PROGRESS NOTES
Pt tolerated infusion well.  SHANNEN PICC line dressing intact, flushes easily with good blood return.  AVS instructions given with all remaining infusion appointments.  Pt DC per ambulation with family @ 5305.

## 2018-07-20 ENCOUNTER — HOSPITAL ENCOUNTER (OUTPATIENT)
Dept: INFUSION THERAPY | Facility: HOSPITAL | Age: 68
Discharge: HOME | End: 2018-07-20
Admitting: INTERNAL MEDICINE

## 2018-07-20 VITALS
SYSTOLIC BLOOD PRESSURE: 133 MMHG | OXYGEN SATURATION: 99 % | DIASTOLIC BLOOD PRESSURE: 81 MMHG | RESPIRATION RATE: 16 BRPM | HEART RATE: 85 BPM | TEMPERATURE: 98.9 F

## 2018-07-20 DIAGNOSIS — I33.0 ACUTE BACTERIAL ENDOCARDITIS: Primary | ICD-10-CM

## 2018-07-20 PROCEDURE — 25010000003 CEFTRIAXONE PER 250 MG: Performed by: INTERNAL MEDICINE

## 2018-07-20 PROCEDURE — 96365 THER/PROPH/DIAG IV INF INIT: CPT

## 2018-07-20 RX ORDER — CEFTRIAXONE SODIUM 2 G/50ML
2 INJECTION, SOLUTION INTRAVENOUS ONCE
Status: CANCELLED | OUTPATIENT
Start: 2018-07-20

## 2018-07-20 RX ORDER — CEFTRIAXONE SODIUM 2 G/50ML
2 INJECTION, SOLUTION INTRAVENOUS ONCE
Status: COMPLETED | OUTPATIENT
Start: 2018-07-20 | End: 2018-07-20

## 2018-07-20 RX ADMIN — CEFTRIAXONE SODIUM 2 G: 2 INJECTION, SOLUTION INTRAVENOUS at 09:45

## 2018-07-20 NOTE — PROGRESS NOTES
Tolerated infusion without difficulty. RPICC dressing changed today instead of tomorrow due to weekend schedule.  Discharged home amb with family.

## 2018-07-21 ENCOUNTER — HOSPITAL ENCOUNTER (OUTPATIENT)
Dept: INFUSION THERAPY | Facility: HOSPITAL | Age: 68
Discharge: HOME | End: 2018-07-21
Admitting: INTERNAL MEDICINE

## 2018-07-21 VITALS
TEMPERATURE: 98.1 F | HEART RATE: 89 BPM | RESPIRATION RATE: 18 BRPM | SYSTOLIC BLOOD PRESSURE: 115 MMHG | OXYGEN SATURATION: 100 % | DIASTOLIC BLOOD PRESSURE: 68 MMHG

## 2018-07-21 DIAGNOSIS — I33.0 ACUTE BACTERIAL ENDOCARDITIS: ICD-10-CM

## 2018-07-21 PROCEDURE — 96365 THER/PROPH/DIAG IV INF INIT: CPT

## 2018-07-21 PROCEDURE — 25010000003 CEFTRIAXONE PER 250 MG: Performed by: INTERNAL MEDICINE

## 2018-07-21 RX ORDER — CEFTRIAXONE SODIUM 2 G/50ML
2 INJECTION, SOLUTION INTRAVENOUS ONCE
Status: COMPLETED | OUTPATIENT
Start: 2018-07-21 | End: 2018-07-21

## 2018-07-21 RX ORDER — CEFTRIAXONE SODIUM 2 G/50ML
2 INJECTION, SOLUTION INTRAVENOUS ONCE
Status: CANCELLED | OUTPATIENT
Start: 2018-07-21

## 2018-07-21 RX ADMIN — CEFTRIAXONE SODIUM 2 G: 2 INJECTION, SOLUTION INTRAVENOUS at 09:08

## 2018-07-21 NOTE — PROGRESS NOTES
Pt tolerated infusion well.  RIght PICC line flushed with 20cc NS-mild difficulty with blood return initially.  Blood return noted after flushing with 20cc NS.  Pt dc'ed with son ambulatory at 0935.

## 2018-07-22 ENCOUNTER — HOSPITAL ENCOUNTER (OUTPATIENT)
Dept: INFUSION THERAPY | Facility: HOSPITAL | Age: 68
Discharge: HOME | End: 2018-07-22
Admitting: INTERNAL MEDICINE

## 2018-07-22 VITALS
OXYGEN SATURATION: 100 % | RESPIRATION RATE: 16 BRPM | TEMPERATURE: 98 F | SYSTOLIC BLOOD PRESSURE: 127 MMHG | DIASTOLIC BLOOD PRESSURE: 78 MMHG

## 2018-07-22 DIAGNOSIS — I33.0 ACUTE BACTERIAL ENDOCARDITIS: ICD-10-CM

## 2018-07-22 PROCEDURE — 25010000003 CEFTRIAXONE PER 250 MG: Performed by: INTERNAL MEDICINE

## 2018-07-22 PROCEDURE — 96365 THER/PROPH/DIAG IV INF INIT: CPT

## 2018-07-22 RX ORDER — CEFTRIAXONE SODIUM 2 G/50ML
2 INJECTION, SOLUTION INTRAVENOUS ONCE
Status: CANCELLED | OUTPATIENT
Start: 2018-07-22

## 2018-07-22 RX ORDER — CEFTRIAXONE SODIUM 2 G/50ML
2 INJECTION, SOLUTION INTRAVENOUS ONCE
Status: COMPLETED | OUTPATIENT
Start: 2018-07-22 | End: 2018-07-22

## 2018-07-22 RX ADMIN — CEFTRIAXONE SODIUM 2 G: 2 INJECTION, SOLUTION INTRAVENOUS at 09:03

## 2018-07-22 NOTE — PROGRESS NOTES
Right PICC line flushed easily with good blood return.  Pt tolerated infusion well.  Pt dc'ed ambulatory at 0932.

## 2018-07-23 ENCOUNTER — HOSPITAL ENCOUNTER (OUTPATIENT)
Dept: INFUSION THERAPY | Facility: HOSPITAL | Age: 68
Discharge: HOME | End: 2018-07-23
Admitting: INTERNAL MEDICINE

## 2018-07-23 VITALS
HEART RATE: 78 BPM | RESPIRATION RATE: 16 BRPM | SYSTOLIC BLOOD PRESSURE: 129 MMHG | OXYGEN SATURATION: 99 % | TEMPERATURE: 99.2 F | DIASTOLIC BLOOD PRESSURE: 67 MMHG

## 2018-07-23 DIAGNOSIS — I33.0 ACUTE BACTERIAL ENDOCARDITIS: ICD-10-CM

## 2018-07-23 PROCEDURE — 25010000003 CEFTRIAXONE PER 250 MG: Performed by: INTERNAL MEDICINE

## 2018-07-23 PROCEDURE — 96365 THER/PROPH/DIAG IV INF INIT: CPT

## 2018-07-23 RX ORDER — CEFTRIAXONE SODIUM 2 G/50ML
2 INJECTION, SOLUTION INTRAVENOUS ONCE
Status: COMPLETED | OUTPATIENT
Start: 2018-07-23 | End: 2018-07-23

## 2018-07-23 RX ORDER — CEFTRIAXONE SODIUM 2 G/50ML
2 INJECTION, SOLUTION INTRAVENOUS ONCE
Status: CANCELLED | OUTPATIENT
Start: 2018-07-23

## 2018-07-23 RX ADMIN — CEFTRIAXONE SODIUM 2 G: 2 INJECTION, SOLUTION INTRAVENOUS at 09:38

## 2018-07-23 NOTE — PROGRESS NOTES
Right PICC line flushed easily with good blood return. Patient tolerated infusion well. Patient ambulatory, D/C'd from ACU at 1033 with friend.

## 2018-07-24 ENCOUNTER — HOSPITAL ENCOUNTER (OUTPATIENT)
Dept: INFUSION THERAPY | Facility: HOSPITAL | Age: 68
Discharge: HOME | End: 2018-07-24
Admitting: INTERNAL MEDICINE

## 2018-07-24 VITALS
HEART RATE: 90 BPM | SYSTOLIC BLOOD PRESSURE: 122 MMHG | OXYGEN SATURATION: 99 % | RESPIRATION RATE: 20 BRPM | TEMPERATURE: 98.4 F | DIASTOLIC BLOOD PRESSURE: 68 MMHG

## 2018-07-24 DIAGNOSIS — I33.0 ACUTE BACTERIAL ENDOCARDITIS: ICD-10-CM

## 2018-07-24 LAB
BASOPHILS # BLD AUTO: 0.08 10*3/MM3 (ref 0–0.2)
BASOPHILS NFR BLD AUTO: 1.6 % (ref 0–1.5)
CREAT BLD-MCNC: 1.21 MG/DL (ref 0.76–1.27)
DEPRECATED RDW RBC AUTO: 47 FL (ref 37–54)
EOSINOPHIL # BLD AUTO: 0.32 10*3/MM3 (ref 0–0.7)
EOSINOPHIL NFR BLD AUTO: 6.5 % (ref 0.3–6.2)
ERYTHROCYTE [DISTWIDTH] IN BLOOD BY AUTOMATED COUNT: 13.7 % (ref 11.5–14.5)
GFR SERPL CREATININE-BSD FRML MDRD: 59 ML/MIN/1.73
HCT VFR BLD AUTO: 33.4 % (ref 40.4–52.2)
HGB BLD-MCNC: 10.8 G/DL (ref 13.7–17.6)
IMM GRANULOCYTES # BLD: 0.01 10*3/MM3 (ref 0–0.03)
IMM GRANULOCYTES NFR BLD: 0.2 % (ref 0–0.5)
LYMPHOCYTES # BLD AUTO: 1.34 10*3/MM3 (ref 0.9–4.8)
LYMPHOCYTES NFR BLD AUTO: 27.3 % (ref 19.6–45.3)
MCH RBC QN AUTO: 30.6 PG (ref 27–32.7)
MCHC RBC AUTO-ENTMCNC: 32.3 G/DL (ref 32.6–36.4)
MCV RBC AUTO: 94.6 FL (ref 79.8–96.2)
MONOCYTES # BLD AUTO: 0.36 10*3/MM3 (ref 0.2–1.2)
MONOCYTES NFR BLD AUTO: 7.3 % (ref 5–12)
NEUTROPHILS # BLD AUTO: 2.8 10*3/MM3 (ref 1.9–8.1)
NEUTROPHILS NFR BLD AUTO: 57.3 % (ref 42.7–76)
PLATELET # BLD AUTO: 350 10*3/MM3 (ref 140–500)
PMV BLD AUTO: 10.7 FL (ref 6–12)
RBC # BLD AUTO: 3.53 10*6/MM3 (ref 4.6–6)
WBC NRBC COR # BLD: 4.9 10*3/MM3 (ref 4.5–10.7)

## 2018-07-24 PROCEDURE — 96365 THER/PROPH/DIAG IV INF INIT: CPT

## 2018-07-24 PROCEDURE — 85025 COMPLETE CBC W/AUTO DIFF WBC: CPT | Performed by: INTERNAL MEDICINE

## 2018-07-24 PROCEDURE — 25010000003 CEFTRIAXONE PER 250 MG: Performed by: INTERNAL MEDICINE

## 2018-07-24 PROCEDURE — 36592 COLLECT BLOOD FROM PICC: CPT

## 2018-07-24 PROCEDURE — 82565 ASSAY OF CREATININE: CPT | Performed by: INTERNAL MEDICINE

## 2018-07-24 RX ORDER — CEFTRIAXONE SODIUM 2 G/50ML
2 INJECTION, SOLUTION INTRAVENOUS ONCE
Status: CANCELLED | OUTPATIENT
Start: 2018-07-24

## 2018-07-24 RX ORDER — CEFTRIAXONE SODIUM 2 G/50ML
2 INJECTION, SOLUTION INTRAVENOUS ONCE
Status: COMPLETED | OUTPATIENT
Start: 2018-07-24 | End: 2018-07-24

## 2018-07-24 RX ADMIN — CEFTRIAXONE SODIUM 2 G: 2 INJECTION, SOLUTION INTRAVENOUS at 08:31

## 2018-07-24 NOTE — PROGRESS NOTES
SHANNEN PICC line flushed easily with good blood return. Patient tolerated infusion well. Patient ambulatory, D/C'd from ACU with family member post infusion.     1000-Lab results routed to Dr. Marshall through [employer] per patient request.

## 2018-07-25 ENCOUNTER — HOSPITAL ENCOUNTER (OUTPATIENT)
Dept: INFUSION THERAPY | Facility: HOSPITAL | Age: 68
Discharge: HOME | End: 2018-07-25
Admitting: INTERNAL MEDICINE

## 2018-07-25 VITALS
OXYGEN SATURATION: 98 % | TEMPERATURE: 98.2 F | SYSTOLIC BLOOD PRESSURE: 131 MMHG | RESPIRATION RATE: 14 BRPM | HEART RATE: 88 BPM | DIASTOLIC BLOOD PRESSURE: 71 MMHG

## 2018-07-25 DIAGNOSIS — Z86.79 HISTORY OF BACTERIAL ENDOCARDITIS: ICD-10-CM

## 2018-07-25 PROBLEM — A41.9 SEPSIS (HCC): Status: RESOLVED | Noted: 2018-06-04 | Resolved: 2018-07-25

## 2018-07-25 PROBLEM — R06.02 SHORTNESS OF BREATH: Status: RESOLVED | Noted: 2018-07-10 | Resolved: 2018-07-25

## 2018-07-25 PROBLEM — J15.9 BACTERIAL PNEUMONIA: Status: RESOLVED | Noted: 2018-06-04 | Resolved: 2018-07-25

## 2018-07-25 PROCEDURE — 25010000003 CEFTRIAXONE PER 250 MG: Performed by: INTERNAL MEDICINE

## 2018-07-25 PROCEDURE — 96365 THER/PROPH/DIAG IV INF INIT: CPT

## 2018-07-25 RX ORDER — CEFTRIAXONE SODIUM 2 G/50ML
2 INJECTION, SOLUTION INTRAVENOUS ONCE
Status: CANCELLED | OUTPATIENT
Start: 2018-07-25

## 2018-07-25 RX ORDER — CEFTRIAXONE SODIUM 2 G/50ML
2 INJECTION, SOLUTION INTRAVENOUS ONCE
Status: COMPLETED | OUTPATIENT
Start: 2018-07-25 | End: 2018-07-25

## 2018-07-25 RX ADMIN — CEFTRIAXONE SODIUM 2 G: 2 INJECTION, SOLUTION INTRAVENOUS at 12:57

## 2018-07-26 ENCOUNTER — HOSPITAL ENCOUNTER (OUTPATIENT)
Dept: INFUSION THERAPY | Facility: HOSPITAL | Age: 68
Discharge: HOME | End: 2018-07-26
Admitting: INTERNAL MEDICINE

## 2018-07-26 VITALS
HEART RATE: 85 BPM | RESPIRATION RATE: 16 BRPM | TEMPERATURE: 97.7 F | DIASTOLIC BLOOD PRESSURE: 68 MMHG | OXYGEN SATURATION: 99 % | SYSTOLIC BLOOD PRESSURE: 122 MMHG

## 2018-07-26 DIAGNOSIS — Z86.79 HISTORY OF BACTERIAL ENDOCARDITIS: ICD-10-CM

## 2018-07-26 PROCEDURE — 96365 THER/PROPH/DIAG IV INF INIT: CPT

## 2018-07-26 PROCEDURE — 25010000003 CEFTRIAXONE PER 250 MG: Performed by: INTERNAL MEDICINE

## 2018-07-26 RX ORDER — CEFTRIAXONE SODIUM 2 G/50ML
2 INJECTION, SOLUTION INTRAVENOUS ONCE
Status: COMPLETED | OUTPATIENT
Start: 2018-07-26 | End: 2018-07-26

## 2018-07-26 RX ORDER — CEFTRIAXONE SODIUM 2 G/50ML
2 INJECTION, SOLUTION INTRAVENOUS ONCE
Status: CANCELLED | OUTPATIENT
Start: 2018-07-26

## 2018-07-26 RX ADMIN — CEFTRIAXONE SODIUM 2 G: 2 INJECTION, SOLUTION INTRAVENOUS at 11:44

## 2018-07-26 NOTE — PROGRESS NOTES
Right PICC line flushed easily with good blood return. Patient tolerated infusion well. Patient ambulatory, D/C'd from ACU at 1220 with friend.

## 2018-07-27 ENCOUNTER — HOSPITAL ENCOUNTER (OUTPATIENT)
Dept: INFUSION THERAPY | Facility: HOSPITAL | Age: 68
Discharge: HOME | End: 2018-07-27
Admitting: INTERNAL MEDICINE

## 2018-07-27 VITALS
DIASTOLIC BLOOD PRESSURE: 72 MMHG | HEART RATE: 84 BPM | OXYGEN SATURATION: 100 % | RESPIRATION RATE: 20 BRPM | TEMPERATURE: 97.6 F | SYSTOLIC BLOOD PRESSURE: 115 MMHG

## 2018-07-27 DIAGNOSIS — Z86.79 HISTORY OF BACTERIAL ENDOCARDITIS: ICD-10-CM

## 2018-07-27 PROCEDURE — G0463 HOSPITAL OUTPT CLINIC VISIT: HCPCS

## 2018-07-27 PROCEDURE — 96365 THER/PROPH/DIAG IV INF INIT: CPT

## 2018-07-27 PROCEDURE — 25010000003 CEFTRIAXONE PER 250 MG: Performed by: INTERNAL MEDICINE

## 2018-07-27 RX ORDER — CEFTRIAXONE SODIUM 2 G/50ML
2 INJECTION, SOLUTION INTRAVENOUS ONCE
Status: CANCELLED | OUTPATIENT
Start: 2018-07-27

## 2018-07-27 RX ORDER — CEFTRIAXONE SODIUM 2 G/50ML
2 INJECTION, SOLUTION INTRAVENOUS ONCE
Status: COMPLETED | OUTPATIENT
Start: 2018-07-27 | End: 2018-07-27

## 2018-07-27 RX ADMIN — CEFTRIAXONE SODIUM 2 G: 2 INJECTION, SOLUTION INTRAVENOUS at 11:01

## 2018-07-27 NOTE — PROGRESS NOTES
Dressing change right PICC line using sterile technique according to policy. Good blood return and flushes easily.Site clear with no redness. Bio patch applied,covered withtegaderm. Stat lock used. Patient tolerated procedure well.

## 2018-07-28 ENCOUNTER — HOSPITAL ENCOUNTER (OUTPATIENT)
Dept: INFUSION THERAPY | Facility: HOSPITAL | Age: 68
Discharge: HOME | End: 2018-07-28
Admitting: INTERNAL MEDICINE

## 2018-07-28 VITALS
HEART RATE: 90 BPM | RESPIRATION RATE: 14 BRPM | SYSTOLIC BLOOD PRESSURE: 111 MMHG | OXYGEN SATURATION: 100 % | DIASTOLIC BLOOD PRESSURE: 61 MMHG | TEMPERATURE: 98.5 F

## 2018-07-28 DIAGNOSIS — Z86.79 HISTORY OF BACTERIAL ENDOCARDITIS: ICD-10-CM

## 2018-07-28 PROCEDURE — 96365 THER/PROPH/DIAG IV INF INIT: CPT

## 2018-07-28 PROCEDURE — 25010000003 CEFTRIAXONE PER 250 MG: Performed by: INTERNAL MEDICINE

## 2018-07-28 RX ORDER — CEFTRIAXONE SODIUM 2 G/50ML
2 INJECTION, SOLUTION INTRAVENOUS ONCE
Status: CANCELLED | OUTPATIENT
Start: 2018-07-28

## 2018-07-28 RX ORDER — CEFTRIAXONE SODIUM 2 G/50ML
2 INJECTION, SOLUTION INTRAVENOUS ONCE
Status: COMPLETED | OUTPATIENT
Start: 2018-07-28 | End: 2018-07-28

## 2018-07-28 RX ADMIN — CEFTRIAXONE SODIUM 2 G: 2 INJECTION, SOLUTION INTRAVENOUS at 09:20

## 2018-07-29 ENCOUNTER — HOSPITAL ENCOUNTER (OUTPATIENT)
Dept: INFUSION THERAPY | Facility: HOSPITAL | Age: 68
Discharge: HOME | End: 2018-07-29
Admitting: INTERNAL MEDICINE

## 2018-07-29 VITALS
SYSTOLIC BLOOD PRESSURE: 117 MMHG | OXYGEN SATURATION: 98 % | DIASTOLIC BLOOD PRESSURE: 76 MMHG | RESPIRATION RATE: 16 BRPM | TEMPERATURE: 97 F | HEART RATE: 90 BPM

## 2018-07-29 DIAGNOSIS — Z86.79 HISTORY OF BACTERIAL ENDOCARDITIS: ICD-10-CM

## 2018-07-29 PROCEDURE — 25010000003 CEFTRIAXONE PER 250 MG: Performed by: INTERNAL MEDICINE

## 2018-07-29 PROCEDURE — 96365 THER/PROPH/DIAG IV INF INIT: CPT

## 2018-07-29 RX ORDER — CEFTRIAXONE SODIUM 2 G/50ML
2 INJECTION, SOLUTION INTRAVENOUS ONCE
Status: CANCELLED | OUTPATIENT
Start: 2018-07-29

## 2018-07-29 RX ORDER — CEFTRIAXONE SODIUM 2 G/50ML
2 INJECTION, SOLUTION INTRAVENOUS ONCE
Status: COMPLETED | OUTPATIENT
Start: 2018-07-29 | End: 2018-07-29

## 2018-07-29 RX ADMIN — CEFTRIAXONE SODIUM 2 G: 2 INJECTION, SOLUTION INTRAVENOUS at 09:06

## 2018-07-30 ENCOUNTER — HOSPITAL ENCOUNTER (OUTPATIENT)
Dept: INFUSION THERAPY | Facility: HOSPITAL | Age: 68
Discharge: HOME | End: 2018-07-30
Admitting: INTERNAL MEDICINE

## 2018-07-30 VITALS
DIASTOLIC BLOOD PRESSURE: 69 MMHG | SYSTOLIC BLOOD PRESSURE: 124 MMHG | TEMPERATURE: 98 F | RESPIRATION RATE: 16 BRPM | HEART RATE: 98 BPM | OXYGEN SATURATION: 96 %

## 2018-07-30 DIAGNOSIS — Z86.79 HISTORY OF BACTERIAL ENDOCARDITIS: ICD-10-CM

## 2018-07-30 DIAGNOSIS — I33.0 ACUTE BACTERIAL ENDOCARDITIS: ICD-10-CM

## 2018-07-30 LAB
BASOPHILS # BLD AUTO: 0.05 10*3/MM3 (ref 0–0.2)
BASOPHILS NFR BLD AUTO: 1.2 % (ref 0–1.5)
CREAT BLD-MCNC: 1.21 MG/DL (ref 0.76–1.27)
DEPRECATED RDW RBC AUTO: 49.9 FL (ref 37–54)
EOSINOPHIL # BLD AUTO: 0.4 10*3/MM3 (ref 0–0.7)
EOSINOPHIL NFR BLD AUTO: 9.5 % (ref 0.3–6.2)
ERYTHROCYTE [DISTWIDTH] IN BLOOD BY AUTOMATED COUNT: 14.4 % (ref 11.5–14.5)
GFR SERPL CREATININE-BSD FRML MDRD: 59 ML/MIN/1.73
HCT VFR BLD AUTO: 34.6 % (ref 40.4–52.2)
HGB BLD-MCNC: 11.1 G/DL (ref 13.7–17.6)
IMM GRANULOCYTES # BLD: 0.01 10*3/MM3 (ref 0–0.03)
IMM GRANULOCYTES NFR BLD: 0.2 % (ref 0–0.5)
LYMPHOCYTES # BLD AUTO: 1.21 10*3/MM3 (ref 0.9–4.8)
LYMPHOCYTES NFR BLD AUTO: 28.6 % (ref 19.6–45.3)
MCH RBC QN AUTO: 30.6 PG (ref 27–32.7)
MCHC RBC AUTO-ENTMCNC: 32.1 G/DL (ref 32.6–36.4)
MCV RBC AUTO: 95.3 FL (ref 79.8–96.2)
MONOCYTES # BLD AUTO: 0.42 10*3/MM3 (ref 0.2–1.2)
MONOCYTES NFR BLD AUTO: 9.9 % (ref 5–12)
NEUTROPHILS # BLD AUTO: 2.15 10*3/MM3 (ref 1.9–8.1)
NEUTROPHILS NFR BLD AUTO: 50.8 % (ref 42.7–76)
PLATELET # BLD AUTO: 290 10*3/MM3 (ref 140–500)
PMV BLD AUTO: 10.6 FL (ref 6–12)
RBC # BLD AUTO: 3.63 10*6/MM3 (ref 4.6–6)
WBC NRBC COR # BLD: 4.23 10*3/MM3 (ref 4.5–10.7)

## 2018-07-30 PROCEDURE — 85025 COMPLETE CBC W/AUTO DIFF WBC: CPT | Performed by: INTERNAL MEDICINE

## 2018-07-30 PROCEDURE — 96365 THER/PROPH/DIAG IV INF INIT: CPT

## 2018-07-30 PROCEDURE — 36592 COLLECT BLOOD FROM PICC: CPT

## 2018-07-30 PROCEDURE — 25010000003 CEFTRIAXONE PER 250 MG: Performed by: INTERNAL MEDICINE

## 2018-07-30 PROCEDURE — 82565 ASSAY OF CREATININE: CPT | Performed by: INTERNAL MEDICINE

## 2018-07-30 RX ORDER — CEFTRIAXONE SODIUM 2 G/50ML
2 INJECTION, SOLUTION INTRAVENOUS ONCE
Status: CANCELLED | OUTPATIENT
Start: 2018-07-30

## 2018-07-30 RX ORDER — CEFTRIAXONE SODIUM 2 G/50ML
2 INJECTION, SOLUTION INTRAVENOUS ONCE
Status: COMPLETED | OUTPATIENT
Start: 2018-07-30 | End: 2018-07-30

## 2018-07-30 RX ADMIN — CEFTRIAXONE SODIUM 2 G: 2 INJECTION, SOLUTION INTRAVENOUS at 09:31

## 2018-07-31 ENCOUNTER — HOSPITAL ENCOUNTER (OUTPATIENT)
Dept: INFUSION THERAPY | Facility: HOSPITAL | Age: 68
Discharge: HOME | End: 2018-07-31
Admitting: INTERNAL MEDICINE

## 2018-07-31 VITALS
OXYGEN SATURATION: 100 % | RESPIRATION RATE: 20 BRPM | TEMPERATURE: 98 F | HEART RATE: 87 BPM | SYSTOLIC BLOOD PRESSURE: 138 MMHG | DIASTOLIC BLOOD PRESSURE: 75 MMHG

## 2018-07-31 DIAGNOSIS — Z86.79 HISTORY OF BACTERIAL ENDOCARDITIS: ICD-10-CM

## 2018-07-31 PROCEDURE — 25010000003 CEFTRIAXONE PER 250 MG: Performed by: INTERNAL MEDICINE

## 2018-07-31 PROCEDURE — 96365 THER/PROPH/DIAG IV INF INIT: CPT

## 2018-07-31 RX ORDER — CEFTRIAXONE SODIUM 2 G/50ML
2 INJECTION, SOLUTION INTRAVENOUS ONCE
Status: CANCELLED | OUTPATIENT
Start: 2018-07-31

## 2018-07-31 RX ORDER — CEFTRIAXONE SODIUM 2 G/50ML
2 INJECTION, SOLUTION INTRAVENOUS ONCE
Status: COMPLETED | OUTPATIENT
Start: 2018-07-31 | End: 2018-07-31

## 2018-07-31 RX ADMIN — CEFTRIAXONE SODIUM 2 G: 2 INJECTION, SOLUTION INTRAVENOUS at 12:50

## 2018-08-01 ENCOUNTER — HOSPITAL ENCOUNTER (OUTPATIENT)
Dept: INFUSION THERAPY | Facility: HOSPITAL | Age: 68
Discharge: HOME | End: 2018-08-01
Admitting: INTERNAL MEDICINE

## 2018-08-01 VITALS
TEMPERATURE: 98.3 F | HEART RATE: 91 BPM | DIASTOLIC BLOOD PRESSURE: 71 MMHG | RESPIRATION RATE: 18 BRPM | SYSTOLIC BLOOD PRESSURE: 137 MMHG | OXYGEN SATURATION: 98 %

## 2018-08-01 DIAGNOSIS — Z86.79 HISTORY OF BACTERIAL ENDOCARDITIS: ICD-10-CM

## 2018-08-01 PROCEDURE — 96365 THER/PROPH/DIAG IV INF INIT: CPT

## 2018-08-01 PROCEDURE — 25010000003 CEFTRIAXONE PER 250 MG: Performed by: INTERNAL MEDICINE

## 2018-08-01 RX ORDER — CEFTRIAXONE SODIUM 2 G/50ML
2 INJECTION, SOLUTION INTRAVENOUS ONCE
Status: COMPLETED | OUTPATIENT
Start: 2018-08-01 | End: 2018-08-01

## 2018-08-01 RX ORDER — CEFTRIAXONE SODIUM 2 G/50ML
2 INJECTION, SOLUTION INTRAVENOUS ONCE
Status: CANCELLED | OUTPATIENT
Start: 2018-08-01

## 2018-08-01 RX ADMIN — CEFTRIAXONE SODIUM 2 G: 2 INJECTION, SOLUTION INTRAVENOUS at 09:37

## 2018-08-01 NOTE — PROGRESS NOTES
Pt tolerated infusion well.  SHANNEN PICC line dressing clean dry & intact.  Flushes easily with good blood return.  Flushed with 20 cc NS prior to infusion and 30 cc NS after infusion via IV pump to flush all medication through IV tubing.  Pt DC per ambulation @ 1018.

## 2018-08-02 ENCOUNTER — HOSPITAL ENCOUNTER (OUTPATIENT)
Dept: INFUSION THERAPY | Facility: HOSPITAL | Age: 68
Discharge: HOME | End: 2018-08-02
Admitting: INTERNAL MEDICINE

## 2018-08-02 ENCOUNTER — OFFICE VISIT (OUTPATIENT)
Dept: CARDIOLOGY | Facility: CLINIC | Age: 68
End: 2018-08-02

## 2018-08-02 VITALS
RESPIRATION RATE: 16 BRPM | SYSTOLIC BLOOD PRESSURE: 123 MMHG | DIASTOLIC BLOOD PRESSURE: 75 MMHG | HEART RATE: 104 BPM | TEMPERATURE: 98.5 F | OXYGEN SATURATION: 98 %

## 2018-08-02 VITALS
DIASTOLIC BLOOD PRESSURE: 70 MMHG | BODY MASS INDEX: 29.43 KG/M2 | SYSTOLIC BLOOD PRESSURE: 120 MMHG | HEART RATE: 91 BPM | HEIGHT: 68 IN | WEIGHT: 194.2 LBS

## 2018-08-02 DIAGNOSIS — I25.5 CARDIOMYOPATHY, ISCHEMIC: ICD-10-CM

## 2018-08-02 DIAGNOSIS — I47.29 IDIOPATHIC VENTRICULAR TACHYCARDIA (HCC): ICD-10-CM

## 2018-08-02 DIAGNOSIS — I36.1 NON-RHEUMATIC TRICUSPID VALVE INSUFFICIENCY: ICD-10-CM

## 2018-08-02 DIAGNOSIS — I48.0 PAROXYSMAL ATRIAL FIBRILLATION (HCC): ICD-10-CM

## 2018-08-02 DIAGNOSIS — Z86.79 HISTORY OF BACTERIAL ENDOCARDITIS: ICD-10-CM

## 2018-08-02 DIAGNOSIS — I33.0 ACUTE BACTERIAL ENDOCARDITIS: ICD-10-CM

## 2018-08-02 DIAGNOSIS — I50.22 CHRONIC SYSTOLIC CONGESTIVE HEART FAILURE, NYHA CLASS 3 (HCC): Primary | ICD-10-CM

## 2018-08-02 PROCEDURE — 93000 ELECTROCARDIOGRAM COMPLETE: CPT | Performed by: INTERNAL MEDICINE

## 2018-08-02 PROCEDURE — 99215 OFFICE O/P EST HI 40 MIN: CPT | Performed by: INTERNAL MEDICINE

## 2018-08-02 PROCEDURE — 25010000003 CEFTRIAXONE PER 250 MG: Performed by: INTERNAL MEDICINE

## 2018-08-02 PROCEDURE — 96365 THER/PROPH/DIAG IV INF INIT: CPT

## 2018-08-02 RX ORDER — CEFTRIAXONE SODIUM 2 G/50ML
2 INJECTION, SOLUTION INTRAVENOUS ONCE
Status: CANCELLED | OUTPATIENT
Start: 2018-08-02

## 2018-08-02 RX ORDER — CEFTRIAXONE SODIUM 2 G/50ML
2 INJECTION, SOLUTION INTRAVENOUS ONCE
Status: COMPLETED | OUTPATIENT
Start: 2018-08-02 | End: 2018-08-02

## 2018-08-02 RX ADMIN — CEFTRIAXONE SODIUM 2 G: 2 INJECTION, SOLUTION INTRAVENOUS at 10:40

## 2018-08-02 NOTE — PROGRESS NOTES
Date of Office Visit: 2018  Encounter Provider: Naun Calvin MD  Place of Service: [facility]ARDI[facility]  Patient Name: Mello Arias Jr.  :1950      Chief Complaint   Patient presents with   • Cardiomyopathy     History of Present Illness  The patient is a 67-year-old white male with a history of coronary artery disease, ventricular tachycardia, valvular heart disease and endocarditis.  .  He experienced a myocardial infarction in  necessitating angioplasty of the left anterior descending coronary artery.  He also has a history of ventricular tachycardia for which he received an AICD and paroxysmal atrial fibrillation.  Over time his ventricular function deteriorated with an ejection fraction of approximately 20%.  Undergone bypass surgery as well as a mitral valve replacement with a tissue prosthesis and a tricuspid valve repair.  Post bypass surgery in  there was never any significant improvement in his left ventricular function.  Ultimately an ICD was placed for ventricular tachycardia.  He did not meet qualifications for a biventricular device.  He was placed on medical therapy for heart failure for which she's had several admissions and seemed to be improving.  He was hospitalized with 2 episodes of sepsis recently.  It appears that he has endocarditis.  His echocardiogram was a large vegetation on the posterior leaflet of the mitral valve.  He is now on antibiotic therapy.  In addition the patient was referred to the heart failure clinic at the [facility].  Although I have no correspondence from them the patient reports he was told at this point he is not a candidate for heart transplant because he is doing well.  There was no conversation about whether or not he needed an LVAD.  In essence they did not change his medication.  They did question whether he would need lifelong antibiotic suppression.    Symptomatically the patient appears  much better.  He states his exertional capacity has improved although he still has some exertional dyspnea.  He is also still fatigued but improving.  His diet has increased.  He does not have any swelling of his lower extremities.  He denies palpitations and he denies any angina.    Past Medical History:   Diagnosis Date   • Allergic rhinitis    • Anemia    • Asthma    • Bone fracture    • CAD (coronary artery disease)    • Cardiomyopathy, ischemic    • Carpal tunnel syndrome    • CHF (congestive heart failure), NYHA class III, chronic, systolic (CMS/HCC)    • GERD (gastroesophageal reflux disease)    • Health care maintenance    • Herniated disc, cervical    • Hx of total hip arthroplasty 3/18/2016   • Hyperglycemia    • Hyperlipidemia    • Hypertension    • Idiopathic ventricular tachycardia (CMS/HCC)    • Lumbosacral radiculopathy at L4    • Myocardial infarction     OF INFERIOR WALL, GREATER THAN 8 WEEKS   • Myocardial ischemia     POST MYOCARDIAL INFARCTION, POST PTCA WITH STENT PLACEMENT   • Non-rheumatic tricuspid valve insufficiency    • Nonrheumatic mitral valve regurgitation    • Open wound     OF LEFT INDEX FINGER WITHOUT DAMAGE TO NAIL   • Osteoarthritis    • PAF (paroxysmal atrial fibrillation) (CMS/HCC)    • Ventricular tachycardia (CMS/HCC)     POST AICD         Past Surgical History:   Procedure Laterality Date   • APPENDECTOMY     • CARDIAC CATHETERIZATION N/A 10/21/2016    Procedure: Coronary angiography;  Surgeon: Naun Calvin MD;  Location: [facility] CATH INVASIVE LOCATION;  Service:    • CARDIAC CATHETERIZATION N/A 10/21/2016    Procedure: Left heart cath;  Surgeon: Naun Calvin MD;  Location: [facility] CATH INVASIVE LOCATION;  Service:    • CARDIAC CATHETERIZATION N/A 10/21/2016    Procedure: Left ventriculography;  Surgeon: Naun Calvin MD;  Location: [facility] CATH INVASIVE LOCATION;  Service:    • CARDIAC CATHETERIZATION N/A 10/21/2016    Procedure: Right heart cath;  Surgeon:  Naun Calvin MD;  Location: [facility] CATH INVASIVE LOCATION;  Service:    • CARDIAC DEFIBRILLATOR PLACEMENT     • CARPAL TUNNEL RELEASE     • COLONOSCOPY     • CORONARY ANGIOPLASTY      WITH STENT PLACEMENT TO THE LAD   • CORONARY ARTERY BYPASS GRAFT WITH MITRAL VALVE REPAIR/REPLACEMENT N/A 11/7/2016    Procedure: JAYE STERNOTOMY CORONARY ARTERY BYPASS GRAFT TIMES 4 USING LEFT INTERNAL MAMMARY ARTERY AND LEFT GREATER SAPHENOUS VEIN GRAFT PER ENDOSCOPIC VEIN HARVESTING, MITRAL VALVE REPLACEMENT AND TRICUSPID VALVE REPAIR;  Surgeon: Slade Hurtado MD;  Location: [facility] OR;  Service:    • EYE SURGERY     • FOOT SURGERY Left    • HERNIA REPAIR     • IMPLANTABLE CARDIOVERTER DEFIBRILLATOR LEAD REPLACEMENT/POCKET REVISION     • INGUINAL HERNIA REPAIR Left    • LASIK     • TONSILLECTOMY     • TOTAL HIP ARTHROPLASTY Bilateral            Current Outpatient Prescriptions:   •  acetaminophen (TYLENOL) 325 MG tablet, Take 2 tablets by mouth Every 6 (Six) Hours As Needed for Mild Pain ., Disp: , Rfl: 0  •  albuterol (PROAIR HFA) 108 (90 BASE) MCG/ACT inhaler, Inhale 2 puffs Every 6 (Six) Hours As Needed., Disp: , Rfl:   •  aspirin 81 MG tablet, Take 81 mg by mouth Daily., Disp: , Rfl:   •  bumetanide (BUMEX) 1 MG tablet, Take 1 tablet by mouth Daily., Disp: 30 tablet, Rfl: 1  •  buPROPion XL (WELLBUTRIN XL) 150 MG 24 hr tablet, Take 1 tablet by mouth Daily., Disp: 30 tablet, Rfl: 0  •  carvedilol (COREG) 6.25 MG tablet, Take 1 tablet by mouth Every 12 (Twelve) Hours for 30 days., Disp: 60 tablet, Rfl: 1  •  magnesium oxide (MAG-OX) 400 MG tablet, Take 1 tablet by mouth Daily., Disp: 30 tablet, Rfl: 0  •  montelukast (SINGULAIR) 10 MG tablet, Take 10 mg by mouth Every Night., Disp: , Rfl:   •  nitroglycerin (NITROSTAT) 0.4 MG SL tablet, Place 0.4 mg under the tongue Every 5 (Five) Minutes As Needed for Chest Pain. Take no more than 3 doses in 15 minutes., Disp: , Rfl:   •  omeprazole (PRILOSEC) 20 MG capsule, Take 20  "mg by mouth Daily., Disp: , Rfl:   •  sacubitril-valsartan (ENTRESTO) 24-26 MG tablet, Take 1 tablet by mouth Every 12 (Twelve) Hours., Disp: 60 tablet, Rfl: 1  No current facility-administered medications for this visit.       Social History     Social History   • Marital status: Single     Spouse name: N/A   • Number of children: N/A   • Years of education: N/A     Occupational History   • Not on file.     Social History Main Topics   • Smoking status: Never Smoker   • Smokeless tobacco: Never Used      Comment: daily caffeine use   • Alcohol use 6.0 oz/week     10 Shots of liquor per week      Comment: social drinker   • Drug use: No   • Sexual activity: Defer     Other Topics Concern   • Not on file     Social History Narrative   • No narrative on file         Review of Systems   Constitution: Positive for malaise/fatigue.   HENT: Negative.    Eyes: Negative.    Cardiovascular: Positive for dyspnea on exertion.   Respiratory: Positive for shortness of breath.    Endocrine: Negative.    Skin: Negative.    Musculoskeletal: Negative.    Gastrointestinal: Negative.    Neurological: Negative.    Psychiatric/Behavioral: Negative.        Procedures      ECG 12 Lead  Date/Time: 8/2/2018 4:54 PM  Performed by: JANENE ZHAO  Authorized by: JANENE ZHAO   Comparison: not compared with previous ECG   Previous ECG: no previous ECG available  Rhythm: sinus rhythm  Ectopy: unifocal PVCs  Rate: normal  QRS axis: normal  Comments: nonspecific ST-T wave changes                Objective:    /70   Pulse 91   Ht 172.7 cm (68\")   Wt 88.1 kg (194 lb 3.2 oz)   BMI 29.53 kg/m²         Physical Exam   Constitutional: He is oriented to person, place, and time. He appears well-developed and well-nourished.   HENT:   Head: Normocephalic.   Eyes: Pupils are equal, round, and reactive to light.   Neck: Normal range of motion. Carotid bruit is not present. No thyromegaly present.   Cardiovascular: Normal rate, regular " rhythm, S1 normal, S2 normal, normal heart sounds and intact distal pulses.  Exam reveals no friction rub.    No murmur heard.  Pulmonary/Chest: Effort normal and breath sounds normal.   Abdominal: Soft. Bowel sounds are normal.   Musculoskeletal: He exhibits no edema.   Neurological: He is alert and oriented to person, place, and time.   Skin: Skin is warm, dry and intact. No erythema.   Psychiatric: He has a normal mood and affect.   Vitals reviewed.          Assessment:       Diagnosis Plan   1. Chronic systolic congestive heart failure, NYHA class 3 (CMS/HCC)     2. Cardiomyopathy, ischemic     3. Idiopathic ventricular tachycardia (CMS/HCC)     4. Paroxysmal atrial fibrillation (CMS/HCC)     5. Non-rheumatic tricuspid valve insufficiency     6. Acute bacterial endocarditis         1. Heart Failure  Assessment  • NYHA class II - There is slight limitation of physical activity. The patient is comfortable at rest, but physical activity results in fatigue, palpitations or shortness of breath.  • ACE inhibitor prescribed  • Beta blocker prescribed  • Diuretics prescribed    Subjective/Objective  • The patient reports dyspnea    • The patient has an ICD implant    2. Coronary Artery Disease  Assessment  • The patient has no angina    Plan  • Lifestyle modifications discussed include adhering to a heart healthy diet and maintenance of a healthy weight    Subjective - Objective  • There is a history of past MI  • There is a history of previous coronary artery bypass graft  • Current antiplatelet therapy includes aspirin 81 mg    3. Atrial Fibrillation and Atrial Flutter  Assessment  • The patient has paroxysmal atrial fibrillation  • The patient's CHADS2-VASc score is 3  • A [id number] score of 2 or more is considered a high risk for a thromboembolic event  • Aspirin prescribed  4.  Ventricular tachycardia: Status post AICD    5.  Mitral regurgitation: Status post mitral valve replacement with tissue prosthesis.   Endocarditis affecting the valve    6.  Tricuspid regurgitation: Status post valve repair.  Plan:       Clinically the patient appears to be improving.  He still critically ill with his endocarditis although improving from this.  He will remain on his antibiotics intravenously.  I will recheck out to Dr. Ferny Marshall concerning long-term antibiotic use and follow-up.    The patient will be following up in 6 weeks.  I will reecho him after that time to see the status of his vegetation.

## 2018-08-03 ENCOUNTER — HOSPITAL ENCOUNTER (OUTPATIENT)
Dept: INFUSION THERAPY | Facility: HOSPITAL | Age: 68
Discharge: HOME | End: 2018-08-03
Admitting: INTERNAL MEDICINE

## 2018-08-03 VITALS
HEART RATE: 88 BPM | TEMPERATURE: 98.9 F | RESPIRATION RATE: 16 BRPM | SYSTOLIC BLOOD PRESSURE: 115 MMHG | OXYGEN SATURATION: 96 % | DIASTOLIC BLOOD PRESSURE: 75 MMHG

## 2018-08-03 DIAGNOSIS — Z86.79 HISTORY OF BACTERIAL ENDOCARDITIS: ICD-10-CM

## 2018-08-03 PROCEDURE — 25010000003 CEFTRIAXONE PER 250 MG: Performed by: INTERNAL MEDICINE

## 2018-08-03 PROCEDURE — 96365 THER/PROPH/DIAG IV INF INIT: CPT

## 2018-08-03 PROCEDURE — G0463 HOSPITAL OUTPT CLINIC VISIT: HCPCS

## 2018-08-03 RX ORDER — CEFTRIAXONE SODIUM 2 G/50ML
2 INJECTION, SOLUTION INTRAVENOUS ONCE
Status: CANCELLED | OUTPATIENT
Start: 2018-08-03

## 2018-08-03 RX ORDER — CEFTRIAXONE SODIUM 2 G/50ML
2 INJECTION, SOLUTION INTRAVENOUS ONCE
Status: COMPLETED | OUTPATIENT
Start: 2018-08-03 | End: 2018-08-03

## 2018-08-03 RX ADMIN — CEFTRIAXONE SODIUM 2 G: 2 INJECTION, SOLUTION INTRAVENOUS at 09:18

## 2018-08-04 ENCOUNTER — HOSPITAL ENCOUNTER (OUTPATIENT)
Dept: INFUSION THERAPY | Facility: HOSPITAL | Age: 68
Discharge: HOME | End: 2018-08-04
Admitting: INTERNAL MEDICINE

## 2018-08-04 VITALS
OXYGEN SATURATION: 100 % | SYSTOLIC BLOOD PRESSURE: 119 MMHG | HEART RATE: 86 BPM | DIASTOLIC BLOOD PRESSURE: 76 MMHG | TEMPERATURE: 97.8 F | RESPIRATION RATE: 20 BRPM

## 2018-08-04 DIAGNOSIS — Z86.79 HISTORY OF BACTERIAL ENDOCARDITIS: ICD-10-CM

## 2018-08-04 PROCEDURE — 25010000003 CEFTRIAXONE PER 250 MG: Performed by: INTERNAL MEDICINE

## 2018-08-04 PROCEDURE — 96365 THER/PROPH/DIAG IV INF INIT: CPT

## 2018-08-04 RX ORDER — CEFTRIAXONE SODIUM 2 G/50ML
2 INJECTION, SOLUTION INTRAVENOUS ONCE
Status: CANCELLED | OUTPATIENT
Start: 2018-08-04

## 2018-08-04 RX ORDER — CEFTRIAXONE SODIUM 2 G/50ML
2 INJECTION, SOLUTION INTRAVENOUS ONCE
Status: COMPLETED | OUTPATIENT
Start: 2018-08-04 | End: 2018-08-04

## 2018-08-04 RX ADMIN — CEFTRIAXONE SODIUM 2 G: 2 INJECTION, SOLUTION INTRAVENOUS at 09:34

## 2018-08-04 NOTE — PROGRESS NOTES
Pt tolerating antibiotic well. No blood return with PICC l,ine but flushes easily and site clear. D/C ambulatory @1006

## 2018-08-05 ENCOUNTER — HOSPITAL ENCOUNTER (OUTPATIENT)
Dept: INFUSION THERAPY | Facility: HOSPITAL | Age: 68
Discharge: HOME | End: 2018-08-05
Admitting: INTERNAL MEDICINE

## 2018-08-05 VITALS
DIASTOLIC BLOOD PRESSURE: 78 MMHG | HEART RATE: 96 BPM | OXYGEN SATURATION: 100 % | TEMPERATURE: 97.8 F | SYSTOLIC BLOOD PRESSURE: 124 MMHG | RESPIRATION RATE: 20 BRPM

## 2018-08-05 DIAGNOSIS — Z86.79 HISTORY OF BACTERIAL ENDOCARDITIS: ICD-10-CM

## 2018-08-05 PROCEDURE — 25010000003 CEFTRIAXONE PER 250 MG: Performed by: INTERNAL MEDICINE

## 2018-08-05 PROCEDURE — 96365 THER/PROPH/DIAG IV INF INIT: CPT

## 2018-08-05 RX ORDER — CEFTRIAXONE SODIUM 2 G/50ML
2 INJECTION, SOLUTION INTRAVENOUS ONCE
Status: COMPLETED | OUTPATIENT
Start: 2018-08-05 | End: 2018-08-05

## 2018-08-05 RX ORDER — CEFTRIAXONE SODIUM 2 G/50ML
2 INJECTION, SOLUTION INTRAVENOUS ONCE
Status: CANCELLED | OUTPATIENT
Start: 2018-08-05

## 2018-08-05 RX ADMIN — CEFTRIAXONE SODIUM 2 G: 2 INJECTION, SOLUTION INTRAVENOUS at 09:08

## 2018-08-05 NOTE — PROGRESS NOTES
Right PICC line flushes easily positive blood return noted. Site clear. Tolerated infusion well. D/C ambulatory @ 8552.

## 2018-08-06 ENCOUNTER — HOSPITAL ENCOUNTER (OUTPATIENT)
Dept: INFUSION THERAPY | Facility: HOSPITAL | Age: 68
Discharge: HOME | End: 2018-08-06
Admitting: INTERNAL MEDICINE

## 2018-08-06 VITALS
DIASTOLIC BLOOD PRESSURE: 71 MMHG | HEART RATE: 100 BPM | TEMPERATURE: 98.6 F | OXYGEN SATURATION: 95 % | RESPIRATION RATE: 16 BRPM | SYSTOLIC BLOOD PRESSURE: 143 MMHG

## 2018-08-06 DIAGNOSIS — Z86.79 HISTORY OF BACTERIAL ENDOCARDITIS: ICD-10-CM

## 2018-08-06 DIAGNOSIS — D63.8 ANEMIA OF CHRONIC DISEASE: ICD-10-CM

## 2018-08-06 PROCEDURE — 25010000003 CEFTRIAXONE PER 250 MG: Performed by: INTERNAL MEDICINE

## 2018-08-06 PROCEDURE — 96375 TX/PRO/DX INJ NEW DRUG ADDON: CPT

## 2018-08-06 PROCEDURE — 96365 THER/PROPH/DIAG IV INF INIT: CPT

## 2018-08-06 PROCEDURE — 25010000002 ALTEPLASE: Performed by: INTERNAL MEDICINE

## 2018-08-06 PROCEDURE — 36593 DECLOT VASCULAR DEVICE: CPT

## 2018-08-06 RX ORDER — CEFTRIAXONE SODIUM 2 G/50ML
2 INJECTION, SOLUTION INTRAVENOUS ONCE
Status: CANCELLED | OUTPATIENT
Start: 2018-08-06

## 2018-08-06 RX ORDER — CEFTRIAXONE SODIUM 2 G/50ML
2 INJECTION, SOLUTION INTRAVENOUS ONCE
Status: COMPLETED | OUTPATIENT
Start: 2018-08-06 | End: 2018-08-06

## 2018-08-06 RX ADMIN — ALTEPLASE 2 MG: 2.2 INJECTION, POWDER, LYOPHILIZED, FOR SOLUTION INTRAVENOUS at 11:05

## 2018-08-06 RX ADMIN — CEFTRIAXONE SODIUM 2 G: 2 INJECTION, SOLUTION INTRAVENOUS at 10:13

## 2018-08-06 NOTE — PROGRESS NOTES
SHANNEN PICC line flushed easily but no blood return noted.  Dr Marshall office called and requested cathflow for PICC line post infusion due to labs need for tomorrow.  Dr Brothers on call for group and will return call.  Dr Brothers's office returned call and order faxed for cathflow.  Pt notified. Cathflo given per order and explained to pt it's use for assisting with blood return from PICC line. Pt verbalized understanding. Pt dc'ed ambulatory at 1107.

## 2018-08-07 ENCOUNTER — EPISODE CHANGES (OUTPATIENT)
Dept: CASE MANAGEMENT | Facility: OTHER | Age: 68
End: 2018-08-07

## 2018-08-07 ENCOUNTER — HOSPITAL ENCOUNTER (OUTPATIENT)
Dept: INFUSION THERAPY | Facility: HOSPITAL | Age: 68
Discharge: HOME | End: 2018-08-07
Admitting: INTERNAL MEDICINE

## 2018-08-07 VITALS
SYSTOLIC BLOOD PRESSURE: 131 MMHG | OXYGEN SATURATION: 95 % | RESPIRATION RATE: 16 BRPM | HEART RATE: 90 BPM | DIASTOLIC BLOOD PRESSURE: 74 MMHG | TEMPERATURE: 98.1 F

## 2018-08-07 DIAGNOSIS — I33.0 ACUTE BACTERIAL ENDOCARDITIS: ICD-10-CM

## 2018-08-07 DIAGNOSIS — Z86.79 HISTORY OF BACTERIAL ENDOCARDITIS: ICD-10-CM

## 2018-08-07 LAB
BASOPHILS # BLD AUTO: 0.04 10*3/MM3 (ref 0–0.2)
BASOPHILS NFR BLD AUTO: 1 % (ref 0–1.5)
CREAT BLD-MCNC: 1.15 MG/DL (ref 0.76–1.27)
DEPRECATED RDW RBC AUTO: 53.5 FL (ref 37–54)
EOSINOPHIL # BLD AUTO: 0.39 10*3/MM3 (ref 0–0.7)
EOSINOPHIL NFR BLD AUTO: 10.1 % (ref 0.3–6.2)
ERYTHROCYTE [DISTWIDTH] IN BLOOD BY AUTOMATED COUNT: 15.3 % (ref 11.5–14.5)
GFR SERPL CREATININE-BSD FRML MDRD: 63 ML/MIN/1.73
HCT VFR BLD AUTO: 36.1 % (ref 40.4–52.2)
HGB BLD-MCNC: 11.5 G/DL (ref 13.7–17.6)
IMM GRANULOCYTES # BLD: 0.01 10*3/MM3 (ref 0–0.03)
IMM GRANULOCYTES NFR BLD: 0.3 % (ref 0–0.5)
LYMPHOCYTES # BLD AUTO: 1.11 10*3/MM3 (ref 0.9–4.8)
LYMPHOCYTES NFR BLD AUTO: 28.8 % (ref 19.6–45.3)
MCH RBC QN AUTO: 30.7 PG (ref 27–32.7)
MCHC RBC AUTO-ENTMCNC: 31.9 G/DL (ref 32.6–36.4)
MCV RBC AUTO: 96.5 FL (ref 79.8–96.2)
MONOCYTES # BLD AUTO: 0.48 10*3/MM3 (ref 0.2–1.2)
MONOCYTES NFR BLD AUTO: 12.4 % (ref 5–12)
NEUTROPHILS # BLD AUTO: 1.84 10*3/MM3 (ref 1.9–8.1)
NEUTROPHILS NFR BLD AUTO: 47.7 % (ref 42.7–76)
PLATELET # BLD AUTO: 220 10*3/MM3 (ref 140–500)
PMV BLD AUTO: 10.6 FL (ref 6–12)
RBC # BLD AUTO: 3.74 10*6/MM3 (ref 4.6–6)
WBC NRBC COR # BLD: 3.86 10*3/MM3 (ref 4.5–10.7)

## 2018-08-07 PROCEDURE — 36592 COLLECT BLOOD FROM PICC: CPT

## 2018-08-07 PROCEDURE — 82565 ASSAY OF CREATININE: CPT | Performed by: INTERNAL MEDICINE

## 2018-08-07 PROCEDURE — 25010000003 CEFTRIAXONE PER 250 MG: Performed by: INTERNAL MEDICINE

## 2018-08-07 PROCEDURE — 85025 COMPLETE CBC W/AUTO DIFF WBC: CPT | Performed by: INTERNAL MEDICINE

## 2018-08-07 PROCEDURE — 96365 THER/PROPH/DIAG IV INF INIT: CPT

## 2018-08-07 RX ORDER — CEFTRIAXONE SODIUM 2 G/50ML
2 INJECTION, SOLUTION INTRAVENOUS ONCE
Status: CANCELLED | OUTPATIENT
Start: 2018-08-07

## 2018-08-07 RX ORDER — CEFTRIAXONE SODIUM 2 G/50ML
2 INJECTION, SOLUTION INTRAVENOUS ONCE
Status: COMPLETED | OUTPATIENT
Start: 2018-08-07 | End: 2018-08-07

## 2018-08-07 RX ADMIN — CEFTRIAXONE SODIUM 2 G: 2 INJECTION, SOLUTION INTRAVENOUS at 10:11

## 2018-08-08 ENCOUNTER — HOSPITAL ENCOUNTER (OUTPATIENT)
Dept: INFUSION THERAPY | Facility: HOSPITAL | Age: 68
Discharge: HOME | End: 2018-08-08
Admitting: INTERNAL MEDICINE

## 2018-08-08 VITALS
HEART RATE: 93 BPM | OXYGEN SATURATION: 96 % | TEMPERATURE: 98.5 F | DIASTOLIC BLOOD PRESSURE: 76 MMHG | SYSTOLIC BLOOD PRESSURE: 139 MMHG | RESPIRATION RATE: 20 BRPM

## 2018-08-08 DIAGNOSIS — Z86.79 HISTORY OF BACTERIAL ENDOCARDITIS: ICD-10-CM

## 2018-08-08 PROCEDURE — 25010000003 CEFTRIAXONE PER 250 MG: Performed by: INTERNAL MEDICINE

## 2018-08-08 PROCEDURE — 96365 THER/PROPH/DIAG IV INF INIT: CPT

## 2018-08-08 RX ORDER — CEFTRIAXONE SODIUM 2 G/50ML
2 INJECTION, SOLUTION INTRAVENOUS ONCE
Status: COMPLETED | OUTPATIENT
Start: 2018-08-08 | End: 2018-08-08

## 2018-08-08 RX ORDER — CEFTRIAXONE SODIUM 2 G/50ML
2 INJECTION, SOLUTION INTRAVENOUS ONCE
Status: CANCELLED | OUTPATIENT
Start: 2018-08-08

## 2018-08-08 RX ADMIN — CEFTRIAXONE SODIUM 2 G: 2 INJECTION, SOLUTION INTRAVENOUS at 12:43

## 2018-08-09 ENCOUNTER — HOSPITAL ENCOUNTER (OUTPATIENT)
Dept: INFUSION THERAPY | Facility: HOSPITAL | Age: 68
Discharge: HOME | End: 2018-08-09
Admitting: INTERNAL MEDICINE

## 2018-08-09 VITALS
TEMPERATURE: 97.5 F | OXYGEN SATURATION: 97 % | DIASTOLIC BLOOD PRESSURE: 74 MMHG | HEART RATE: 98 BPM | SYSTOLIC BLOOD PRESSURE: 141 MMHG | RESPIRATION RATE: 16 BRPM

## 2018-08-09 DIAGNOSIS — Z86.79 HISTORY OF BACTERIAL ENDOCARDITIS: ICD-10-CM

## 2018-08-09 PROCEDURE — 25010000003 CEFTRIAXONE PER 250 MG: Performed by: INTERNAL MEDICINE

## 2018-08-09 PROCEDURE — 96365 THER/PROPH/DIAG IV INF INIT: CPT

## 2018-08-09 RX ORDER — CEFTRIAXONE SODIUM 2 G/50ML
2 INJECTION, SOLUTION INTRAVENOUS ONCE
Status: COMPLETED | OUTPATIENT
Start: 2018-08-09 | End: 2018-08-09

## 2018-08-09 RX ORDER — CEFTRIAXONE SODIUM 2 G/50ML
2 INJECTION, SOLUTION INTRAVENOUS ONCE
Status: CANCELLED | OUTPATIENT
Start: 2018-08-09

## 2018-08-09 RX ADMIN — CEFTRIAXONE SODIUM 2 G: 2 INJECTION, SOLUTION INTRAVENOUS at 09:54

## 2018-08-09 NOTE — PROGRESS NOTES
Pt tolerated infusion well.  SHANNEN PICC line flushed easily with good blood return. Pt dc'ed ambulatory at 1030.

## 2018-08-10 ENCOUNTER — HOSPITAL ENCOUNTER (OUTPATIENT)
Dept: INFUSION THERAPY | Facility: HOSPITAL | Age: 68
Discharge: HOME | End: 2018-08-10
Admitting: INTERNAL MEDICINE

## 2018-08-10 VITALS
TEMPERATURE: 98.8 F | SYSTOLIC BLOOD PRESSURE: 129 MMHG | HEART RATE: 91 BPM | DIASTOLIC BLOOD PRESSURE: 66 MMHG | RESPIRATION RATE: 16 BRPM | OXYGEN SATURATION: 98 %

## 2018-08-10 DIAGNOSIS — Z86.79 HISTORY OF BACTERIAL ENDOCARDITIS: ICD-10-CM

## 2018-08-10 PROCEDURE — 25010000003 CEFTRIAXONE PER 250 MG: Performed by: INTERNAL MEDICINE

## 2018-08-10 PROCEDURE — 96365 THER/PROPH/DIAG IV INF INIT: CPT

## 2018-08-10 PROCEDURE — G0463 HOSPITAL OUTPT CLINIC VISIT: HCPCS

## 2018-08-10 RX ORDER — CEFTRIAXONE SODIUM 2 G/50ML
2 INJECTION, SOLUTION INTRAVENOUS ONCE
Status: COMPLETED | OUTPATIENT
Start: 2018-08-10 | End: 2018-08-10

## 2018-08-10 RX ORDER — CEFTRIAXONE SODIUM 2 G/50ML
2 INJECTION, SOLUTION INTRAVENOUS ONCE
Status: CANCELLED | OUTPATIENT
Start: 2018-08-10

## 2018-08-10 RX ADMIN — CEFTRIAXONE SODIUM 2 G: 2 INJECTION, SOLUTION INTRAVENOUS at 11:32

## 2018-08-10 NOTE — PROGRESS NOTES
Pt tolerated infusion well.  SHANNEN PICC line dsg changed under sterile technique. Skin under stat lock pink and blistered. Layers of skin protectant applied to new area and new stat lock applied. Biopatch and ultra site value replaced. Pt dc'ed ambulatory at 1205.

## 2018-08-11 ENCOUNTER — HOSPITAL ENCOUNTER (OUTPATIENT)
Dept: INFUSION THERAPY | Facility: HOSPITAL | Age: 68
Discharge: HOME | End: 2018-08-11
Admitting: INTERNAL MEDICINE

## 2018-08-11 VITALS
SYSTOLIC BLOOD PRESSURE: 117 MMHG | TEMPERATURE: 97.7 F | RESPIRATION RATE: 20 BRPM | HEART RATE: 96 BPM | OXYGEN SATURATION: 98 % | DIASTOLIC BLOOD PRESSURE: 73 MMHG

## 2018-08-11 DIAGNOSIS — Z86.79 HISTORY OF BACTERIAL ENDOCARDITIS: ICD-10-CM

## 2018-08-11 PROCEDURE — 25010000003 CEFTRIAXONE PER 250 MG: Performed by: INTERNAL MEDICINE

## 2018-08-11 PROCEDURE — 96365 THER/PROPH/DIAG IV INF INIT: CPT

## 2018-08-11 RX ORDER — CEFTRIAXONE SODIUM 2 G/50ML
2 INJECTION, SOLUTION INTRAVENOUS ONCE
Status: CANCELLED | OUTPATIENT
Start: 2018-08-11

## 2018-08-11 RX ORDER — CEFTRIAXONE SODIUM 2 G/50ML
2 INJECTION, SOLUTION INTRAVENOUS ONCE
Status: COMPLETED | OUTPATIENT
Start: 2018-08-11 | End: 2018-08-11

## 2018-08-11 RX ADMIN — CEFTRIAXONE SODIUM 2 G: 2 INJECTION, SOLUTION INTRAVENOUS at 08:44

## 2018-08-12 ENCOUNTER — HOSPITAL ENCOUNTER (OUTPATIENT)
Dept: INFUSION THERAPY | Facility: HOSPITAL | Age: 68
Discharge: HOME | End: 2018-08-12
Admitting: INTERNAL MEDICINE

## 2018-08-12 VITALS
TEMPERATURE: 97.6 F | SYSTOLIC BLOOD PRESSURE: 118 MMHG | OXYGEN SATURATION: 99 % | RESPIRATION RATE: 20 BRPM | HEART RATE: 86 BPM | DIASTOLIC BLOOD PRESSURE: 76 MMHG

## 2018-08-12 DIAGNOSIS — Z86.79 HISTORY OF BACTERIAL ENDOCARDITIS: ICD-10-CM

## 2018-08-12 PROCEDURE — 25010000003 CEFTRIAXONE PER 250 MG: Performed by: INTERNAL MEDICINE

## 2018-08-12 PROCEDURE — 96365 THER/PROPH/DIAG IV INF INIT: CPT

## 2018-08-12 RX ORDER — CEFTRIAXONE SODIUM 2 G/50ML
2 INJECTION, SOLUTION INTRAVENOUS ONCE
Status: COMPLETED | OUTPATIENT
Start: 2018-08-12 | End: 2018-08-12

## 2018-08-12 RX ORDER — CEFTRIAXONE SODIUM 2 G/50ML
2 INJECTION, SOLUTION INTRAVENOUS ONCE
Status: CANCELLED | OUTPATIENT
Start: 2018-08-12

## 2018-08-12 RX ADMIN — CEFTRIAXONE SODIUM 2 G: 2 INJECTION, SOLUTION INTRAVENOUS at 09:06

## 2018-08-13 ENCOUNTER — HOSPITAL ENCOUNTER (OUTPATIENT)
Dept: INFUSION THERAPY | Facility: HOSPITAL | Age: 68
Discharge: HOME | End: 2018-08-13
Admitting: INTERNAL MEDICINE

## 2018-08-13 VITALS
OXYGEN SATURATION: 99 % | DIASTOLIC BLOOD PRESSURE: 73 MMHG | RESPIRATION RATE: 16 BRPM | HEART RATE: 98 BPM | TEMPERATURE: 98.7 F | SYSTOLIC BLOOD PRESSURE: 134 MMHG

## 2018-08-13 DIAGNOSIS — Z86.79 HISTORY OF BACTERIAL ENDOCARDITIS: ICD-10-CM

## 2018-08-13 PROCEDURE — 25010000003 CEFTRIAXONE PER 250 MG: Performed by: INTERNAL MEDICINE

## 2018-08-13 PROCEDURE — 96365 THER/PROPH/DIAG IV INF INIT: CPT

## 2018-08-13 RX ORDER — CEFTRIAXONE SODIUM 2 G/50ML
2 INJECTION, SOLUTION INTRAVENOUS ONCE
Status: CANCELLED | OUTPATIENT
Start: 2018-08-13

## 2018-08-13 RX ORDER — CEFTRIAXONE SODIUM 2 G/50ML
2 INJECTION, SOLUTION INTRAVENOUS ONCE
Status: COMPLETED | OUTPATIENT
Start: 2018-08-13 | End: 2018-08-13

## 2018-08-13 RX ADMIN — CEFTRIAXONE SODIUM 2 G: 2 INJECTION, SOLUTION INTRAVENOUS at 09:52

## 2018-08-14 ENCOUNTER — HOSPITAL ENCOUNTER (OUTPATIENT)
Dept: INFUSION THERAPY | Facility: HOSPITAL | Age: 68
Discharge: HOME | End: 2018-08-14
Admitting: INTERNAL MEDICINE

## 2018-08-14 VITALS
TEMPERATURE: 98.7 F | SYSTOLIC BLOOD PRESSURE: 123 MMHG | OXYGEN SATURATION: 98 % | DIASTOLIC BLOOD PRESSURE: 76 MMHG | RESPIRATION RATE: 16 BRPM | HEART RATE: 93 BPM

## 2018-08-14 DIAGNOSIS — Z86.79 HISTORY OF BACTERIAL ENDOCARDITIS: ICD-10-CM

## 2018-08-14 DIAGNOSIS — I33.0 ACUTE BACTERIAL ENDOCARDITIS: ICD-10-CM

## 2018-08-14 LAB
BASOPHILS # BLD AUTO: 0.04 10*3/MM3 (ref 0–0.2)
BASOPHILS NFR BLD AUTO: 0.5 % (ref 0–1.5)
CREAT BLD-MCNC: 0.86 MG/DL (ref 0.76–1.27)
DEPRECATED RDW RBC AUTO: 54.6 FL (ref 37–54)
EOSINOPHIL # BLD AUTO: 0.29 10*3/MM3 (ref 0–0.7)
EOSINOPHIL NFR BLD AUTO: 3.9 % (ref 0.3–6.2)
ERYTHROCYTE [DISTWIDTH] IN BLOOD BY AUTOMATED COUNT: 15.3 % (ref 11.5–14.5)
GFR SERPL CREATININE-BSD FRML MDRD: 88 ML/MIN/1.73
HCT VFR BLD AUTO: 36 % (ref 40.4–52.2)
HGB BLD-MCNC: 11.5 G/DL (ref 13.7–17.6)
IMM GRANULOCYTES # BLD: 0.02 10*3/MM3 (ref 0–0.03)
IMM GRANULOCYTES NFR BLD: 0.3 % (ref 0–0.5)
LYMPHOCYTES # BLD AUTO: 0.99 10*3/MM3 (ref 0.9–4.8)
LYMPHOCYTES NFR BLD AUTO: 13.3 % (ref 19.6–45.3)
MCH RBC QN AUTO: 31.1 PG (ref 27–32.7)
MCHC RBC AUTO-ENTMCNC: 31.9 G/DL (ref 32.6–36.4)
MCV RBC AUTO: 97.3 FL (ref 79.8–96.2)
MONOCYTES # BLD AUTO: 0.7 10*3/MM3 (ref 0.2–1.2)
MONOCYTES NFR BLD AUTO: 9.4 % (ref 5–12)
NEUTROPHILS # BLD AUTO: 5.41 10*3/MM3 (ref 1.9–8.1)
NEUTROPHILS NFR BLD AUTO: 72.9 % (ref 42.7–76)
PLATELET # BLD AUTO: 198 10*3/MM3 (ref 140–500)
PMV BLD AUTO: 11 FL (ref 6–12)
RBC # BLD AUTO: 3.7 10*6/MM3 (ref 4.6–6)
WBC NRBC COR # BLD: 7.43 10*3/MM3 (ref 4.5–10.7)

## 2018-08-14 PROCEDURE — 96365 THER/PROPH/DIAG IV INF INIT: CPT

## 2018-08-14 PROCEDURE — 82565 ASSAY OF CREATININE: CPT | Performed by: INTERNAL MEDICINE

## 2018-08-14 PROCEDURE — 36592 COLLECT BLOOD FROM PICC: CPT

## 2018-08-14 PROCEDURE — 85025 COMPLETE CBC W/AUTO DIFF WBC: CPT | Performed by: INTERNAL MEDICINE

## 2018-08-14 PROCEDURE — 25010000003 CEFTRIAXONE PER 250 MG: Performed by: INTERNAL MEDICINE

## 2018-08-14 RX ORDER — CEFTRIAXONE SODIUM 2 G/50ML
2 INJECTION, SOLUTION INTRAVENOUS ONCE
Status: CANCELLED | OUTPATIENT
Start: 2018-08-14

## 2018-08-14 RX ORDER — CEFTRIAXONE SODIUM 2 G/50ML
2 INJECTION, SOLUTION INTRAVENOUS ONCE
Status: COMPLETED | OUTPATIENT
Start: 2018-08-14 | End: 2018-08-14

## 2018-08-14 RX ADMIN — CEFTRIAXONE SODIUM 2 G: 2 INJECTION, SOLUTION INTRAVENOUS at 09:16

## 2018-08-14 NOTE — PROGRESS NOTES
Pt tolerated infusion well.  SHANNEN PICC line dressing intact and flushes easily with good blood return.  Pt DC per ambulation @ 6595.

## 2018-08-15 ENCOUNTER — HOSPITAL ENCOUNTER (OUTPATIENT)
Dept: INFUSION THERAPY | Facility: HOSPITAL | Age: 68
Discharge: HOME | End: 2018-08-15
Admitting: INTERNAL MEDICINE

## 2018-08-15 VITALS
HEART RATE: 98 BPM | SYSTOLIC BLOOD PRESSURE: 140 MMHG | TEMPERATURE: 99.3 F | RESPIRATION RATE: 20 BRPM | OXYGEN SATURATION: 97 % | DIASTOLIC BLOOD PRESSURE: 67 MMHG

## 2018-08-15 DIAGNOSIS — Z86.79 HISTORY OF BACTERIAL ENDOCARDITIS: ICD-10-CM

## 2018-08-15 PROCEDURE — 25010000003 CEFTRIAXONE PER 250 MG: Performed by: INTERNAL MEDICINE

## 2018-08-15 PROCEDURE — 96365 THER/PROPH/DIAG IV INF INIT: CPT

## 2018-08-15 RX ORDER — ATORVASTATIN CALCIUM 40 MG/1
40 TABLET, FILM COATED ORAL DAILY
Qty: 90 TABLET | Refills: 0 | Status: SHIPPED | OUTPATIENT
Start: 2018-08-15 | End: 2018-11-19 | Stop reason: SDUPTHER

## 2018-08-15 RX ORDER — CEFTRIAXONE SODIUM 2 G/50ML
2 INJECTION, SOLUTION INTRAVENOUS ONCE
Status: CANCELLED | OUTPATIENT
Start: 2018-08-15

## 2018-08-15 RX ORDER — CEFTRIAXONE SODIUM 2 G/50ML
2 INJECTION, SOLUTION INTRAVENOUS ONCE
Status: COMPLETED | OUTPATIENT
Start: 2018-08-15 | End: 2018-08-15

## 2018-08-15 RX ADMIN — CEFTRIAXONE SODIUM 2 G: 2 INJECTION, SOLUTION INTRAVENOUS at 09:09

## 2018-08-15 NOTE — PROGRESS NOTES
TOLERATED TREATMENT WELL. PO COFFEE. WATCHING TV, DISCHARGED HOME AMB AFTER APPOINTMENT COMPLETED.

## 2018-08-16 ENCOUNTER — HOSPITAL ENCOUNTER (OUTPATIENT)
Dept: INFUSION THERAPY | Facility: HOSPITAL | Age: 68
Discharge: HOME | End: 2018-08-16
Admitting: INTERNAL MEDICINE

## 2018-08-16 ENCOUNTER — CLINICAL SUPPORT NO REQUIREMENTS (OUTPATIENT)
Dept: CARDIOLOGY | Facility: CLINIC | Age: 68
End: 2018-08-16

## 2018-08-16 VITALS
RESPIRATION RATE: 16 BRPM | SYSTOLIC BLOOD PRESSURE: 134 MMHG | TEMPERATURE: 98.4 F | HEART RATE: 94 BPM | DIASTOLIC BLOOD PRESSURE: 79 MMHG | OXYGEN SATURATION: 98 %

## 2018-08-16 DIAGNOSIS — I25.5 ISCHEMIC CARDIOMYOPATHY: Primary | ICD-10-CM

## 2018-08-16 DIAGNOSIS — Z86.79 HISTORY OF BACTERIAL ENDOCARDITIS: ICD-10-CM

## 2018-08-16 PROCEDURE — 25010000003 CEFTRIAXONE PER 250 MG: Performed by: INTERNAL MEDICINE

## 2018-08-16 PROCEDURE — 96365 THER/PROPH/DIAG IV INF INIT: CPT

## 2018-08-16 PROCEDURE — 93295 DEV INTERROG REMOTE 1/2/MLT: CPT | Performed by: INTERNAL MEDICINE

## 2018-08-16 PROCEDURE — 93296 REM INTERROG EVL PM/IDS: CPT | Performed by: INTERNAL MEDICINE

## 2018-08-16 RX ORDER — CEFTRIAXONE SODIUM 2 G/50ML
2 INJECTION, SOLUTION INTRAVENOUS ONCE
Status: COMPLETED | OUTPATIENT
Start: 2018-08-16 | End: 2018-08-16

## 2018-08-16 RX ORDER — CEFTRIAXONE SODIUM 2 G/50ML
2 INJECTION, SOLUTION INTRAVENOUS ONCE
Status: CANCELLED | OUTPATIENT
Start: 2018-08-16

## 2018-08-16 RX ADMIN — CEFTRIAXONE SODIUM 2 G: 2 INJECTION, SOLUTION INTRAVENOUS at 11:41

## 2018-08-17 ENCOUNTER — HOSPITAL ENCOUNTER (OUTPATIENT)
Dept: INFUSION THERAPY | Facility: HOSPITAL | Age: 68
Discharge: HOME | End: 2018-08-17
Admitting: INTERNAL MEDICINE

## 2018-08-17 VITALS
SYSTOLIC BLOOD PRESSURE: 150 MMHG | DIASTOLIC BLOOD PRESSURE: 63 MMHG | OXYGEN SATURATION: 96 % | HEART RATE: 98 BPM | RESPIRATION RATE: 20 BRPM | TEMPERATURE: 98.7 F

## 2018-08-17 DIAGNOSIS — Z86.79 HISTORY OF BACTERIAL ENDOCARDITIS: ICD-10-CM

## 2018-08-17 PROCEDURE — G0463 HOSPITAL OUTPT CLINIC VISIT: HCPCS

## 2018-08-17 PROCEDURE — 25010000003 CEFTRIAXONE PER 250 MG: Performed by: INTERNAL MEDICINE

## 2018-08-17 PROCEDURE — 96365 THER/PROPH/DIAG IV INF INIT: CPT

## 2018-08-17 RX ORDER — CEFTRIAXONE SODIUM 2 G/50ML
2 INJECTION, SOLUTION INTRAVENOUS ONCE
Status: CANCELLED | OUTPATIENT
Start: 2018-08-17

## 2018-08-17 RX ORDER — CEFTRIAXONE SODIUM 2 G/50ML
2 INJECTION, SOLUTION INTRAVENOUS ONCE
Status: COMPLETED | OUTPATIENT
Start: 2018-08-17 | End: 2018-08-17

## 2018-08-17 RX ADMIN — CEFTRIAXONE SODIUM 2 G: 2 INJECTION, SOLUTION INTRAVENOUS at 10:18

## 2018-08-17 NOTE — PROGRESS NOTES
Patient tolelrated infusion well. SHANNEN PICC flushes easily with good blood return. Patient tolerated PICC Dressing change. Patient ambulatory, D/C'd from ACU at 1106.

## 2018-08-18 ENCOUNTER — HOSPITAL ENCOUNTER (OUTPATIENT)
Dept: INFUSION THERAPY | Facility: HOSPITAL | Age: 68
Discharge: HOME | End: 2018-08-18
Admitting: INTERNAL MEDICINE

## 2018-08-18 VITALS
TEMPERATURE: 97.5 F | HEIGHT: 68 IN | RESPIRATION RATE: 16 BRPM | DIASTOLIC BLOOD PRESSURE: 72 MMHG | BODY MASS INDEX: 30.01 KG/M2 | WEIGHT: 198 LBS | SYSTOLIC BLOOD PRESSURE: 123 MMHG | HEART RATE: 91 BPM | OXYGEN SATURATION: 97 %

## 2018-08-18 DIAGNOSIS — Z86.79 HISTORY OF BACTERIAL ENDOCARDITIS: ICD-10-CM

## 2018-08-18 PROCEDURE — 25010000003 CEFTRIAXONE PER 250 MG: Performed by: INTERNAL MEDICINE

## 2018-08-18 PROCEDURE — 96365 THER/PROPH/DIAG IV INF INIT: CPT

## 2018-08-18 RX ORDER — CEFTRIAXONE SODIUM 2 G/50ML
2 INJECTION, SOLUTION INTRAVENOUS ONCE
Status: CANCELLED | OUTPATIENT
Start: 2018-08-18

## 2018-08-18 RX ORDER — CEFTRIAXONE SODIUM 2 G/50ML
2 INJECTION, SOLUTION INTRAVENOUS ONCE
Status: COMPLETED | OUTPATIENT
Start: 2018-08-18 | End: 2018-08-18

## 2018-08-18 RX ADMIN — CEFTRIAXONE SODIUM 2 G: 2 INJECTION, SOLUTION INTRAVENOUS at 08:47

## 2018-08-18 NOTE — PROGRESS NOTES
Patient tolerated infusion well. SHANNEN PICC flushes easily with good blood return. Patient ambulatory, D/C'd from ACU at 0920.

## 2018-08-19 ENCOUNTER — HOSPITAL ENCOUNTER (OUTPATIENT)
Dept: INFUSION THERAPY | Facility: HOSPITAL | Age: 68
Discharge: HOME | End: 2018-08-19
Admitting: INTERNAL MEDICINE

## 2018-08-19 VITALS
RESPIRATION RATE: 16 BRPM | SYSTOLIC BLOOD PRESSURE: 143 MMHG | DIASTOLIC BLOOD PRESSURE: 78 MMHG | OXYGEN SATURATION: 98 % | HEART RATE: 100 BPM | TEMPERATURE: 98.8 F

## 2018-08-19 DIAGNOSIS — Z86.79 HISTORY OF BACTERIAL ENDOCARDITIS: ICD-10-CM

## 2018-08-19 PROCEDURE — 96365 THER/PROPH/DIAG IV INF INIT: CPT

## 2018-08-19 PROCEDURE — 25010000003 CEFTRIAXONE PER 250 MG: Performed by: INTERNAL MEDICINE

## 2018-08-19 RX ORDER — CEFTRIAXONE SODIUM 2 G/50ML
2 INJECTION, SOLUTION INTRAVENOUS ONCE
Status: COMPLETED | OUTPATIENT
Start: 2018-08-19 | End: 2018-08-19

## 2018-08-19 RX ORDER — CEFTRIAXONE SODIUM 2 G/50ML
2 INJECTION, SOLUTION INTRAVENOUS ONCE
Status: CANCELLED | OUTPATIENT
Start: 2018-08-19

## 2018-08-19 RX ADMIN — CEFTRIAXONE SODIUM 2 G: 2 INJECTION, SOLUTION INTRAVENOUS at 09:04

## 2018-08-19 NOTE — PROGRESS NOTES
Patient tolerated infusion without complaints. SHANNEN PICC flushes easily with good blood return. Patient ambulatory, D/C'd from radiology triage at 0937.

## 2018-08-20 ENCOUNTER — HOSPITAL ENCOUNTER (OUTPATIENT)
Dept: INFUSION THERAPY | Facility: HOSPITAL | Age: 68
Discharge: HOME | End: 2018-08-20
Admitting: INTERNAL MEDICINE

## 2018-08-20 ENCOUNTER — OFFICE VISIT (OUTPATIENT)
Dept: INFECTIOUS DISEASES | Facility: CLINIC | Age: 68
End: 2018-08-20

## 2018-08-20 VITALS
HEIGHT: 68 IN | DIASTOLIC BLOOD PRESSURE: 74 MMHG | WEIGHT: 197.8 LBS | HEART RATE: 76 BPM | SYSTOLIC BLOOD PRESSURE: 126 MMHG | BODY MASS INDEX: 29.98 KG/M2 | TEMPERATURE: 98.1 F

## 2018-08-20 VITALS
RESPIRATION RATE: 20 BRPM | TEMPERATURE: 97.9 F | SYSTOLIC BLOOD PRESSURE: 122 MMHG | HEART RATE: 99 BPM | DIASTOLIC BLOOD PRESSURE: 71 MMHG | OXYGEN SATURATION: 98 %

## 2018-08-20 DIAGNOSIS — A40.1 SEPTICEMIA DUE TO GROUP B STREPTOCOCCUS (HCC): ICD-10-CM

## 2018-08-20 DIAGNOSIS — T82.7XXD: ICD-10-CM

## 2018-08-20 DIAGNOSIS — Z86.79 HISTORY OF BACTERIAL ENDOCARDITIS: ICD-10-CM

## 2018-08-20 DIAGNOSIS — I25.5 ISCHEMIC CARDIOMYOPATHY: ICD-10-CM

## 2018-08-20 DIAGNOSIS — I33.0 ACUTE BACTERIAL ENDOCARDITIS: Primary | ICD-10-CM

## 2018-08-20 DIAGNOSIS — Z79.2 LONG TERM CURRENT USE OF ANTIBIOTICS: ICD-10-CM

## 2018-08-20 PROCEDURE — 25010000003 CEFTRIAXONE PER 250 MG: Performed by: INTERNAL MEDICINE

## 2018-08-20 PROCEDURE — 96365 THER/PROPH/DIAG IV INF INIT: CPT

## 2018-08-20 PROCEDURE — 99214 OFFICE O/P EST MOD 30 MIN: CPT | Performed by: INTERNAL MEDICINE

## 2018-08-20 RX ORDER — AMOXICILLIN 875 MG/1
875 TABLET, COATED ORAL EVERY 12 HOURS SCHEDULED
Qty: 60 TABLET | Refills: 6 | Status: SHIPPED | OUTPATIENT
Start: 2018-08-20

## 2018-08-20 RX ORDER — CEFTRIAXONE SODIUM 2 G/50ML
2 INJECTION, SOLUTION INTRAVENOUS ONCE
Status: COMPLETED | OUTPATIENT
Start: 2018-08-20 | End: 2018-08-20

## 2018-08-20 RX ORDER — CEFTRIAXONE SODIUM 2 G/50ML
2 INJECTION, SOLUTION INTRAVENOUS ONCE
OUTPATIENT
Start: 2018-08-20

## 2018-08-20 RX ADMIN — CEFTRIAXONE SODIUM 2 G: 2 INJECTION, SOLUTION INTRAVENOUS at 10:42

## 2018-08-20 NOTE — PROGRESS NOTES
"cc: f/u endocarditis    Patient had admission in June 2018 and had 2 sets of blood cultures which were positive for group B streptococci.  He was treated with oral antibiotics and was readmitted with recurrent GBS bacteremia in July 2018. JAYE shoewd extremely large mobile vegetation on tissue mitral valve and large ICD vegetation and both RA and RV, but was recommended for medical therapy and was given 6 weeks of IV ceftriaxone.    Since discharge, the patient reports that he is done well.  He denies recurrent fevers or chills or night sweats.  Actually feels quite a bit better energy levels increasing.  He denies any side effects or missed doses from a long-term use of ceftriaxone.  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  GENERAL: Awake and alert, in no acute distress.   SKIN: Warm and dry without cutaneous eruptions   PSYCHIATRIC: Appropriate mood, affect, insight, and judgment.     DIAGNOSTICS:  Antibody monitoring labs reviewed.  Creatinine 0.86-1.2.  White count 7.43, 73% neutrophils, 13% monocytes, 4% eosinophils.  Hematocrit 36.  Platelets 198.    Assessment and Plan  1. 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 Long " term current use of antibiotics  3. Ischemic cardiomyopathy    He ended up doing reasonably well from an infectious perspective.  Given AICD infection and retained hardware, high risk of recurrent/recrudescent infection after discontinuation antibiotics.  We will therefore transition to suppress antibiotics with amoxicillin 875 mg by mouth every 12 hours.  Discussed with the patient that I thought he had about a 50-50 chance of infection recrudescent without suppressive antibiotics.  Suppressive antibiotics will reduce but not eliminate the risk of recrudescent infection.  We discussed signs and symptoms recurrent infection when to seek medical attention.  Finally, although he is not currently a candidate for any transplantation or ventricular assist device, if he were to ever need one, we can consider a trial off suppressive antibiotics to see if his infection comes back as I anticipate they will be very unlikely to perform any type of intervention when actively infected.  I will see him back in clinic in 2-3 months or sooner if needed.

## 2018-08-20 NOTE — PROGRESS NOTES
Patient tolerated infusion without complaints. SHANNEN PICC flushes easily with good blood return. Patient ambulatory, D/C'd form ACU at 1109.

## 2018-08-22 RX ORDER — FUROSEMIDE 40 MG/1
TABLET ORAL
Qty: 90 TABLET | Refills: 1 | OUTPATIENT
Start: 2018-08-22

## 2018-09-11 ENCOUNTER — TELEPHONE (OUTPATIENT)
Dept: CARDIOLOGY | Facility: CLINIC | Age: 68
End: 2018-09-11

## 2018-09-11 NOTE — TELEPHONE ENCOUNTER
Pt is having a cornea transplant next week & he wanted to know with his condition do you have any concerns with him proceeding with this surgery. Pt # [phone]. dmk

## 2018-09-13 NOTE — TELEPHONE ENCOUNTER
Faxed clearance to [phone] [facility], previous fax number was incorrect. Confirmation received. dmk

## 2018-09-14 ENCOUNTER — HOSPITAL ENCOUNTER (EMERGENCY)
Facility: HOSPITAL | Age: 68
Discharge: HOME | End: 2018-09-14
Attending: EMERGENCY MEDICINE | Admitting: EMERGENCY MEDICINE

## 2018-09-14 ENCOUNTER — APPOINTMENT (OUTPATIENT)
Dept: GENERAL RADIOLOGY | Facility: HOSPITAL | Age: 68
End: 2018-09-14

## 2018-09-14 VITALS
DIASTOLIC BLOOD PRESSURE: 70 MMHG | RESPIRATION RATE: 16 BRPM | HEART RATE: 73 BPM | BODY MASS INDEX: 31.52 KG/M2 | HEIGHT: 68 IN | OXYGEN SATURATION: 96 % | SYSTOLIC BLOOD PRESSURE: 128 MMHG | WEIGHT: 208 LBS | TEMPERATURE: 97.7 F

## 2018-09-14 DIAGNOSIS — R06.00 DYSPNEA, UNSPECIFIED TYPE: Primary | ICD-10-CM

## 2018-09-14 LAB
ALBUMIN SERPL-MCNC: 4.3 G/DL (ref 3.5–5.2)
ALBUMIN/GLOB SERPL: 0.9 G/DL
ALP SERPL-CCNC: 105 U/L (ref 39–117)
ALT SERPL W P-5'-P-CCNC: 25 U/L (ref 1–41)
ANION GAP SERPL CALCULATED.3IONS-SCNC: 15.1 MMOL/L
AST SERPL-CCNC: 21 U/L (ref 1–40)
BASOPHILS # BLD AUTO: 0.08 10*3/MM3 (ref 0–0.2)
BASOPHILS NFR BLD AUTO: 1.2 % (ref 0–1.5)
BILIRUB SERPL-MCNC: 0.3 MG/DL (ref 0.1–1.2)
BILIRUB UR QL STRIP: NEGATIVE
BUN BLD-MCNC: 35 MG/DL (ref 8–23)
BUN/CREAT SERPL: 23.2 (ref 7–25)
CALCIUM SPEC-SCNC: 9.4 MG/DL (ref 8.6–10.5)
CHLORIDE SERPL-SCNC: 98 MMOL/L (ref 98–107)
CLARITY UR: CLEAR
CO2 SERPL-SCNC: 22.9 MMOL/L (ref 22–29)
COLOR UR: YELLOW
CREAT BLD-MCNC: 1.51 MG/DL (ref 0.76–1.27)
D-LACTATE SERPL-SCNC: 1 MMOL/L (ref 0.5–2)
D-LACTATE SERPL-SCNC: 2.2 MMOL/L (ref 0.5–2)
DEPRECATED RDW RBC AUTO: 48.4 FL (ref 37–54)
EOSINOPHIL # BLD AUTO: 0.6 10*3/MM3 (ref 0–0.7)
EOSINOPHIL NFR BLD AUTO: 9.1 % (ref 0.3–6.2)
ERYTHROCYTE [DISTWIDTH] IN BLOOD BY AUTOMATED COUNT: 14.2 % (ref 11.5–14.5)
GFR SERPL CREATININE-BSD FRML MDRD: 46 ML/MIN/1.73
GLOBULIN UR ELPH-MCNC: 4.7 GM/DL
GLUCOSE BLD-MCNC: 122 MG/DL (ref 65–99)
GLUCOSE UR STRIP-MCNC: NEGATIVE MG/DL
HCT VFR BLD AUTO: 42.5 % (ref 40.4–52.2)
HGB BLD-MCNC: 13.5 G/DL (ref 13.7–17.6)
HGB UR QL STRIP.AUTO: NEGATIVE
HOLD SPECIMEN: NORMAL
IMM GRANULOCYTES # BLD: 0.02 10*3/MM3 (ref 0–0.03)
IMM GRANULOCYTES NFR BLD: 0.3 % (ref 0–0.5)
INR PPP: 0.99 (ref 0.9–1.1)
KETONES UR QL STRIP: NEGATIVE
LEUKOCYTE ESTERASE UR QL STRIP.AUTO: NEGATIVE
LYMPHOCYTES # BLD AUTO: 1.14 10*3/MM3 (ref 0.9–4.8)
LYMPHOCYTES NFR BLD AUTO: 17.4 % (ref 19.6–45.3)
MCH RBC QN AUTO: 29.5 PG (ref 27–32.7)
MCHC RBC AUTO-ENTMCNC: 31.8 G/DL (ref 32.6–36.4)
MCV RBC AUTO: 93 FL (ref 79.8–96.2)
MONOCYTES # BLD AUTO: 0.59 10*3/MM3 (ref 0.2–1.2)
MONOCYTES NFR BLD AUTO: 9 % (ref 5–12)
NEUTROPHILS # BLD AUTO: 4.14 10*3/MM3 (ref 1.9–8.1)
NEUTROPHILS NFR BLD AUTO: 63 % (ref 42.7–76)
NITRITE UR QL STRIP: NEGATIVE
NT-PROBNP SERPL-MCNC: 456.8 PG/ML (ref 0–900)
PH UR STRIP.AUTO: <=5 [PH] (ref 5–8)
PLATELET # BLD AUTO: 263 10*3/MM3 (ref 140–500)
PMV BLD AUTO: 11.2 FL (ref 6–12)
POTASSIUM BLD-SCNC: 4.6 MMOL/L (ref 3.5–5.2)
PROT SERPL-MCNC: 9 G/DL (ref 6–8.5)
PROT UR QL STRIP: NEGATIVE
PROTHROMBIN TIME: 12.9 SECONDS (ref 11.7–14.2)
RBC # BLD AUTO: 4.57 10*6/MM3 (ref 4.6–6)
SODIUM BLD-SCNC: 136 MMOL/L (ref 136–145)
SP GR UR STRIP: 1.02 (ref 1–1.03)
TROPONIN T SERPL-MCNC: <0.01 NG/ML (ref 0–0.03)
UROBILINOGEN UR QL STRIP: NORMAL
WBC NRBC COR # BLD: 6.57 10*3/MM3 (ref 4.5–10.7)

## 2018-09-14 PROCEDURE — 85025 COMPLETE CBC W/AUTO DIFF WBC: CPT | Performed by: NURSE PRACTITIONER

## 2018-09-14 PROCEDURE — 93005 ELECTROCARDIOGRAM TRACING: CPT | Performed by: NURSE PRACTITIONER

## 2018-09-14 PROCEDURE — 83605 ASSAY OF LACTIC ACID: CPT | Performed by: NURSE PRACTITIONER

## 2018-09-14 PROCEDURE — 81003 URINALYSIS AUTO W/O SCOPE: CPT | Performed by: NURSE PRACTITIONER

## 2018-09-14 PROCEDURE — 71046 X-RAY EXAM CHEST 2 VIEWS: CPT

## 2018-09-14 PROCEDURE — 87040 BLOOD CULTURE FOR BACTERIA: CPT | Performed by: NURSE PRACTITIONER

## 2018-09-14 PROCEDURE — 96360 HYDRATION IV INFUSION INIT: CPT

## 2018-09-14 PROCEDURE — 80053 COMPREHEN METABOLIC PANEL: CPT | Performed by: NURSE PRACTITIONER

## 2018-09-14 PROCEDURE — 83880 ASSAY OF NATRIURETIC PEPTIDE: CPT | Performed by: NURSE PRACTITIONER

## 2018-09-14 PROCEDURE — 99284 EMERGENCY DEPT VISIT MOD MDM: CPT

## 2018-09-14 PROCEDURE — 93010 ELECTROCARDIOGRAM REPORT: CPT | Performed by: INTERNAL MEDICINE

## 2018-09-14 PROCEDURE — 83605 ASSAY OF LACTIC ACID: CPT | Performed by: EMERGENCY MEDICINE

## 2018-09-14 PROCEDURE — 85610 PROTHROMBIN TIME: CPT | Performed by: NURSE PRACTITIONER

## 2018-09-14 PROCEDURE — 84484 ASSAY OF TROPONIN QUANT: CPT | Performed by: NURSE PRACTITIONER

## 2018-09-14 RX ORDER — SODIUM CHLORIDE 0.9 % (FLUSH) 0.9 %
10 SYRINGE (ML) INJECTION AS NEEDED
Status: DISCONTINUED | OUTPATIENT
Start: 2018-09-14 | End: 2018-09-14 | Stop reason: HOSPADM

## 2018-09-14 RX ORDER — SPIRONOLACTONE 25 MG/1
25 TABLET ORAL DAILY
COMMUNITY
End: 2018-10-09 | Stop reason: SDUPTHER

## 2018-09-14 RX ADMIN — SODIUM CHLORIDE 1000 ML: 9 INJECTION, SOLUTION INTRAVENOUS at 13:22

## 2018-09-14 NOTE — PROGRESS NOTES
Clinical Pharmacy Services: Medication History    Mello Arias Jr. is a 68 y.o. male presenting to [facility] for   Chief Complaint   Patient presents with   • Shortness of Breath   • Fatigue       He  has a past medical history of Allergic rhinitis; Anemia; Asthma; Bone fracture; CAD (coronary artery disease); Cardiomyopathy, ischemic; Carpal tunnel syndrome; CHF (congestive heart failure), NYHA class III, chronic, systolic (CMS/HCC); GERD (gastroesophageal reflux disease); Health care maintenance; Herniated disc, cervical; total hip arthroplasty (3/18/2016); Hyperglycemia; Hyperlipidemia; Hypertension; Idiopathic ventricular tachycardia (CMS/[facility]); Lumbosacral radiculopathy at L4; Myocardial infarction; Myocardial ischemia; Non-rheumatic tricuspid valve insufficiency; Nonrheumatic mitral valve regurgitation; Open wound; Osteoarthritis; PAF (paroxysmal atrial fibrillation) (CMS/[facility]); and Ventricular tachycardia (CMS/[facility]).    Allergies as of 09/14/2018 - Reviewed 09/14/2018   Allergen Reaction Noted   • Shellfish-derived products Swelling and Other (See Comments) 11/19/2015       Medication information was obtained from: Patient  Pharmacy and Phone Number: [facility] [phone]    Prior to Admission Medications     Prescriptions Last Dose Informant Patient Reported? Taking?    acetaminophen (TYLENOL) 325 MG tablet  Self No Yes    Take 2 tablets by mouth Every 6 (Six) Hours As Needed for Mild Pain .    albuterol (PROAIR HFA) 108 (90 BASE) MCG/ACT inhaler  Self Yes Yes    Inhale 2 puffs Every 6 (Six) Hours As Needed.    amoxicillin (AMOXIL) 875 MG tablet  Self No Yes    Take 1 tablet by mouth Every 12 (Twelve) Hours.    aspirin 81 MG tablet  Self Yes Yes    Take 81 mg by mouth Daily.    atorvastatin (LIPITOR) 40 MG tablet  Self No Yes    Take 1 tablet by mouth Daily.    bumetanide (BUMEX) 1 MG tablet  Self No Yes    Take 1 tablet by mouth Daily.    montelukast (SINGULAIR) 10 MG tablet  Self Yes Yes     Take 10 mg by mouth Every Night.    nitroglycerin (NITROSTAT) 0.4 MG SL tablet  Self Yes Yes    Place 0.4 mg under the tongue Every 5 (Five) Minutes As Needed for Chest Pain. Take no more than 3 doses in 15 minutes.    omeprazole (PRILOSEC) 20 MG capsule  Self Yes Yes    Take 20 mg by mouth Daily.    sacubitril-valsartan (ENTRESTO) 24-26 MG tablet  Self No Yes    Take 1 tablet by mouth Every 12 (Twelve) Hours.    spironolactone (ALDACTONE) 25 MG tablet  Self Yes Yes    Take 25 mg by mouth Daily.            Medication notes: Magnesium and Bupropion removed per patient, therapy complete.    This medication list is complete to the best of my knowledge as of 9/14/2018    Please call if questions.    Taylor Arias, Medication History Technician  9/14/2018 3:55 PM

## 2018-09-14 NOTE — ED PROVIDER NOTES
EMERGENCY DEPARTMENT ENCOUNTER    Room Number:    Date seen:  2018  Time seen: 12:30 PM  PCP: Kelsey Muñoz MD    HPI:  Chief complaint:SOA  Context:Mello Arias Jr. is a 68 y.o. male who presents to the ED with c/o SOA and weakness with fatigue for past week.  Last night he was moving a heavy object and got hot and felt heart palpitations with severe headache.  Was recently here for issue with heart valve vegetation presumed infectious and was seen by Dr.'s Calvin and Rolando.  He has also been evaluated at [facility].  He is on daily abx supressant of Amoxicillin after having completed 6 weeks of IVABX.  He denies current  CP, n/v or lower extremity edema.     Timing:constant  Duration:  1 week  Location: n/a  Radiation:n/a  Quality:soa, weakness  Intensity/Severity: moderate  Associated Symptoms: no   Aggravating Factors: activity  Alleviating Factors: rest  Previous Episodes: yes  Treatment before arrival: no    MEDICAL RECORD REVIEW  [facility] CARDIOLOG[facility]  [address] [facility] [address] [phone]             Mello Arias Jr.   Adult Transesophageal Echo   Order# [id number]   Reading physician: Adam Blunt MD Ordering physician: Naun Calvin MD Study date: 18   Patient Information   Patient Name  Mello Arias Jr. MRN  [id number] Sex  Male  (Age)  1950 (68 y.o.)   Sedation Narrator Report   Sedation Narrator Report   Interpretation Summary   · Left ventricular systolic function is severely decreased. Estimated EF = 20%. Global left ventricular wall motion appears abnormal. The left ventricular cavity is severely dilated. Left ventricular diastolic function was indeterminate. There is no evidence of a left ventricular thrombus present. Endocardial visualization is poor and Definity was not given. The apex is aneurysmal/dyskinetic. The anterior wall and septum appear to be as well.  · There is an ICD wire traversing the  tricuspid valve, with a very large, bulky vegetation noted along the atrial and ventricular aspects, encasing the wire itself.  · There is a bioprosthetic mitral valve present. Theres is an extremely large vegetation that is attached to what is likely the posterior mitral leaflet. It involves the central portion of the valve where it is globular. It is thick and string-like on the atrial surface, and measures at least 2.5 cm. Interestingly there is no significant mitral regurgitation. I could not obtain an en face view of the valve due to limited transgastric views     Progress Notes  Encounter Date: 8/20/2018  Vamshi Marshall MD   Infectious Diseases     ManualTemplate  Copied  cc: f/u endocarditis     Patient had admission in June 2018 and had 2 sets of blood cultures which were positive for group B streptococci.  He was treated with oral antibiotics and was readmitted with recurrent GBS bacteremia in July 2018. JAYE shoewd extremely large mobile vegetation on tissue mitral valve and large ICD vegetation and both RA and RV, but was recommended for medical therapy and was given 6 weeks of IV ceftriaxone.     Since discharge, the patient reports that he is done well.  He denies recurrent fevers or chills or night sweats.  Actually feels quite a bit better energy levels increasing.  He denies any side effects or missed doses from a long-term use of ceftriaxone.  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  GENERAL: Awake and alert, in no acute distress.   SKIN: Warm and dry without cutaneous eruptions   PSYCHIATRIC: Appropriate mood, affect, insight, and judgment.      DIAGNOSTICS:  Antibody monitoring labs reviewed.  Creatinine 0.86-1.2.  White count 7.43, 73% neutrophils, 13% monocytes, 4% eosinophils.  Hematocrit 36.  Platelets 198.     Assessment and Plan  1. 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 Long term current use of antibiotics  3. Ischemic cardiomyopathy     He ended up doing reasonably well from an infectious perspective.  Given AICD infection and retained hardware, high risk of recurrent/recrudescent infection after discontinuation antibiotics.  We will therefore transition to suppress antibiotics with amoxicillin 875 mg by mouth every 12 hours.  Discussed with the patient that I thought he had about a 50-50 chance of infection recrudescent without suppressive antibiotics.  Suppressive antibiotics will reduce but not eliminate the risk of recrudescent infection.  We discussed signs and symptoms recurrent infection when to seek medical attention.  Finally, although he is not currently a candidate for any transplantation or ventricular assist device, if he were to ever need one, we can consider a trial off suppressive antibiotics to see if his infection comes back as I anticipate they will be very unlikely to perform any type of intervention when actively infected.  I will see him back in clinic in 2-3 months or sooner if needed.               ALLERGIES  Shellfish-derived products    PAST MEDICAL HISTORY  Active Ambulatory Problems     Diagnosis Date Noted   • A-fib (CMS/[facility]) 10/28/2016   • Acid reflux " 10/28/2016   • Blood glucose elevated 10/28/2016   • HLD (hyperlipidemia) 10/28/2016   • Cardiomyopathy, ischemic 10/28/2016   • L-S radiculopathy 10/28/2016   • Idiopathic ventricular tachycardia (CMS/[facility]) 10/28/2016   • CHF (congestive heart failure), NYHA class III (CMS/[facility]) 10/28/2016   • Coronary artery disease of native artery of native heart with stable angina pectoris (CMS/[facility]) 11/03/2016   • Non-rheumatic mitral regurgitation 11/03/2016   • Non-rheumatic tricuspid valve insufficiency 11/03/2016   • S/P CABG x 4 11/15/2016   • S/P MVR (mitral valve replacement) 11/15/2016   • S/P TVR (tricuspid valve repair) 11/15/2016   • History of Bacteremia due to group B Streptococcus 06/08/2018   • Depression 06/27/2018   • History of bacterial endocarditis 07/11/2018   • Anemia of chronic disease 07/15/2018     Resolved Ambulatory Problems     Diagnosis Date Noted   • Hx of total hip arthroplasty 03/18/2016   • History of total hip arthroplasty 04/13/2016   • Right inguinal pain 04/21/2016   • History of bilateral hip arthroplasty 06/27/2016   • Right hip pain 06/27/2016   • Precordial chest pain 10/04/2016   • Ischemic heart disease 10/27/2016   • Other specified forms of effusion, except tuberculous 11/15/2016   • Acute renal failure (CMS/[facility]) 06/04/2018   • Sepsis (CMS/[facility]) 06/04/2018   • Hyponatremia 06/04/2018   • Bacterial pneumonia 06/04/2018   • Pneumonia 07/10/2018   • Hypomagnesemia 07/10/2018   • Positive blood culture 07/10/2018   • Shortness of breath 07/10/2018   • Secondary thrombocytopenia 07/12/2018     Past Medical History:   Diagnosis Date   • Allergic rhinitis    • Anemia    • Asthma    • Bone fracture    • CAD (coronary artery disease)    • Cardiomyopathy, ischemic    • Carpal tunnel syndrome    • CHF (congestive heart failure), NYHA class III, chronic, systolic (CMS/[facility])    • GERD (gastroesophageal reflux disease)    • Health care maintenance    • Herniated disc, cervical    • Hx of total hip  arthroplasty 3/18/2016   • Hyperglycemia    • Hyperlipidemia    • Hypertension    • Idiopathic ventricular tachycardia (CMS/HCC)    • Lumbosacral radiculopathy at L4    • Myocardial infarction    • Myocardial ischemia    • Non-rheumatic tricuspid valve insufficiency    • Nonrheumatic mitral valve regurgitation    • Open wound    • Osteoarthritis    • PAF (paroxysmal atrial fibrillation) (CMS/HCC)    • Ventricular tachycardia (CMS/HCC)        PAST SURGICAL HISTORY  Past Surgical History:   Procedure Laterality Date   • APPENDECTOMY     • CARDIAC CATHETERIZATION N/A 10/21/2016    Procedure: Coronary angiography;  Surgeon: Naun Calvin MD;  Location: [facility] CATH INVASIVE LOCATION;  Service:    • CARDIAC CATHETERIZATION N/A 10/21/2016    Procedure: Left heart cath;  Surgeon: Naun Calvin MD;  Location: [facility] CATH INVASIVE LOCATION;  Service:    • CARDIAC CATHETERIZATION N/A 10/21/2016    Procedure: Left ventriculography;  Surgeon: Naun Calvin MD;  Location: [facility] CATH INVASIVE LOCATION;  Service:    • CARDIAC CATHETERIZATION N/A 10/21/2016    Procedure: Right heart cath;  Surgeon: Naun Calvin MD;  Location: [facility] CATH INVASIVE LOCATION;  Service:    • CARDIAC DEFIBRILLATOR PLACEMENT     • CARPAL TUNNEL RELEASE     • COLONOSCOPY     • CORONARY ANGIOPLASTY      WITH STENT PLACEMENT TO THE LAD   • CORONARY ARTERY BYPASS GRAFT WITH MITRAL VALVE REPAIR/REPLACEMENT N/A 11/7/2016    Procedure: JAYE STERNOTOMY CORONARY ARTERY BYPASS GRAFT TIMES 4 USING LEFT INTERNAL MAMMARY ARTERY AND LEFT GREATER SAPHENOUS VEIN GRAFT PER ENDOSCOPIC VEIN HARVESTING, MITRAL VALVE REPLACEMENT AND TRICUSPID VALVE REPAIR;  Surgeon: Slade Hurtado MD;  Location: [facility] OR;  Service:    • EYE SURGERY     • FOOT SURGERY Left    • HERNIA REPAIR     • IMPLANTABLE CARDIOVERTER DEFIBRILLATOR LEAD REPLACEMENT/POCKET REVISION     • INGUINAL HERNIA REPAIR Left    • LASIK     • TONSILLECTOMY     • TOTAL HIP  ARTHROPLASTY Bilateral        FAMILY HISTORY  Family History   Problem Relation Age of Onset   • Stroke Mother    • Osteoarthritis Mother        SOCIAL HISTORY  Social History     Social History   • Marital status: Single     Spouse name: N/A   • Number of children: N/A   • Years of education: N/A     Occupational History   • Not on file.     Social History Main Topics   • Smoking status: Never Smoker   • Smokeless tobacco: Never Used      Comment: daily caffeine use   • Alcohol use 6.0 oz/week     10 Shots of liquor per week      Comment: social drinker   • Drug use: No   • Sexual activity: Defer     Other Topics Concern   • Not on file     Social History Narrative   • No narrative on file       REVIEW OF SYSTEMS  Review of Systems   Constitutional: Positive for activity change, diaphoresis and fatigue. Negative for appetite change and fever.   HENT: Negative for congestion, dental problem, postnasal drip, rhinorrhea and trouble swallowing.    Eyes: Negative for visual disturbance.   Respiratory: Positive for shortness of breath. Negative for cough, chest tightness and wheezing.    Cardiovascular: Positive for chest pain and palpitations (last night when moving something heavy). Negative for leg swelling.   Gastrointestinal: Negative for abdominal pain, diarrhea, nausea and vomiting.   Genitourinary: Negative for dysuria.   Musculoskeletal: Negative for back pain.   Skin: Negative for rash.   Neurological: Negative for dizziness, speech difficulty, light-headedness and headaches.       PHYSICAL EXAM  ED Triage Vitals [09/14/18 1225]   Temp Heart Rate Resp BP SpO2   97.3 °F (36.3 °C) 101 16 -- 96 %      Temp src Heart Rate Source Patient Position BP Location FiO2 (%)   Tympanic Monitor -- -- --     Physical Exam   Constitutional: He is oriented to person, place, and time and well-developed, well-nourished, and in no distress. He appears to not be writhing in pain and not dehydrated. He appears healthy.  Non-toxic  appearance. He does not have a sickly appearance. No distress.   HENT:   Head: Normocephalic and atraumatic.   Mouth/Throat: Oropharynx is clear and moist and mucous membranes are normal.   Eyes: Pupils are equal, round, and reactive to light.   Neck: Normal range of motion. Neck supple. Carotid bruit is not present.   Cardiovascular: Normal rate, regular rhythm, S1 normal, S2 normal and normal heart sounds.  Exam reveals no gallop and no friction rub.    No murmur heard.  Pulses:       Radial pulses are 2+ on the right side, and 2+ on the left side.   No lower extremity edema     Pulmonary/Chest: Effort normal. No accessory muscle usage. No respiratory distress. He has no decreased breath sounds. He has no wheezes. He has no rales. He exhibits no tenderness.   Abdominal: Soft. There is no rebound and no guarding.   Musculoskeletal: Normal range of motion. He exhibits no deformity.   Lymphadenopathy:     He has no cervical adenopathy.   Neurological: He is alert and oriented to person, place, and time. GCS score is 15.   Skin: Skin is warm and dry. He is not diaphoretic.   Psychiatric: Affect normal.   Nursing note and vitals reviewed.      LAB RESULTS  Recent Results (from the past 24 hour(s))   Comprehensive Metabolic Panel    Collection Time: 09/14/18 12:42 PM   Result Value Ref Range    Glucose 122 (H) 65 - 99 mg/dL    BUN 35 (H) 8 - 23 mg/dL    Creatinine 1.51 (H) 0.76 - 1.27 mg/dL    Sodium 136 136 - 145 mmol/L    Potassium 4.6 3.5 - 5.2 mmol/L    Chloride 98 98 - 107 mmol/L    CO2 22.9 22.0 - 29.0 mmol/L    Calcium 9.4 8.6 - 10.5 mg/dL    Total Protein 9.0 (H) 6.0 - 8.5 g/dL    Albumin 4.30 3.50 - 5.20 g/dL    ALT (SGPT) 25 1 - 41 U/L    AST (SGOT) 21 1 - 40 U/L    Alkaline Phosphatase 105 39 - 117 U/L    Total Bilirubin 0.3 0.1 - 1.2 mg/dL    eGFR Non African Amer 46 (L) >60 mL/min/1.73    Globulin 4.7 gm/dL    A/G Ratio 0.9 g/dL    BUN/Creatinine Ratio 23.2 7.0 - 25.0    Anion Gap 15.1 mmol/L   Protime-INR     Collection Time: 09/14/18 12:42 PM   Result Value Ref Range    Protime 12.9 11.7 - 14.2 Seconds    INR 0.99 0.90 - 1.10   BNP    Collection Time: 09/14/18 12:42 PM   Result Value Ref Range    proBNP 456.8 0.0 - 900.0 pg/mL   Troponin    Collection Time: 09/14/18 12:42 PM   Result Value Ref Range    Troponin T <0.010 0.000 - 0.030 ng/mL   Lactic Acid, Plasma    Collection Time: 09/14/18 12:42 PM   Result Value Ref Range    Lactate 2.2 (C) 0.5 - 2.0 mmol/L   CBC Auto Differential    Collection Time: 09/14/18 12:42 PM   Result Value Ref Range    WBC 6.57 4.50 - 10.70 10*3/mm3    RBC 4.57 (L) 4.60 - 6.00 10*6/mm3    Hemoglobin 13.5 (L) 13.7 - 17.6 g/dL    Hematocrit 42.5 40.4 - 52.2 %    MCV 93.0 79.8 - 96.2 fL    MCH 29.5 27.0 - 32.7 pg    MCHC 31.8 (L) 32.6 - 36.4 g/dL    RDW 14.2 11.5 - 14.5 %    RDW-SD 48.4 37.0 - 54.0 fl    MPV 11.2 6.0 - 12.0 fL    Platelets 263 140 - 500 10*3/mm3    Neutrophil % 63.0 42.7 - 76.0 %    Lymphocyte % 17.4 (L) 19.6 - 45.3 %    Monocyte % 9.0 5.0 - 12.0 %    Eosinophil % 9.1 (H) 0.3 - 6.2 %    Basophil % 1.2 0.0 - 1.5 %    Immature Grans % 0.3 0.0 - 0.5 %    Neutrophils, Absolute 4.14 1.90 - 8.10 10*3/mm3    Lymphocytes, Absolute 1.14 0.90 - 4.80 10*3/mm3    Monocytes, Absolute 0.59 0.20 - 1.20 10*3/mm3    Eosinophils, Absolute 0.60 0.00 - 0.70 10*3/mm3    Basophils, Absolute 0.08 0.00 - 0.20 10*3/mm3    Immature Grans, Absolute 0.02 0.00 - 0.03 10*3/mm3   Lactic Acid, Reflex Timer (This will reflex a repeat order 3-3:15 hours after ordered.)    Collection Time: 09/14/18 12:42 PM   Result Value Ref Range    Extra Tube Hold for add-ons.    Urinalysis With Microscopic If Indicated (No Culture) - Urine, Clean Catch    Collection Time: 09/14/18  1:22 PM   Result Value Ref Range    Color, UA Yellow Yellow, Straw    Appearance, UA Clear Clear    pH, UA <=5.0 5.0 - 8.0    Specific Gravity, UA 1.019 1.005 - 1.030    Glucose, UA Negative Negative    Ketones, UA Negative Negative     Bilirubin, UA Negative Negative    Blood, UA Negative Negative    Protein, UA Negative Negative    Leuk Esterase, UA Negative Negative    Nitrite, UA Negative Negative    Urobilinogen, UA 0.2 E.U./dL 0.2 - 1.0 E.U./dL   Lactic Acid, Plasma    Collection Time: 09/14/18  4:13 PM   Result Value Ref Range    Lactate 1.0 0.5 - 2.0 mmol/L       I ordered the above labs and reviewed the results    RADIOLOGY  XR Chest 2 View   Final Result   No focal pulmonary consolidation. Follow-up as clinical   indications persist.       This report was finalized on 9/14/2018 1:25 PM by Dr. Godwin Hendrickson M.D.                  MEDICATIONS GIVEN IN ER  Medications   sodium chloride 0.9 % flush 10 mL (not administered)   sodium chloride 0.9 % bolus 1,000 mL (0 mL Intravenous Stopped 9/14/18 1527)       EKG  Interpreted by ED Physician    PROCEDURES  Procedures    COURSE & MEDICAL DECISION MAKING  Pertinent Labs and Imaging studies that were ordered and reviewed are noted above.  Results were reviewed/discussed with the patient and they were also made aware of online access.  Pt also made aware that some labs, such as cultures, will not be resulted during ER visit and follow up with PMD is necessary.     PROGRESS AND CONSULTS    Progress Notes:     1300: Reviewed pt's history and workup with Dr. Keith.  After a bedside evaluation, Dr. Keith agrees with the plan of care.    1500:  Spoke with Dr. Rukhsana Brothers (ID on call for Dr. Marshall).  Updated her on patient labs, story, history etc.  She states if ok with patient, repeat Lactic acid after IVF then he can go home.  He understands that if blood cultures are + he will be called back to ER.  He and his wife agree and understand.     1655:  Repeat lactic acid is normal.  Will send patient home. The patient's history, physical exam, and lab findings were discussed with the physician, who also performed a face to face history and physical exam.  I discussed all results and noted any  "abnormalities with patient.  Discussed absoute need to recheck abnormalities with their family physician.  I answered any of the patient's questions.  Discussed plan for discharge, as there is no emergent indication for admission.  Pt is agreeable and understands need for follow up and repeat testing.  Pt is aware that discharge does not mean that nothing is wrong but it indicates no emergency is present and they must continue care with their family physician.  Pt is discharged with instructions to follow up with primary care doctor to have their blood pressure rechecked.           Disposition vitals:  /73   Pulse 74   Temp 97.3 °F (36.3 °C) (Tympanic)   Resp 18   Ht 172.7 cm (68\")   Wt 94.3 kg (208 lb)   SpO2 97%   BMI 31.63 kg/m²       DIAGNOSIS  Final diagnoses:   Dyspnea, unspecified type       FOLLOW UP   Kelsey Muñoz MD  [address] [id number] [phone]    Schedule an appointment as soon as possible for a visit       Vamshi Marshall MD  [address] [id number] [phone]    Schedule an appointment as soon as possible for a visit in 3 days        RX     Medication List      No changes were made to your prescriptions during this visit.                Lorin Kwok, APRN  09/14/18 1655    "

## 2018-09-14 NOTE — ED PROVIDER NOTES
Pt is a 68 y.o. male who presents to the ED complaining of SOA and fatigue for the last week. Pt reports he was diagnosed with endocarditis in 07/2018, and states he has not been back at his prior baseline since. Pt states he was working in a hot garage by moving a heavy metal bench when he felt himself become lightheaded, diaphoretic with palpitations and HA. Pt denies cough, fever, N/V, CP.        On exam,  Constitutional: NADR  Cardiovascular: HRRR  Pulmonary: Lungs CTAB  Abdomen: Benign  Musculoskeletal: No pedal edema, no calf tenderness    EKG           EKG time: 1242  Rhythm/Rate: nsr, 90  P waves and MN: normal  QRS, axis: anterior Q waves, LAD, LVH  ST and T waves: inverted T waves laterally     Interpreted Contemporaneously by me, independently viewed  unchanged compared to prior 7/9/18      Plan: Acquire labs and a CXR      MD ATTESTATION NOTE    The PEDRITO and I have discussed this patient's history, physical exam, and treatment plan.  I have reviewed the documentation and personally had a face to face interaction with the patient. I affirm the documentation and agree with the treatment and plan.  The attached note describes my personal findings.      Documentation assistance provided by naomi Blum for Dr. Keith. Information recorded by the scribe was done at my direction and has been verified and validated by me.             Vamshi Blum  09/14/18 4408       Mello Keith MD  09/14/18 8041

## 2018-09-14 NOTE — ED NOTES
EKG PERFORMED BY THIS TECH; EKG TIME 1242; SHOWN TO DR DE LA TORRE 1243     Luh Pedersen  09/14/18 1240

## 2018-09-19 LAB
BACTERIA SPEC AEROBE CULT: NORMAL
BACTERIA SPEC AEROBE CULT: NORMAL

## 2018-10-04 ENCOUNTER — OFFICE VISIT (OUTPATIENT)
Dept: CARDIOLOGY | Facility: CLINIC | Age: 68
End: 2018-10-04

## 2018-10-04 VITALS
DIASTOLIC BLOOD PRESSURE: 64 MMHG | SYSTOLIC BLOOD PRESSURE: 102 MMHG | BODY MASS INDEX: 29.4 KG/M2 | HEART RATE: 80 BPM | WEIGHT: 194 LBS | HEIGHT: 68 IN

## 2018-10-04 DIAGNOSIS — I25.5 CARDIOMYOPATHY, ISCHEMIC: ICD-10-CM

## 2018-10-04 DIAGNOSIS — I50.22 CHRONIC SYSTOLIC CONGESTIVE HEART FAILURE, NYHA CLASS 3 (HCC): Primary | ICD-10-CM

## 2018-10-04 DIAGNOSIS — I47.29 IDIOPATHIC VENTRICULAR TACHYCARDIA (HCC): ICD-10-CM

## 2018-10-04 DIAGNOSIS — I36.1 NON-RHEUMATIC TRICUSPID VALVE INSUFFICIENCY: ICD-10-CM

## 2018-10-04 DIAGNOSIS — I34.0 NON-RHEUMATIC MITRAL REGURGITATION: ICD-10-CM

## 2018-10-04 PROCEDURE — 99214 OFFICE O/P EST MOD 30 MIN: CPT | Performed by: INTERNAL MEDICINE

## 2018-10-04 PROCEDURE — 93000 ELECTROCARDIOGRAM COMPLETE: CPT | Performed by: INTERNAL MEDICINE

## 2018-10-04 RX ORDER — CARVEDILOL 12.5 MG/1
1 TABLET ORAL 2 TIMES DAILY
COMMUNITY
Start: 2018-09-24

## 2018-10-04 RX ORDER — FLUTICASONE PROPIONATE AND SALMETEROL 232; 14 UG/1; UG/1
POWDER, METERED RESPIRATORY (INHALATION) 2 TIMES DAILY
COMMUNITY
Start: 2018-10-03 | End: 2019-01-29

## 2018-10-04 RX ORDER — TIOTROPIUM BROMIDE INHALATION SPRAY 3.12 UG/1
SPRAY, METERED RESPIRATORY (INHALATION) DAILY
COMMUNITY
Start: 2018-09-11 | End: 2021-09-08 | Stop reason: SDUPTHER

## 2018-10-04 NOTE — PROGRESS NOTES
Date of Office Visit: 10/04/2018  Encounter Provider: Naun Calvin MD  Place of Service: [facility] CARDIOLOGY  Patient Name: Mello Arias Jr.  :1950      Chief Complaint   Patient presents with   • Congestive Heart Failure     History of Present Illness    The patient is a 68-year-old white male with a history of coronary artery disease for which she is undergone bypass surgery.  He also has a history of mitral regurgitation and has undergone a mitral valve LASIK with a tissue prosthesis.  Because of underlying coronary disease including myocardial infarction he is developed severe left ventricular dysfunction resulting in congestive heart failure.  His been hospitalized on several occasions and is now being seen by the advanced heart failure clinic at the [facility].  He is presently being evaluated for LVAD and/or transplant.  He returns to the office today feeling about the same.  He still feels relatively lethargic and short of breath with exertion.  He is on Entresto and they have recommended increasing the dose to the next dose level.  He has not started this because of concerns.  I do share some of his concerns and that the fact that his blood pressure is down around 100 systolic and I'm afraid going up to the next dose level would be detrimental.  He is going to record his blood pressure over the next few days and we agreed on parameters for which he can start increased Entresto.    As a result of his severe ischemic cardiomyopathy the patient has a history of ventricular tachycardia and does have an AICD in place.    His most recent ejection fraction is approximately 20%.    Past Medical History:   Diagnosis Date   • Allergic rhinitis    • Anemia    • Asthma    • CAD (coronary artery disease)    • Cardiomyopathy, ischemic    • Carpal tunnel syndrome    • CHF (congestive heart failure), NYHA class III, chronic, systolic (CMS/HCC)    • GERD  (gastroesophageal reflux disease)    • Health care maintenance    • Herniated disc, cervical    • Hx of total hip arthroplasty 3/18/2016   • Hyperglycemia    • Hyperlipidemia    • Hypertension    • Idiopathic ventricular tachycardia (CMS/HCC)    • Lumbosacral radiculopathy at L4    • Myocardial infarction (CMS/HCC)     OF INFERIOR WALL, GREATER THAN 8 WEEKS   • Myocardial ischemia     POST MYOCARDIAL INFARCTION, POST PTCA WITH STENT PLACEMENT   • Non-rheumatic tricuspid valve insufficiency    • Nonrheumatic mitral valve regurgitation    • Open wound     OF LEFT INDEX FINGER WITHOUT DAMAGE TO NAIL   • Osteoarthritis    • PAF (paroxysmal atrial fibrillation) (CMS/HCC)          Past Surgical History:   Procedure Laterality Date   • APPENDECTOMY     • CARDIAC CATHETERIZATION N/A 10/21/2016    Procedure: Coronary angiography;  Surgeon: Naun Calvin MD;  Location: [facility] CATH INVASIVE LOCATION;  Service:    • CARDIAC CATHETERIZATION N/A 10/21/2016    Procedure: Left heart cath;  Surgeon: Naun Calvin MD;  Location: [facility] CATH INVASIVE LOCATION;  Service:    • CARDIAC CATHETERIZATION N/A 10/21/2016    Procedure: Left ventriculography;  Surgeon: Naun Calvin MD;  Location: [facility] CATH INVASIVE LOCATION;  Service:    • CARDIAC CATHETERIZATION N/A 10/21/2016    Procedure: Right heart cath;  Surgeon: Naun Calvin MD;  Location: [facility] CATH INVASIVE LOCATION;  Service:    • CARDIAC DEFIBRILLATOR PLACEMENT     • CARPAL TUNNEL RELEASE     • COLONOSCOPY     • CORONARY ANGIOPLASTY      WITH STENT PLACEMENT TO THE LAD   • CORONARY ARTERY BYPASS GRAFT WITH MITRAL VALVE REPAIR/REPLACEMENT N/A 11/7/2016    Procedure: JAYE STERNOTOMY CORONARY ARTERY BYPASS GRAFT TIMES 4 USING LEFT INTERNAL MAMMARY ARTERY AND LEFT GREATER SAPHENOUS VEIN GRAFT PER ENDOSCOPIC VEIN HARVESTING, MITRAL VALVE REPLACEMENT AND TRICUSPID VALVE REPAIR;  Surgeon: Slade Hurtado MD;  Location: [facility] OR;  Service:    • EYE  SURGERY     • FOOT SURGERY Left    • HERNIA REPAIR     • IMPLANTABLE CARDIOVERTER DEFIBRILLATOR LEAD REPLACEMENT/POCKET REVISION     • INGUINAL HERNIA REPAIR Left    • LASIK     • TONSILLECTOMY     • TOTAL HIP ARTHROPLASTY Bilateral            Current Outpatient Prescriptions:   •  acetaminophen (TYLENOL) 325 MG tablet, Take 2 tablets by mouth Every 6 (Six) Hours As Needed for Mild Pain ., Disp: , Rfl: 0  •  albuterol (PROAIR HFA) 108 (90 BASE) MCG/ACT inhaler, Inhale 2 puffs Every 6 (Six) Hours As Needed., Disp: , Rfl:   •  amoxicillin (AMOXIL) 875 MG tablet, Take 1 tablet by mouth Every 12 (Twelve) Hours., Disp: 60 tablet, Rfl: 6  •  aspirin 81 MG tablet, Take 81 mg by mouth Daily., Disp: , Rfl:   •  atorvastatin (LIPITOR) 40 MG tablet, Take 1 tablet by mouth Daily., Disp: 90 tablet, Rfl: 0  •  bumetanide (BUMEX) 1 MG tablet, Take 1 tablet by mouth Daily., Disp: 30 tablet, Rfl: 1  •  carvedilol (COREG) 12.5 MG tablet, Take 1 tablet by mouth 2 (Two) Times a Day., Disp: , Rfl:   •  Fluticasone-Salmeterol 232-14 MCG/ACT aerosol powder , , Disp: , Rfl:   •  montelukast (SINGULAIR) 10 MG tablet, Take 10 mg by mouth Every Night., Disp: , Rfl:   •  nitroglycerin (NITROSTAT) 0.4 MG SL tablet, Place 0.4 mg under the tongue Every 5 (Five) Minutes As Needed for Chest Pain. Take no more than 3 doses in 15 minutes., Disp: , Rfl:   •  omeprazole (PRILOSEC) 20 MG capsule, Take 20 mg by mouth Daily., Disp: , Rfl:   •  sacubitril-valsartan (ENTRESTO) 24-26 MG tablet, Take 1 tablet by mouth Every 12 (Twelve) Hours., Disp: 60 tablet, Rfl: 5  •  SPIRIVA RESPIMAT 2.5 MCG/ACT aerosol solution inhaler, , Disp: , Rfl:   •  spironolactone (ALDACTONE) 25 MG tablet, Take 25 mg by mouth Daily., Disp: , Rfl:       Social History     Social History   • Marital status: Single     Spouse name: N/A   • Number of children: N/A   • Years of education: N/A     Occupational History   • Not on file.     Social History Main Topics   • Smoking status:  "Never Smoker   • Smokeless tobacco: Never Used      Comment: daily caffeine use   • Alcohol use 6.0 oz/week     10 Shots of liquor per week      Comment: social drinker   • Drug use: No   • Sexual activity: Defer     Other Topics Concern   • Not on file     Social History Narrative   • No narrative on file         Review of Systems   Constitution: Positive for malaise/fatigue.   HENT: Negative.    Eyes: Negative.    Cardiovascular: Positive for dyspnea on exertion.   Respiratory: Negative.    Endocrine: Negative.    Skin: Negative.    Musculoskeletal: Negative.    Gastrointestinal: Negative.    Neurological: Negative.    Psychiatric/Behavioral: Negative.        Procedures      ECG 12 Lead  Date/Time: 10/4/2018 3:55 PM  Performed by: JANENE ZHAO  Authorized by: JANENE ZHAO   Comparison: compared with previous ECG from 9/14/2018  Similar to previous ECG  Rhythm: sinus rhythm  Rate: normal  QRS axis: normal  Other findings: LVH with strain                Objective:    /64   Pulse 80   Ht 172.7 cm (68\")   Wt 88 kg (194 lb)   BMI 29.50 kg/m²         Physical Exam   Constitutional: He is oriented to person, place, and time. He appears well-developed and well-nourished.   HENT:   Head: Normocephalic.   Eyes: Pupils are equal, round, and reactive to light.   Neck: Normal range of motion. No JVD present. Carotid bruit is not present. No thyromegaly present.   Cardiovascular: Normal rate, regular rhythm, S1 normal, S2 normal, normal heart sounds and intact distal pulses.  Exam reveals no gallop and no friction rub.    No murmur heard.  Pulmonary/Chest: Effort normal and breath sounds normal.   Abdominal: Soft. Bowel sounds are normal.   Musculoskeletal: He exhibits no edema.   Neurological: He is alert and oriented to person, place, and time.   Skin: Skin is warm, dry and intact. No erythema.   Psychiatric: He has a normal mood and affect.   Vitals reviewed.          Assessment:       Diagnosis Plan "   1. Chronic systolic congestive heart failure, NYHA class 3 (CMS/HCC)     2. Idiopathic ventricular tachycardia (CMS/HCC)     3. Non-rheumatic mitral regurgitation     4. Non-rheumatic tricuspid valve insufficiency     5. Cardiomyopathy, ischemic       1. Coronary Artery Disease  Assessment  • The patient has no angina    Plan  • Lifestyle modifications discussed include adhering to a heart healthy diet and regular exercise    Subjective - Objective  • There is a history of past MI        2.Heart Failure  Assessment  • NYHA class II - There is slight limitation of physical activity. The patient is comfortable at rest, but physical activity results in fatigue, palpitations or shortness of breath.  • Beta blocker prescribed  • Diuretics prescribed  • Angiotensin receptor blocker (ARB) prescribed    Subjective/Objective    • Physical exam findings negative for elevated JVP.  • The patient has an ICD implant    3.  Mitral regurgitation: Status post mitral valve replacement  4.  Ventricular tachycardia: Status post AICD     Plan:     Patient will monitor his blood pressure and we will decide on increasing his Entresto if he meets parameters.  He will follow back up in December.

## 2018-10-09 RX ORDER — SPIRONOLACTONE 25 MG/1
TABLET ORAL
Qty: 90 TABLET | Refills: 3 | Status: SHIPPED | OUTPATIENT
Start: 2018-10-09

## 2018-10-17 ENCOUNTER — APPOINTMENT (OUTPATIENT)
Dept: CT IMAGING | Facility: HOSPITAL | Age: 68
End: 2018-10-17

## 2018-10-17 ENCOUNTER — HOSPITAL ENCOUNTER (EMERGENCY)
Facility: HOSPITAL | Age: 68
Discharge: HOME | End: 2018-10-17
Attending: EMERGENCY MEDICINE | Admitting: EMERGENCY MEDICINE

## 2018-10-17 VITALS
DIASTOLIC BLOOD PRESSURE: 86 MMHG | OXYGEN SATURATION: 97 % | HEART RATE: 79 BPM | HEIGHT: 68 IN | SYSTOLIC BLOOD PRESSURE: 132 MMHG | BODY MASS INDEX: 30.1 KG/M2 | TEMPERATURE: 97.7 F | RESPIRATION RATE: 18 BRPM | WEIGHT: 198.6 LBS

## 2018-10-17 DIAGNOSIS — R42 DIZZINESS: Primary | ICD-10-CM

## 2018-10-17 DIAGNOSIS — R00.2 PALPITATIONS: ICD-10-CM

## 2018-10-17 LAB
ALBUMIN SERPL-MCNC: 4.4 G/DL (ref 3.5–5.2)
ALBUMIN/GLOB SERPL: 1.1 G/DL
ALP SERPL-CCNC: 111 U/L (ref 39–117)
ALT SERPL W P-5'-P-CCNC: 35 U/L (ref 1–41)
ANION GAP SERPL CALCULATED.3IONS-SCNC: 13.7 MMOL/L
AST SERPL-CCNC: 18 U/L (ref 1–40)
BASOPHILS # BLD AUTO: 0.03 10*3/MM3 (ref 0–0.2)
BASOPHILS NFR BLD AUTO: 0.3 % (ref 0–1.5)
BILIRUB SERPL-MCNC: 0.5 MG/DL (ref 0.1–1.2)
BUN BLD-MCNC: 26 MG/DL (ref 8–23)
BUN/CREAT SERPL: 22 (ref 7–25)
CALCIUM SPEC-SCNC: 9.8 MG/DL (ref 8.6–10.5)
CHLORIDE SERPL-SCNC: 99 MMOL/L (ref 98–107)
CO2 SERPL-SCNC: 25.3 MMOL/L (ref 22–29)
CREAT BLD-MCNC: 1.18 MG/DL (ref 0.76–1.27)
CRP SERPL-MCNC: 0.15 MG/DL (ref 0–0.5)
DEPRECATED RDW RBC AUTO: 47.3 FL (ref 37–54)
EOSINOPHIL # BLD AUTO: 0.29 10*3/MM3 (ref 0–0.7)
EOSINOPHIL NFR BLD AUTO: 3.2 % (ref 0.3–6.2)
ERYTHROCYTE [DISTWIDTH] IN BLOOD BY AUTOMATED COUNT: 14.2 % (ref 11.5–14.5)
ERYTHROCYTE [SEDIMENTATION RATE] IN BLOOD: 44 MM/HR (ref 0–20)
GFR SERPL CREATININE-BSD FRML MDRD: 61 ML/MIN/1.73
GLOBULIN UR ELPH-MCNC: 4.1 GM/DL
GLUCOSE BLD-MCNC: 137 MG/DL (ref 65–99)
HCT VFR BLD AUTO: 39.7 % (ref 40.4–52.2)
HGB BLD-MCNC: 13.4 G/DL (ref 13.7–17.6)
HOLD SPECIMEN: NORMAL
HOLD SPECIMEN: NORMAL
IMM GRANULOCYTES # BLD: 0.02 10*3/MM3 (ref 0–0.03)
IMM GRANULOCYTES NFR BLD: 0.2 % (ref 0–0.5)
LYMPHOCYTES # BLD AUTO: 1.01 10*3/MM3 (ref 0.9–4.8)
LYMPHOCYTES NFR BLD AUTO: 11.2 % (ref 19.6–45.3)
MAGNESIUM SERPL-MCNC: 1.8 MG/DL (ref 1.6–2.4)
MCH RBC QN AUTO: 30.6 PG (ref 27–32.7)
MCHC RBC AUTO-ENTMCNC: 33.8 G/DL (ref 32.6–36.4)
MCV RBC AUTO: 90.6 FL (ref 79.8–96.2)
MONOCYTES # BLD AUTO: 0.76 10*3/MM3 (ref 0.2–1.2)
MONOCYTES NFR BLD AUTO: 8.4 % (ref 5–12)
NEUTROPHILS # BLD AUTO: 6.94 10*3/MM3 (ref 1.9–8.1)
NEUTROPHILS NFR BLD AUTO: 76.9 % (ref 42.7–76)
PLATELET # BLD AUTO: 257 10*3/MM3 (ref 140–500)
PMV BLD AUTO: 10.9 FL (ref 6–12)
POTASSIUM BLD-SCNC: 4.5 MMOL/L (ref 3.5–5.2)
PROCALCITONIN SERPL-MCNC: 0.06 NG/ML (ref 0.1–0.25)
PROT SERPL-MCNC: 8.5 G/DL (ref 6–8.5)
RBC # BLD AUTO: 4.38 10*6/MM3 (ref 4.6–6)
SODIUM BLD-SCNC: 138 MMOL/L (ref 136–145)
TROPONIN T SERPL-MCNC: <0.01 NG/ML (ref 0–0.03)
TSH SERPL DL<=0.05 MIU/L-ACNC: 2.24 MIU/ML (ref 0.27–4.2)
WBC NRBC COR # BLD: 9.03 10*3/MM3 (ref 4.5–10.7)
WHOLE BLOOD HOLD SPECIMEN: NORMAL
WHOLE BLOOD HOLD SPECIMEN: NORMAL

## 2018-10-17 PROCEDURE — 85025 COMPLETE CBC W/AUTO DIFF WBC: CPT | Performed by: PHYSICIAN ASSISTANT

## 2018-10-17 PROCEDURE — 80053 COMPREHEN METABOLIC PANEL: CPT | Performed by: PHYSICIAN ASSISTANT

## 2018-10-17 PROCEDURE — 85652 RBC SED RATE AUTOMATED: CPT | Performed by: EMERGENCY MEDICINE

## 2018-10-17 PROCEDURE — 84484 ASSAY OF TROPONIN QUANT: CPT | Performed by: PHYSICIAN ASSISTANT

## 2018-10-17 PROCEDURE — 83735 ASSAY OF MAGNESIUM: CPT | Performed by: EMERGENCY MEDICINE

## 2018-10-17 PROCEDURE — 84145 PROCALCITONIN (PCT): CPT | Performed by: EMERGENCY MEDICINE

## 2018-10-17 PROCEDURE — 87040 BLOOD CULTURE FOR BACTERIA: CPT | Performed by: EMERGENCY MEDICINE

## 2018-10-17 PROCEDURE — 96360 HYDRATION IV INFUSION INIT: CPT

## 2018-10-17 PROCEDURE — 99284 EMERGENCY DEPT VISIT MOD MDM: CPT

## 2018-10-17 PROCEDURE — 70450 CT HEAD/BRAIN W/O DYE: CPT

## 2018-10-17 PROCEDURE — 93005 ELECTROCARDIOGRAM TRACING: CPT | Performed by: PHYSICIAN ASSISTANT

## 2018-10-17 PROCEDURE — 86140 C-REACTIVE PROTEIN: CPT | Performed by: EMERGENCY MEDICINE

## 2018-10-17 PROCEDURE — 36415 COLL VENOUS BLD VENIPUNCTURE: CPT

## 2018-10-17 PROCEDURE — 93010 ELECTROCARDIOGRAM REPORT: CPT | Performed by: INTERNAL MEDICINE

## 2018-10-17 PROCEDURE — 84443 ASSAY THYROID STIM HORMONE: CPT | Performed by: EMERGENCY MEDICINE

## 2018-10-17 RX ORDER — MECLIZINE HYDROCHLORIDE 25 MG/1
25 TABLET ORAL 4 TIMES DAILY PRN
Qty: 20 TABLET | Refills: 0 | Status: SHIPPED | OUTPATIENT
Start: 2018-10-17

## 2018-10-17 RX ORDER — SODIUM CHLORIDE 0.9 % (FLUSH) 0.9 %
10 SYRINGE (ML) INJECTION AS NEEDED
Status: DISCONTINUED | OUTPATIENT
Start: 2018-10-17 | End: 2018-10-17 | Stop reason: HOSPADM

## 2018-10-17 RX ADMIN — SODIUM CHLORIDE 500 ML: 9 INJECTION, SOLUTION INTRAVENOUS at 14:52

## 2018-10-17 NOTE — ED PROVIDER NOTES
EMERGENCY DEPARTMENT ENCOUNTER    CHIEF COMPLAINT  Chief Complaint: dizziness  History given by: patient  History limited by: nothing  Room Number: 24/24  PMD: Kelsey Muñoz MD      HPI:  Pt is a 68 y.o. male who presents complaining of dizziness that began at 0230 this morning. He states that he stood up and he felt like his heart was racing as well. However, his apple watch informed him that his HR was in the 80s. Pt took pain medication last night prior to going to bed last night due to a recent dental procedure. Pt is in the second phase for a heart transplant and goes to  tomorrow for further testing. He also had a recent cornea transplant. Pt had an MI in 2001 and his heart has not truly recovered since then. He has a history of endocarditis and was concerned because he had similar symptoms at that time.     Duration:  12 hours   Onset: gradual  Timing: constant  Location: n/a  Radiation: n/a  Quality: dizzy  Intensity/Severity: moderate  Progression: unchanged  Associated Symptoms: palpitations  Aggravating Factors: none  Alleviating Factors: none  Previous Episodes: Pt has a h/x of endocarditis and had similar symptoms at that time.  Treatment before arrival: none    PAST MEDICAL HISTORY  Active Ambulatory Problems     Diagnosis Date Noted   • A-fib (CMS/[facility]) 10/28/2016   • Acid reflux 10/28/2016   • Blood glucose elevated 10/28/2016   • HLD (hyperlipidemia) 10/28/2016   • Cardiomyopathy, ischemic 10/28/2016   • L-S radiculopathy 10/28/2016   • Idiopathic ventricular tachycardia (CMS/[facility]) 10/28/2016   • CHF (congestive heart failure), NYHA class III (CMS/[facility]) 10/28/2016   • Coronary artery disease of native artery of native heart with stable angina pectoris (CMS/[facility]) 11/03/2016   • Non-rheumatic mitral regurgitation 11/03/2016   • Non-rheumatic tricuspid valve insufficiency 11/03/2016   • S/P CABG x 4 11/15/2016   • S/P MVR (mitral valve replacement) 11/15/2016   • S/P TVR (tricuspid valve repair)  11/15/2016   • History of Bacteremia due to group B Streptococcus 06/08/2018   • Depression 06/27/2018   • History of bacterial endocarditis 07/11/2018   • Anemia of chronic disease 07/15/2018   • Hyperglycemia      Resolved Ambulatory Problems     Diagnosis Date Noted   • Hx of total hip arthroplasty 03/18/2016   • History of total hip arthroplasty 04/13/2016   • Right inguinal pain 04/21/2016   • History of bilateral hip arthroplasty 06/27/2016   • Right hip pain 06/27/2016   • Precordial chest pain 10/04/2016   • Ischemic heart disease 10/27/2016   • Other specified forms of effusion, except tuberculous 11/15/2016   • Acute renal failure (CMS/[facility]) 06/04/2018   • Sepsis (CMS/[facility]) 06/04/2018   • Hyponatremia 06/04/2018   • Bacterial pneumonia 06/04/2018   • Pneumonia 07/10/2018   • Hypomagnesemia 07/10/2018   • Positive blood culture 07/10/2018   • Shortness of breath 07/10/2018   • Secondary thrombocytopenia 07/12/2018     Past Medical History:   Diagnosis Date   • Allergic rhinitis    • Anemia    • Asthma    • CAD (coronary artery disease)    • Cardiomyopathy, ischemic    • Carpal tunnel syndrome    • CHF (congestive heart failure), NYHA class III, chronic, systolic (CMS/[facility])    • Endocarditis    • GERD (gastroesophageal reflux disease)    • Health care maintenance    • Herniated disc, cervical    • Hx of total hip arthroplasty 3/18/2016   • Hyperglycemia    • Hyperlipidemia    • Hypertension    • Idiopathic ventricular tachycardia (CMS/[facility])    • Lumbosacral radiculopathy at L4    • Myocardial infarction (CMS/[facility])    • Myocardial ischemia    • Non-rheumatic tricuspid valve insufficiency    • Nonrheumatic mitral valve regurgitation    • Open wound    • Osteoarthritis    • PAF (paroxysmal atrial fibrillation) (CMS/[facility])        PAST SURGICAL HISTORY  Past Surgical History:   Procedure Laterality Date   • APPENDECTOMY     • CARDIAC CATHETERIZATION N/A 10/21/2016    Procedure: Coronary angiography;  Surgeon: Naun SALGADO  MD Nikunj;  Location: [facility] CATH INVASIVE LOCATION;  Service:    • CARDIAC CATHETERIZATION N/A 10/21/2016    Procedure: Left heart cath;  Surgeon: Naun Calvin MD;  Location: [facility] CATH INVASIVE LOCATION;  Service:    • CARDIAC CATHETERIZATION N/A 10/21/2016    Procedure: Left ventriculography;  Surgeon: Naun Calvin MD;  Location: [facility] CATH INVASIVE LOCATION;  Service:    • CARDIAC CATHETERIZATION N/A 10/21/2016    Procedure: Right heart cath;  Surgeon: Naun Calvin MD;  Location: [facility] INVASIVE LOCATION;  Service:    • CARDIAC DEFIBRILLATOR PLACEMENT     • CARPAL TUNNEL RELEASE     • COLONOSCOPY     • CORONARY ANGIOPLASTY      WITH STENT PLACEMENT TO THE LAD   • CORONARY ARTERY BYPASS GRAFT WITH MITRAL VALVE REPAIR/REPLACEMENT N/A 11/7/2016    Procedure: JAYE STERNOTOMY CORONARY ARTERY BYPASS GRAFT TIMES 4 USING LEFT INTERNAL MAMMARY ARTERY AND LEFT GREATER SAPHENOUS VEIN GRAFT PER ENDOSCOPIC VEIN HARVESTING, MITRAL VALVE REPLACEMENT AND TRICUSPID VALVE REPAIR;  Surgeon: Slade Hurtado MD;  Location: [facility]  Service:    • EYE SURGERY     • FOOT SURGERY Left    • HERNIA REPAIR     • IMPLANTABLE CARDIOVERTER DEFIBRILLATOR LEAD REPLACEMENT/POCKET REVISION     • INGUINAL HERNIA REPAIR Left    • LASIK     • TONSILLECTOMY     • TOTAL HIP ARTHROPLASTY Bilateral        FAMILY HISTORY  Family History   Problem Relation Age of Onset   • Stroke Mother    • Osteoarthritis Mother        SOCIAL HISTORY  Social History     Social History   • Marital status: Single     Spouse name: N/A   • Number of children: N/A   • Years of education: N/A     Occupational History   • Not on file.     Social History Main Topics   • Smoking status: Never Smoker   • Smokeless tobacco: Never Used      Comment: daily caffeine use   • Alcohol use 6.0 oz/week     10 Shots of liquor per week      Comment: social drinker   • Drug use: No   • Sexual activity: Defer     Other Topics Concern   • Not on file      Social History Narrative   • No narrative on file       ALLERGIES  Shellfish-derived products    REVIEW OF SYSTEMS  Review of Systems   Constitutional: Negative for activity change, appetite change and fever.   HENT: Negative for congestion and sore throat.    Eyes: Negative.    Respiratory: Negative for cough and shortness of breath.    Cardiovascular: Positive for palpitations. Negative for chest pain and leg swelling.   Gastrointestinal: Negative for abdominal pain, diarrhea and vomiting.   Endocrine: Negative.    Genitourinary: Negative for decreased urine volume and dysuria.   Musculoskeletal: Negative for neck pain.   Skin: Negative for rash and wound.   Allergic/Immunologic: Negative.    Neurological: Positive for dizziness. Negative for weakness, numbness and headaches.   Hematological: Negative.    Psychiatric/Behavioral: Negative.    All other systems reviewed and are negative.      PHYSICAL EXAM  ED Triage Vitals   Temp Heart Rate Resp BP SpO2   10/17/18 1250 10/17/18 1250 10/17/18 1250 10/17/18 1254 10/17/18 1250   97.7 °F (36.5 °C) 81 18 143/88 98 %      Temp src Heart Rate Source Patient Position BP Location FiO2 (%)   10/17/18 1250 -- 10/17/18 1254 10/17/18 1254 --   Tympanic  Sitting Right arm        Physical Exam   Constitutional: He is oriented to person, place, and time. No distress.   HENT:   Head: Normocephalic and atraumatic.   Eyes: Pupils are equal, round, and reactive to light. EOM are normal.   Corneal changes in left eye from previous cornea transplant.   Neck: Normal range of motion. Neck supple.   Cardiovascular: Normal rate, regular rhythm and normal heart sounds.    Pulmonary/Chest: Effort normal and breath sounds normal. No respiratory distress.   Abdominal: Soft. There is no tenderness. There is no rebound and no guarding.   Musculoskeletal: Normal range of motion. He exhibits no edema.   Neurological: He is alert and oriented to person, place, and time. He has normal sensation  and normal strength.   Skin: Skin is warm and dry.   Psychiatric: Mood and affect normal.   Nursing note and vitals reviewed.      LAB RESULTS  Lab Results (last 24 hours)     Procedure Component Value Units Date/Time    CBC & Differential [[id number]] Collected:  10/17/18 1306    Specimen:  Blood Updated:  10/17/18 1335    Narrative:       The following orders were created for panel order CBC & Differential.  Procedure                               Abnormality         Status                     ---------                               -----------         ------                     CBC Auto Differential[[id number]]        Abnormal            Final result                 Please view results for these tests on the individual orders.    Comprehensive Metabolic Panel [[id number]]  (Abnormal) Collected:  10/17/18 1306    Specimen:  Blood Updated:  10/17/18 1349     Glucose 137 (H) mg/dL      BUN 26 (H) mg/dL      Creatinine 1.18 mg/dL      Sodium 138 mmol/L      Potassium 4.5 mmol/L      Chloride 99 mmol/L      CO2 25.3 mmol/L      Calcium 9.8 mg/dL      Total Protein 8.5 g/dL      Albumin 4.40 g/dL      ALT (SGPT) 35 U/L      AST (SGOT) 18 U/L      Alkaline Phosphatase 111 U/L      Total Bilirubin 0.5 mg/dL      eGFR Non African Amer 61 mL/min/1.73      Globulin 4.1 gm/dL      A/G Ratio 1.1 g/dL      BUN/Creatinine Ratio 22.0     Anion Gap 13.7 mmol/L     Troponin [[id number]]  (Normal) Collected:  10/17/18 1306    Specimen:  Blood Updated:  10/17/18 1349     Troponin T <0.010 ng/mL     Narrative:       Troponin T Reference Ranges:  Less than 0.03 ng/mL:    Negative for AMI  0.03 to 0.09 ng/mL:      Indeterminant for AMI  Greater than 0.09 ng/mL: Positive for AMI    CBC Auto Differential [[id number]]  (Abnormal) Collected:  10/17/18 1306    Specimen:  Blood Updated:  10/17/18 1335     WBC 9.03 10*3/mm3      RBC 4.38 (L) 10*6/mm3      Hemoglobin 13.4 (L) g/dL      Hematocrit 39.7 (L) %      MCV 90.6 fL      MCH 30.6 pg       "MCHC 33.8 g/dL      RDW 14.2 %      RDW-SD 47.3 fl      MPV 10.9 fL      Platelets 257 10*3/mm3      Neutrophil % 76.9 (H) %      Lymphocyte % 11.2 (L) %      Monocyte % 8.4 %      Eosinophil % 3.2 %      Basophil % 0.3 %      Immature Grans % 0.2 %      Neutrophils, Absolute 6.94 10*3/mm3      Lymphocytes, Absolute 1.01 10*3/mm3      Monocytes, Absolute 0.76 10*3/mm3      Eosinophils, Absolute 0.29 10*3/mm3      Basophils, Absolute 0.03 10*3/mm3      Immature Grans, Absolute 0.02 10*3/mm3     Magnesium [[id number]]  (Normal) Collected:  10/17/18 1306    Specimen:  Blood Updated:  10/17/18 1458     Magnesium 1.8 mg/dL     TSH [[id number]]  (Normal) Collected:  10/17/18 1306    Specimen:  Blood Updated:  10/17/18 1505     TSH 2.240 mIU/mL     Sedimentation Rate [[id number]]  (Abnormal) Collected:  10/17/18 1306    Specimen:  Blood Updated:  10/17/18 1522     Sed Rate 44 (H) mm/hr     C-reactive Protein [[id number]]  (Normal) Collected:  10/17/18 1306    Specimen:  Blood Updated:  10/17/18 1458     C-Reactive Protein 0.15 mg/dL     Procalcitonin [[id number]]  (Abnormal) Collected:  10/17/18 1306    Specimen:  Blood Updated:  10/17/18 1505     Procalcitonin 0.06 (L) ng/mL     Narrative:       As a Marker for Sepsis (Non-Neonates):   1. <0.5 ng/mL represents a low risk of severe sepsis and/or septic shock.  1. >2 ng/mL represents a high risk of severe sepsis and/or septic shock.    As a Marker for Lower Respiratory Tract Infections that require antibiotic therapy:  PCT on Admission     Antibiotic Therapy             6-12 Hrs later  > 0.5                Strongly Recommended            >0.25 - <0.5         Recommended  0.1 - 0.25           Discouraged                   Remeasure/reassess PCT  <0.1                 Strongly Discouraged          Remeasure/reassess PCT      As 28 day mortality risk marker: \"Change in Procalcitonin Result\" (> 80 % or <=80 %) if Day 0 (or Day 1) and Day 4 values are available. Refer to " http://[email or web address]-pct-calculator.com/   Change in PCT <=80 %   A decrease of PCT levels below or equal to 80 % defines a positive change in PCT test result representing a higher risk for 28-day all-cause mortality of patients diagnosed with severe sepsis or septic shock.  Change in PCT > 80 %   A decrease of PCT levels of more than 80 % defines a negative change in PCT result representing a lower risk for 28-day all-cause mortality of patients diagnosed with severe sepsis or septic shock.                Blood Culture - Blood, [[id number]] Collected:  10/17/18 1506    Specimen:  Blood from Arm, Left Updated:  10/17/18 1509    Blood Culture - Blood, [[id number]] Collected:  10/17/18 1506    Specimen:  Blood from Arm, Right Updated:  10/17/18 1509          I ordered the above labs and reviewed the results    RADIOLOGY  CT Head Without Contrast    (Results Pending)   Reviewed CT head which shows nothing acute. Independently viewed by me. Interpreted by radiologist. Discussed with Dr. Laureano.    I ordered the above noted radiological studies. Interpreted by radiologist. Discussed with radiologist (Dr. Laureano). Reviewed by me in PACS.       PROCEDURES  Procedures    EKG          EKG time: 1306  Rhythm/Rate: SR, 80  P waves and [place]: normal  QRS, axis: LAD, LVH with secondary repolarization changes  ST and T waves: normal    Interpreted Contemporaneously by me, independently viewed  unchanged compared to prior 9/14/18    PROGRESS AND CONSULTS  ED Course as of Oct 17 1554   Wed Oct 17, 2018   1327 Dizzy and weak since 0200  [EE]      ED Course User Index  [EE] Mick Kapoor PA     1326  Pt's workup started by Mick Kapoor PA-C, and includes labs and EKG.    1433  Placed call to Dr. Marshall for consult. Ordered additional labs for further evaluation.    1445  Ordered IV fluids for hydration. Ordered CT head for further evaluation.    1453  Discussed Pt's case with Dr. Marshall who agrees with plan to discharge Pt pending a  negative workup but he would like two sets of blood cultures to be drawn and he will follow the results.     1457  Rechecked Pt who is resting comfortably. Informed him that I spoke with Dr. aMrshall and that he would like us to order blood cultures on patient. He will follow those results. If Pt's CT head and labs are negative, he will be able to be discharged. Pt understands and agrees to all plans. All questions answered.     1551  Rechecked Pt who is resting comfortably. Informed Pt that his labs and CT head are unremarkable. Discussed plans to discharge Pt and he understands and agrees to all plans. All questions answered.       MEDICAL DECISION MAKING  Results were reviewed/discussed with the patient and they were also made aware of online access. Pt also made aware that some labs, such as cultures, will not be resulted during ER visit and follow up with PMD is necessary.     MDM  Number of Diagnoses or Management Options     Amount and/or Complexity of Data Reviewed  Clinical lab tests: ordered and reviewed (Hb=13.4, troponin negative. Sed rate=44)  Tests in the radiology section of CPT®: ordered and reviewed (CT head shows nothing acute.)  Tests in the medicine section of CPT®: reviewed and ordered (See EKG note.)  Discussion of test results with the performing providers: yes (Dr. Laureano (radiology))  Decide to obtain previous medical records or to obtain history from someone other than the patient: yes           DIAGNOSIS  Final diagnoses:   Dizziness   Palpitations       DISPOSITION  DISCHARGE    Patient discharged in stable condition.    Reviewed implications of results, diagnosis, meds, responsibility to follow up, warning signs and symptoms of possible worsening, potential complications and reasons to return to ER, including new or worsening symptoms.    Patient/Family voiced understanding of above instructions.    Discussed plan for discharge, as there is no emergent indication for admission. Patient  referred to primary care provider for BP management due to today's BP. Pt/family is agreeable and understands need for follow up and repeat testing.  Pt is aware that discharge does not mean that nothing is wrong but it indicates no emergency is present that requires admission and they must continue care with follow-up as given below or physician of their choice.     FOLLOW-UP  Kelsey Muñoz MD  [address]  [facility] [id number] [phone]    Schedule an appointment as soon as possible for a visit       Naun Calvin MD  [address]  [facility] [address]  [facility] [id number] [phone]    Schedule an appointment as soon as possible for a visit            Medication List      New Prescriptions    meclizine 25 MG tablet  Commonly known as:  ANTIVERT  Take 1 tablet by mouth 4 (Four) Times a Day As Needed for dizziness.              Latest Documented Vital Signs:  As of 3:54 PM  BP- 132/86 HR- 80 Temp- 97.7 °F (36.5 °C) (Tympanic) O2 sat- 95%    --  Documentation assistance provided by naomi Castillo for Dr. Guerra.  Information recorded by the scribe was done at my direction and has been verified and validated by me.     Krystle Castillo  10/17/18 [id number]       Shun Guerra MD  10/17/18 [id number]

## 2018-10-17 NOTE — ED TRIAGE NOTES
Pt reports woke up at 0200 this morning felt dizzy and lightheaded. Pt reports in the process of getting on heart transplant list, was treated for Endocarditis in August and EF 16%. Pt also states had tooth pulled yesterday, took two oxycodone at 1900 last night

## 2018-10-18 ENCOUNTER — EPISODE CHANGES (OUTPATIENT)
Dept: SOCIAL WORK | Facility: HOSPITAL | Age: 68
End: 2018-10-18

## 2018-10-19 ENCOUNTER — PATIENT OUTREACH (OUTPATIENT)
Dept: CASE MANAGEMENT | Facility: OTHER | Age: 68
End: 2018-10-19

## 2018-10-19 NOTE — OUTREACH NOTE
"Care Management Plan 10/19/2018   Lifestyle Goals Routine follow-up with doctor(s);Maintain blood pressure < 130/80   Barriers Disease education   Self Management Medication Adherence   Annual Wellness Visit:  Patient Will Schedule   Care Gaps Addressed Colonoscopy;Flu Shot;Pneumonia Vaccine   Does patient have depression diagnosis? No   Advanced Directives: Patient Has   Ed Visits past 12 months: 3 or more   Hospitalizations past 12 months 2 or 3   Goal Progress Making Progress Toward Goal(s)   Readiness Scale 9   Confidence Scale 7   Health Literacy Good     The main concerns and/or symptoms the patient would like to address are: Pt. Reports he has not had any further dizzy episodes or palpations. Pt. Weighs self daily and has maintained his current weight for one month, he is aware of need for low salt diet, he works closely with his cardiologist team and  is now being seen by the advanced heart failure clinic at the [facility].  He is presently being evaluated for LVAD and/or transplant. Health Maintenance Gaps reviewed, he has received his flu and pneumonia shot at the  clinic and \"feels sure all the other tests have been done.\" Pt. Has called his Gastro practice for a colonoscopy date and is awaiting call back.    Education/instruction provided by Care Coordinator: Explained role of Care Advisor and contact information given to patient.Nurse provided patient education.No other questions or concerns voiced at this time. Reviewed Gaps in Care and need to schedule Annual Wellness Visit.    Follow Up Outreach Due: ~ 2 weeks    Yesenia Saucedo RN    "

## 2018-10-22 ENCOUNTER — PREP FOR SURGERY (OUTPATIENT)
Dept: OTHER | Facility: HOSPITAL | Age: 68
End: 2018-10-22

## 2018-10-22 DIAGNOSIS — Z12.11 SCREEN FOR COLON CANCER: Primary | ICD-10-CM

## 2018-10-22 LAB
BACTERIA SPEC AEROBE CULT: NORMAL
BACTERIA SPEC AEROBE CULT: NORMAL

## 2018-10-24 PROBLEM — Z12.11 SCREEN FOR COLON CANCER: Status: ACTIVE | Noted: 2018-10-24

## 2018-11-02 ENCOUNTER — PATIENT OUTREACH (OUTPATIENT)
Dept: CASE MANAGEMENT | Facility: OTHER | Age: 68
End: 2018-11-02

## 2018-11-02 NOTE — OUTREACH NOTE
"Pt. Has not had any further \"dizzy spells\" B/P has been in good range per pt. Pt. Will have his colonoscopy 10/6/18, reviewed clear liquid diet and to avoid any red color food items or drinks. Encouraged him to stay hydrated due to loss of fluids. Nurse provided patient education.No other questions or concerns voiced at this time.   "

## 2018-11-06 ENCOUNTER — HOSPITAL ENCOUNTER (OUTPATIENT)
Facility: HOSPITAL | Age: 68
Setting detail: HOSPITAL OUTPATIENT SURGERY
Discharge: HOME | End: 2018-11-06
Attending: SURGERY | Admitting: SURGERY

## 2018-11-06 ENCOUNTER — ANESTHESIA EVENT (OUTPATIENT)
Dept: GASTROENTEROLOGY | Facility: HOSPITAL | Age: 68
End: 2018-11-06

## 2018-11-06 ENCOUNTER — ANESTHESIA (OUTPATIENT)
Dept: GASTROENTEROLOGY | Facility: HOSPITAL | Age: 68
End: 2018-11-06

## 2018-11-06 VITALS
OXYGEN SATURATION: 97 % | HEIGHT: 68 IN | RESPIRATION RATE: 18 BRPM | TEMPERATURE: 97.6 F | BODY MASS INDEX: 28.93 KG/M2 | DIASTOLIC BLOOD PRESSURE: 78 MMHG | WEIGHT: 190.9 LBS | SYSTOLIC BLOOD PRESSURE: 112 MMHG | HEART RATE: 89 BPM

## 2018-11-06 DIAGNOSIS — Z12.11 SCREEN FOR COLON CANCER: ICD-10-CM

## 2018-11-06 PROCEDURE — 45385 COLONOSCOPY W/LESION REMOVAL: CPT | Performed by: SURGERY

## 2018-11-06 PROCEDURE — 45380 COLONOSCOPY AND BIOPSY: CPT | Performed by: SURGERY

## 2018-11-06 PROCEDURE — 25010000002 PROPOFOL 10 MG/ML EMULSION: Performed by: ANESTHESIOLOGY

## 2018-11-06 PROCEDURE — 88305 TISSUE EXAM BY PATHOLOGIST: CPT | Performed by: SURGERY

## 2018-11-06 RX ORDER — SODIUM CHLORIDE, SODIUM LACTATE, POTASSIUM CHLORIDE, CALCIUM CHLORIDE 600; 310; 30; 20 MG/100ML; MG/100ML; MG/100ML; MG/100ML
1000 INJECTION, SOLUTION INTRAVENOUS CONTINUOUS
Status: DISCONTINUED | OUTPATIENT
Start: 2018-11-06 | End: 2018-11-06 | Stop reason: HOSPADM

## 2018-11-06 RX ORDER — LIDOCAINE HYDROCHLORIDE 20 MG/ML
INJECTION, SOLUTION INFILTRATION; PERINEURAL AS NEEDED
Status: DISCONTINUED | OUTPATIENT
Start: 2018-11-06 | End: 2018-11-06 | Stop reason: SURG

## 2018-11-06 RX ORDER — SODIUM CHLORIDE 0.9 % (FLUSH) 0.9 %
3 SYRINGE (ML) INJECTION AS NEEDED
Status: DISCONTINUED | OUTPATIENT
Start: 2018-11-06 | End: 2018-11-06 | Stop reason: HOSPADM

## 2018-11-06 RX ORDER — PROPOFOL 10 MG/ML
VIAL (ML) INTRAVENOUS AS NEEDED
Status: DISCONTINUED | OUTPATIENT
Start: 2018-11-06 | End: 2018-11-06 | Stop reason: SURG

## 2018-11-06 RX ORDER — LIDOCAINE HYDROCHLORIDE 10 MG/ML
0.5 INJECTION, SOLUTION INFILTRATION; PERINEURAL ONCE AS NEEDED
Status: DISCONTINUED | OUTPATIENT
Start: 2018-11-06 | End: 2018-11-06 | Stop reason: HOSPADM

## 2018-11-06 RX ADMIN — SODIUM CHLORIDE, POTASSIUM CHLORIDE, SODIUM LACTATE AND CALCIUM CHLORIDE 1000 ML: 600; 310; 30; 20 INJECTION, SOLUTION INTRAVENOUS at 06:27

## 2018-11-06 RX ADMIN — LIDOCAINE HYDROCHLORIDE 60 MG: 20 INJECTION, SOLUTION INFILTRATION; PERINEURAL at 07:05

## 2018-11-06 RX ADMIN — SODIUM CHLORIDE, POTASSIUM CHLORIDE, SODIUM LACTATE AND CALCIUM CHLORIDE: 600; 310; 30; 20 INJECTION, SOLUTION INTRAVENOUS at 07:05

## 2018-11-06 RX ADMIN — PROPOFOL 240 MG: 10 INJECTION, EMULSION INTRAVENOUS at 07:05

## 2018-11-06 NOTE — OP NOTE
PREOPERATIVE DIAGNOSIS:  Screening    POSTOPERATIVE DIAGNOSIS AND FINDINGS:  -2 small ascending colon polyps  -1 larger pedunculated sigmoid polyp, still subcentimeter  -1 small sigmoid polyp    PROCEDURE:  Colonoscopy to cecum with:  -Cold biopsy removal of ascending colon polyps  -Snare polypectomy of sigmoid polyps    SURGEON:  Kevin Munson MD    ANESTHESIA:  MAC    SPECIMEN(S):  Polyp (s)    DESCRIPTION:  In decubitus position digital rectal exam was normal. Colonoscope inserted under direct visualization of lumen to cecum confirmed by visualization of ileocecal valve and appendiceal orifice.    Scope slowly withdrawn circumferentially examining all mucosal surfaces.    Quality of bowel preparation good.    There were 2 small ascending colon polyps removed completely with the cold biopsy forceps.  Good hemostasis at both sites.  A somewhat larger pedunculated sigmoid polyp was removed completely with the hot snare and retrieved.  Good hemostasis at the site.  A smaller sigmoid polyp was also removed with the hot snare and retrieved, good hemostasis at the site.  No other mucosal abnormalities were noted.    Tolerated well.    RECOMMENDATION FOR FUTURE SURVEILLANCE:  To be determined based on polyp pathology and issued as separate report    Kevin Munson M.D.

## 2018-11-06 NOTE — PERIOPERATIVE NURSING NOTE
Notified [employer] rep re: patient's AICD. Spoke with Joey Souza - he states ok to use magnet during cautery and that the magnet does not need to be checked after cautery use.

## 2018-11-06 NOTE — ANESTHESIA PREPROCEDURE EVALUATION
Anesthesia Evaluation     Patient summary reviewed and Nursing notes reviewed                Airway   Mallampati: II  TM distance: >3 FB  Neck ROM: full  no difficulty expected  Dental - normal exam     Pulmonary - normal exam   (+) asthma,   Cardiovascular - normal exam    (+) hypertension, valvular problems/murmurs, past MI , CAD, dysrhythmias Atrial Fib, angina, CHF, hyperlipidemia,       Neuro/Psych  (+) numbness, psychiatric history,     GI/Hepatic/Renal/Endo    (+)  GERD,      Musculoskeletal     Abdominal  - normal exam   Substance History      OB/GYN          Other                        Anesthesia Plan    ASA 4     MAC     Anesthetic plan, all risks, benefits, and alternatives have been provided, discussed and informed consent has been obtained with: patient.

## 2018-11-06 NOTE — H&P
HPI:  Screening, no family history of colon cancer, no personal history of polyps    PMH, PSH, MEDS AND ALLERGIES reviewed and reconciled with EPIC    PHYSICAL EXAM:  -  Constitutional:  no acute distress  -  Respiratory:  normal inspiratory effort  -  Cardiovascular: regular rate  -  Gastrointestinal: Soft    ASSESSMENT/PLAN:    Colonoscopy    Kevin Munson M.D.

## 2018-11-06 NOTE — DISCHARGE INSTRUCTIONS
For the next 24 hours patient needs to be with a responsible adult.    For 24 hours DO NOT drive, operate machinery, appliances, drink alcohol, make important decisions or sign legal documents.    Start with a light or bland diet and advance to regular diet as tolerated.    Follow recommendations on procedure report provided by your doctor.    Call Dr Munson for problems [phone]    Problems may include but not limited to: large amounts of bleeding, trouble breathing, repeated vomiting, severe unrelieved pain, fever or chills.

## 2018-11-07 ENCOUNTER — DOCUMENTATION (OUTPATIENT)
Dept: SURGERY | Facility: CLINIC | Age: 68
End: 2018-11-07

## 2018-11-07 LAB
CYTO UR: NORMAL
LAB AP CASE REPORT: NORMAL
PATH REPORT.FINAL DX SPEC: NORMAL
PATH REPORT.GROSS SPEC: NORMAL

## 2018-11-07 NOTE — PROGRESS NOTES
Screening colonoscopy 11/6/2018 demonstrated 4 polyps.    Pathology: Tubular adenomas    Recommendation: 3 year surveillance

## 2018-11-08 ENCOUNTER — TELEPHONE (OUTPATIENT)
Dept: SURGERY | Facility: CLINIC | Age: 68
End: 2018-11-08

## 2018-11-08 NOTE — TELEPHONE ENCOUNTER
----- Message from Kevin Munson MD sent at 11/7/2018  3:39 PM EST -----  Please let him know that he had 4 benign polyps.  Based on the type of polyps in the number of polyps, 3 year surveillance recommended-put in computer for reminder

## 2018-11-21 RX ORDER — ATORVASTATIN CALCIUM 40 MG/1
TABLET, FILM COATED ORAL
Qty: 15 TABLET | Refills: 0 | Status: SHIPPED | OUTPATIENT
Start: 2018-11-21

## 2018-11-22 ENCOUNTER — LAB REQUISITION (OUTPATIENT)
Dept: LAB | Facility: HOSPITAL | Age: 68
End: 2018-11-22

## 2018-11-22 DIAGNOSIS — Z00.00 ENCOUNTER FOR GENERAL ADULT MEDICAL EXAMINATION WITHOUT ABNORMAL FINDINGS: ICD-10-CM

## 2018-11-22 LAB
ALBUMIN SERPL-MCNC: 4.5 G/DL (ref 3.5–5.2)
ALBUMIN/GLOB SERPL: 1.1 G/DL
ALP SERPL-CCNC: 95 U/L (ref 39–117)
ALT SERPL W P-5'-P-CCNC: 22 U/L (ref 1–41)
ANION GAP SERPL CALCULATED.3IONS-SCNC: 14.7 MMOL/L
AST SERPL-CCNC: 17 U/L (ref 1–40)
BILIRUB SERPL-MCNC: 0.3 MG/DL (ref 0.1–1.2)
BUN BLD-MCNC: 47 MG/DL (ref 8–23)
BUN/CREAT SERPL: 24.9 (ref 7–25)
CALCIUM SPEC-SCNC: 9.8 MG/DL (ref 8.6–10.5)
CHLORIDE SERPL-SCNC: 97 MMOL/L (ref 98–107)
CO2 SERPL-SCNC: 24.3 MMOL/L (ref 22–29)
CREAT BLD-MCNC: 1.89 MG/DL (ref 0.76–1.27)
GFR SERPL CREATININE-BSD FRML MDRD: 35 ML/MIN/1.73
GLOBULIN UR ELPH-MCNC: 4.1 GM/DL
GLUCOSE BLD-MCNC: 119 MG/DL (ref 65–99)
MAGNESIUM SERPL-MCNC: 1.9 MG/DL (ref 1.6–2.4)
NT-PROBNP SERPL-MCNC: 635 PG/ML (ref 0–900)
POTASSIUM BLD-SCNC: 4.6 MMOL/L (ref 3.5–5.2)
PROT SERPL-MCNC: 8.6 G/DL (ref 6–8.5)
SODIUM BLD-SCNC: 136 MMOL/L (ref 136–145)

## 2018-11-22 PROCEDURE — 83880 ASSAY OF NATRIURETIC PEPTIDE: CPT | Performed by: INTERNAL MEDICINE

## 2018-11-22 PROCEDURE — 83735 ASSAY OF MAGNESIUM: CPT | Performed by: INTERNAL MEDICINE

## 2018-11-22 PROCEDURE — 80053 COMPREHEN METABOLIC PANEL: CPT | Performed by: INTERNAL MEDICINE

## 2018-12-03 ENCOUNTER — PATIENT OUTREACH (OUTPATIENT)
Dept: CASE MANAGEMENT | Facility: OTHER | Age: 68
End: 2018-12-03

## 2018-12-03 NOTE — OUTREACH NOTE
Pt. Reports he is now on the heart transplant list, he has f/u planned with PCP on 12/7/18 and Cardiologist 12/12/18. Reminded patient of fasting labs due tomorrow.Nurse provided patient education.No other questions or concerns voiced at this time.

## 2019-01-01 ENCOUNTER — HOSPITAL ENCOUNTER (EMERGENCY)
Facility: HOSPITAL | Age: 69
Discharge: HOME | End: 2019-01-01
Attending: EMERGENCY MEDICINE | Admitting: EMERGENCY MEDICINE

## 2019-01-01 VITALS
SYSTOLIC BLOOD PRESSURE: 142 MMHG | TEMPERATURE: 97.7 F | HEART RATE: 90 BPM | RESPIRATION RATE: 16 BRPM | HEIGHT: 68 IN | WEIGHT: 192 LBS | OXYGEN SATURATION: 96 % | DIASTOLIC BLOOD PRESSURE: 79 MMHG | BODY MASS INDEX: 29.1 KG/M2

## 2019-01-01 DIAGNOSIS — T82.9XXA COMPLICATION ASSOCIATED WITH PERIPHERALLY INSERTED CENTRAL CATHETER, INITIAL ENCOUNTER: Primary | ICD-10-CM

## 2019-01-01 PROCEDURE — 99284 EMERGENCY DEPT VISIT MOD MDM: CPT

## 2019-01-01 PROCEDURE — C1751 CATH, INF, PER/CENT/MIDLINE: HCPCS

## 2019-01-01 RX ORDER — SODIUM CHLORIDE 0.9 % (FLUSH) 0.9 %
10 SYRINGE (ML) INJECTION AS NEEDED
Status: DISCONTINUED | OUTPATIENT
Start: 2019-01-01 | End: 2019-01-01 | Stop reason: HOSPADM

## 2019-01-01 RX ORDER — SODIUM CHLORIDE 0.9 % (FLUSH) 0.9 %
10 SYRINGE (ML) INJECTION EVERY 12 HOURS SCHEDULED
Status: CANCELLED | OUTPATIENT
Start: 2019-01-01

## 2019-01-01 RX ORDER — SODIUM CHLORIDE 0.9 % (FLUSH) 0.9 %
10 SYRINGE (ML) INJECTION AS NEEDED
Status: CANCELLED | OUTPATIENT
Start: 2019-01-01

## 2019-01-01 RX ORDER — SODIUM CHLORIDE 0.9 % (FLUSH) 0.9 %
20 SYRINGE (ML) INJECTION AS NEEDED
Status: CANCELLED | OUTPATIENT
Start: 2019-01-01

## 2019-01-01 NOTE — ED NOTES
Per MD request, peripheral IV placed for medication transfusion until PICC line can be obtained by IV nurse. IV nurse paged. Informed [facility] of need for PICC line placement. Informed consent being obtained now.     Angy Flaherty, OLIVA  01/01/19 [id number]

## 2019-01-01 NOTE — ED PROVIDER NOTES
EMERGENCY DEPARTMENT ENCOUNTER    CHIEF COMPLAINT  Chief Complaint: Removed PICC line  History given by: Pt  History limited by: none  Room Number: 05/05  PMD: Kelsey Muñoz MD   Cardiology: Dr. Calvin      HPI:  Pt is a 68 y.o. male, Hx of cardiomyopathy, who presents complaining of accidental picc line removal from right arm that occurred an hour ago. Pt states he was in his lazy boy chair and was getting up when the line got stuck on something and was pulled out. Pt states he hardly saw any blood and denies any pain to area. Pt states he is on a heart transplant list and is on a continuous Milrinone infusion. Pt then called his transplant team at , who sent pt here and told to start IV first for medicine if a PICC line wasn't quickly put in. Pt denies changed dizziness or changed lightheadedness. Per family, pt has an EF < 15%. Pt has no further complaints.     Duration:  One hour  Onset: sudden  Timing: constant  Location: right arm  Radiation: n/a  Quality: PICC line removal  Intensity/Severity: moderate  Progression: unchanged  Associated Symptoms: none  Aggravating Factors: none  Alleviating Factors: none  Previous Episodes: none  Treatment before arrival: Pt states he is on a heart transplant list and is on continuous Milrinone.    PAST MEDICAL HISTORY  Active Ambulatory Problems     Diagnosis Date Noted   • A-fib (CMS/[facility]) 10/28/2016   • Acid reflux 10/28/2016   • Blood glucose elevated 10/28/2016   • HLD (hyperlipidemia) 10/28/2016   • Cardiomyopathy, ischemic 10/28/2016   • L-S radiculopathy 10/28/2016   • Idiopathic ventricular tachycardia (CMS/[facility]) 10/28/2016   • CHF (congestive heart failure), NYHA class III (CMS/[facility]) 10/28/2016   • Coronary artery disease of native artery of native heart with stable angina pectoris (CMS/[facility]) 11/03/2016   • Non-rheumatic mitral regurgitation 11/03/2016   • Non-rheumatic tricuspid valve insufficiency 11/03/2016   • S/P CABG x 4 11/15/2016   • S/P MVR (mitral  valve replacement) 11/15/2016   • S/P TVR (tricuspid valve repair) 11/15/2016   • History of Bacteremia due to group B Streptococcus 06/08/2018   • Depression 06/27/2018   • History of bacterial endocarditis 07/11/2018   • Anemia of chronic disease 07/15/2018   • Hyperglycemia    • Screen for colon cancer 10/24/2018     Resolved Ambulatory Problems     Diagnosis Date Noted   • Hx of total hip arthroplasty 03/18/2016   • History of total hip arthroplasty 04/13/2016   • Right inguinal pain 04/21/2016   • History of bilateral hip arthroplasty 06/27/2016   • Right hip pain 06/27/2016   • Precordial chest pain 10/04/2016   • Ischemic heart disease 10/27/2016   • Other specified forms of effusion, except tuberculous 11/15/2016   • Acute renal failure (CMS/[facility]) 06/04/2018   • Sepsis (CMS/[facility]) 06/04/2018   • Hyponatremia 06/04/2018   • Bacterial pneumonia 06/04/2018   • Pneumonia 07/10/2018   • Hypomagnesemia 07/10/2018   • Positive blood culture 07/10/2018   • Shortness of breath 07/10/2018   • Secondary thrombocytopenia 07/12/2018     Past Medical History:   Diagnosis Date   • Allergic rhinitis    • Anemia    • Asthma    • CAD (coronary artery disease)    • Cardiomyopathy, ischemic    • Carpal tunnel syndrome    • CHF (congestive heart failure), NYHA class III, chronic, systolic (CMS/[facility])    • Endocarditis    • GERD (gastroesophageal reflux disease)    • Health care maintenance    • Herniated disc, cervical    • Hx of total hip arthroplasty 3/18/2016   • Hyperglycemia    • Hyperlipidemia    • Hypertension    • Idiopathic ventricular tachycardia (CMS/[facility])    • Lumbosacral radiculopathy at L4    • Myocardial infarction (CMS/[facility])    • Myocardial ischemia    • Non-rheumatic tricuspid valve insufficiency    • Nonrheumatic mitral valve regurgitation    • Open wound    • Osteoarthritis    • PAF (paroxysmal atrial fibrillation) (CMS/[facility])        PAST SURGICAL HISTORY  Past Surgical History:   Procedure Laterality Date   •  APPENDECTOMY     • CARDIAC CATHETERIZATION N/A 10/21/2016    Procedure: Coronary angiography;  Surgeon: Naun Calvin MD;  Location: [facility] CATH INVASIVE LOCATION;  Service:    • CARDIAC CATHETERIZATION N/A 10/21/2016    Procedure: Left heart cath;  Surgeon: Naun Calvin MD;  Location: [facility] CATH INVASIVE LOCATION;  Service:    • CARDIAC CATHETERIZATION N/A 10/21/2016    Procedure: Left ventriculography;  Surgeon: Naun Calvin MD;  Location: [facility] CATH INVASIVE LOCATION;  Service:    • CARDIAC CATHETERIZATION N/A 10/21/2016    Procedure: Right heart cath;  Surgeon: Naun Calvin MD;  Location: [facility] CATH INVASIVE LOCATION;  Service:    • CARDIAC DEFIBRILLATOR PLACEMENT     • CARPAL TUNNEL RELEASE     • COLONOSCOPY     • COLONOSCOPY N/A 11/6/2018    Procedure: COLONOSCOPY to CECUM WITH COLD BX/HOT SNARE POLYPECTOMY;  Surgeon: Kevin Munson MD;  Location: [facility] ENDOSCOPY;  Service: General   • CORNEAL TRANSPLANT Left    • CORONARY ANGIOPLASTY      WITH STENT PLACEMENT TO THE LAD   • CORONARY ARTERY BYPASS GRAFT WITH MITRAL VALVE REPAIR/REPLACEMENT N/A 11/7/2016    Procedure: JAYE STERNOTOMY CORONARY ARTERY BYPASS GRAFT TIMES 4 USING LEFT INTERNAL MAMMARY ARTERY AND LEFT GREATER SAPHENOUS VEIN GRAFT PER ENDOSCOPIC VEIN HARVESTING, MITRAL VALVE REPLACEMENT AND TRICUSPID VALVE REPAIR;  Surgeon: Slade Hurtado MD;  Location: [facility] OR;  Service:    • EYE SURGERY     • FOOT SURGERY Left    • HERNIA REPAIR     • IMPLANTABLE CARDIOVERTER DEFIBRILLATOR LEAD REPLACEMENT/POCKET REVISION     • INGUINAL HERNIA REPAIR Left    • LASIK     • TONSILLECTOMY     • TOTAL HIP ARTHROPLASTY Bilateral        FAMILY HISTORY  Family History   Problem Relation Age of Onset   • Stroke Mother    • Osteoarthritis Mother        SOCIAL HISTORY  Social History     Socioeconomic History   • Marital status: Single     Spouse name: Not on file   • Number of children: Not on file   • Years of education: Not on  file   • Highest education level: Not on file   Social Needs   • Financial resource strain: Not on file   • Food insecurity - worry: Not on file   • Food insecurity - inability: Not on file   • Transportation needs - medical: Not on file   • Transportation needs - non-medical: Not on file   Occupational History   • Not on file   Tobacco Use   • Smoking status: Never Smoker   • Smokeless tobacco: Never Used   • Tobacco comment: daily caffeine use   Substance and Sexual Activity   • Alcohol use: Yes     Alcohol/week: 6.0 oz     Types: 10 Shots of liquor per week     Comment: social drinker   • Drug use: No   • Sexual activity: Defer   Other Topics Concern   • Not on file   Social History Narrative   • Not on file       ALLERGIES  Shellfish-derived products    REVIEW OF SYSTEMS  Review of Systems   Constitutional: Negative for activity change, appetite change and fever.   HENT: Negative for congestion and sore throat.    Eyes: Negative.    Respiratory: Negative for cough and shortness of breath.    Cardiovascular: Negative for chest pain and leg swelling.   Gastrointestinal: Negative for abdominal pain, diarrhea and vomiting.   Endocrine: Negative.    Genitourinary: Negative for decreased urine volume and dysuria.   Musculoskeletal: Negative for neck pain.   Skin: Negative for rash and wound.   Allergic/Immunologic: Negative.    Neurological: Negative for weakness, numbness and headaches.   Hematological: Negative.    Psychiatric/Behavioral: Negative.    All other systems reviewed and are negative.      PHYSICAL EXAM  ED Triage Vitals   Temp Heart Rate Resp BP SpO2   01/01/19 1600 01/01/19 1500 01/01/19 1500 01/01/19 1534 01/01/19 1500   97.7 °F (36.5 °C) 83 16 142/79 99 %      Temp src Heart Rate Source Patient Position BP Location FiO2 (%)   01/01/19 1600 -- -- -- --   Tympanic             Physical Exam   Constitutional: He is oriented to person, place, and time. He appears distressed (mild).   HENT:   Head:  Normocephalic and atraumatic.   Eyes: EOM are normal.   Neck: Normal range of motion. Neck supple.   Cardiovascular: Normal rate and regular rhythm.   Murmur (at left sternal border) heard.   Systolic (ejectrion) murmur is present with a grade of 2/6.  Pulses:       Radial pulses are 2+ on the right side.   Pulmonary/Chest: Effort normal and breath sounds normal. No respiratory distress.   Abdominal: Soft. Bowel sounds are normal. He exhibits no distension. There is no tenderness.   Musculoskeletal: Normal range of motion. He exhibits no edema.   puncture wound from PICC line sight in right arm. Non-tender no bleeding no erythema.    Neurological: He is alert and oriented to person, place, and time. He has normal sensation and normal strength.   Skin: Skin is warm and dry.   Psychiatric: Mood and affect normal.   Nursing note and vitals reviewed.        PROCEDURES  Procedures      PROGRESS AND CONSULTS        1605  Ordered continuous drip and consult to IV team.     1716  Per RN, pt states that IV will try to get here as fast as possible to replace PICC line.    1717  Pt recheck. IV Milrinone being given. Informed pt that IV will be down here shortly. Pt is agreeable.     1816  Pt recheck. IV Therapy has replaced a proper PICC line with XR confirming proper replacement. Discussed the plan to discharge pt home. I instructed pt to f/u with his PCP. Pt understands and agrees with plan, all concerns addressed.     MEDICAL DECISION MAKING  Results were reviewed/discussed with the patient and they were also made aware of online access. Pt also made aware that some labs, such as cultures, will not be resulted during ER visit and follow up with PMD is necessary.     MDM  Number of Diagnoses or Management Options  Complication associated with peripherally inserted central catheter, initial encounter:   Patient Progress  Patient progress: improved         DIAGNOSIS  Final diagnoses:   Complication associated with peripherally  inserted central catheter, initial encounter       DISPOSITION  DISCHARGE    Patient discharged in stable condition.    Reviewed implications of results, diagnosis, meds, responsibility to follow up, warning signs and symptoms of possible worsening, potential complications and reasons to return to ER.    Patient/Family voiced understanding of above instructions.    Discussed plan for discharge, as there is no emergent indication for admission. Patient referred to primary care provider for BP management due to today's BP. Pt/family is agreeable and understands need for follow up and repeat testing.  Pt is aware that discharge does not mean that nothing is wrong but it indicates no emergency is present that requires admission and they must continue care with follow-up as given below or physician of their choice.     FOLLOW-UP  Kelsey Muñoz MD  [address] [id number] [phone]      As needed         Medication List      No changes were made to your prescriptions during this visit.           Latest Documented Vital Signs:  As of 9:27 PM  BP- 142/79 HR- 90 Temp- 97.7 °F (36.5 °C) (Tympanic) O2 sat- 96%    --  Documentation assistance provided by naomi Lugo for Dr. Pugh.  Information recorded by the scradolph was done at my direction and has been verified and validated by me.              Naldo Lugo  01/01/19 2123       Kip Pugh MD  01/01/19 9584

## 2019-01-02 RX ORDER — ATORVASTATIN CALCIUM 40 MG/1
TABLET, FILM COATED ORAL
Qty: 15 TABLET | Refills: 0 | OUTPATIENT
Start: 2019-01-02

## 2019-01-03 ENCOUNTER — PATIENT OUTREACH (OUTPATIENT)
Dept: CASE MANAGEMENT | Facility: OTHER | Age: 69
End: 2019-01-03

## 2019-01-03 NOTE — OUTREACH NOTE
"Pt. With recent visit to ER due to dislodgement of PICC line.Pt is on a heart transplant list and is on a continuous Milrinone infusion. Pt  called his transplant team at , who sent pt to ER and told to start IV first for medicine if a PICC line wasn't quickly put in. Pt has an IV nurse visit him weekly and he has a good emergency plan in place for care of PICC line.Pt. Is trying a \"new way\" to secure his PICC line to help with keeping it in place.Nurse provided patient education.No other questions or concerns voiced at this time.  "

## 2019-01-12 ENCOUNTER — HOSPITAL ENCOUNTER (EMERGENCY)
Facility: HOSPITAL | Age: 69
Discharge: HOME | End: 2019-01-12
Attending: EMERGENCY MEDICINE | Admitting: EMERGENCY MEDICINE

## 2019-01-12 ENCOUNTER — APPOINTMENT (OUTPATIENT)
Dept: GENERAL RADIOLOGY | Facility: HOSPITAL | Age: 69
End: 2019-01-12

## 2019-01-12 VITALS
SYSTOLIC BLOOD PRESSURE: 117 MMHG | WEIGHT: 202.2 LBS | BODY MASS INDEX: 30.65 KG/M2 | HEIGHT: 68 IN | HEART RATE: 120 BPM | RESPIRATION RATE: 16 BRPM | DIASTOLIC BLOOD PRESSURE: 81 MMHG | TEMPERATURE: 98.5 F | OXYGEN SATURATION: 93 %

## 2019-01-12 DIAGNOSIS — S22.32XA CLOSED FRACTURE OF ONE RIB OF LEFT SIDE, INITIAL ENCOUNTER: Primary | ICD-10-CM

## 2019-01-12 PROCEDURE — 99283 EMERGENCY DEPT VISIT LOW MDM: CPT

## 2019-01-12 PROCEDURE — 71101 X-RAY EXAM UNILAT RIBS/CHEST: CPT

## 2019-01-12 RX ORDER — OXYCODONE HYDROCHLORIDE AND ACETAMINOPHEN 5; 325 MG/1; MG/1
2 TABLET ORAL ONCE
Status: COMPLETED | OUTPATIENT
Start: 2019-01-12 | End: 2019-01-12

## 2019-01-12 RX ORDER — OXYCODONE HYDROCHLORIDE AND ACETAMINOPHEN 5; 325 MG/1; MG/1
1 TABLET ORAL EVERY 6 HOURS PRN
Qty: 20 TABLET | Refills: 0 | OUTPATIENT
Start: 2019-01-12 | End: 2019-01-29

## 2019-01-12 RX ORDER — LIDOCAINE 50 MG/G
1 PATCH TOPICAL EVERY 24 HOURS
Qty: 6 PATCH | Refills: 0 | OUTPATIENT
Start: 2019-01-12 | End: 2019-01-29

## 2019-01-12 RX ADMIN — OXYCODONE AND ACETAMINOPHEN 2 TABLET: 5; 325 TABLET ORAL at 12:04

## 2019-01-12 NOTE — ED NOTES
Pt arrives to ED with c/o fall.  States he is on a heart transplant list and is taking several medications that make him dizzy.  He became dizzy this am and lost balance and fell backwards hitting his head and back on furniture.  Pt is complaining of severe pain in upper left back area which radiates around to the left chest. Pt denies LOC     Bianca Gibbons RN  01/12/19 1048

## 2019-01-12 NOTE — ED PROVIDER NOTES
EMERGENCY DEPARTMENT ENCOUNTER    CHIEF COMPLAINT  Chief Complaint: Fall  History given by: Patient  History limited by: None  Room Number:   PMD: Kelsey Muñoz MD      HPI:  Pt is a 68 y.o. male who presents complaining of fall secondary to dizziness that occurred this morning. Patient states he is dizzy often, especially in the mornings while trying to get out of bed, due to different medications he is on. Not currently dizzy. Patient reports falling onto back when he fell and is c/o back and rib pain. No head injury or LOC. Patient not anticoagulated.     Duration: Since onset  Onset: This morning  Timin fall  Location: [place]   Radiation: NA  Quality: Fall  Intensity/Severity: Moderate  Progression: NA  Associated Symptoms: Back pain, rib pain  Aggravating Factors: None  Alleviating Factors: None  Previous Episodes: NA  Treatment before arrival: None    PAST MEDICAL HISTORY  Active Ambulatory Problems     Diagnosis Date Noted   • A-fib (CMS/[facility]) 10/28/2016   • Acid reflux 10/28/2016   • Blood glucose elevated 10/28/2016   • HLD (hyperlipidemia) 10/28/2016   • Cardiomyopathy, ischemic 10/28/2016   • L-S radiculopathy 10/28/2016   • Idiopathic ventricular tachycardia (CMS/[facility]) 10/28/2016   • CHF (congestive heart failure), NYHA class III (CMS/[facility]) 10/28/2016   • Coronary artery disease of native artery of native heart with stable angina pectoris (CMS/[facility]) 2016   • Non-rheumatic mitral regurgitation 2016   • Non-rheumatic tricuspid valve insufficiency 2016   • S/P CABG x 4 11/15/2016   • S/P MVR (mitral valve replacement) 11/15/2016   • S/P TVR (tricuspid valve repair) 11/15/2016   • History of Bacteremia due to group B Streptococcus 2018   • Depression 2018   • History of bacterial endocarditis 2018   • Anemia of chronic disease 07/15/2018   • Hyperglycemia    • Screen for colon cancer 10/24/2018     Resolved Ambulatory Problems     Diagnosis Date Noted   • Hx  of total hip arthroplasty 03/18/2016   • History of total hip arthroplasty 04/13/2016   • Right inguinal pain 04/21/2016   • History of bilateral hip arthroplasty 06/27/2016   • Right hip pain 06/27/2016   • Precordial chest pain 10/04/2016   • Ischemic heart disease 10/27/2016   • Other specified forms of effusion, except tuberculous 11/15/2016   • Acute renal failure (CMS/[facility]) 06/04/2018   • Sepsis (CMS/[facility]) 06/04/2018   • Hyponatremia 06/04/2018   • Bacterial pneumonia 06/04/2018   • Pneumonia 07/10/2018   • Hypomagnesemia 07/10/2018   • Positive blood culture 07/10/2018   • Shortness of breath 07/10/2018   • Secondary thrombocytopenia 07/12/2018     Past Medical History:   Diagnosis Date   • Allergic rhinitis    • Anemia    • Asthma    • CAD (coronary artery disease)    • Cardiomyopathy, ischemic    • Carpal tunnel syndrome    • CHF (congestive heart failure), NYHA class III, chronic, systolic (CMS/[facility])    • Endocarditis    • GERD (gastroesophageal reflux disease)    • Health care maintenance    • Herniated disc, cervical    • Hx of total hip arthroplasty 3/18/2016   • Hyperglycemia    • Hyperlipidemia    • Hypertension    • Idiopathic ventricular tachycardia (CMS/[facility])    • Lumbosacral radiculopathy at L4    • Myocardial infarction (CMS/[facility])    • Myocardial ischemia    • Non-rheumatic tricuspid valve insufficiency    • Nonrheumatic mitral valve regurgitation    • Open wound    • Osteoarthritis    • PAF (paroxysmal atrial fibrillation) (CMS/[facility])        PAST SURGICAL HISTORY  Past Surgical History:   Procedure Laterality Date   • APPENDECTOMY     • CARDIAC CATHETERIZATION N/A 10/21/2016    Procedure: Coronary angiography;  Surgeon: Naun Calvin MD;  Location: [facility] CATH INVASIVE LOCATION;  Service:    • CARDIAC CATHETERIZATION N/A 10/21/2016    Procedure: Left heart cath;  Surgeon: Naun Calvin MD;  Location: [facility] CATH INVASIVE LOCATION;  Service:    • CARDIAC CATHETERIZATION N/A 10/21/2016     Procedure: Left ventriculography;  Surgeon: Naun Calvin MD;  Location: [facility] CATH INVASIVE LOCATION;  Service:    • CARDIAC CATHETERIZATION N/A 10/21/2016    Procedure: Right heart cath;  Surgeon: Naun Calvin MD;  Location: [facility] CATH INVASIVE LOCATION;  Service:    • CARDIAC DEFIBRILLATOR PLACEMENT     • CARPAL TUNNEL RELEASE     • COLONOSCOPY     • COLONOSCOPY N/A 11/6/2018    Procedure: COLONOSCOPY to CECUM WITH COLD BX/HOT SNARE POLYPECTOMY;  Surgeon: Kevin Munson MD;  Location: [facility] ENDOSCOPY;  Service: General   • CORNEAL TRANSPLANT Left    • CORONARY ANGIOPLASTY      WITH STENT PLACEMENT TO THE LAD   • CORONARY ARTERY BYPASS GRAFT WITH MITRAL VALVE REPAIR/REPLACEMENT N/A 11/7/2016    Procedure: JAYE STERNOTOMY CORONARY ARTERY BYPASS GRAFT TIMES 4 USING LEFT INTERNAL MAMMARY ARTERY AND LEFT GREATER SAPHENOUS VEIN GRAFT PER ENDOSCOPIC VEIN HARVESTING, MITRAL VALVE REPLACEMENT AND TRICUSPID VALVE REPAIR;  Surgeon: Slade Hurtado MD;  Location: [facility] OR;  Service:    • EYE SURGERY     • FOOT SURGERY Left    • HERNIA REPAIR     • IMPLANTABLE CARDIOVERTER DEFIBRILLATOR LEAD REPLACEMENT/POCKET REVISION     • INGUINAL HERNIA REPAIR Left    • LASIK     • TONSILLECTOMY     • TOTAL HIP ARTHROPLASTY Bilateral        FAMILY HISTORY  Family History   Problem Relation Age of Onset   • Stroke Mother    • Osteoarthritis Mother        SOCIAL HISTORY  Social History     Socioeconomic History   • Marital status: Single     Spouse name: Not on file   • Number of children: Not on file   • Years of education: Not on file   • Highest education level: Not on file   Social Needs   • Financial resource strain: Not on file   • Food insecurity - worry: Not on file   • Food insecurity - inability: Not on file   • Transportation needs - medical: Not on file   • Transportation needs - non-medical: Not on file   Occupational History   • Not on file   Tobacco Use   • Smoking status: Never Smoker   • Smokeless  tobacco: Never Used   • Tobacco comment: daily caffeine use   Substance and Sexual Activity   • Alcohol use: Yes     Alcohol/week: 6.0 oz     Types: 10 Shots of liquor per week     Comment: social drinker   • Drug use: No   • Sexual activity: Defer   Other Topics Concern   • Not on file   Social History Narrative   • Not on file       ALLERGIES  Shellfish-derived products    REVIEW OF SYSTEMS  Review of Systems   Constitutional: Negative for activity change, appetite change and fever.   HENT: Negative for congestion and sore throat.    Eyes: Negative.    Respiratory: Negative for cough and shortness of breath.    Cardiovascular: Negative for chest pain and leg swelling.   Gastrointestinal: Negative for abdominal pain, diarrhea and vomiting.   Endocrine: Negative.    Genitourinary: Negative for decreased urine volume and dysuria.   Musculoskeletal: Positive for arthralgias and back pain. Negative for neck pain.        Patient c/o rib pain   Skin: Negative for rash and wound.   Allergic/Immunologic: Negative.    Neurological: Negative for dizziness, weakness, numbness and headaches.   Hematological: Negative.    Psychiatric/Behavioral: Negative.    All other systems reviewed and are negative.      PHYSICAL EXAM  ED Triage Vitals   Temp Heart Rate Resp BP SpO2   01/12/19 1120 01/12/19 1048 01/12/19 1048 01/12/19 1108 01/12/19 1048   98.5 °F (36.9 °C) 117 16 125/69 97 %      Temp src Heart Rate Source Patient Position BP Location FiO2 (%)   -- 01/12/19 1048 -- -- --    Monitor          Physical Exam   Constitutional: He is oriented to person, place, and time and well-developed, well-nourished, and in no distress. No distress.   HENT:   Head: Normocephalic and atraumatic.   Eyes: EOM are normal. Pupils are equal, round, and reactive to light.   Neck: Normal range of motion. Neck supple.   Cardiovascular: Regular rhythm and normal heart sounds. Tachycardia present.   Pulmonary/Chest: Effort normal and breath sounds  normal. No respiratory distress.   Abdominal: Soft. There is no tenderness. There is no rebound and no guarding.   Musculoskeletal: Normal range of motion. He exhibits no edema.   Abrasion and contusion to left scapula and left axilla. Areas tender to palpation.    Neurological: He is alert and oriented to person, place, and time. He has normal sensation and normal strength.   Skin: Skin is warm and dry.   Psychiatric: Mood and affect normal.   Nursing note and vitals reviewed.        RADIOLOGY  XR Ribs Left With PA Chest              I ordered the above noted radiological studies. Interpreted by radiologist.  Reviewed by me in PACS.       PROCEDURES  Procedures      PROGRESS AND CONSULTS      1150  XR of left ribs ordered to evaluate for rib fxs. Head CT canceled since patient did not hit head. Percocet ordered for pain. Incentive spirometer ordered to assist with deep breathing.     1208  BP- 125/69 HR- 112 Temp- 98.5 °F (36.9 °C) O2 sat- 98%  Rechecked the patient who is in NAD and is resting comfortably. Discussed finding of one rib fx. Plan- discharge home with incentive spirometer           MEDICAL DECISION MAKING  Results were reviewed/discussed with the patient and they were also made aware of online access. Pt also made aware that some labs, such as cultures, will not be resulted during ER visit and follow up with PMD is necessary.     MDM       DIAGNOSIS  Final diagnoses:   Closed fracture of one rib of left side, initial encounter       DISPOSITION  Discharge home to self care.    Latest Documented Vital Signs:  As of 12:09 PM  BP- 125/69 HR- 112 Temp- 98.5 °F (36.9 °C) O2 sat- 98%    --  Documentation assistance provided by naomi Bianchi for Simon Lopez MD.  Information recorded by the naomi was done at my direction and has been verified and validated by me.     Vicki Bianchi  01/12/19 1155       Vicki Bianchi  01/12/19 1215       Simon Lopez MD  01/12/19 0465

## 2019-01-29 ENCOUNTER — HOSPITAL ENCOUNTER (EMERGENCY)
Facility: HOSPITAL | Age: 69
Discharge: HOME | End: 2019-01-29
Attending: EMERGENCY MEDICINE | Admitting: EMERGENCY MEDICINE

## 2019-01-29 ENCOUNTER — APPOINTMENT (OUTPATIENT)
Dept: GENERAL RADIOLOGY | Facility: HOSPITAL | Age: 69
End: 2019-01-29

## 2019-01-29 VITALS
HEART RATE: 86 BPM | OXYGEN SATURATION: 99 % | DIASTOLIC BLOOD PRESSURE: 81 MMHG | TEMPERATURE: 96.9 F | WEIGHT: 196 LBS | RESPIRATION RATE: 16 BRPM | HEIGHT: 68 IN | SYSTOLIC BLOOD PRESSURE: 130 MMHG | BODY MASS INDEX: 29.7 KG/M2

## 2019-01-29 DIAGNOSIS — Z45.89 ENCOUNTER FOR MANAGEMENT OF PERIPHERALLY INSERTED CENTRAL CATHETER (PICC): Primary | ICD-10-CM

## 2019-01-29 PROCEDURE — 99283 EMERGENCY DEPT VISIT LOW MDM: CPT

## 2019-01-29 PROCEDURE — 76937 US GUIDE VASCULAR ACCESS: CPT

## 2019-01-29 PROCEDURE — C1751 CATH, INF, PER/CENT/MIDLINE: HCPCS

## 2019-01-29 RX ORDER — LIDOCAINE HYDROCHLORIDE 10 MG/ML
10 INJECTION, SOLUTION EPIDURAL; INFILTRATION; INTRACAUDAL; PERINEURAL ONCE
Status: COMPLETED | OUTPATIENT
Start: 2019-01-29 | End: 2019-01-29

## 2019-01-29 RX ORDER — MILRINONE LACTATE 0.2 MG/ML
.25-.75 INJECTION, SOLUTION INTRAVENOUS
Status: DISCONTINUED | OUTPATIENT
Start: 2019-01-29 | End: 2019-01-29 | Stop reason: HOSPADM

## 2019-01-29 RX ADMIN — LIDOCAINE HYDROCHLORIDE 8 ML: 10 INJECTION, SOLUTION EPIDURAL; INFILTRATION; INTRACAUDAL; PERINEURAL at 13:28

## 2019-02-07 ENCOUNTER — PATIENT OUTREACH (OUTPATIENT)
Dept: CASE MANAGEMENT | Facility: OTHER | Age: 69
End: 2019-02-07

## 2019-02-07 NOTE — OUTREACH NOTE
Pt. With recent visit to ER on 1/29/19 due to dislodgement of PICC line. Pt is on a heart transplant list and is on a continuous Milrinone infusion.Pt. continues to have [facility] IV team visits weekly. Nurse provided patient education.No other questions or concerns voiced at this time.

## 2019-02-19 ENCOUNTER — TRANSCRIBE ORDERS (OUTPATIENT)
Dept: ADMINISTRATIVE | Facility: HOSPITAL | Age: 69
End: 2019-02-19

## 2019-02-19 DIAGNOSIS — Z94.9 TRANSPLANT: Primary | ICD-10-CM

## 2019-03-05 ENCOUNTER — HOSPITAL ENCOUNTER (EMERGENCY)
Facility: HOSPITAL | Age: 69
Discharge: HOME | End: 2019-03-05
Attending: EMERGENCY MEDICINE | Admitting: EMERGENCY MEDICINE

## 2019-03-05 VITALS
TEMPERATURE: 98.3 F | RESPIRATION RATE: 18 BRPM | SYSTOLIC BLOOD PRESSURE: 118 MMHG | OXYGEN SATURATION: 97 % | DIASTOLIC BLOOD PRESSURE: 75 MMHG | HEART RATE: 73 BPM

## 2019-03-05 DIAGNOSIS — L24.5 IRRITANT CONTACT DERMATITIS DUE TO OTHER CHEMICAL PRODUCTS: Primary | ICD-10-CM

## 2019-03-05 LAB
ALBUMIN SERPL-MCNC: 4.1 G/DL (ref 3.5–5.2)
ALBUMIN/GLOB SERPL: 1.1 G/DL
ALP SERPL-CCNC: 97 U/L (ref 39–117)
ALT SERPL W P-5'-P-CCNC: 14 U/L (ref 1–41)
ANION GAP SERPL CALCULATED.3IONS-SCNC: 16.7 MMOL/L
AST SERPL-CCNC: 27 U/L (ref 1–40)
BASOPHILS # BLD AUTO: 0.06 10*3/MM3 (ref 0–0.2)
BASOPHILS NFR BLD AUTO: 1 % (ref 0–1.5)
BILIRUB SERPL-MCNC: 0.3 MG/DL (ref 0.1–1.2)
BUN BLD-MCNC: 29 MG/DL (ref 8–23)
BUN/CREAT SERPL: 17 (ref 7–25)
CALCIUM SPEC-SCNC: 9.4 MG/DL (ref 8.6–10.5)
CHLORIDE SERPL-SCNC: 101 MMOL/L (ref 98–107)
CO2 SERPL-SCNC: 21.3 MMOL/L (ref 22–29)
CREAT BLD-MCNC: 1.71 MG/DL (ref 0.76–1.27)
DEPRECATED RDW RBC AUTO: 45.2 FL (ref 37–54)
EOSINOPHIL # BLD AUTO: 0.56 10*3/MM3 (ref 0–0.4)
EOSINOPHIL NFR BLD AUTO: 9.5 % (ref 0.3–6.2)
ERYTHROCYTE [DISTWIDTH] IN BLOOD BY AUTOMATED COUNT: 12.8 % (ref 12.3–15.4)
GFR SERPL CREATININE-BSD FRML MDRD: 40 ML/MIN/1.73
GLOBULIN UR ELPH-MCNC: 3.8 GM/DL
GLUCOSE BLD-MCNC: 106 MG/DL (ref 65–99)
HCT VFR BLD AUTO: 40.1 % (ref 37.5–51)
HGB BLD-MCNC: 13.1 G/DL (ref 13–17.7)
IMM GRANULOCYTES # BLD AUTO: 0.02 10*3/MM3 (ref 0–0.05)
IMM GRANULOCYTES NFR BLD AUTO: 0.3 % (ref 0–0.5)
LYMPHOCYTES # BLD AUTO: 0.78 10*3/MM3 (ref 0.7–3.1)
LYMPHOCYTES NFR BLD AUTO: 13.2 % (ref 19.6–45.3)
MCH RBC QN AUTO: 31.5 PG (ref 26.6–33)
MCHC RBC AUTO-ENTMCNC: 32.7 G/DL (ref 31.5–35.7)
MCV RBC AUTO: 96.4 FL (ref 79–97)
MONOCYTES # BLD AUTO: 0.67 10*3/MM3 (ref 0.1–0.9)
MONOCYTES NFR BLD AUTO: 11.3 % (ref 5–12)
NEUTROPHILS # BLD AUTO: 3.83 10*3/MM3 (ref 1.4–7)
NEUTROPHILS NFR BLD AUTO: 64.7 % (ref 42.7–76)
NRBC BLD AUTO-RTO: 0 /100 WBC (ref 0–0)
PLATELET # BLD AUTO: 232 10*3/MM3 (ref 140–450)
PMV BLD AUTO: 11.3 FL (ref 6–12)
POTASSIUM BLD-SCNC: 5.4 MMOL/L (ref 3.5–5.2)
PROT SERPL-MCNC: 7.9 G/DL (ref 6–8.5)
RBC # BLD AUTO: 4.16 10*6/MM3 (ref 4.14–5.8)
SODIUM BLD-SCNC: 139 MMOL/L (ref 136–145)
WBC NRBC COR # BLD: 5.92 10*3/MM3 (ref 3.4–10.8)

## 2019-03-05 PROCEDURE — 80053 COMPREHEN METABOLIC PANEL: CPT | Performed by: NURSE PRACTITIONER

## 2019-03-05 PROCEDURE — 99284 EMERGENCY DEPT VISIT MOD MDM: CPT

## 2019-03-05 PROCEDURE — 85025 COMPLETE CBC W/AUTO DIFF WBC: CPT | Performed by: NURSE PRACTITIONER

## 2019-03-05 RX ORDER — SODIUM CHLORIDE 0.9 % (FLUSH) 0.9 %
10 SYRINGE (ML) INJECTION AS NEEDED
Status: DISCONTINUED | OUTPATIENT
Start: 2019-03-05 | End: 2019-03-05 | Stop reason: HOSPADM

## 2019-03-05 NOTE — ED PROVIDER NOTES
EMERGENCY DEPARTMENT ENCOUNTER    CHIEF COMPLAINT  Chief Complaint: Vascular Access Problem  History given by:Pt  History limited by:none  Time Seen: 1320  Room Number: 39/39  PMD: Kelsey Muñoz MD      HPI:  Pt is a 68 y.o. male who presents with erythema to PICC line in right upper arm. Pt states he has an EF of around 15-20% and is on a continuous infusion for a future heart transplant. Pt states his current PICC line has been in for a month. Pt states his home care nurse examined the PICC line today and instructed pt to go to the ER for concerns for infection. Patient also complains of fatigue. Patient denies fever, chills, new CP, new SOA, or N/V.  Past Medical History of MI, HTN, PAF, CHF    Duration: noticed today  Timing:constant  Location:right upper arm  Radiation:none  Quality:erythema around PICC line  Intensity/Severity:moderate  Progression:unchanged  Associated Symptoms:fatigue  Aggravating Factors:none  Alleviating Factors:none  Previous Episodes:none stated  Treatment before arrival: Pt is on a continuous infusion for a future heart transplant. Pt states his home care nurse examined the PICC line today and instructed pt to go to the ER for concerns for infection.    PAST MEDICAL HISTORY  Active Ambulatory Problems     Diagnosis Date Noted   • A-fib (CMS/[facility]) 10/28/2016   • Acid reflux 10/28/2016   • Blood glucose elevated 10/28/2016   • HLD (hyperlipidemia) 10/28/2016   • Cardiomyopathy, ischemic 10/28/2016   • L-S radiculopathy 10/28/2016   • Idiopathic ventricular tachycardia (CMS/[facility]) 10/28/2016   • CHF (congestive heart failure), NYHA class III (CMS/[facility]) 10/28/2016   • Coronary artery disease of native artery of native heart with stable angina pectoris (CMS/[facility]) 11/03/2016   • Non-rheumatic mitral regurgitation 11/03/2016   • Non-rheumatic tricuspid valve insufficiency 11/03/2016   • S/P CABG x 4 11/15/2016   • S/P MVR (mitral valve replacement) 11/15/2016   • S/P TVR (tricuspid valve  repair) 11/15/2016   • History of Bacteremia due to group B Streptococcus 06/08/2018   • Depression 06/27/2018   • History of bacterial endocarditis 07/11/2018   • Anemia of chronic disease 07/15/2018   • Hyperglycemia    • Screen for colon cancer 10/24/2018     Resolved Ambulatory Problems     Diagnosis Date Noted   • Hx of total hip arthroplasty 03/18/2016   • History of total hip arthroplasty 04/13/2016   • Right inguinal pain 04/21/2016   • History of bilateral hip arthroplasty 06/27/2016   • Right hip pain 06/27/2016   • Precordial chest pain 10/04/2016   • Ischemic heart disease 10/27/2016   • Other specified forms of effusion, except tuberculous 11/15/2016   • Acute renal failure (CMS/HCC) 06/04/2018   • Sepsis (CMS/[facility]) 06/04/2018   • Hyponatremia 06/04/2018   • Bacterial pneumonia 06/04/2018   • Pneumonia 07/10/2018   • Hypomagnesemia 07/10/2018   • Positive blood culture 07/10/2018   • Shortness of breath 07/10/2018   • Secondary thrombocytopenia 07/12/2018     Past Medical History:   Diagnosis Date   • Allergic rhinitis    • Anemia    • Asthma    • CAD (coronary artery disease)    • Cardiomyopathy, ischemic    • Carpal tunnel syndrome    • CHF (congestive heart failure), NYHA class III, chronic, systolic (CMS/[facility])    • Endocarditis    • GERD (gastroesophageal reflux disease)    • Health care maintenance    • Herniated disc, cervical    • Hx of total hip arthroplasty 3/18/2016   • Hyperglycemia    • Hyperlipidemia    • Hypertension    • Idiopathic ventricular tachycardia (CMS/[facility])    • Lumbosacral radiculopathy at L4    • Myocardial infarction (CMS/[facility])    • Myocardial ischemia    • Non-rheumatic tricuspid valve insufficiency    • Nonrheumatic mitral valve regurgitation    • Open wound    • Osteoarthritis    • PAF (paroxysmal atrial fibrillation) (CMS/[facility])        PAST SURGICAL HISTORY  Past Surgical History:   Procedure Laterality Date   • APPENDECTOMY     • CARDIAC CATHETERIZATION N/A 10/21/2016     Procedure: Coronary angiography;  Surgeon: Naun Calvin MD;  Location: [facility] CATH INVASIVE LOCATION;  Service:    • CARDIAC CATHETERIZATION N/A 10/21/2016    Procedure: Left heart cath;  Surgeon: Naun Calvin MD;  Location: [facility] CATH INVASIVE LOCATION;  Service:    • CARDIAC CATHETERIZATION N/A 10/21/2016    Procedure: Left ventriculography;  Surgeon: Naun Calvin MD;  Location: [facility] CATH INVASIVE LOCATION;  Service:    • CARDIAC CATHETERIZATION N/A 10/21/2016    Procedure: Right heart cath;  Surgeon: Naun Calvin MD;  Location: [facility] CATH INVASIVE LOCATION;  Service:    • CARDIAC DEFIBRILLATOR PLACEMENT     • CARPAL TUNNEL RELEASE     • COLONOSCOPY     • COLONOSCOPY N/A 11/6/2018    Procedure: COLONOSCOPY to CECUM WITH COLD BX/HOT SNARE POLYPECTOMY;  Surgeon: Kevin Munson MD;  Location: [facility] ENDOSCOPY;  Service: General   • CORNEAL TRANSPLANT Left    • CORONARY ANGIOPLASTY      WITH STENT PLACEMENT TO THE LAD   • CORONARY ARTERY BYPASS GRAFT WITH MITRAL VALVE REPAIR/REPLACEMENT N/A 11/7/2016    Procedure: JAYE STERNOTOMY CORONARY ARTERY BYPASS GRAFT TIMES 4 USING LEFT INTERNAL MAMMARY ARTERY AND LEFT GREATER SAPHENOUS VEIN GRAFT PER ENDOSCOPIC VEIN HARVESTING, MITRAL VALVE REPLACEMENT AND TRICUSPID VALVE REPAIR;  Surgeon: Slade Hutrado MD;  Location: [facility] OR;  Service:    • EYE SURGERY     • FOOT SURGERY Left    • HERNIA REPAIR     • IMPLANTABLE CARDIOVERTER DEFIBRILLATOR LEAD REPLACEMENT/POCKET REVISION     • INGUINAL HERNIA REPAIR Left    • LASIK     • TONSILLECTOMY     • TOTAL HIP ARTHROPLASTY Bilateral        FAMILY HISTORY  Family History   Problem Relation Age of Onset   • Stroke Mother    • Osteoarthritis Mother        SOCIAL HISTORY  Social History     Socioeconomic History   • Marital status: Single     Spouse name: Not on file   • Number of children: Not on file   • Years of education: Not on file   • Highest education level: Not on file   Social Needs    • Financial resource strain: Not on file   • Food insecurity - worry: Not on file   • Food insecurity - inability: Not on file   • Transportation needs - medical: Not on file   • Transportation needs - non-medical: Not on file   Occupational History   • Not on file   Tobacco Use   • Smoking status: Never Smoker   • Smokeless tobacco: Never Used   • Tobacco comment: daily caffeine use   Substance and Sexual Activity   • Alcohol use: Yes     Alcohol/week: 6.0 oz     Types: 10 Shots of liquor per week     Comment: social drinker   • Drug use: No   • Sexual activity: Defer   Other Topics Concern   • Not on file   Social History Narrative   • Not on file         ALLERGIES  Shellfish-derived products    REVIEW OF SYSTEMS  Review of Systems   Constitutional: Positive for fatigue. Negative for activity change, appetite change, chills and fever.   HENT: Negative for congestion and sore throat.    Respiratory: Negative for cough and shortness of breath.    Cardiovascular: Negative for chest pain and leg swelling.   Gastrointestinal: Negative for abdominal pain, diarrhea, nausea and vomiting.   Genitourinary: Negative for decreased urine volume and dysuria.   Musculoskeletal: Negative for back pain and neck pain.   Skin: Positive for color change (erythema around PICC line in RUE). Negative for rash and wound.   Neurological: Negative for dizziness, weakness, numbness and headaches.   Psychiatric/Behavioral: The patient is not nervous/anxious.        PHYSICAL EXAM  ED Triage Vitals   Temp Heart Rate Resp BP SpO2   03/05/19 1220 03/05/19 1220 03/05/19 1220 03/05/19 1233 03/05/19 1220   98.3 °F (36.8 °C) 87 18 132/74 94 %       Physical Exam   Constitutional: He is well-developed, well-nourished, and in no distress. No distress.   HENT:   Head: Atraumatic.   Mouth/Throat: Mucous membranes are normal.   Eyes: No scleral icterus.   Neck: Normal range of motion.   Cardiovascular: Normal rate, regular rhythm and normal heart  sounds.   Pulmonary/Chest: Effort normal and breath sounds normal. No respiratory distress. He has no wheezes. He has no rales.   Musculoskeletal: Normal range of motion.   PICC line in right upper arm. Dressing in place. No swelling note to arm   Neurological: He is alert.   Skin: Skin is warm and dry.   Erythema noted to dressing site, no erythema, drainage or signs of infection noted to PICC line insertion site.   Psychiatric: Mood and affect normal.   Nursing note and vitals reviewed.      LAB RESULTS  Recent Results (from the past 24 hour(s))   Comprehensive Metabolic Panel    Collection Time: 03/05/19  1:45 PM   Result Value Ref Range    Glucose 106 (H) 65 - 99 mg/dL    BUN 29 (H) 8 - 23 mg/dL    Creatinine 1.71 (H) 0.76 - 1.27 mg/dL    Sodium 139 136 - 145 mmol/L    Potassium 5.4 (H) 3.5 - 5.2 mmol/L    Chloride 101 98 - 107 mmol/L    CO2 21.3 (L) 22.0 - 29.0 mmol/L    Calcium 9.4 8.6 - 10.5 mg/dL    Total Protein 7.9 6.0 - 8.5 g/dL    Albumin 4.10 3.50 - 5.20 g/dL    ALT (SGPT) 14 1 - 41 U/L    AST (SGOT) 27 1 - 40 U/L    Alkaline Phosphatase 97 39 - 117 U/L    Total Bilirubin 0.3 0.1 - 1.2 mg/dL    eGFR Non African Amer 40 (L) >60 mL/min/1.73    Globulin 3.8 gm/dL    A/G Ratio 1.1 g/dL    BUN/Creatinine Ratio 17.0 7.0 - 25.0    Anion Gap 16.7 mmol/L   CBC Auto Differential    Collection Time: 03/05/19  1:45 PM   Result Value Ref Range    WBC 5.92 3.40 - 10.80 10*3/mm3    RBC 4.16 4.14 - 5.80 10*6/mm3    Hemoglobin 13.1 13.0 - 17.7 g/dL    Hematocrit 40.1 37.5 - 51.0 %    MCV 96.4 79.0 - 97.0 fL    MCH 31.5 26.6 - 33.0 pg    MCHC 32.7 31.5 - 35.7 g/dL    RDW 12.8 12.3 - 15.4 %    RDW-SD 45.2 37.0 - 54.0 fl    MPV 11.3 6.0 - 12.0 fL    Platelets 232 140 - 450 10*3/mm3    Neutrophil % 64.7 42.7 - 76.0 %    Lymphocyte % 13.2 (L) 19.6 - 45.3 %    Monocyte % 11.3 5.0 - 12.0 %    Eosinophil % 9.5 (H) 0.3 - 6.2 %    Basophil % 1.0 0.0 - 1.5 %    Immature Grans % 0.3 0.0 - 0.5 %    Neutrophils, Absolute 3.83 1.40 -  7.00 10*3/mm3    Lymphocytes, Absolute 0.78 0.70 - 3.10 10*3/mm3    Monocytes, Absolute 0.67 0.10 - 0.90 10*3/mm3    Eosinophils, Absolute 0.56 (H) 0.00 - 0.40 10*3/mm3    Basophils, Absolute 0.06 0.00 - 0.20 10*3/mm3    Immature Grans, Absolute 0.02 0.00 - 0.05 10*3/mm3    nRBC 0.0 0.0 - 0.0 /100 WBC       I ordered the above labs and reviewed the results      PROGRESS AND CONSULTS      1340  Reviewed pt's history and workup with Dr. Reid.  At bedside evaluation, they agree with the plan of care. Pt's PICC line has been evaluated by PICC nurse and will be discharged.    Reviewed implications of results, diagnosis, meds, responsibility to follow up, warning signs and symptoms of possible worsening, potential complications and reasons to return to ER with patient.  Discussed all results and noted any abnormalities with patient.  Discussed absolute need to recheck abnormalities with PCP.    Discussed plan for discharge, as there is no emergent indication for admission.  Pt is agreeable and understands need for follow up and repeat testing.  Pt is aware that discharge does not mean that nothing is wrong but it indicates no emergency is present.  Pt is discharged with instructions to follow up with primary care doctor to have their blood pressure rechecked.       DIAGNOSIS  Final diagnoses:   Irritant contact dermatitis due to other chemical products       FOLLOW UP   Kelsey Muñoz MD  [address] [id number] [phone]      As needed    [facility] Emergency Department  [address] [facility] [address] [phone]    As needed      COURSE & MEDICAL DECISION MAKING  Pertinent Labs that were ordered and reviewed are noted above.  Results were reviewed/discussed with the patient and they were also made aware of online assess.   Pt also made aware that some labs, such as cultures, will not be resulted during ER visit and follow up with PMD is necessary.     MEDICATIONS  GIVEN IN ER  Medications   sodium chloride 0.9 % flush 10 mL (not administered)       /75   Pulse 73   Temp 98.3 °F (36.8 °C) (Tympanic)   Resp 18   SpO2 97%       I personally reviewed the past medical history, past surgical history, social history, family history, current medications and allergies as they appear in this chart.  The scribe's note accurately reflects the work and decisions made by me.     Documentation assistance provided by naomi Lugo for CONNER Marquez on 3/5/2019 at 4:51 PM. Information recorded by the scribe was done at my direction and has been verified and validated by me.          Naldo Lugo  03/05/19 2641       Kristin Arana APRN  03/05/19 1683

## 2019-03-05 NOTE — ED PROVIDER NOTES
Pt is a 68 y.o. male who presents to the ED complaining of redness around his PICC line in his RUE that was noticed by home health nurse this AM. Pt states he is on continuous infusions for upcoming heart transplant. Pt is followed by  (cardiology). Pt denies any pain to his PICC. Family states pt has been more fatigued. Pt states he his on transplant list due to 13% EJ rate caused by prior MI.         On exam,  Constitutional: A/O, NAD  Cardio: RRR  Pulmonary: CTAB  Skin: PICC in RUE, there is erythema in the area of the dressing only, there is no signs of cellulitis or lymphangitis       Plan: Have PICC nurse evaluate pt    4:12 PM  Pt has been evaluated by PICC nurse. Will discharge.      MD ATTESTATION NOTE    The PEDRITO and I have discussed this patient's history, physical exam, and treatment plan.  I have reviewed the documentation and personally had a face to face interaction with the patient. I affirm the documentation and agree with the treatment and plan.  The attached note describes my personal findings.      Documentation assistance provided by naomi Enciso and Steven Pierre for MD Kallie. Information recorded by the scribe was done at my direction and has been verified and validated by me.             Cari Enciso  03/05/19 [id number]       Shady Pierre  03/05/19 1612       Mello Reid MD  03/05/19 [id number]

## 2019-03-05 NOTE — ED TRIAGE NOTES
Pt has PICC line in right arm. Pt had dressing changed today by home health and she was concerned it is infected- redness and drainage.

## 2019-03-05 NOTE — ED NOTES
"Pt sent to ER to to suspected Picc line infection. Home health nurse noticed \"pus\" and drainage coming from the insertion site while changing the dressing today. Pt denies chest pain, SOA, and fever. Pt is alert and oriented x 3. Pt is on a constant Mirinolone pump for heart failure.      Emili Chavez RN  03/05/19 7017    "

## 2019-03-07 ENCOUNTER — PATIENT OUTREACH (OUTPATIENT)
Dept: CASE MANAGEMENT | Facility: OTHER | Age: 69
End: 2019-03-07

## 2019-03-07 NOTE — OUTREACH NOTE
Pt. With recent visit to ER on 3/5/19 due to possible infection at insertion site of PICC line.Pt. Reports PICC line was not infected but he had an allergic reaction to the cleaning solution used during site care by IV team. Pt is on a heart transplant list and is on a continuous Milrinone infusion.Pt. continues to have [facility] IV team visits weekly. Nurse provided patient education.No other questions or concerns voiced at this time

## 2019-03-25 RX ORDER — SACUBITRIL AND VALSARTAN 24; 26 MG/1; MG/1
TABLET, FILM COATED ORAL
Qty: 60 TABLET | Refills: 4 | Status: SHIPPED | OUTPATIENT
Start: 2019-03-25

## 2019-04-11 ENCOUNTER — PATIENT OUTREACH (OUTPATIENT)
Dept: CASE MANAGEMENT | Facility: OTHER | Age: 69
End: 2019-04-11

## 2019-04-16 ENCOUNTER — EPISODE CHANGES (OUTPATIENT)
Dept: CASE MANAGEMENT | Facility: OTHER | Age: 69
End: 2019-04-16

## 2019-08-02 ENCOUNTER — TRANSCRIBE ORDERS (OUTPATIENT)
Dept: CARDIAC REHAB | Facility: HOSPITAL | Age: 69
End: 2019-08-02

## 2019-08-02 DIAGNOSIS — Z94.1 HEART TRANSPLANTED (HCC): Primary | ICD-10-CM

## 2019-08-07 ENCOUNTER — APPOINTMENT (OUTPATIENT)
Dept: CARDIAC REHAB | Facility: HOSPITAL | Age: 69
End: 2019-08-07

## 2019-08-09 NOTE — ANESTHESIA POSTPROCEDURE EVALUATION
"Patient: Mello Arias Jr.    Procedure Summary     Date:  11/06/18 Room / Location:  [facility] ENDOSCOPY 1 / [facility] ENDOSCOPY    Anesthesia Start:  0708 Anesthesia Stop:  0732    Procedure:  COLONOSCOPY to CECUM WITH COLD BX/HOT SNARE POLYPECTOMY (N/A ) Diagnosis:       Screen for colon cancer      (Screen for colon cancer [Z12.11])    Surgeon:  Kevin Munson MD Provider:  Bruce Berry MD    Anesthesia Type:  MAC ASA Status:  4          Anesthesia Type: MAC  Last vitals  BP   93/69 (11/06/18 0726)   Temp   36.4 °C (97.6 °F) (11/06/18 0726)   Pulse   89 (11/06/18 0726)   Resp   18 (11/06/18 0726)     SpO2   96 % (11/06/18 0726)     Post Anesthesia Care and Evaluation    Patient location during evaluation: bedside  Patient participation: complete - patient participated  Level of consciousness: awake and alert  Pain management: adequate  Airway patency: patent  Anesthetic complications: No anesthetic complications    Cardiovascular status: acceptable  Respiratory status: acceptable  Hydration status: acceptable    Comments: BP 93/69 (BP Location: Left arm, Patient Position: Lying)   Pulse 89   Temp 36.4 °C (97.6 °F) (Oral)   Resp 18   Ht 172.7 cm (68\")   Wt 86.6 kg (190 lb 14.4 oz)   SpO2 96%   BMI 29.03 kg/m²       "
I personally performed the service described in the documentation recorded by the scribe in my presence, and it accurately and completely records my words and actions.

## 2019-08-16 ENCOUNTER — TREATMENT (OUTPATIENT)
Dept: CARDIAC REHAB | Facility: HOSPITAL | Age: 69
End: 2019-08-16

## 2019-08-16 DIAGNOSIS — Z94.1 STATUS POST HEART TRANSPLANT (HCC): Primary | ICD-10-CM

## 2019-08-16 PROCEDURE — 93798 PHYS/QHP OP CAR RHAB W/ECG: CPT

## 2019-08-19 ENCOUNTER — TREATMENT (OUTPATIENT)
Dept: CARDIAC REHAB | Facility: HOSPITAL | Age: 69
End: 2019-08-19

## 2019-08-19 DIAGNOSIS — Z94.1 STATUS POST HEART TRANSPLANT (HCC): Primary | ICD-10-CM

## 2019-08-19 PROCEDURE — 93798 PHYS/QHP OP CAR RHAB W/ECG: CPT

## 2019-08-21 ENCOUNTER — TREATMENT (OUTPATIENT)
Dept: CARDIAC REHAB | Facility: HOSPITAL | Age: 69
End: 2019-08-21

## 2019-08-21 DIAGNOSIS — Z94.1 STATUS POST HEART TRANSPLANT (HCC): Primary | ICD-10-CM

## 2019-08-21 PROCEDURE — 93798 PHYS/QHP OP CAR RHAB W/ECG: CPT

## 2019-10-11 ENCOUNTER — APPOINTMENT (OUTPATIENT)
Dept: CARDIAC REHAB | Facility: HOSPITAL | Age: 69
End: 2019-10-11

## 2019-10-14 ENCOUNTER — APPOINTMENT (OUTPATIENT)
Dept: CARDIAC REHAB | Facility: HOSPITAL | Age: 69
End: 2019-10-14

## 2019-10-16 ENCOUNTER — APPOINTMENT (OUTPATIENT)
Dept: CARDIAC REHAB | Facility: HOSPITAL | Age: 69
End: 2019-10-16

## 2019-10-18 ENCOUNTER — APPOINTMENT (OUTPATIENT)
Dept: CARDIAC REHAB | Facility: HOSPITAL | Age: 69
End: 2019-10-18

## 2019-10-21 ENCOUNTER — APPOINTMENT (OUTPATIENT)
Dept: CARDIAC REHAB | Facility: HOSPITAL | Age: 69
End: 2019-10-21

## 2019-10-23 ENCOUNTER — APPOINTMENT (OUTPATIENT)
Dept: CARDIAC REHAB | Facility: HOSPITAL | Age: 69
End: 2019-10-23

## 2019-10-25 ENCOUNTER — APPOINTMENT (OUTPATIENT)
Dept: CARDIAC REHAB | Facility: HOSPITAL | Age: 69
End: 2019-10-25

## 2019-10-28 ENCOUNTER — APPOINTMENT (OUTPATIENT)
Dept: CARDIAC REHAB | Facility: HOSPITAL | Age: 69
End: 2019-10-28

## 2019-10-30 ENCOUNTER — APPOINTMENT (OUTPATIENT)
Dept: CARDIAC REHAB | Facility: HOSPITAL | Age: 69
End: 2019-10-30

## 2019-11-01 ENCOUNTER — APPOINTMENT (OUTPATIENT)
Dept: CARDIAC REHAB | Facility: HOSPITAL | Age: 69
End: 2019-11-01

## 2019-11-04 ENCOUNTER — APPOINTMENT (OUTPATIENT)
Dept: CARDIAC REHAB | Facility: HOSPITAL | Age: 69
End: 2019-11-04

## 2019-11-06 ENCOUNTER — APPOINTMENT (OUTPATIENT)
Dept: CARDIAC REHAB | Facility: HOSPITAL | Age: 69
End: 2019-11-06

## 2019-11-08 ENCOUNTER — APPOINTMENT (OUTPATIENT)
Dept: CARDIAC REHAB | Facility: HOSPITAL | Age: 69
End: 2019-11-08

## 2019-11-11 ENCOUNTER — APPOINTMENT (OUTPATIENT)
Dept: CARDIAC REHAB | Facility: HOSPITAL | Age: 69
End: 2019-11-11

## 2019-11-13 ENCOUNTER — APPOINTMENT (OUTPATIENT)
Dept: CARDIAC REHAB | Facility: HOSPITAL | Age: 69
End: 2019-11-13

## 2019-11-15 ENCOUNTER — APPOINTMENT (OUTPATIENT)
Dept: CARDIAC REHAB | Facility: HOSPITAL | Age: 69
End: 2019-11-15

## 2020-04-22 ENCOUNTER — TELEPHONE (OUTPATIENT)
Dept: CARDIOLOGY | Facility: CLINIC | Age: 70
End: 2020-04-22

## 2020-04-22 NOTE — TELEPHONE ENCOUNTER
If I am not mistaken the patient underwent heart transplantation at the [facility].  I have not seen him back since that time.  I assume he is being followed by the physicians at .  If he is continuing to follow them on a regular basis then they probably other ones that need to get the clearance.  If he would like to see me I be more than happy to do so.  It would need to be in an office visit.

## 2020-04-22 NOTE — TELEPHONE ENCOUNTER
[phone]  Ext [phone]    Maty at [facility] called stating the pt is need back sx pretty urgently.  They will be operating on L2-L5 with Dr Liriano.  His last OV was 10/2018-he needs surgical clearance.  Please advise.    C RMA

## 2021-09-02 ENCOUNTER — TRANSCRIBE ORDERS (OUTPATIENT)
Dept: HOME HEALTH SERVICES | Facility: HOME HEALTHCARE | Age: 71
End: 2021-09-02

## 2021-09-02 ENCOUNTER — HOME HEALTH ADMISSION (OUTPATIENT)
Dept: HOME HEALTH SERVICES | Facility: HOME HEALTHCARE | Age: 71
End: 2021-09-02

## 2021-09-02 DIAGNOSIS — Z87.81 S/P RIGHT HIP FRACTURE: Primary | ICD-10-CM

## 2021-09-03 ENCOUNTER — HOME CARE VISIT (OUTPATIENT)
Dept: HOME HEALTH SERVICES | Facility: HOME HEALTHCARE | Age: 71
End: 2021-09-03

## 2021-09-03 NOTE — CASE COMMUNICATION
Patient was supposed to be a SOC today but wants to be seen on monday. I did call Md office adn received verbal orders per Lawrence for extension of orders for tuesdday 9/7/21.

## 2021-09-08 ENCOUNTER — HOME CARE VISIT (OUTPATIENT)
Dept: HOME HEALTH SERVICES | Facility: HOME HEALTHCARE | Age: 71
End: 2021-09-08

## 2021-09-08 PROCEDURE — G0151 HHCP-SERV OF PT,EA 15 MIN: HCPCS

## 2021-09-08 NOTE — HOME HEALTH
_____________________________________________________________________________  PHYSICAL THERAPY EVALUATION    REASON FOR REFERRAL:  71 yr old male heart transplant recipient with history of (B) AUGUSTUS in 2016 who slipped & fell on 7/10/21 sustaining a nondisplaced fracture of (R) greater trochanter.  Pt treated non-operatively & was placed on strict NWB precautions.  He transferred to [facility] on 7/12/21 & received PT services.  Pt had f/u appt with orthopedist Dr. Matt Covington on 8/31/21 & was allowed to progress WBAT (R) LE.  Pt discharged home on 9/2/21 with orders for home health to address deficits in functional strength & mobility.  Pt was not available for SOC until this date.    DIAGNOSIS:   Nondisplaced fracture of greater trochanter of right femur, subsequent encounter for closed fracture with routine healing    PAST MEDICAL HISTORY:    Asthma  Arthritis  CAD, CHF, Cardiomyopathy S/P HEART TRANSPLANT 4/3/2019 that resolved issues  Herniation of lumbar intervertebral disc with radiculopathy 05/18/2020  Herniated nucleus pulposus, L2-3 right 04/22/2020  HLD  HTN  Right lumbar radiculopathy 04/22/2020  Normocytic anemia 12/16/2019  Asthma exacerbation 12/14/2019  Dyslipidemia 12/14/2019  Acute respiratory failure with hypoxia   MI         PAST SURGICAL HISTORY:    APPENDECTOMY         CORONARY STENT PLACEMENT         CORONARY ARTERY BYPASS GRAFT       CARDIAC DEFIBRILLATOR PLACEMENT         HEART TRANSPLANT 04/03/2019       ORIF ANKLE FRACTURE Right HARDWARE IN PLACE    CORNEAL TRANSPLANT Left     HERNIA REPAIR      BACK SURGERY 05/21/2020 Bilateral L2-3 microdiscectomy       PRIOR LEVEL OF FUNCTION:  Full function, ambulating without asst device.    HOME ENVIRONMENT:  Lives independently in single story home with 2 platform steps to enter.  Pt has ramp over 1 step.  Laundry in basement.  Son checks on patient daily.    MENTAL STATUS:  Alert & Oriented x 4    PATIENT GOAL FOR EPISODE OF CARE:  To return  "to his prior level of function & \"get back to normal\"    PAIN RATING:         LOCATION:  (R) greater trochanter        AT BEST:    0/10        AT WORST:  8/10              AT PRESENT:   0/10 sitting, however increased to 5/10 with WBing        INCREASES WITH: transfers, weight bearing actiities         CONTROLLED WITH:  rest, Tylenol    VITAL SIGNS  BLOOD PRESSURE:  140/80  TEMPERATURE:  98.0  PULSE:  94  RESPIRATIONS:  18  OXYGEN SATURATION:   98% room air  OXYGEN USE:  No    EDEMA:  +1 (L) LE, +1-2 (R) LE    WOUND / SKIN CONDITION:  Intact    POSTURE:  No gross deformities    MUSCLE TONE/COORDINATION:  WNL    SENSATION/PROPRIOCEPTION:  WNL    DOMINANT SIDE:  Right    TINETTI SCORE:  21/28- indicates MODERATE FALL RISK  (TINETTI FALL RISK SCORING: Under 19= High  19-24= Moderate  25 & up= Low)    TIMED UP AND GO: 19.29  seconds- indicates TYPICAL MOBILITY    (TUG Mobility: High > 10 secTypical 10-19Slower 20-29Diminished>30)    5 SIT<>STAND:  18.03 Seconds - indicates IMPAIRED MOBILITY associated with increased disability & morbidity for predicting recurrent fall risk   (60-69 YR= 11.4 [id number] YR= 12.6 sec 80-89 YR= 14.8 sec)"

## 2021-09-09 ENCOUNTER — HOME CARE VISIT (OUTPATIENT)
Dept: HOME HEALTH SERVICES | Facility: HOME HEALTHCARE | Age: 71
End: 2021-09-09

## 2021-09-09 VITALS
SYSTOLIC BLOOD PRESSURE: 106 MMHG | OXYGEN SATURATION: 99 % | HEART RATE: 97 BPM | TEMPERATURE: 97.9 F | RESPIRATION RATE: 18 BRPM | DIASTOLIC BLOOD PRESSURE: 62 MMHG

## 2021-09-09 PROCEDURE — G0152 HHCP-SERV OF OT,EA 15 MIN: HCPCS

## 2021-09-10 NOTE — HOME HEALTH
Patient lives alone in house with bedroom, bathroom with shower stall with bench, grabbars and hand held shower, living room, and kitchen all on main level and laundry in the basement.  Supportive adult son and friends bring him food/meals, and he is able to get himself something to eat and drink with I. Prior to this episode he was I with driving, personal care and community.

## 2021-09-14 ENCOUNTER — HOME CARE VISIT (OUTPATIENT)
Dept: HOME HEALTH SERVICES | Facility: HOME HEALTHCARE | Age: 71
End: 2021-09-14

## 2021-09-14 VITALS
HEART RATE: 82 BPM | SYSTOLIC BLOOD PRESSURE: 132 MMHG | RESPIRATION RATE: 18 BRPM | DIASTOLIC BLOOD PRESSURE: 80 MMHG | OXYGEN SATURATION: 97 %

## 2021-09-14 PROCEDURE — G0151 HHCP-SERV OF PT,EA 15 MIN: HCPCS

## 2021-09-14 NOTE — HOME HEALTH
___________________________________________________________________________________  PHYSICAL THERAPY ROUTINE VISIT NOTE    SUBJECTIVE:  Patient states he's not really having pain in the right hip, but more in the center of his low back.  He's still sleeping in the recliner & informed PT that he really hasnt slept in a bed since before his heart transplant in 2019.  He purchased a [employer] moped but hasnt gotten on it yet.  His son set it up in his den.    MEDICATION CHANGES:  None    FALLS SINCE LAST VISIT:  None    MENTAL STATUS:  Alert & Oriented x 4    PAIN RATING:         LOCATION:  LBP        AT BEST:    0/10        AT WORST:   610        AT PRESENT:   3/10        INCREASES WITH:  mobility, ambulation, prolonged standing         CONTROLLED WITH:  rest, Tylenol    EDEMA:  +2 (R) ankle, +1 (L) ankle    WOUND / SKIN CONDITION:  Intact    POST TREATMENT ASSESSMENT:  Patient progressing in all areas.  His LBP is likely due to sleeping in a recliner without proper support to lumbar spine.  Pt able to actively increase lumbar support with control & reported it made a good difference.  Gait continues to improve.

## 2021-09-16 ENCOUNTER — HOME CARE VISIT (OUTPATIENT)
Dept: HOME HEALTH SERVICES | Facility: HOME HEALTHCARE | Age: 71
End: 2021-09-16

## 2021-09-16 VITALS
HEART RATE: 98 BPM | SYSTOLIC BLOOD PRESSURE: 138 MMHG | RESPIRATION RATE: 18 BRPM | TEMPERATURE: 98 F | OXYGEN SATURATION: 98 % | DIASTOLIC BLOOD PRESSURE: 88 MMHG

## 2021-09-16 PROCEDURE — G0151 HHCP-SERV OF PT,EA 15 MIN: HCPCS

## 2021-09-16 NOTE — HOME HEALTH
___________________________________________________________________________________  PHYSICAL THERAPY ROUTINE VISIT NOTE     SUBJECTIVE:  Patient states his LBP is much better after using the lumbar support in the chair.  His hip doesnt bother him, unless he walks a lot on it.  He wants to be able to sit & ride his moped.    MEDICATION CHANGES:  None     FALLS SINCE LAST VISIT:  None     MENTAL STATUS:  Alert & Oriented x 4     PAIN RATING:         LOCATION:  LBP        AT BEST:    0/10        AT WORST:   410        AT PRESENT:   2/10        INCREASES WITH:  mobility, ambulation, prolonged standing         CONTROLLED WITH:  rest, Tylenol, proper positioning     EDEMA:  +2 (R) ankle, +1 (L) ankle     WOUND / SKIN CONDITION:  Intact     POST TREATMENT ASSESSMENT:  Pt progressing in all areas.  He performed truck transfers well.  He was instructed to pratice ambulating with his STC as tolerated during the day, but to continue to ambulate with the rollator at night & during the early am.

## 2021-09-21 ENCOUNTER — HOME CARE VISIT (OUTPATIENT)
Dept: HOME HEALTH SERVICES | Facility: HOME HEALTHCARE | Age: 71
End: 2021-09-21

## 2021-09-21 VITALS
OXYGEN SATURATION: 97 % | SYSTOLIC BLOOD PRESSURE: 136 MMHG | TEMPERATURE: 98.1 F | HEART RATE: 94 BPM | DIASTOLIC BLOOD PRESSURE: 82 MMHG | RESPIRATION RATE: 20 BRPM

## 2021-09-21 PROCEDURE — G0151 HHCP-SERV OF PT,EA 15 MIN: HCPCS

## 2021-09-21 NOTE — HOME HEALTH
_________________________________________________________________________  PHYSICAL THERAPY ROUTINE VISIT NOTE     SUBJECTIVE: Patient states he's been walking some without a cane or rollator.  His LBP has improved since utilizing the lumbar support in the lift chair.  He feels like he's getting stronger & plans to continue outpatient therapy at [facility].  (PT called & left VM for PT dept at hospital)    FALLS SINCE LAST VISIT: None     MENTAL STATUS: Alert & Oriented x 4     PAIN RATING:   LOCATION: LBP   AT BEST: 0/10   AT WORST: 410   AT PRESENT: 0/10   INCREASES WITH: mobility, ambulation, prolonged standing   CONTROLLED WITH: rest, Tylenol, proper positioning     EDEMA: +2 (R) ankle, +1 (L) ankle     WOUND / SKIN CONDITION: Intact     POST TREATMENT ASSESSMENT:  Patient is rapidly progressing in all areas & will be ready for discharge next visit.  DC notice signed.

## 2021-09-23 ENCOUNTER — HOME CARE VISIT (OUTPATIENT)
Dept: HOME HEALTH SERVICES | Facility: HOME HEALTHCARE | Age: 71
End: 2021-09-23

## 2021-09-23 VITALS
OXYGEN SATURATION: 98 % | DIASTOLIC BLOOD PRESSURE: 90 MMHG | SYSTOLIC BLOOD PRESSURE: 150 MMHG | TEMPERATURE: 97.8 F | RESPIRATION RATE: 18 BRPM | HEART RATE: 92 BPM

## 2021-09-23 PROCEDURE — G0151 HHCP-SERV OF PT,EA 15 MIN: HCPCS

## 2021-09-23 NOTE — HOME HEALTH
______________________________________________________________  PHYSICAL THERAPY DISCHARGE VISIT SUMMARY    SUBJECTIVE:  Pt states he's doing well.  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  He's walking some without the cane, because he left it in the truck.    FALLS SINCE LAST VISIT: None     MENTAL STATUS: Alert & Oriented x 4     PAIN RATING:   LOCATION: LBP   AT BEST: 0/10   AT WORST: 410   AT PRESENT: 0/10   INCREASES WITH: mobility, ambulation, prolonged standing   CONTROLLED WITH: rest, Tylenol, proper positioning     EDEMA: +2 (R) ankle, +1 (L) ankle     WOUND / SKIN CONDITION: Intact     DISCHARGE STATUS:  Home to self care & outpatient therapy    DISCHARGE CONDITION:  Good    DISCHARGE REASON:  Goals achieved    INSTRUCTIONS HOME PROGRAM PROVIDED:  Yes          CONTENT: Written/pictoral Home exercise program          PATIENT/CAREGIVER LEVEL OF COMPLIANCE:  Good    UNMET NEEDS: None    HOME HEALTH SERVICES CONTINUING: None

## 2022-01-01 NOTE — PLAN OF CARE
Physician made aware of potassium level of 7.2     Anita Marie RN  01/01/22 1398 Problem: Patient Care Overview  Goal: Plan of Care Review  Outcome: Ongoing (interventions implemented as appropriate)   06/05/18 1240   Coping/Psychosocial   Plan of Care Reviewed With patient   Plan of Care Review   Progress improving   OTHER   Outcome Summary A&O, denies CP, VSS, low grade temp, monitor tracing sinus tach, lungs with expiratory wheezes scattered, o2 @ 1i in place with O2 sats high 90s, c/o SOB with exertion, voiding qs, appetite fair to poor, fluid restriction maintained, PPP, trace edema, PT in, pt monitored closely, Dr Arguelles at bedside . aware ofd AM procalcitonin, await echo and renal sono at bedside, Mg supplement given per orders.monitor closely.     Goal: Individualization and Mutuality  Outcome: Ongoing (interventions implemented as appropriate)    Goal: Discharge Needs Assessment  Outcome: Ongoing (interventions implemented as appropriate)    Goal: Interprofessional Rounds/Family Conf  Outcome: Ongoing (interventions implemented as appropriate)      Problem: Fall Risk (Adult)  Goal: Identify Related Risk Factors and Signs and Symptoms  Outcome: Ongoing (interventions implemented as appropriate)    Goal: Absence of Fall  Outcome: Ongoing (interventions implemented as appropriate)      Problem: Pneumonia (Adult)  Goal: Signs and Symptoms of Listed Potential Problems Will be Absent, Minimized or Managed (Pneumonia)  Outcome: Ongoing (interventions implemented as appropriate)      Problem: Cardiac: Heart Failure (Adult)  Goal: Signs and Symptoms of Listed Potential Problems Will be Absent, Minimized or Managed (Cardiac: Heart Failure)  Outcome: Ongoing (interventions implemented as appropriate)      Problem: Skin Injury Risk (Adult)  Goal: Identify Related Risk Factors and Signs and Symptoms  Outcome: Ongoing (interventions implemented as appropriate)    Goal: Skin Health and Integrity  Outcome: Ongoing (interventions implemented as appropriate)

## (undated) DEVICE — THE SINGLE USE ETRAP – POLYP TRAP IS USED FOR SUCTION RETRIEVAL OF ENDOSCOPICALLY REMOVED POLYPS.: Brand: ETRAP

## (undated) DEVICE — THE TORRENT IRRIGATION SCOPE CONNECTOR IS USED WITH THE TORRENT IRRIGATION TUBING TO PROVIDE IRRIGATION FLUIDS SUCH AS STERILE WATER DURING GASTROINTESTINAL ENDOSCOPIC PROCEDURES WHEN USED IN CONJUNCTION WITH AN IRRIGATION PUMP (OR ELECTROSURGICAL UNIT).: Brand: TORRENT

## (undated) DEVICE — CANN NASL CO2 TRULINK W/O2 A/

## (undated) DEVICE — SNAR POLYP SENSATION STDOVL 27 240 BX40

## (undated) DEVICE — TUBING, SUCTION, 1/4" X 10', STRAIGHT: Brand: MEDLINE

## (undated) DEVICE — SINGLE-USE BIOPSY FORCEPS: Brand: RADIAL JAW 4

## (undated) DEVICE — Device: Brand: DEFENDO AIR/WATER/SUCTION AND BIOPSY VALVE